# Patient Record
Sex: MALE | Race: WHITE | NOT HISPANIC OR LATINO | Employment: OTHER | URBAN - METROPOLITAN AREA
[De-identification: names, ages, dates, MRNs, and addresses within clinical notes are randomized per-mention and may not be internally consistent; named-entity substitution may affect disease eponyms.]

---

## 2017-10-23 RX ORDER — TRIAMTERENE AND HYDROCHLOROTHIAZIDE 37.5; 25 MG/1; MG/1
CAPSULE ORAL
Qty: 45 CAPSULE | Refills: 0 | OUTPATIENT
Start: 2017-10-23

## 2018-06-20 ENCOUNTER — TELEPHONE (OUTPATIENT)
Dept: DERMATOLOGY | Facility: CLINIC | Age: 68
End: 2018-06-20

## 2018-06-20 NOTE — TELEPHONE ENCOUNTER
6-20-18 Called patient with path report and he is scheduled for a consult with Dr. Goodman on 6-27-18

## 2018-06-27 ENCOUNTER — INITIAL CONSULT (OUTPATIENT)
Dept: DERMATOLOGY | Facility: CLINIC | Age: 68
End: 2018-06-27
Payer: COMMERCIAL

## 2018-06-27 VITALS
WEIGHT: 238 LBS | HEART RATE: 55 BPM | SYSTOLIC BLOOD PRESSURE: 168 MMHG | BODY MASS INDEX: 36.07 KG/M2 | DIASTOLIC BLOOD PRESSURE: 87 MMHG | HEIGHT: 68 IN

## 2018-06-27 DIAGNOSIS — C44.319 BASAL CELL CARCINOMA (BCC) OF RIGHT TEMPLE REGION: Primary | ICD-10-CM

## 2018-06-27 PROCEDURE — 99999 PR PBB SHADOW E&M-EST. PATIENT-LVL III: CPT | Mod: PBBFAC,,, | Performed by: DERMATOLOGY

## 2018-06-27 PROCEDURE — 99202 OFFICE O/P NEW SF 15 MIN: CPT | Mod: S$GLB,,, | Performed by: DERMATOLOGY

## 2018-06-27 RX ORDER — AMLODIPINE BESYLATE 5 MG/1
5 TABLET ORAL DAILY
COMMUNITY
End: 2019-01-30

## 2018-06-27 RX ORDER — NIACIN 500 MG
250 CAPSULE, EXTENDED RELEASE ORAL NIGHTLY
COMMUNITY
End: 2019-05-20

## 2018-06-27 RX ORDER — MONTELUKAST SODIUM 10 MG/1
10 TABLET ORAL NIGHTLY
COMMUNITY
End: 2020-10-25 | Stop reason: SDUPTHER

## 2018-06-27 RX ORDER — TAMSULOSIN HYDROCHLORIDE 0.4 MG/1
0.4 CAPSULE ORAL DAILY
COMMUNITY
End: 2019-01-30

## 2018-06-27 RX ORDER — POTASSIUM CHLORIDE 750 MG/1
10 TABLET, EXTENDED RELEASE ORAL 2 TIMES DAILY
COMMUNITY
End: 2019-08-12 | Stop reason: SDUPTHER

## 2018-06-27 RX ORDER — GLUCOSAMINE/CHONDRO SU A 500-400 MG
1 TABLET ORAL 3 TIMES DAILY
COMMUNITY
End: 2020-01-16

## 2018-06-27 RX ORDER — ROSUVASTATIN CALCIUM 5 MG/1
5 TABLET, COATED ORAL DAILY
COMMUNITY
End: 2019-10-28 | Stop reason: SDUPTHER

## 2018-06-27 RX ORDER — BUDESONIDE AND FORMOTEROL FUMARATE DIHYDRATE 160; 4.5 UG/1; UG/1
2 AEROSOL RESPIRATORY (INHALATION) EVERY 12 HOURS
COMMUNITY
End: 2021-01-07 | Stop reason: SDUPTHER

## 2018-06-27 RX ORDER — TRIAMTERENE AND HYDROCHLOROTHIAZIDE 37.5; 25 MG/1; MG/1
1 CAPSULE ORAL EVERY MORNING
COMMUNITY
End: 2019-11-21 | Stop reason: SDUPTHER

## 2018-06-27 RX ORDER — ALBUTEROL SULFATE 1.25 MG/3ML
1.25 SOLUTION RESPIRATORY (INHALATION) EVERY 6 HOURS PRN
COMMUNITY
End: 2019-01-30

## 2018-06-27 RX ORDER — IBUPROFEN 100 MG/5ML
1000 SUSPENSION, ORAL (FINAL DOSE FORM) ORAL DAILY
COMMUNITY

## 2018-06-27 RX ORDER — ASPIRIN 325 MG
50 TABLET, DELAYED RELEASE (ENTERIC COATED) ORAL DAILY
COMMUNITY

## 2018-06-27 RX ORDER — NAPROXEN SODIUM 220 MG/1
81 TABLET, FILM COATED ORAL
COMMUNITY

## 2018-06-27 RX ORDER — PNV NO.95/FERROUS FUM/FOLIC AC 28MG-0.8MG
1000 TABLET ORAL DAILY
COMMUNITY

## 2018-06-27 NOTE — LETTER
June 28, 2018      Omero Sullivan MD  [address] [address]           [facility]  [address] [facility] [address] [place] [address]  Phone: [phone]          Patient: Nico Teague   MR Number: [id number]   YOB: 1950   Date of Visit: 6/27/2018       Dear Dr. Omero Sullivan:    Thank you for referring Nico Teague to me for evaluation. Attached you will find relevant portions of my assessment and plan of care.    If you have questions, please do not hesitate to call me. I look forward to following Nico Teague along with you.    Sincerely,    Ruddy Goodman MD    Enclosure  CC:  No Recipients    If you would like to receive this communication electronically, please contact [email or web address] or [phone] to request more information on [employer] Link access.    For providers and/or their staff who would like to refer a patient to [facility], please contact us through our one-stop-shop provider referral line, [facility] , at [phone].    If you feel you have received this communication in error or would no longer like to receive these types of communications, please e-mail [email or web address]

## 2018-06-27 NOTE — PROGRESS NOTES
ALLERGIES:  Patient has no known allergies.    CHIEF COMPLAINT:  This 67 y.o. male comes for evaluation for Mohs' Micrographic Surgery, Fresh Tissue Technique, for treatment of a biopsy-proven basal cell carcinoma  on the right central temple. Consultation requested by Omero Sullivan M.D..    The patient is accompanied to this visit by his wife.    HISTORY OF PRESENT ILLNESS:   Location: right central temple  Duration: 2 months or more  Quality: persistent  Context: status post biopsy by Omero Sullivan M.D.; path = basal cell carcinoma; pathology accession #[id number], [facility]     Prior Treatment: none  See also the handwritten notes/diagrams scanned to chart for additional details.    Defibrillator: No  Pacemaker: No  Artificial heart valves: No  Artificial joints: No    REVIEW OF SYSTEMS:   General: general health good  Skin: has previous history of skin cancer(s); prior Mohs surgery right medial canthus at my private office  CV: has hypertension; no artificial valves, has no chest pain; has mitral valve prolapse   Resp: has asthma-related shortness of breath  Endo: has no diabetes  Hem/Lymph: taking prescribed anticoagulants-ASPIRIN, has easy bruising/bleeding  Allergy/Immuno: has no allergies as noted above  GI: has no history of hepatitis  MS: as noted above     PAST MEDICAL HISTORY:  Past Medical History:   Diagnosis Date    Allergy     Arthritis     Asthma     Basal cell carcinoma     Fever blister     Hypertension     Joint pain     Melanoma     Skin disease     Squamous cell carcinoma        PAST SURGICAL HISTORY:  Past Surgical History:   Procedure Laterality Date    SHOULDER ARTHROSCOPY          SOCIAL HISTORY:  Dependencies: smoking status as noted below  Social History   Substance Use Topics    Smoking status: Former Smoker     Packs/day: 2.00     Types: Cigarettes     Start date: 5/1/1972     Quit date: 1/1/1988    Smokeless tobacco: Not on file    Alcohol use Not on file        PERTINENT MEDICATIONS:  See medications list.    Current Outpatient Prescriptions:     albuterol (ACCUNEB) 1.25 mg/3 mL Nebu, Take 1.25 mg by nebulization every 6 (six) hours as needed. Rescue, Disp: , Rfl:     amLODIPine (NORVASC) 5 MG tablet, Take 5 mg by mouth once daily., Disp: , Rfl:     ascorbic acid, vitamin C, (VITAMIN C) 1000 MG tablet, Take 1,000 mg by mouth once daily., Disp: , Rfl:     aspirin 81 MG Chew, Take 81 mg by mouth once daily., Disp: , Rfl:     budesonide-formoterol 160-4.5 mcg (SYMBICORT) 160-4.5 mcg/actuation HFAA, Inhale 2 puffs into the lungs every 12 (twelve) hours. Controller, Disp: , Rfl:     co-enzyme Q-10 50 mg capsule, Take 50 mg by mouth once daily., Disp: , Rfl:     glucosamine-chondroitin 500-400 mg tablet, Take 1 tablet by mouth 3 (three) times daily., Disp: , Rfl:     montelukast (SINGULAIR) 10 mg tablet, Take 10 mg by mouth every evening., Disp: , Rfl:     niacin 500 MG CpSR, Take 250 mg by mouth every evening., Disp: , Rfl:     potassium chloride SA (K-DUR,KLOR-CON) 10 MEQ tablet, Take 10 mEq by mouth 2 (two) times daily., Disp: , Rfl:     rosuvastatin (CRESTOR) 5 MG tablet, Take 5 mg by mouth once daily., Disp: , Rfl:     tamsulosin (FLOMAX) 0.4 mg Cp24, Take 0.4 mg by mouth once daily., Disp: , Rfl:     triamterene-hydrochlorothiazide 37.5-25 mg (DYAZIDE) 37.5-25 mg per capsule, Take 1 capsule by mouth every morning., Disp: , Rfl:     vitamin E 1000 UNIT capsule, Take 1,000 Units by mouth once daily., Disp: , Rfl:     ALLERGIES:  Patient has no known allergies.    EXAM:  See also the handwritten notes/diagrams scanned to chart for additional details.  Constitutional  General appearance: well-developed, well-nourished, well-kempt older white male    Eyes  Inspection of conjunctivae and lids reveals no abnormalities; sclerae anicteric  Neurologic/Psychiatric  Alert,  normal orientation to time, place, person  Normal mood and affect with no evidence of  depression, anxiety, agitation  Skin: see photo(s)  Head: background marked solar damage to exposed areas of skin; in addition, inspection/palpation reveals an approximately 8 mm eschar on the right temple which feels freely movable over the underlying tissues on palpation;  he confirmed this as the site of the prior biopsy  Neck: examination reveals marked chronic solar damage  Right upper extremity: examination reveals marked chronic solar damage; some ecchymosis/ecchymoses   Left upper extremity: examination reveals marked chronic solar damage; some ecchymosis/ecchymoses     ASSESSMENT: biopsy-proven basal cell carcinoma of the right temple  chronic solar damage to areas as noted above  Personal history of skin cancer  Long term current use of aspirin    PLAN:  The diagnosis and management options, and risks and benefits of the alternatives, including observation/non-treatment, radiation treatment, excision with vertical frozen section or paraffin-embedded section margin evaluation, and Mohs' Micrographic Surgery, Fresh Tissue Technique, were discussed at length with the patient. In particular, the discussion included, but was not limited to, the following:    One alternative at this point would be to defer further treatment and observe the lesion. With small skin cancers of this kind, it is possible that a biopsy can be sufficient to definitively treat a small skin cancer of this kind. Alternatively, some skin cancers are slow growing and do not require immediate treatment. The potential advantage of this choice would be to avoid the need for possibly unnecessary additional surgery. Among the potential disadvantages of this would be the possibility of enlargement of the lesion, more extensive spread of the lesion or recurrence at a later date, which might necessitate a larger and more complex surgery.    Radiation treatment can be an effective treatment for this type of skin cancer. The usual course of treatment  is every weekday for several weeks. Local irritation will result from treatment, although no systemic side effects are expected. The potential advantage of radiation treatment is that it avoids the need for surgery. Among the disadvantages of radiation treatment are the length of treatment, the local inflammatory response, the absence of pathologic confirmation of the removal of the skin cancer, a possible increased risk of additional skin cancer in the treated area in later years, and a somewhat increased risk of recurrence at a later date.     Excisional surgery can be an effective treatment for this type of skin cancer. This would involve excision of the lesion with margin evaluation by submitting the specimen to a pathologist for either immediate marginal assessment via frozen section processing, or delayed marginal assessment by fixed-tissue processing. The potential advantage of this technique is that it offers a way of treating the lesion with some degree of histologic confirmation of tumor removal. Among the disadvantages of this treatment are the possible need for re-excision if marginal involvement is identified, a somewhat greater likelihood of recurrence as compared to Mohs' surgery because of the less comprehensive margin evaluation inherent in the technique, and the general potential risks of surgery, including allergic reactions to the anesthetic and other materials used, infection, injury to nerves in the area with consequent loss of sensation or muscle function, and scarring or distortion of surrounding structures.    Mohs' surgery is a very effective treatment for this type of skin cancer. The potential advantage of Mohs' surgery is that this technique offers the greatest possible certainty of knowing that the skin cancer has been completely removed, with the removal of the least amount of normal tissue. The potential disadvantages of Mohs' surgery include the duration of the surgery, the possible  need for a separate surgery for reconstruction following tumor removal, and scarring as a result. In addition, general potential risks of surgery as noted above also apply to treatment via Mohs' surgery.    In light of the nature of this tumor and the location on the temple in an area of increased risk of recurrence,  Mohs' micrographic surgery was thought to be the most appropriate management choice, and this diagnosis is appropriate for treatment by Mohs' micrographic surgery.     Sufficient time was available for questions, and all questions were answered to his satisfaction. He fully understands the aims, risks, alternatives, and possible complications, and has elected to proceed with the surgery, and verbally consented to do so. The procedure will be scheduled in the near future.    Routine pre-op instructions were given to him.    I instructed him to continue ASA prior to surgery.    A consultation report will be sent to Dr. Sullivan.  --------------------------------------  Note: Some or all of this note may have been generated using voice recognition software. There may be voice recognition errors including grammatical and/or spelling errors found in the text. Attempts were made to correct these errors prior to signature.

## 2018-07-20 ENCOUNTER — TELEPHONE (OUTPATIENT)
Dept: DERMATOLOGY | Facility: CLINIC | Age: 68
End: 2018-07-20

## 2018-07-20 NOTE — TELEPHONE ENCOUNTER
7-20-18 returned patient call and told him it was at the [place] office and that I mailed him a new appointment paper. Katty

## 2018-07-20 NOTE — TELEPHONE ENCOUNTER
----- Message from Bao Woodson sent at 7/20/2018  8:45 AM CDT -----  Contact: Patient   Patient Returning Call from [facility]    Who Left Message for Patient:Maria Elena  Communication Preference:[phone]  Additional Information:

## 2018-08-27 NOTE — PROGRESS NOTES
ALLERGIES:   Patient has no known allergies.      Current Outpatient Medications:     albuterol (ACCUNEB) 1.25 mg/3 mL Nebu, Take 1.25 mg by nebulization every 6 (six) hours as needed. Rescue, Disp: , Rfl:     amLODIPine (NORVASC) 5 MG tablet, Take 5 mg by mouth once daily., Disp: , Rfl:     ascorbic acid, vitamin C, (VITAMIN C) 1000 MG tablet, Take 1,000 mg by mouth once daily., Disp: , Rfl:     aspirin 81 MG Chew, Take 81 mg by mouth once daily., Disp: , Rfl:     budesonide-formoterol 160-4.5 mcg (SYMBICORT) 160-4.5 mcg/actuation HFAA, Inhale 2 puffs into the lungs every 12 (twelve) hours. Controller, Disp: , Rfl:     co-enzyme Q-10 50 mg capsule, Take 50 mg by mouth once daily., Disp: , Rfl:     glucosamine-chondroitin 500-400 mg tablet, Take 1 tablet by mouth 3 (three) times daily., Disp: , Rfl:     montelukast (SINGULAIR) 10 mg tablet, Take 10 mg by mouth every evening., Disp: , Rfl:     niacin 500 MG CpSR, Take 250 mg by mouth every evening., Disp: , Rfl:     potassium chloride SA (K-DUR,KLOR-CON) 10 MEQ tablet, Take 10 mEq by mouth 2 (two) times daily., Disp: , Rfl:     rosuvastatin (CRESTOR) 5 MG tablet, Take 5 mg by mouth once daily., Disp: , Rfl:     tamsulosin (FLOMAX) 0.4 mg Cp24, Take 0.4 mg by mouth once daily., Disp: , Rfl:     triamterene-hydrochlorothiazide 37.5-25 mg (DYAZIDE) 37.5-25 mg per capsule, Take 1 capsule by mouth every morning., Disp: , Rfl:     vitamin E 1000 UNIT capsule, Take 1,000 Units by mouth once daily., Disp: , Rfl:   -------------------------------------------------------------  PROCEDURE: Mohs' Micrographic Surgery    SITE: right central temple    INDICATION: basal cell carcinoma in an area at increased risk of recurrence    CASE NUMBER: [id number]      ANESTHETIC: 3 mL 1% Lidocaine with Epinephrine 1:100,000    SURGICAL PREP: Ethanol and Hibiclens    SURGEON: Ruddy Goodman MD    ASSISTANTS: Katty Fiore CST     PREOPERATIVE DIAGNOSIS: basal cell  carcinoma    POSTOPERATIVE DIAGNOSIS: basal cell carcinoma    PATHOLOGIC DIAGNOSIS: basal cell carcinoma    STAGES OF MOHS' SURGERY PERFORMED: one    TUMOR-FREE PLANE ACHIEVED: yes    HEMOSTASIS: Hyfrecation     SPECIMENS: one (one in stage A, )    INITIAL LESION SIZE: 1.5 x 1.5 cm    FINAL DEFECT SIZE: 1.5 x 1.6 cm    WOUND REPAIR/DISPOSITION: see below    NARRATIVE:    The patient is a 67 y.o.male referred by Omero Sullivan MD with a history of cancer on the right temple which was biopsied - pathology accession #[id number], [facility]. Findings revealed basal cell carcinoma. Examination revealed a pink, sclerotic plaque on the right temple at the site of prior biopsy, which was confirmed by reference to the photograph taken at the previous patient visit. In light of the nature of this tumor and the location on the temple, Mohs' micrographic surgery was thought to be the most appropriate management choice, and this diagnosis is appropriate for treatment by Mohs' micrographic surgery.  I discussed it with the patient and he fully understands the aims, risks, alternatives, and possible complications, and elects to proceed.  There are no medical or surgical contraindications to the procedure.     A signed informed consent was obtained.    PROCEDURE:  The patient was placed in the right lateral decubitus position on the operating table in the Mohs' Surgery Suite. The area in question was thoroughly prepped with ethanol and Hibiclens, with particular care to avoid application to the immediate periocular skin or introduction into the external auditory meatus. A sterile surgical marker was used to outline the clinically apparent margins of the involved area, and a narrow margin of normal-appearing skin. Reference marks were made at the periphery of the outlined area with the surgical marker. The proposed area of excision was measured and photographed. Local anesthesia as noted above was administered.  The total  "volume of anesthetic used throughout this portion of the procedure was as documented above. The area was prepared and draped in the standard manner. All of the grossly identifiable area of clinically abnormal tissue and an underlying/peripheral layer was taken and processed by the Mohs' technique.  Hemostasis was obtained with the hyfrecator. Tissue was taken from any areas of residual marginal involvement (if present) and processed by the Mohs' technique in as many stages as needed until a tumor-free plane was achieved.    Colors of inks used in the reference nicks at epidermal margins (if present) and/or inking of non-epithelial edges, if applicable, is represented on the Mohs map as follows: solid lines represent red ink, dots represent blue ink, jagged lines represent black ink, curlicues represent green ink, "xxx" represents yellow ink.    The first Mohs' layer consisted of one section(s) with 4 slide(s) evaluated. No residual tumor was noted at the margins of the first Mohs' layer. Histology of the specimen(s) showed changes consistent with chronic solar damage.     A total of one section(s) and 4 slide(s) were examined under the microscope via the Mohs technique.  A cancer free plane was reached after layer number one. Defect final size was as noted above.     The wound was covered with a nonadherent dressing between stages, and the patient allowed to wait in the waiting area during these periods. The final defect was photographed at the completion of the Mohs' procedure.    See the separate procedure note which follows regarding repair of the defect following Mohs' surgery.     -----------------------------------------------    REPAIR FOLLOWING MOHS' MICROGRAPHIC SURGERY    PREOPERATIVE DIAGNOSIS: defect following Mohs' surgery for a basal cell carcinoma    POSTOPERATIVE DIAGNOSIS: same    PROCEDURE PERFORMED: complex linear closure     ANESTHETIC: 6 mL 1% Lidocaine with Epinephrine 1:100,000     SURGICAL " PREP: Hibiclens    SURGEON: Ruddy Goodman MD     ASSISTANTS: as above    LOCATION: right temple      INDICATIONS:  Earlier in the day, the patient underwent Mohs' micrographic surgical excision of a basal cell carcinoma on the right temple. Tumor free margins were achieved after layer number one.  Later in the day, the management of the resulting wound was addressed with the patient. I discussed the various wound management options with the patient and he fully understands the aims, risks, alternatives, and possible complications of the alternatives, and he elects to proceed with closure of the defect in the manner noted below.  There are no medical or surgical contraindications to the procedure.    A signed informed consent was previously obtained.    PROCEDURE:  Repair via complex closure:  The patient was returned to the procedure room following completion of the Mohs' procedure and final slide review. Because of the size, shape and location of the defect, simple closure could not be achieved without possible distortion of surrounding structures, excessive tension on the wound margins and an unacceptable risk of wound dehiscence, and the creation of standing cone deformities. Consideration was given to the site of the wound, the surrounding structures, and the orientation of closure necessary to provide the optimal functional and cosmetic outcome. After devoting time to these considerations, and to the orientation of the vectors of maximal skin tension surrounding the defect, the area was prepped again and a fusiform closure was outlined on the skin surrounding the defect with a sterile surgical marker, to minimize tension across the wound. Additional anesthetic was infiltrated into the tissues surrounding the defect and the anticipated area of repair, to maintain anesthesia during the procedure. Preparation of the site for closure was then carried out by extending the defect through excision of triangles of  superfluous tissue on either side of the wound to square the shoulders of the defect and to allow closure without distortion by standing cone deformities, creating a semi-fusiform defect with an anterior M-plasty.  Wound margins were extensively undermined to allow advancement of the wound margins into the defect and to permit closure with minimal tension. After hemostasis was achieved with the hyfrecator, closure was accomplished with:      multiple #4-0 buried interrupted Vicryl suture(s) and    multiple #5-0 simple interrupted and vertical mattress Prolene suture(s) and    one #5-0 running locked Prolene suture(s) for final approximation of the wound margins.    Total length of the final closure, including the limbs of the M-plasty, was 5.0 cm.     The site was photographed following completion of the repair. Final dressing consisted of petrolatum, Telfa and tape.    Estimated blood loss for the total procedure was less than 5 mL.    Total operative time including tissue processing in the Mohs' laboratory and microscopic Mohs' frozen section slide review was 2 hour(s). Verbal and written wound care instructions were given to the patient, and he expressed understanding of these instructions. The patient tolerated the procedure well and left the operating room in good condition; he is to return in 7 days for suture removal.     Dr. Goodman's cell phone number was given to the patient with instructions to call prn with any problems.

## 2018-08-28 ENCOUNTER — PROCEDURE VISIT (OUTPATIENT)
Dept: DERMATOLOGY | Facility: CLINIC | Age: 68
End: 2018-08-28
Payer: COMMERCIAL

## 2018-08-28 VITALS
BODY MASS INDEX: 36.07 KG/M2 | HEIGHT: 68 IN | HEART RATE: 65 BPM | WEIGHT: 238 LBS | DIASTOLIC BLOOD PRESSURE: 74 MMHG | SYSTOLIC BLOOD PRESSURE: 140 MMHG

## 2018-08-28 DIAGNOSIS — C44.319 BASAL CELL CARCINOMA (BCC) OF RIGHT TEMPLE REGION: Primary | ICD-10-CM

## 2018-08-28 PROCEDURE — 99499 UNLISTED E&M SERVICE: CPT | Mod: S$GLB,,, | Performed by: DERMATOLOGY

## 2018-08-28 PROCEDURE — 17311 MOHS 1 STAGE H/N/HF/G: CPT | Mod: PBBFAC,PO | Performed by: DERMATOLOGY

## 2018-08-28 PROCEDURE — 17311 MOHS 1 STAGE H/N/HF/G: CPT | Mod: S$GLB,,, | Performed by: DERMATOLOGY

## 2018-08-28 PROCEDURE — 13132 CMPLX RPR F/C/C/M/N/AX/G/H/F: CPT | Mod: 51,S$GLB,, | Performed by: DERMATOLOGY

## 2018-08-28 PROCEDURE — 13132 CMPLX RPR F/C/C/M/N/AX/G/H/F: CPT | Mod: PBBFAC,PO | Performed by: DERMATOLOGY

## 2018-08-28 RX ORDER — LOSARTAN POTASSIUM 100 MG/1
100 TABLET ORAL DAILY
COMMUNITY
End: 2019-08-23 | Stop reason: SDUPTHER

## 2018-09-04 ENCOUNTER — OFFICE VISIT (OUTPATIENT)
Dept: DERMATOLOGY | Facility: CLINIC | Age: 68
End: 2018-09-04
Payer: COMMERCIAL

## 2018-09-04 DIAGNOSIS — Z48.02 VISIT FOR SUTURE REMOVAL: Primary | ICD-10-CM

## 2018-09-04 PROCEDURE — 99024 POSTOP FOLLOW-UP VISIT: CPT | Mod: S$GLB,,, | Performed by: DERMATOLOGY

## 2018-09-04 PROCEDURE — 99999 PR PBB SHADOW E&M-EST. PATIENT-LVL III: CPT | Mod: PBBFAC,,, | Performed by: DERMATOLOGY

## 2018-09-04 NOTE — PROGRESS NOTES
CC: 67 y.o.male patient is here for suture removal.     HPI: Patient is one week(s) s/p Mohs' micrographic surgery, fresh tissue technique of a Basal ell Carcinoma on the right temple, with subsequent repair   Patient reports no problems.    EXAM:  Sutures intact.  Wound healing well.  Good approximation of skin edges.  No undue erythema to surrounding skin or signs or symptoms of infection.    IMPRESSION:  Healing well post Mohs' micrographic surgery and repair    PLAN:  Site cleaned with peroxide, sutures removed  Dressed with petrolatum   Reviewed further care and expected course  Followup 3-4 months to Dr. Sullivan; PRN to me

## 2018-11-29 ENCOUNTER — TELEPHONE (OUTPATIENT)
Dept: PULMONOLOGY | Facility: CLINIC | Age: 68
End: 2018-11-29

## 2018-11-29 NOTE — TELEPHONE ENCOUNTER
----- Message from Arjun Montoya MA sent at 11/29/2018  2:11 PM CST -----      ----- Message -----  From: Catarina Song  Sent: 11/28/2018   2:39 PM  To: [facility] Staff    Would like to be seen tomorrow morning. Having uri issues, temp of 101 that started this afternoon. Sniffles, cough that is not productive, sinus issues. Uses [facility] on  [address]. Body aches. Had flu shot about week and half ago.

## 2018-11-29 NOTE — TELEPHONE ENCOUNTER
I spoke with the patient today, as I just got the message.  He is actually feeling better, no longer with fever.  He is hydrating. Told him to treat cold with OTC meds if he gets worse to call back

## 2019-01-29 RX ORDER — FLUTICASONE PROPIONATE 50 MCG
1 SPRAY, SUSPENSION (ML) NASAL
COMMUNITY
End: 2021-09-22 | Stop reason: SDUPTHER

## 2019-01-29 RX ORDER — GUAIFENESIN AND PHENYLEPHRINE HCL 400; 10 MG/1; MG/1
TABLET ORAL
COMMUNITY
End: 2019-05-20

## 2019-01-29 RX ORDER — MULTIVITAMIN
1 TABLET ORAL DAILY
COMMUNITY

## 2019-01-29 RX ORDER — GARLIC 100 MG
TABLET ORAL
COMMUNITY
End: 2020-01-16

## 2019-01-30 ENCOUNTER — OFFICE VISIT (OUTPATIENT)
Dept: PULMONOLOGY | Facility: CLINIC | Age: 69
End: 2019-01-30
Payer: COMMERCIAL

## 2019-01-30 VITALS
WEIGHT: 258 LBS | OXYGEN SATURATION: 98 % | SYSTOLIC BLOOD PRESSURE: 140 MMHG | DIASTOLIC BLOOD PRESSURE: 65 MMHG | HEART RATE: 68 BPM | HEIGHT: 68 IN | BODY MASS INDEX: 39.1 KG/M2

## 2019-01-30 DIAGNOSIS — J45.40 MODERATE PERSISTENT ASTHMA WITHOUT COMPLICATION: Primary | ICD-10-CM

## 2019-01-30 PROBLEM — J45.909 MODERATE ASTHMA: Status: ACTIVE | Noted: 2019-01-30

## 2019-01-30 PROCEDURE — 99214 OFFICE O/P EST MOD 30 MIN: CPT | Mod: ,,, | Performed by: INTERNAL MEDICINE

## 2019-01-30 PROCEDURE — 99214 PR OFFICE/OUTPT VISIT, EST, LEVL IV, 30-39 MIN: ICD-10-PCS | Mod: ,,, | Performed by: INTERNAL MEDICINE

## 2019-01-30 PROCEDURE — 1101F PR PT FALLS ASSESS DOC 0-1 FALLS W/OUT INJ PAST YR: ICD-10-PCS | Mod: ,,, | Performed by: INTERNAL MEDICINE

## 2019-01-30 PROCEDURE — 1101F PT FALLS ASSESS-DOCD LE1/YR: CPT | Mod: ,,, | Performed by: INTERNAL MEDICINE

## 2019-01-30 RX ORDER — VERAPAMIL HYDROCHLORIDE 120 MG/1
120 TABLET, FILM COATED ORAL DAILY
COMMUNITY
Start: 2019-01-24 | End: 2020-01-16

## 2019-01-30 RX ORDER — ASCORBATE CALCIUM 500 MG
500 TABLET ORAL
COMMUNITY
End: 2019-05-20

## 2019-01-30 RX ORDER — DICLOFENAC SODIUM 10 MG/G
GEL TOPICAL
COMMUNITY
Start: 2018-12-18 | End: 2019-05-20

## 2019-01-30 RX ORDER — ALBUTEROL SULFATE 90 UG/1
2 AEROSOL, METERED RESPIRATORY (INHALATION) EVERY 6 HOURS PRN
Qty: 3 INHALER | Refills: 4 | Status: SHIPPED | OUTPATIENT
Start: 2019-01-30 | End: 2021-04-01 | Stop reason: SDUPTHER

## 2019-01-30 NOTE — PROGRESS NOTES
SUBJECTIVE:    Patient ID: Nico Teague is a 68 y.o. male.    Chief Complaint: Asthma    HPI   Patient here today feeling alright.  He is using Symbicort twice a day.  He is using his rescue inhaler 3-4 times a week.  He states Dr. Rivera diagnosed him with PVC's and changed some of his meds.  He does not know if his shortness of breath is his asthma or his heart, he does not check his peak flow when he is short of breath.    Past Medical History:   Diagnosis Date    Allergy     Arthritis     Asthma     Basal cell carcinoma     Fever blister     Hypertension     Joint pain     Melanoma     PVC (premature ventricular contraction)     Skin disease     Squamous cell carcinoma      Past Surgical History:   Procedure Laterality Date    LIPOMA RESECTION      SHOULDER ARTHROSCOPY      SKIN GRAFT       Family History   Problem Relation Age of Onset    Cancer Mother         Social History:   Marital Status:   Occupation: retired captain   Alcohol History:  reports that he does not drink alcohol.  Tobacco History:  reports that he quit smoking about 31 years ago. His smoking use included cigarettes. He started smoking about 46 years ago. He has a 40.00 pack-year smoking history. He does not have any smokeless tobacco history on file.  Drug History:  reports that he does not use drugs.    Review of patient's allergies indicates:   Allergen Reactions    Bactrim [sulfamethoxazole-trimethoprim]      HIVES/ RASH       Current Outpatient Medications   Medication Sig Dispense Refill    albuterol (ACCUNEB) 1.25 mg/3 mL Nebu Take 1.25 mg by nebulization every 6 (six) hours as needed. Rescue      ascorbic acid, vitamin C, (VITAMIN C) 1000 MG tablet Take 1,000 mg by mouth once daily.      aspirin 81 MG Chew Take 81 mg by mouth once daily.      budesonide-formoterol 160-4.5 mcg (SYMBICORT) 160-4.5 mcg/actuation HFAA Inhale 2 puffs into the lungs every 12 (twelve) hours. Controller      co-enzyme Q-10 50  mg capsule Take 50 mg by mouth once daily.      fluticasone (FLONASE) 50 mcg/actuation nasal spray 1 spray by Nasal route.      garlic 100 mg Tab Take by mouth.      glucosamine-chondroitin 500-400 mg tablet Take 1 tablet by mouth 3 (three) times daily.      Lactobacillus rhamnosus GG (CULTURELLE) 10 billion cell capsule Take 1 capsule by mouth.      losartan (COZAAR) 100 MG tablet Take 100 mg by mouth once daily.      montelukast (SINGULAIR) 10 mg tablet Take 10 mg by mouth every evening.      multivitamin (THERAGRAN) per tablet Take 1 tablet by mouth.      niacin 500 MG CpSR Take 250 mg by mouth every evening.      potassium 99 mg Tab Take by mouth.      potassium chloride SA (K-DUR,KLOR-CON) 10 MEQ tablet Take 10 mEq by mouth 2 (two) times daily.      rosuvastatin (CRESTOR) 5 MG tablet Take 5 mg by mouth once daily.      triamterene-hydrochlorothiazide 37.5-25 mg (DYAZIDE) 37.5-25 mg per capsule Take 1 capsule by mouth every morning.      turmeric root extract 500 mg Cap Take by mouth.      verapamil (CALAN-SR) 180 MG CR tablet       vitamin E 1000 UNIT capsule Take 1,000 Units by mouth once daily.      ascorbate calcium 500 mg Tab Take 500 mg by mouth.      diclofenac sodium (VOLTAREN) 1 % Gel        No current facility-administered medications for this visit.        Last PFT: 4/2018      Review of Systems  General: Feeling Well.  Eyes: Vision is good.  ENT:  No sinusitis or pharyngitis.   Heart:: feels palpitations occasionally .  Lungs: dyspnea stable but still using rescue 4-5 times a week, cold spell makes him more short of breath   GI: No Nausea, vomiting, constipation, diarrhea, or reflux.  : No dysuria, hesitancy, or nocturia.  Musculoskeletal: knee pain .  Skin: No lesions or rashes.  Neuro: No headaches or neuropathy.  Lymph: swellings better since taking his fluid pill daily   Psych: No anxiety or depression.  Endo: weight gain     OBJECTIVE:      BP (!) 140/65 (BP Location: Left  "arm, Patient Position: Sitting, BP Method: Medium (Manual))   Pulse 68   Ht 5' 8" (1.727 m)   Wt 117 kg (258 lb)   SpO2 98%   BMI 39.23 kg/m²     Physical Exam  GENERAL: Older patient in no distress.  HEENT: Pupils equal and reactive. Extraocular movements intact. Nose intact.      Pharynx moist.  NECK: Supple.   HEART: Regular rate and rhythm. No murmur or gallop auscultated.  LUNGS: Clear to auscultation and percussion. Lung excursion symmetrical. No change in fremitus. No adventitial noises.  ABDOMEN: Bowel sounds present. Non-tender, no masses palpated.  EXTREMITIES: Normal muscle tone and joint movement, no cyanosis or clubbing.   LYMPHATICS: No adenopathy palpated, no edema.  SKIN: Dry, intact, no lesions.   NEURO: Cranial nerves II-XII intact. Motor strength 5/5 bilaterally, upper and lower extremities.  PSYCH: Appropriate affect.    Divya Alvarez NP served in the capacity as a "scribe" for this patient encounter.  A "face to face" encounter occurred with Dr. Brewer on this date  The treatment plan and medical decision making is outlined below:         Assessment:       1. Moderate persistent asthma without complication          Plan:       Moderate persistent asthma without complication    lose weight  Exercise  Use your rescue inhaler 2 puffs 15 minutes before exercise   Check you peak when you feel short of breath before reaching for your rescue inhaler  Continue the Symbicort      Follow-up in about 6 months (around 7/30/2019).        "

## 2019-01-30 NOTE — PATIENT INSTRUCTIONS
Understanding Asthma Triggers  Triggers are things that cause you to have asthma symptoms. Some triggers you can stay away from completely. Others you can plan for and adjust to. Use this sheet to help you know your triggers.    What are triggers?  Triggers irritate your lungs and lead to asthma flare-ups. Some examples are:  · Irritants, such as tobacco smoke or air pollution. These are a concern for all people with asthma.  · Allergens or substances that cause allergies, like pets, dust mites, or pollen  · Special conditions. These include being ill with a cold or the flu, or certain kinds of weather, including changes in weather. These differ from person to person.  · Exercise can trigger asthma in some people. If exercise is one of your triggers, you can learn how to exercise safely.  What triggers your asthma?  Which of these common triggers cause your asthma to flare up? Check all that apply to you.  Irritants:  ? Tobacco smoke (smoking or secondhand smoke)  ? Smoke from fireplaces  ? Vehicle exhaust  ? Smog or air pollution  ? Aerosol sprays  ? Strong odors, such as perfume, incense, or cooking odors  ? Household , such as ammonia or bleach  Allergens:  ? Cats  ? Dogs  ? Birds  ? Dust or dust mites  ? Pollen  ? Mold  ? Cockroaches  Other triggers:  ? Cold air  ? Hot air  ? Weather changes  ? Exercise  ? Certain foods or food ingredients (such as sulfites)  ? Medicines  ? Emotions such as laughing, crying, or feeling stressed  ? Illness such as colds, flu, and sinus infections  Allergies and allergy treatment  People with asthma often have allergies. If you have allergies, or think you have them, talk with your healthcare provider about testing and treatment. Allergy testing can find out exactly which allergens affect you. Types of tests include:  · Skin tests. A small amount of each allergen is put on the skin. Sites are then looked at for an allergic reaction. This could be redness, swelling, or  itching. In general, the greater the reaction, the stronger the allergy.  · Blood tests. A blood test can show sensitivity to the allergen.  Exposing a person to gradually larger amounts of an allergen can help the body build up a tolerance. This is the purpose of allergy shots (immunotherapy). For this therapy, injections are given over a period of years. At first, you get injections with a very small amount of allergen about once a week. As treatment goes on, the amount of allergen is gradually increased to a certain level. Eventually, you have the injections less often. This therapy can take up to a year to start working. But it can be very effective to manage certain allergies over time.   Date Last Reviewed: 10/1/2016  © 9905-2099 [employer] [address] [place] [address], [place] [address]. All rights reserved. This information is not intended as a substitute for professional medical care. Always follow your healthcare professional's instructions.      ose weight  Exercise  Use your rescue inhaler 2 puffs 15 minutes before exercise   Check you peak when you feel short of breath   Continue the Symbicort

## 2019-05-06 ENCOUNTER — TELEPHONE (OUTPATIENT)
Dept: PULMONOLOGY | Facility: CLINIC | Age: 69
End: 2019-05-06

## 2019-05-06 RX ORDER — PREDNISONE 10 MG/1
TABLET ORAL
Qty: 20 TABLET | Refills: 0 | Status: SHIPPED | OUTPATIENT
Start: 2019-05-06 | End: 2019-05-20

## 2019-05-06 NOTE — TELEPHONE ENCOUNTER
Started with allergies on Friday . Went to urgent care on Friday they gave him a shot an antibodic . He is having a lot of coughing can't lay down withough coughing wants to know if we can prescribe him something is doing worse .

## 2019-05-06 NOTE — TELEPHONE ENCOUNTER
I spoke with the patient sent Prednisone taper, he has tessalon the 100mg I told him to take 2 three times a day instead of 1.

## 2019-05-17 RX ORDER — PROMETHAZINE HYDROCHLORIDE 6.25 MG/5ML
SYRUP ORAL
COMMUNITY
Start: 2019-05-03 | End: 2019-05-20

## 2019-05-17 RX ORDER — TAMSULOSIN HYDROCHLORIDE 0.4 MG/1
CAPSULE ORAL
COMMUNITY
Start: 2019-02-08 | End: 2019-08-16 | Stop reason: SDUPTHER

## 2019-05-17 RX ORDER — ECONAZOLE NITRATE 10 MG/G
CREAM TOPICAL
COMMUNITY
Start: 2019-02-11 | End: 2021-12-14

## 2019-05-17 RX ORDER — AMLODIPINE BESYLATE 5 MG/1
TABLET ORAL
COMMUNITY
Start: 2018-12-18 | End: 2019-08-08

## 2019-05-17 RX ORDER — CEFDINIR 300 MG/1
CAPSULE ORAL
COMMUNITY
Start: 2019-05-03 | End: 2019-05-20

## 2019-05-20 ENCOUNTER — OFFICE VISIT (OUTPATIENT)
Dept: PULMONOLOGY | Facility: CLINIC | Age: 69
End: 2019-05-20
Payer: COMMERCIAL

## 2019-05-20 VITALS
SYSTOLIC BLOOD PRESSURE: 125 MMHG | OXYGEN SATURATION: 97 % | HEART RATE: 88 BPM | HEIGHT: 68 IN | DIASTOLIC BLOOD PRESSURE: 80 MMHG | BODY MASS INDEX: 36.53 KG/M2 | WEIGHT: 241 LBS

## 2019-05-20 DIAGNOSIS — J45.40 MODERATE PERSISTENT ASTHMA WITHOUT COMPLICATION: Primary | ICD-10-CM

## 2019-05-20 PROCEDURE — 1101F PR PT FALLS ASSESS DOC 0-1 FALLS W/OUT INJ PAST YR: ICD-10-PCS | Mod: ,,, | Performed by: INTERNAL MEDICINE

## 2019-05-20 PROCEDURE — 1101F PT FALLS ASSESS-DOCD LE1/YR: CPT | Mod: ,,, | Performed by: INTERNAL MEDICINE

## 2019-05-20 PROCEDURE — 99214 OFFICE O/P EST MOD 30 MIN: CPT | Mod: ,,, | Performed by: INTERNAL MEDICINE

## 2019-05-20 PROCEDURE — 99214 PR OFFICE/OUTPT VISIT, EST, LEVL IV, 30-39 MIN: ICD-10-PCS | Mod: ,,, | Performed by: INTERNAL MEDICINE

## 2019-05-20 NOTE — PATIENT INSTRUCTIONS
Controlling Asthma Triggers: Irritants  Irritants are things in the air that can trigger symptoms in some people with asthma or COPD. Below are some common irritants. Some are tiny particles and others are dissolved in the air. You will also find tips to help you stay away from them.     Wear a mask when working around fine particles, like dust or residue from sanding.   Smoke  This is from cigarettes, cigars, pipes, barbecues, outdoor campfires, and fireplaces.  · Dont smoke. And dont let people smoke in your home or car.  · When you travel, ask for rental cars and hotel rooms that are smoke-free.  · Stay away from fireplaces and wood stoves. If you cant, sit away from them. Make sure the smoke goes outside.  · Dont burn incense or use candles.  · Move away from smoky outdoor cooking grills.  Smog  This is from car exhaust and other pollution.  · Read or listen to local air quality reports. These let you know when air quality is poor.  · Stay indoors as much as you can on smoggy days. If possible, use air conditioning instead of opening the windows. Air conditioners filter the air. The filter needs to be cleaned regularly.  · In your car, set air conditioning to use air only inside the car. This will let in less pollution.  Strong odors  These are from air fresheners, deodorizers, and cleaning products. They are also from perfumes, deodorants, and other beauty products. Strong odors also come from incense and candles, and insect sprays and other sprays. The chlorine in swimming pools can affect some people.  · Use products with no scent. An example is scent-free deodorant or body lotion.  · Do not use products with bleach and ammonia for cleaning. Try making a cleaning solution with white vinegar, baking soda, or mild dish soap.  · Use exhaust fans while cooking. Or open a window if you can.  · Do not use perfumes, air fresheners, potpourri, and other scented products.  Other irritants  These are dust,  aerosol sprays, and fine powders.  · Wear a mask while doing tasks like sanding, dusting, sweeping, and yardwork. Make sure any indoor work area is well-ventilated. Open doors and windows when doing these tasks.  · Use pump spray bottles instead of aerosols.  · Pour liquid  instead of spraying them. For example, instead of spraying a window with , pour some on a rag or cloth.  If you have a quick-relief inhaler, carry it with you at all times. If you cant avoid an area with irritants, watch for symptoms. If you have symptoms, leave the area and use your quick-relief inhaler as directed.   Date Last Reviewed: 10/1/2016  © 1518-5998 [employer] [address] [place] [address], [place] [address]. All rights reserved. This information is not intended as a substitute for professional medical care. Always follow your healthcare professional's instructions.      Keep using your Symbicort 2 puffs twice a day   Continue the Singulair every day  Have Dr. Jiang send us office visit tomorrow  Keep losing weight

## 2019-05-20 NOTE — PROGRESS NOTES
SUBJECTIVE:    Patient ID: Nico Teague is a 68 y.o. male.    Chief Complaint: Asthma    HPI   Patient here today using his Symbicort twice a day.  He is getting over bronchitis again and a sinus infection. He is going to see Dr. Jiang tomorrow regarding his CT scan of his sinuses.  He has completed Ceftin and is currently on Levaquin per Dr. Jiang.   He is not coughing like he was several weeks ago.  His peak flow today is 750.  Past Medical History:   Diagnosis Date    Allergy     Arthritis     Asthma     Basal cell carcinoma     Fever blister     Hypertension     Joint pain     Melanoma     PVC (premature ventricular contraction)     Skin disease     Squamous cell carcinoma      Past Surgical History:   Procedure Laterality Date    LIPOMA RESECTION      SHOULDER ARTHROSCOPY      SKIN GRAFT       Family History   Problem Relation Age of Onset    Cancer Mother         Social History:   Marital Status:   Occupation: retired captain   Alcohol History:  reports that he does not drink alcohol.  Tobacco History:  reports that he quit smoking about 31 years ago. His smoking use included cigarettes. He started smoking about 47 years ago. He has a 40.00 pack-year smoking history. He has never used smokeless tobacco.  Drug History:  reports that he does not use drugs.    Review of patient's allergies indicates:   Allergen Reactions    Bactrim [sulfamethoxazole-trimethoprim]      HIVES/ RASH       Current Outpatient Medications   Medication Sig Dispense Refill    amLODIPine (NORVASC) 5 MG tablet Take 1/2 tablet by mouth twice a day      ascorbic acid, vitamin C, (VITAMIN C) 1000 MG tablet Take 1,000 mg by mouth once daily.      aspirin 81 MG Chew Take 81 mg by mouth once daily.      budesonide-formoterol 160-4.5 mcg (SYMBICORT) 160-4.5 mcg/actuation HFAA Inhale 2 puffs into the lungs every 12 (twelve) hours. Controller      co-enzyme Q-10 50 mg capsule Take 50 mg by mouth once daily.       econazole nitrate 1 % cream       garlic 100 mg Tab Take by mouth.      glucosamine-chondroitin 500-400 mg tablet Take 1 tablet by mouth 3 (three) times daily.      Lactobacillus rhamnosus GG (CULTURELLE) 10 billion cell capsule Take 1 capsule by mouth.      losartan (COZAAR) 100 MG tablet Take 100 mg by mouth once daily.      montelukast (SINGULAIR) 10 mg tablet Take 10 mg by mouth every evening.      multivitamin (THERAGRAN) per tablet Take 1 tablet by mouth.      potassium 99 mg Tab Take by mouth.      potassium chloride SA (K-DUR,KLOR-CON) 10 MEQ tablet Take 10 mEq by mouth 2 (two) times daily.      rosuvastatin (CRESTOR) 5 MG tablet Take 5 mg by mouth once daily.      tamsulosin (FLOMAX) 0.4 mg Cap       turmeric root extract 500 mg Cap Take by mouth.      verapamil (CALAN-SR) 180 MG CR tablet       vitamin E 1000 UNIT capsule Take 1,000 Units by mouth once daily.      albuterol (PROVENTIL/VENTOLIN HFA) 90 mcg/actuation inhaler Inhale 2 puffs into the lungs every 6 (six) hours as needed for Shortness of Breath. Rescue 3 Inhaler 4    diclofenac sodium (VOLTAREN) 1 % Gel       fluticasone (FLONASE) 50 mcg/actuation nasal spray 1 spray by Nasal route.      promethazine (PHENERGAN) 6.25 mg/5 mL syrup       triamterene-hydrochlorothiazide 37.5-25 mg (DYAZIDE) 37.5-25 mg per capsule Take 1 capsule by mouth every morning.       No current facility-administered medications for this visit.        Last PFT: 4/2018      Review of Systems  General: Feeling Well.  Eyes: Vision is good.  ENT:  He is blowing a lot out of his nose.   Heart:: feels palpitations occasionally .  Lungs: shortness of breath is better then it was 2 weeks ago, not coughing anymore   GI: No Nausea, vomiting, constipation, diarrhea, or reflux.  : No dysuria, hesitancy, or nocturia.  Musculoskeletal: knee pain .  Skin: No lesions or rashes.  Neuro: No headaches or neuropathy.  Lymph: swelling much better to his legs   Psych: No  "anxiety or depression.  Endo: weight loss     OBJECTIVE:      /80 (BP Location: Left arm, Patient Position: Sitting, BP Method: Medium (Manual))   Pulse 88   Ht 5' 8" (1.727 m)   Wt 109.3 kg (241 lb)   SpO2 97%    L/min   BMI 36.64 kg/m²     Physical Exam  GENERAL: Older patient in no distress.  HEENT: Pupils equal and reactive. Extraocular movements intact. Nose intact.      Pharynx moist.  NECK: Supple.   HEART: Regular rate and rhythm. No murmur or gallop auscultated.  LUNGS: Clear to auscultation and percussion. Lung excursion symmetrical. No change in fremitus. No adventitial noises.  ABDOMEN: Bowel sounds present. Non-tender, no masses palpated.  EXTREMITIES: Normal muscle tone and joint movement, no cyanosis or clubbing.   LYMPHATICS: No adenopathy palpated, +1 pitting edema to legs   SKIN: Dry, intact, no lesions.   NEURO: Cranial nerves II-XII intact. Motor strength 5/5 bilaterally, upper and lower extremities.  PSYCH: Appropriate affect.    Divya Alvarez NP served in the capacity as a "scribe" for this patient encounter.  A "face to face" encounter occurred with Dr. Brewer on this date  The treatment plan and medical decision making is outlined below:         Assessment:       1. Moderate persistent asthma without complication          Plan:       Moderate persistent asthma without complication      Keep using your Symbicort 2 puffs twice a day   Continue the Singulair every day  Have Dr. Jiang send us office visit tomorrow  Keep losing weight   Follow up in about 6 months (around 11/20/2019).        "

## 2019-08-08 ENCOUNTER — OFFICE VISIT (OUTPATIENT)
Dept: FAMILY MEDICINE | Facility: CLINIC | Age: 69
End: 2019-08-08
Payer: MEDICARE

## 2019-08-08 VITALS
BODY MASS INDEX: 35.92 KG/M2 | HEART RATE: 68 BPM | HEIGHT: 68 IN | WEIGHT: 237 LBS | OXYGEN SATURATION: 98 % | DIASTOLIC BLOOD PRESSURE: 70 MMHG | SYSTOLIC BLOOD PRESSURE: 118 MMHG

## 2019-08-08 DIAGNOSIS — Z11.59 NEED FOR HEPATITIS C SCREENING TEST: ICD-10-CM

## 2019-08-08 DIAGNOSIS — N40.0 BENIGN PROSTATIC HYPERPLASIA, UNSPECIFIED WHETHER LOWER URINARY TRACT SYMPTOMS PRESENT: ICD-10-CM

## 2019-08-08 DIAGNOSIS — E78.5 DYSLIPIDEMIA: ICD-10-CM

## 2019-08-08 DIAGNOSIS — I10 ESSENTIAL HYPERTENSION: Primary | ICD-10-CM

## 2019-08-08 DIAGNOSIS — Z13.6 SCREENING FOR AAA (ABDOMINAL AORTIC ANEURYSM): ICD-10-CM

## 2019-08-08 DIAGNOSIS — J45.20 MILD INTERMITTENT ASTHMA WITHOUT COMPLICATION: ICD-10-CM

## 2019-08-08 PROCEDURE — 1101F PT FALLS ASSESS-DOCD LE1/YR: CPT | Mod: S$GLB,,, | Performed by: NURSE PRACTITIONER

## 2019-08-08 PROCEDURE — 99203 PR OFFICE/OUTPT VISIT, NEW, LEVL III, 30-44 MIN: ICD-10-PCS | Mod: S$GLB,,, | Performed by: NURSE PRACTITIONER

## 2019-08-08 PROCEDURE — 99203 OFFICE O/P NEW LOW 30 MIN: CPT | Mod: S$GLB,,, | Performed by: NURSE PRACTITIONER

## 2019-08-08 PROCEDURE — 1101F PR PT FALLS ASSESS DOC 0-1 FALLS W/OUT INJ PAST YR: ICD-10-PCS | Mod: S$GLB,,, | Performed by: NURSE PRACTITIONER

## 2019-08-08 RX ORDER — ACETAMINOPHEN, DIPHENHYDRAMINE HCL, PHENYLEPHRINE HCL 325; 25; 5 MG/1; MG/1; MG/1
1 TABLET ORAL NIGHTLY PRN
COMMUNITY

## 2019-08-08 RX ORDER — LANOLIN ALCOHOL/MO/W.PET/CERES
400 CREAM (GRAM) TOPICAL DAILY
COMMUNITY
End: 2020-01-16

## 2019-08-08 RX ORDER — TERBINAFINE HYDROCHLORIDE 250 MG/1
250 TABLET ORAL
COMMUNITY
Start: 2019-06-04 | End: 2020-01-16

## 2019-08-08 RX ORDER — BENZONATATE 200 MG/1
200 CAPSULE ORAL 3 TIMES DAILY PRN
COMMUNITY
End: 2019-12-11

## 2019-08-08 NOTE — PROGRESS NOTES
SUBJECTIVE:      Patient ID: Nico Teague is a 68 y.o. male.    Chief Complaint: Establish Care    Mr Teague is a 68 y.o. male here today to establish care. He follows with Dr Rivera for PVCs, currently taking Verapamil  mg q am. Following with Dr Jiang for chronic sinusitis. Also follows with Dr Brewer for Asthma and Dr Rivas for BPH. He is UTD with his colonoscopy. Doing well today, does not need refills at this time. Had recent labs in June 2019.       Hyperlipidemia   This is a chronic problem. The current episode started more than 1 year ago. The problem is controlled. Recent lipid tests were reviewed and are normal. Pertinent negatives include no chest pain or shortness of breath. Current antihyperlipidemic treatment includes statins. The current treatment provides significant improvement of lipids.   Hypertension   This is a chronic problem. The current episode started more than 1 year ago. The problem is controlled. Pertinent negatives include no anxiety, blurred vision, chest pain, headaches, malaise/fatigue, palpitations, peripheral edema or shortness of breath. Risk factors for coronary artery disease include male gender, dyslipidemia and obesity. Past treatments include angiotensin blockers and diuretics. The current treatment provides significant improvement.       Past Surgical History:   Procedure Laterality Date    LIPOMA RESECTION      NOSE SURGERY      SHOULDER ARTHROSCOPY      SKIN GRAFT       Family History   Problem Relation Age of Onset    Cancer Mother         esophageal    Heart disease Father     Hypertension Father     Arthritis Paternal Grandmother       Social History     Socioeconomic History    Marital status:      Spouse name: Not on file    Number of children: Not on file    Years of education: Not on file    Highest education level: Not on file   Occupational History    Occupation: retired captain    Social Needs    Financial resource strain:  Not on file    Food insecurity:     Worry: Not on file     Inability: Not on file    Transportation needs:     Medical: Not on file     Non-medical: Not on file   Tobacco Use    Smoking status: Former Smoker     Packs/day: 2.00     Years: 20.00     Pack years: 40.00     Types: Cigarettes     Start date: 1972     Last attempt to quit: 1988     Years since quittin.9    Smokeless tobacco: Never Used   Substance and Sexual Activity    Alcohol use: No     Frequency: Never    Drug use: No    Sexual activity: Never   Lifestyle    Physical activity:     Days per week: Not on file     Minutes per session: Not on file    Stress: Not on file   Relationships    Social connections:     Talks on phone: Not on file     Gets together: Not on file     Attends Rastafarian service: Not on file     Active member of club or organization: Not on file     Attends meetings of clubs or organizations: Not on file     Relationship status: Not on file   Other Topics Concern    Not on file   Social History Narrative    Not on file     Current Outpatient Medications   Medication Sig Dispense Refill    albuterol (PROVENTIL/VENTOLIN HFA) 90 mcg/actuation inhaler Inhale 2 puffs into the lungs every 6 (six) hours as needed for Shortness of Breath. Rescue 3 Inhaler 4    ascorbic acid, vitamin C, (VITAMIN C) 1000 MG tablet Take 1,000 mg by mouth once daily.      aspirin 81 MG Chew Take 81 mg by mouth once daily.      benzonatate (TESSALON) 200 MG capsule Take 200 mg by mouth 3 (three) times daily as needed for Cough.      budesonide-formoterol 160-4.5 mcg (SYMBICORT) 160-4.5 mcg/actuation HFAA Inhale 2 puffs into the lungs every 12 (twelve) hours. Controller      co-enzyme Q-10 50 mg capsule Take 50 mg by mouth once daily.      econazole nitrate 1 % cream       fluticasone (FLONASE) 50 mcg/actuation nasal spray 1 spray by Nasal route.      garlic 100 mg Tab Take by mouth.      glucosamine-chondroitin 500-400 mg tablet  Take 1 tablet by mouth 3 (three) times daily.      Lactobacillus rhamnosus GG (CULTURELLE) 10 billion cell capsule Take 1 capsule by mouth.      losartan (COZAAR) 100 MG tablet Take 100 mg by mouth once daily.      magnesium oxide (MAG-OX) 400 mg (241.3 mg magnesium) tablet Take 400 mg by mouth once daily.      melatonin 10 mg Tab Take 1 tablet by mouth nightly as needed.      montelukast (SINGULAIR) 10 mg tablet Take 10 mg by mouth every evening.      multivitamin (THERAGRAN) per tablet Take 1 tablet by mouth.      omega 3-dha-epa-fish oil 1,600-500-800 mg/5 mL Liqd Take 2 capsules by mouth once daily.      potassium 99 mg Tab Take by mouth.      potassium chloride SA (K-DUR,KLOR-CON) 10 MEQ tablet Take 10 mEq by mouth 2 (two) times daily.      rosuvastatin (CRESTOR) 5 MG tablet Take 5 mg by mouth once daily.      tamsulosin (FLOMAX) 0.4 mg Cap       terbinafine HCl (LAMISIL) 250 mg tablet Take 250 mg by mouth.      triamterene-hydrochlorothiazide 37.5-25 mg (DYAZIDE) 37.5-25 mg per capsule Take 1 capsule by mouth every morning.      verapamil (CALAN-SR) 180 MG CR tablet       vitamin E 1000 UNIT capsule Take 1,000 Units by mouth once daily.       No current facility-administered medications for this visit.      Review of patient's allergies indicates:   Allergen Reactions    Bactrim [sulfamethoxazole-trimethoprim]      HIVES/ RASH      Past Medical History:   Diagnosis Date    Allergy     Arthritis     Asthma     Basal cell carcinoma     Fever blister     Hypertension     Joint pain     Melanoma     PVC (premature ventricular contraction)     Skin disease     Squamous cell carcinoma      Past Surgical History:   Procedure Laterality Date    LIPOMA RESECTION      NOSE SURGERY      SHOULDER ARTHROSCOPY      SKIN GRAFT         Review of Systems   Constitutional: Negative for appetite change, chills, diaphoresis, malaise/fatigue and unexpected weight change.   HENT: Negative for ear  "discharge, facial swelling, hearing loss, nosebleeds and trouble swallowing.    Eyes: Negative for blurred vision, photophobia, pain and visual disturbance.   Respiratory: Negative for apnea, choking, shortness of breath and wheezing.    Cardiovascular: Negative for chest pain and palpitations.   Gastrointestinal: Negative for abdominal pain, blood in stool and vomiting.   Endocrine: Negative for polyphagia.   Genitourinary: Negative for difficulty urinating and hematuria.   Musculoskeletal: Negative for gait problem and joint swelling.   Skin: Negative for pallor.   Neurological: Negative for dizziness, seizures, speech difficulty, weakness, light-headedness and headaches.   Hematological: Does not bruise/bleed easily.   Psychiatric/Behavioral: Negative for agitation, confusion and dysphoric mood.      OBJECTIVE:      Vitals:    08/08/19 0939   BP: 118/70   Pulse: 68   SpO2: 98%   Weight: 107.5 kg (237 lb)   Height: 5' 8" (1.727 m)     Physical Exam   Constitutional: He is oriented to person, place, and time. He appears well-developed and well-nourished.   HENT:   Head: Atraumatic.   Eyes: Conjunctivae are normal.   Neck: Neck supple. No JVD present. Carotid bruit is not present. No thyromegaly present.   Cardiovascular: Normal rate, regular rhythm, normal heart sounds and intact distal pulses.   Pulmonary/Chest: Effort normal and breath sounds normal.   Abdominal: Soft. Bowel sounds are normal. He exhibits no distension. There is no tenderness.   Musculoskeletal: Normal range of motion. He exhibits no edema.   Lymphadenopathy:     He has no cervical adenopathy.   Neurological: He is alert and oriented to person, place, and time.   Skin: Skin is warm and dry. No rash noted.   Psychiatric: He has a normal mood and affect. His speech is normal and behavior is normal.   Nursing note and vitals reviewed.     Assessment:       1. Essential hypertension    2. Dyslipidemia    3. Mild intermittent asthma without " complication    4. Benign prostatic hyperplasia, unspecified whether lower urinary tract symptoms present    5. Need for hepatitis C screening test    6. Screening for AAA (abdominal aortic aneurysm)        Plan:       Essential hypertension  Stable on current meds    Dyslipidemia  Lipids 6/19 reviewed and stable  Stable on current meds  Will repeat labs in 6mo    Mild intermittent asthma without complication  F/u with Dr Brewer as scheduled    Benign prostatic hyperplasia, unspecified whether lower urinary tract symptoms present  F/u with Dr Rivas as scheduled    Need for hepatitis C screening test  -     Hepatitis C antibody; Future; Expected date: 08/08/2019    Screening for AAA (abdominal aortic aneurysm)  -     US Abdominal Aorta; Future; Expected date: 08/08/2019        Follow up in about 6 months (around 2/8/2020) for htn .      8/8/2019 CONCHITA Bernal, FNP

## 2019-08-08 NOTE — PATIENT INSTRUCTIONS
4 Steps for Eating Healthier  Changing the way you eat can improve your health. It can lower your cholesterol and blood pressure, and help you stay at a healthy weight. Your diet doesnt have to be bland and boring to be healthy. Just watch your calories and follow these steps:    1. Eat fewer unhealthy fats  · Choose more fish and lean meats instead of fatty cuts of meat.  · Skip butter and lard, and use less margarine.  · Pass on foods that have palm, coconut, or hydrogenated oils.  · Eat fewer high-fat dairy foods like cheese, ice cream, and whole milk.  · Get a heart-healthy cookbook and try some low-fat recipes.  2. Go light on salt  · Keep the saltshaker off the table.  · Limit high-salt ingredients, such as soy sauce, bouillon, and garlic salt.  · Instead of adding salt when cooking, season your food with herbs and flavorings. Try lemon, garlic, and onion.  · Limit convenience foods, such as boxed or canned foods and restaurant food.  · Read food labels and choose lower-sodium options.  3. Limit sugar  · Pause before you add sugars to pancakes, cereal, coffee, or tea. This includes white and brown table sugar, syrup, honey, and molasses. Cut your usual amount by half.  · Use non-sugar sweeteners. Stevia, aspartame, and sucralose can satisfy a sweet tooth without adding calories.  · Swap out sugar-filled soda and other drinks. Buy sugar-free or low-calorie beverages. Remember water is always the best choice.  · Read labels and choose foods with less added sugar. Keep in mind that dairy foods and foods with fruit will have some natural sugar.  · Cut the sugar in recipes by 1/3 to 1/2. Boost the flavor with extracts like almond, vanilla, or orange. Or add spices such as cinnamon or nutmeg.  4. Eat more fiber  · Eat fresh fruits and vegetables every day.  · Boost your diet with whole grains. Go for oats, whole-grain rice, and bran.  · Add beans and lentils to your meals.  · Drink more water to match your fiber  increase. This is to help prevent constipation.  Date Last Reviewed: 5/11/2015  © 4213-8793 [employer]. [address] [place] [address], [place] [address]. All rights reserved. This information is not intended as a substitute for professional medical care. Always follow your healthcare professional's instructions.

## 2019-08-12 ENCOUNTER — OFFICE VISIT (OUTPATIENT)
Dept: ORTHOPEDICS | Facility: CLINIC | Age: 69
End: 2019-08-12
Payer: MEDICARE

## 2019-08-12 VITALS
HEIGHT: 68 IN | BODY MASS INDEX: 35.16 KG/M2 | SYSTOLIC BLOOD PRESSURE: 118 MMHG | WEIGHT: 232 LBS | DIASTOLIC BLOOD PRESSURE: 72 MMHG | HEART RATE: 70 BPM

## 2019-08-12 DIAGNOSIS — M25.551 RIGHT HIP PAIN: ICD-10-CM

## 2019-08-12 DIAGNOSIS — M25.552 LEFT HIP PAIN: ICD-10-CM

## 2019-08-12 DIAGNOSIS — M24.011: ICD-10-CM

## 2019-08-12 DIAGNOSIS — M25.511 ACUTE PAIN OF RIGHT SHOULDER: ICD-10-CM

## 2019-08-12 DIAGNOSIS — M87.051 AVASCULAR NECROSIS OF BONE OF HIP, RIGHT: Primary | ICD-10-CM

## 2019-08-12 DIAGNOSIS — M96.89 FAILURE OF ROTATOR CUFF REPAIR: ICD-10-CM

## 2019-08-12 PROCEDURE — 1101F PT FALLS ASSESS-DOCD LE1/YR: CPT | Mod: S$GLB,,, | Performed by: ORTHOPAEDIC SURGERY

## 2019-08-12 PROCEDURE — 99204 OFFICE O/P NEW MOD 45 MIN: CPT | Mod: S$GLB,,, | Performed by: ORTHOPAEDIC SURGERY

## 2019-08-12 PROCEDURE — 1101F PR PT FALLS ASSESS DOC 0-1 FALLS W/OUT INJ PAST YR: ICD-10-PCS | Mod: S$GLB,,, | Performed by: ORTHOPAEDIC SURGERY

## 2019-08-12 PROCEDURE — 99204 PR OFFICE/OUTPT VISIT, NEW, LEVL IV, 45-59 MIN: ICD-10-PCS | Mod: S$GLB,,, | Performed by: ORTHOPAEDIC SURGERY

## 2019-08-12 NOTE — PROGRESS NOTES
[facility]    Subjective:     Chief Complaint:   Chief Complaint   Patient presents with    Left Hip - Pain     Left hip pain x 3 months. States that his pain is not as bad as the right.     Right Hip - Pain     Right hip pain x 3 months. States that his pain is getting worse and that when he gets up from a sitting position the pain is worse.    Right Shoulder - Pain     Right shoulder pain x 1 month. States that he does have issues with raising his arm above his head and does feel a pop at times.        Past Medical History:   Diagnosis Date    Allergy     Arthritis     Asthma     Basal cell carcinoma     Fever blister     Hypertension     Joint pain     Melanoma     PVC (premature ventricular contraction)     Skin disease     Squamous cell carcinoma        Past Surgical History:   Procedure Laterality Date    LIPOMA RESECTION      NOSE SURGERY      SHOULDER ARTHROSCOPY      SKIN GRAFT         Current Outpatient Medications   Medication Sig    albuterol (PROVENTIL/VENTOLIN HFA) 90 mcg/actuation inhaler Inhale 2 puffs into the lungs every 6 (six) hours as needed for Shortness of Breath. Rescue    ascorbic acid, vitamin C, (VITAMIN C) 1000 MG tablet Take 1,000 mg by mouth once daily.    aspirin 81 MG Chew Take 81 mg by mouth once daily.    benzonatate (TESSALON) 200 MG capsule Take 200 mg by mouth 3 (three) times daily as needed for Cough.    budesonide-formoterol 160-4.5 mcg (SYMBICORT) 160-4.5 mcg/actuation HFAA Inhale 2 puffs into the lungs every 12 (twelve) hours. Controller    co-enzyme Q-10 50 mg capsule Take 50 mg by mouth once daily.    econazole nitrate 1 % cream     fluticasone (FLONASE) 50 mcg/actuation nasal spray 1 spray by Nasal route.    garlic 100 mg Tab Take by mouth.    glucosamine-chondroitin 500-400 mg tablet Take 1 tablet by mouth 3 (three) times daily.    Lactobacillus rhamnosus GG (CULTURELLE) 10 billion cell capsule Take 1 capsule by mouth.    losartan  (COZAAR) 100 MG tablet Take 100 mg by mouth once daily.    magnesium oxide (MAG-OX) 400 mg (241.3 mg magnesium) tablet Take 400 mg by mouth once daily.    melatonin 10 mg Tab Take 1 tablet by mouth nightly as needed.    montelukast (SINGULAIR) 10 mg tablet Take 10 mg by mouth every evening.    multivitamin (THERAGRAN) per tablet Take 1 tablet by mouth.    omega 3-dha-epa-fish oil 1,600-500-800 mg/5 mL Liqd Take 2 capsules by mouth once daily.    potassium 99 mg Tab Take by mouth.    rosuvastatin (CRESTOR) 5 MG tablet Take 5 mg by mouth once daily.    tamsulosin (FLOMAX) 0.4 mg Cap     terbinafine HCl (LAMISIL) 250 mg tablet Take 250 mg by mouth.    triamterene-hydrochlorothiazide 37.5-25 mg (DYAZIDE) 37.5-25 mg per capsule Take 1 capsule by mouth every morning.    verapamil (CALAN-SR) 180 MG CR tablet     vitamin E 1000 UNIT capsule Take 1,000 Units by mouth once daily.     No current facility-administered medications for this visit.        Review of patient's allergies indicates:   Allergen Reactions    Bactrim [sulfamethoxazole-trimethoprim]      HIVES/ RASH       Family History   Problem Relation Age of Onset    Cancer Mother         esophageal    Heart disease Father     Hypertension Father     Arthritis Paternal Grandmother        Social History     Socioeconomic History    Marital status:      Spouse name: Not on file    Number of children: Not on file    Years of education: Not on file    Highest education level: Not on file   Occupational History    Occupation: retired captain    Social Needs    Financial resource strain: Not on file    Food insecurity:     Worry: Not on file     Inability: Not on file    Transportation needs:     Medical: Not on file     Non-medical: Not on file   Tobacco Use    Smoking status: Former Smoker     Packs/day: 2.00     Years: 20.00     Pack years: 40.00     Types: Cigarettes     Start date: 5/1/1972     Last attempt to quit: 8/27/1988     Years  since quittin.9    Smokeless tobacco: Never Used   Substance and Sexual Activity    Alcohol use: No     Frequency: Never    Drug use: No    Sexual activity: Never   Lifestyle    Physical activity:     Days per week: Not on file     Minutes per session: Not on file    Stress: Not on file   Relationships    Social connections:     Talks on phone: Not on file     Gets together: Not on file     Attends Christianity service: Not on file     Active member of club or organization: Not on file     Attends meetings of clubs or organizations: Not on file     Relationship status: Not on file   Other Topics Concern    Not on file   Social History Narrative    Not on file       History of present illness: She is to issues she has some pain in his right hip is been there for couple months that's a catching painful sensation that runs in the hip to the knee. He takes Symbicort for lung issues and has had some prednisone for lung issues. He can walk a half a mile. The pain is little worse when he first gets up. Feels like the right hip does not move as well as the left hip. He also has some pain in the right shoulder. 2016 we did a rotator cuff repair seemed to do well until the last few weeks where he is having issues again with his right shoulder with pain and popping no new injury. He is retired      Review of Systems:    Constitution: Negative for chills, fever, and sweats.  Negative for unexplained weight loss.    HENT:  Negative for headaches and blurry vision.    Cardiovascular:Negative for chest pain or irregular heart beat. Negative for hypertension.    Respiratory:  Negative for cough and shortness of breath.    Gastrointestinal: Negative for abdominal pain, heartburn, melena, nausea, and vomitting.    Genitourinary:  Negative bladder incontinence and dysuria.    Musculoskeletal:  See HPI for details.     Neurological: Negative for numbness.    Psychiatric/Behavioral: Negative for depression.  The patient is  not nervous/anxious.      Endocrine: Negative for polyuria    Hematologic/Lymphatic: Negative for bleeding problem.  Does not bruise/bleed easily.    Skin: Negative for poor would healing and rash    Objective:      Physical Examination:    Vital Signs:    Vitals:    08/12/19 0832   BP: 118/72   Pulse: 70       Body mass index is 35.28 kg/m².    This a well-developed, well nourished patient in no acute distress.  They are alert and oriented and cooperative to examination.        So physical exam right hip shows the right leg overall is bigger than the left he says is true since he was a child. It is about three quarters of an inch more circumference right compared to left but the thigh on the right also greater than the left the left has excellent range of motion the right hip does have mild decreased rotation. Not tender over the trochanter some vague pain in the hip when you manipulate the right hip. Lumbar not tender straight leg raise not impressive his right shoulder he does have a painful click it at with arc. He has good forward flexion he has negative apprehension test. He has good strength and full passive range of motion  Pertinent New Results:    XRAY Report / Interpretation:   AP x-ray right hip is a unremarkable is relative of varus hips bilateral good coverage no acute findings. Maybe minimal degenerative change right hip compared to left but not impressive. No acute findings. That's AP and lateral right hip the right shoulder AP shows that the he has one metal anchor buried in the humeral head is a second metal anchor that appears to be anterior to the glenoid. Subdeltoid space is a still preserved. No acute findings.Electronically Signed By Gerson Guerra JR, MD  We have an AP pelvis and we have a lateral view of the left hip. He has minimal varus deformity of the left hip findings no arthritic changes.  Signature  Assessment/Plan:      My impression the right hip looking for arthritis or perhaps  avascular necrosis with his history of steroids. So we'll do MRI right hip. The right shoulder has had a prior rotator cuff repair 1 those anchors is displaced he's having some symptoms with a painful click with arc so we will an MRI to look at the rotator cuff as well as the hardware in the right shoulder. Work not an issue after MRIs      This note was created using Dragon voice recognition software that occasionally misinterpreted phrases or words.

## 2019-08-14 ENCOUNTER — NUTRITION (OUTPATIENT)
Dept: NUTRITION | Facility: HOSPITAL | Age: 69
End: 2019-08-14
Payer: MEDICARE

## 2019-08-14 ENCOUNTER — PATIENT MESSAGE (OUTPATIENT)
Dept: ORTHOPEDICS | Facility: CLINIC | Age: 69
End: 2019-08-14

## 2019-08-14 VITALS — WEIGHT: 236.81 LBS | BODY MASS INDEX: 36.01 KG/M2

## 2019-08-14 PROCEDURE — G0447 BEHAVIOR COUNSEL OBESITY 15M: HCPCS

## 2019-08-14 NOTE — PROGRESS NOTES
Diagnosis/Reason for Referral: Obesity   Medical Nutrition Prescription: Medical Nutrition Therapy      Weight today: 236.8 (2# weight loss in 3 weeks)    Comments:    Patient has increased his time in the ellipitical to 35 minutes twice a day. He also walks to his sons house a few times a week. Patient still watching his portion sizes and using a smoothie once a day as a meal replacement. Patient cannot ride his bicycle d/t hip pain. He is going for an MRI.       Goals: 1. Continue ellipitical 2x/day and walking on a regular basis       Michelle Wilkinson  08/14/2019  9:09 AM

## 2019-08-16 ENCOUNTER — PATIENT MESSAGE (OUTPATIENT)
Dept: PULMONOLOGY | Facility: CLINIC | Age: 69
End: 2019-08-16

## 2019-08-16 ENCOUNTER — PATIENT MESSAGE (OUTPATIENT)
Dept: FAMILY MEDICINE | Facility: CLINIC | Age: 69
End: 2019-08-16

## 2019-08-19 RX ORDER — TAMSULOSIN HYDROCHLORIDE 0.4 MG/1
0.4 CAPSULE ORAL DAILY
Qty: 90 CAPSULE | Refills: 1 | Status: SHIPPED | OUTPATIENT
Start: 2019-08-19 | End: 2020-02-27

## 2019-08-19 RX ORDER — MONTELUKAST SODIUM 10 MG/1
10 TABLET ORAL NIGHTLY
Qty: 90 TABLET | Refills: 3 | Status: SHIPPED | OUTPATIENT
Start: 2019-08-19 | End: 2020-01-16 | Stop reason: SDUPTHER

## 2019-08-22 ENCOUNTER — HOSPITAL ENCOUNTER (OUTPATIENT)
Dept: RADIOLOGY | Facility: HOSPITAL | Age: 69
Discharge: HOME | End: 2019-08-22
Attending: ORTHOPAEDIC SURGERY
Payer: MEDICARE

## 2019-08-22 ENCOUNTER — PATIENT MESSAGE (OUTPATIENT)
Dept: FAMILY MEDICINE | Facility: CLINIC | Age: 69
End: 2019-08-22

## 2019-08-22 DIAGNOSIS — M25.551 RIGHT HIP PAIN: ICD-10-CM

## 2019-08-22 DIAGNOSIS — M24.011: ICD-10-CM

## 2019-08-22 DIAGNOSIS — M96.89 FAILURE OF ROTATOR CUFF REPAIR: ICD-10-CM

## 2019-08-22 DIAGNOSIS — M87.051 AVASCULAR NECROSIS OF BONE OF HIP, RIGHT: ICD-10-CM

## 2019-08-22 DIAGNOSIS — M25.511 ACUTE PAIN OF RIGHT SHOULDER: ICD-10-CM

## 2019-08-22 PROCEDURE — 73721 MRI JNT OF LWR EXTRE W/O DYE: CPT | Mod: TC,RT

## 2019-08-22 PROCEDURE — 73221 MRI JOINT UPR EXTREM W/O DYE: CPT | Mod: TC,RT

## 2019-08-23 RX ORDER — LOSARTAN POTASSIUM 100 MG/1
100 TABLET ORAL DAILY
Qty: 90 TABLET | Refills: 1 | Status: SHIPPED | OUTPATIENT
Start: 2019-08-23 | End: 2020-03-13 | Stop reason: SDUPTHER

## 2019-09-03 ENCOUNTER — HOSPITAL ENCOUNTER (OUTPATIENT)
Dept: RADIOLOGY | Facility: HOSPITAL | Age: 69
Discharge: HOME | End: 2019-09-03
Attending: NURSE PRACTITIONER
Payer: MEDICARE

## 2019-09-03 DIAGNOSIS — Z13.6 SCREENING FOR AAA (ABDOMINAL AORTIC ANEURYSM): ICD-10-CM

## 2019-09-03 PROCEDURE — 76775 US EXAM ABDO BACK WALL LIM: CPT | Mod: TC,PO

## 2019-09-16 ENCOUNTER — OFFICE VISIT (OUTPATIENT)
Dept: ORTHOPEDICS | Facility: CLINIC | Age: 69
End: 2019-09-16
Payer: MEDICARE

## 2019-09-16 ENCOUNTER — TELEPHONE (OUTPATIENT)
Dept: RADIOLOGY | Facility: HOSPITAL | Age: 69
End: 2019-09-16

## 2019-09-16 VITALS
WEIGHT: 236 LBS | SYSTOLIC BLOOD PRESSURE: 120 MMHG | BODY MASS INDEX: 35.77 KG/M2 | HEIGHT: 68 IN | HEART RATE: 88 BPM | DIASTOLIC BLOOD PRESSURE: 70 MMHG

## 2019-09-16 DIAGNOSIS — M79.18 LUMBAR MUSCLE PAIN: ICD-10-CM

## 2019-09-16 DIAGNOSIS — M75.41 IMPINGEMENT SYNDROME, SHOULDER, RIGHT: ICD-10-CM

## 2019-09-16 DIAGNOSIS — M16.11 PRIMARY OSTEOARTHRITIS OF RIGHT HIP: Primary | ICD-10-CM

## 2019-09-16 DIAGNOSIS — M25.551 RIGHT HIP PAIN: ICD-10-CM

## 2019-09-16 PROCEDURE — 99214 OFFICE O/P EST MOD 30 MIN: CPT | Mod: 25,S$GLB,, | Performed by: ORTHOPAEDIC SURGERY

## 2019-09-16 PROCEDURE — 1101F PR PT FALLS ASSESS DOC 0-1 FALLS W/OUT INJ PAST YR: ICD-10-PCS | Mod: S$GLB,,, | Performed by: ORTHOPAEDIC SURGERY

## 2019-09-16 PROCEDURE — 20610 LARGE JOINT ASPIRATION/INJECTION: R SUBACROMIAL BURSA: ICD-10-PCS | Mod: RT,S$GLB,, | Performed by: ORTHOPAEDIC SURGERY

## 2019-09-16 PROCEDURE — 20610 DRAIN/INJ JOINT/BURSA W/O US: CPT | Mod: RT,S$GLB,, | Performed by: ORTHOPAEDIC SURGERY

## 2019-09-16 PROCEDURE — 99214 PR OFFICE/OUTPT VISIT, EST, LEVL IV, 30-39 MIN: ICD-10-PCS | Mod: 25,S$GLB,, | Performed by: ORTHOPAEDIC SURGERY

## 2019-09-16 PROCEDURE — 1101F PT FALLS ASSESS-DOCD LE1/YR: CPT | Mod: S$GLB,,, | Performed by: ORTHOPAEDIC SURGERY

## 2019-09-16 RX ORDER — METHYLPREDNISOLONE ACETATE 40 MG/ML
40 INJECTION, SUSPENSION INTRA-ARTICULAR; INTRALESIONAL; INTRAMUSCULAR; SOFT TISSUE
Status: DISCONTINUED | OUTPATIENT
Start: 2019-09-16 | End: 2019-09-16 | Stop reason: HOSPADM

## 2019-09-16 RX ADMIN — METHYLPREDNISOLONE ACETATE 40 MG: 40 INJECTION, SUSPENSION INTRA-ARTICULAR; INTRALESIONAL; INTRAMUSCULAR; SOFT TISSUE at 09:09

## 2019-09-16 NOTE — PROCEDURES
Large Joint Aspiration/Injection: R subacromial bursa  Date/Time: 9/16/2019 9:00 AM  Performed by: Gerson Guerra Jr., MD  Authorized by: Gerson Guerra Jr., MD     Consent Done?:  Yes (Verbal)  Indications:  Pain  Procedure site marked: Yes    Timeout: Prior to procedure the correct patient, procedure, and site was verified    Anesthesia  Local anesthesia used  Anesthetic: lidocaine 1% without epinephrine    Location:  Shoulder  Site:  R subacromial bursa  Prep: Patient was prepped and draped in usual sterile fashion    Needle size:  25 G  Medications:  40 mg methylPREDNISolone acetate 40 mg/mL  Patient tolerance:  Patient tolerated the procedure well with no immediate complications

## 2019-09-25 ENCOUNTER — OFFICE VISIT (OUTPATIENT)
Dept: FAMILY MEDICINE | Facility: CLINIC | Age: 69
End: 2019-09-25
Payer: MEDICARE

## 2019-09-25 VITALS
SYSTOLIC BLOOD PRESSURE: 138 MMHG | WEIGHT: 235 LBS | HEART RATE: 79 BPM | BODY MASS INDEX: 35.61 KG/M2 | DIASTOLIC BLOOD PRESSURE: 84 MMHG | TEMPERATURE: 100 F | OXYGEN SATURATION: 98 % | HEIGHT: 68 IN

## 2019-09-25 DIAGNOSIS — N30.01 ACUTE CYSTITIS WITH HEMATURIA: ICD-10-CM

## 2019-09-25 DIAGNOSIS — R50.9 FEVER, UNSPECIFIED FEVER CAUSE: Primary | ICD-10-CM

## 2019-09-25 DIAGNOSIS — K13.79 ORAL PAIN OF UNKNOWN ETIOLOGY: ICD-10-CM

## 2019-09-25 LAB
BILIRUB UR QL STRIP: NEGATIVE
CTP QC/QA: YES
FLUAV AG NPH QL: NEGATIVE
FLUBV AG NPH QL: NEGATIVE
GLUCOSE UR QL STRIP: NEGATIVE
KETONES UR QL STRIP: NEGATIVE
LEUKOCYTE ESTERASE UR QL STRIP: NEGATIVE
PH, POC UA: 6.5
POC BLOOD, URINE: POSITIVE
POC NITRATES, URINE: NEGATIVE
PROT UR QL STRIP: NEGATIVE
SP GR UR STRIP: 1.01 (ref 1–1.03)
UROBILINOGEN UR STRIP-ACNC: NORMAL (ref 0.3–2.2)

## 2019-09-25 PROCEDURE — 99214 PR OFFICE/OUTPT VISIT, EST, LEVL IV, 30-39 MIN: ICD-10-PCS | Mod: S$GLB,,, | Performed by: NURSE PRACTITIONER

## 2019-09-25 PROCEDURE — 99214 OFFICE O/P EST MOD 30 MIN: CPT | Mod: S$GLB,,, | Performed by: NURSE PRACTITIONER

## 2019-09-25 PROCEDURE — 1101F PR PT FALLS ASSESS DOC 0-1 FALLS W/OUT INJ PAST YR: ICD-10-PCS | Mod: S$GLB,,, | Performed by: NURSE PRACTITIONER

## 2019-09-25 PROCEDURE — 1101F PT FALLS ASSESS-DOCD LE1/YR: CPT | Mod: S$GLB,,, | Performed by: NURSE PRACTITIONER

## 2019-09-25 PROCEDURE — 81003 POCT URINALYSIS, DIPSTICK, AUTOMATED, W/O SCOPE: ICD-10-PCS | Mod: QW,S$GLB,, | Performed by: NURSE PRACTITIONER

## 2019-09-25 PROCEDURE — 87804 INFLUENZA ASSAY W/OPTIC: CPT | Mod: QW,,, | Performed by: NURSE PRACTITIONER

## 2019-09-25 PROCEDURE — 81003 URINALYSIS AUTO W/O SCOPE: CPT | Mod: QW,S$GLB,, | Performed by: NURSE PRACTITIONER

## 2019-09-25 PROCEDURE — 87804 POCT INFLUENZA A/B: ICD-10-PCS | Mod: QW,,, | Performed by: NURSE PRACTITIONER

## 2019-09-25 RX ORDER — AMOXICILLIN AND CLAVULANATE POTASSIUM 875; 125 MG/1; MG/1
1 TABLET, FILM COATED ORAL 2 TIMES DAILY
Qty: 20 TABLET | Refills: 0 | Status: CANCELLED | OUTPATIENT
Start: 2019-09-25

## 2019-09-25 RX ORDER — CIPROFLOXACIN 500 MG/1
500 TABLET ORAL 2 TIMES DAILY
Qty: 20 TABLET | Refills: 0 | Status: SHIPPED | OUTPATIENT
Start: 2019-09-25 | End: 2020-01-16

## 2019-09-25 NOTE — PATIENT INSTRUCTIONS
Fever Control (Adult)  A fever is a normal reaction of your body to an illness. The temperature itself usually isnt harmful.  It actually helps your body fight infections. You dont need to treat a fever unless you feel very uncomfortable.   Home care  Follow these tips to take care of yourself at home:  · If you feel warm, check your temperature.  · Dress in light clothing. This will help you lose extra body heat through your skin. The fever will go up if you wear extra layers or wrap in blankets.  · Fever causes your body to lose water through evaporation. Drink plenty of fluids. These include water, juice, clear sodas, ginger ale, or lemonade.  Fever medicines  You can take acetaminophen every 4 to 6 hours if:  · You feel very uncomfortable  · Your oral temperature is 100.4ºF (38ºC) or higher  If you cant take or keep down oral medicine, ask your pharmacist for acetaminophen suppositories. You dont need a prescription for these.  If the fever doesnt get better within 1 hour after you take acetaminophen, take ibuprofen. If this works, keep taking the ibuprofen every 6 to 8 hours.  Note: If you have chronic liver or kidney disease, talk with your healthcare provider before taking these medicines. Also talk with your provider if you ever had a stomach ulcer or GI (gastrointestinal) bleeding.  If either medicine alone doesnt keep the fever down, you may switch off between the 2 medicines every 3 to 4 hours. But do this only if your healthcare provider has told you to. For example, take ibuprofen. Wait 3 hours. Then take acetaminophen. Wait 3 hours. Take ibuprofen, and so on. Follow your providers instructions exactly.  Note: Do not give aspirin to anyone younger than age 19 who is ill with a fever. Aspirin can cause serious side effects such as liver damage and Reye syndrome. Although rare, Reye syndrome is a very serious illness usually found in children younger than age 15. The syndrome is closely linked to  the use of aspirin or aspirin-containing medicine during viral infection.  Follow-up care  Follow up with your healthcare provider if you don't get better after 48 hours.  When to seek medical advice  Call your healthcare provider right away if any of these occur:  · Fever, as directed by your healthcare provider, or:  ¨ Fever of 100.4°F (38°C) or above lasting for 24 to 48 hours  ¨ Fever lasting more than 3 days, even without other symptoms  ¨ Fever that happens after visiting a foreign country  ¨ Fever that happens within a month after visiting a country with malaria. Malaria is a serious illness. A fever can still be malaria even if you took medicine to prevent it. The medicine does not work in all cases.  · Confusion or trouble thinking  · Headache or stiff neck  · Flat, small, purplish red spots on your skin  · Low blood pressure  · Fast heart rate  · Fast (rapid) breathing  · You are pregnant  · You just had surgery, another medical procedure, or were just discharged from the hospital  · Use of medicines that suppress the immune system (immunosuppressants). These include Prednisone, cancer medicines, and organ transplant rejection medicines. If you are not sure about whether your medicines suppress your immune system, ask your healthcare provider.  Call 911  Someone should call 911 if you:  · Are having trouble breathing or shortness of breath  · Are unresponsive  Important reminder  Call your healthcare provider if you get a fever after visiting a place where infectious diseases are common. Many people  a cold or other virus while traveling. This usually goes away without a problem. But, some places have more serious diseases. Fever with certain other symptoms may mean you have a serious illness. Symptoms to watch for include diarrhea, skin rashes, insect bites, and skin boils, or infections. Your provider may ask you:  · What you did on your trip  · How long you were there  · Where you stayed (hotel,  native house, tent)  · What you ate and drank  · If you were bitten by insects or other bugs  · If you swam in freshwater  · If you had sex or got a tattoo or piercing while you were there  Check the Centers for Disease Control and Prevention to get more information about specific infectious diseases in the areas you have traveled.  Date Last Reviewed: 1/1/2017  © 4835-7095 [employer]. [address] [place] [address], [place] [id number]. All rights reserved. This information is not intended as a substitute for professional medical care. Always follow your healthcare professional's instructions.

## 2019-09-25 NOTE — PROGRESS NOTES
SUBJECTIVE:      Patient ID: Nico Teague is a 69 y.o. male.    Chief Complaint: Generalized Body Aches (fever, full body pain, weakness. started today)    Mr Teague is here today complaining of fever, weakness and body aches that started yesterday and worse today. He also started with urinary frequency last night with a little burning. Has a dental bridge that started hurting yesterday as well.     Fever    This is a new problem. The current episode started yesterday. The problem occurs daily. The problem has been gradually worsening. The maximum temperature noted was 100 to 100.9 F. The temperature was taken using an oral thermometer. Associated symptoms include congestion, muscle aches and urinary pain. Pertinent negatives include no abdominal pain, chest pain, headaches or vomiting.       Past Surgical History:   Procedure Laterality Date    LIPOMA RESECTION      NOSE SURGERY      SHOULDER ARTHROSCOPY      SKIN GRAFT       Family History   Problem Relation Age of Onset    Cancer Mother         esophageal    Heart disease Father     Hypertension Father     Arthritis Paternal Grandmother       Social History     Socioeconomic History    Marital status:      Spouse name: Not on file    Number of children: Not on file    Years of education: Not on file    Highest education level: Not on file   Occupational History    Occupation: retired captain    Social Needs    Financial resource strain: Not on file    Food insecurity:     Worry: Not on file     Inability: Not on file    Transportation needs:     Medical: Not on file     Non-medical: Not on file   Tobacco Use    Smoking status: Former Smoker     Packs/day: 2.00     Years: 20.00     Pack years: 40.00     Types: Cigarettes     Start date: 1972     Last attempt to quit: 1988     Years since quittin.0    Smokeless tobacco: Never Used   Substance and Sexual Activity    Alcohol use: No     Frequency: Never    Drug use: No     Sexual activity: Never   Lifestyle    Physical activity:     Days per week: Not on file     Minutes per session: Not on file    Stress: Not on file   Relationships    Social connections:     Talks on phone: Not on file     Gets together: Not on file     Attends Worship service: Not on file     Active member of club or organization: Not on file     Attends meetings of clubs or organizations: Not on file     Relationship status: Not on file   Other Topics Concern    Not on file   Social History Narrative    Not on file     Current Outpatient Medications   Medication Sig Dispense Refill    albuterol (PROVENTIL/VENTOLIN HFA) 90 mcg/actuation inhaler Inhale 2 puffs into the lungs every 6 (six) hours as needed for Shortness of Breath. Rescue 3 Inhaler 4    ascorbic acid, vitamin C, (VITAMIN C) 1000 MG tablet Take 1,000 mg by mouth once daily.      aspirin 81 MG Chew Take 81 mg by mouth once daily.      benzonatate (TESSALON) 200 MG capsule Take 200 mg by mouth 3 (three) times daily as needed for Cough.      budesonide-formoterol 160-4.5 mcg (SYMBICORT) 160-4.5 mcg/actuation HFAA Inhale 2 puffs into the lungs every 12 (twelve) hours. Controller      ciprofloxacin HCl (CIPRO) 500 MG tablet Take 1 tablet (500 mg total) by mouth 2 (two) times daily. 20 tablet 0    co-enzyme Q-10 50 mg capsule Take 50 mg by mouth once daily.      econazole nitrate 1 % cream       fluticasone (FLONASE) 50 mcg/actuation nasal spray 1 spray by Nasal route.      garlic 100 mg Tab Take by mouth.      glucosamine-chondroitin 500-400 mg tablet Take 1 tablet by mouth 3 (three) times daily.      Lactobacillus rhamnosus GG (CULTURELLE) 10 billion cell capsule Take 1 capsule by mouth.      losartan (COZAAR) 100 MG tablet Take 1 tablet (100 mg total) by mouth once daily. 90 tablet 1    magnesium oxide (MAG-OX) 400 mg (241.3 mg magnesium) tablet Take 400 mg by mouth once daily.      melatonin 10 mg Tab Take 1 tablet by mouth nightly  as needed.      montelukast (SINGULAIR) 10 mg tablet Take 10 mg by mouth every evening.      montelukast (SINGULAIR) 10 mg tablet Take 1 tablet (10 mg total) by mouth every evening. 90 tablet 3    multivitamin (THERAGRAN) per tablet Take 1 tablet by mouth.      omega 3-dha-epa-fish oil 1,600-500-800 mg/5 mL Liqd Take 2 capsules by mouth once daily.      potassium 99 mg Tab Take by mouth.      rosuvastatin (CRESTOR) 5 MG tablet Take 5 mg by mouth once daily.      tamsulosin (FLOMAX) 0.4 mg Cap Take 1 capsule (0.4 mg total) by mouth once daily. 90 capsule 1    terbinafine HCl (LAMISIL) 250 mg tablet Take 250 mg by mouth.      triamterene-hydrochlorothiazide 37.5-25 mg (DYAZIDE) 37.5-25 mg per capsule Take 1 capsule by mouth every morning.      verapamil (CALAN-SR) 180 MG CR tablet       vitamin E 1000 UNIT capsule Take 1,000 Units by mouth once daily.       No current facility-administered medications for this visit.      Review of patient's allergies indicates:   Allergen Reactions    Bactrim [sulfamethoxazole-trimethoprim]      HIVES/ RASH    Green tea (rashida sinensis) Hives     Pt is has allergies to black tea. Not an option under search.      Past Medical History:   Diagnosis Date    Allergy     Arthritis     Asthma     Basal cell carcinoma     Fever blister     Hypertension     Joint pain     Melanoma     PVC (premature ventricular contraction)     Skin disease     Squamous cell carcinoma      Past Surgical History:   Procedure Laterality Date    LIPOMA RESECTION      NOSE SURGERY      SHOULDER ARTHROSCOPY      SKIN GRAFT         Review of Systems   Constitutional: Positive for fever. Negative for appetite change, chills, diaphoresis and unexpected weight change.   HENT: Positive for congestion. Negative for ear discharge, facial swelling, hearing loss, nosebleeds and trouble swallowing.    Eyes: Negative for photophobia, pain and visual disturbance.   Respiratory: Negative for  "apnea, choking and shortness of breath.    Cardiovascular: Negative for chest pain and palpitations.   Gastrointestinal: Negative for abdominal pain, blood in stool and vomiting.   Endocrine: Negative for polyphagia.   Genitourinary: Positive for dysuria and frequency. Negative for difficulty urinating and hematuria.   Musculoskeletal: Positive for myalgias. Negative for gait problem and joint swelling.   Skin: Negative for pallor.   Neurological: Positive for weakness. Negative for dizziness, seizures, speech difficulty, light-headedness and headaches.   Hematological: Does not bruise/bleed easily.   Psychiatric/Behavioral: Negative for agitation, confusion and dysphoric mood. The patient is not nervous/anxious.       OBJECTIVE:      Vitals:    09/25/19 1256   BP: 138/84   Pulse: 79   Temp: 100.2 °F (37.9 °C)   TempSrc: Oral   SpO2: 98%   Weight: 106.6 kg (235 lb)   Height: 5' 8" (1.727 m)     Physical Exam   Constitutional: He is oriented to person, place, and time. He appears well-developed and well-nourished.   HENT:   Head: Atraumatic.   Right Ear: Tympanic membrane normal.   Left Ear: Tympanic membrane normal.   Nose: Nose normal.   Mouth/Throat: Uvula is midline and oropharynx is clear and moist.       Eyes: Conjunctivae are normal.   Neck: Neck supple. No thyromegaly present.   Cardiovascular: Normal rate, regular rhythm, normal heart sounds and intact distal pulses.   Pulmonary/Chest: Effort normal and breath sounds normal.   Abdominal: Soft. Bowel sounds are normal. He exhibits no distension. There is no hepatosplenomegaly. There is no tenderness. There is no rigidity, no rebound, no guarding and no CVA tenderness.   Musculoskeletal: Normal range of motion. He exhibits no edema.   Neurological: He is alert and oriented to person, place, and time.   Skin: Skin is warm and dry.   Psychiatric: He has a normal mood and affect.   Nursing note and vitals reviewed.     Assessment:       1. Fever, unspecified " fever cause    2. Oral pain of unknown etiology    3. Acute cystitis with hematuria        Plan:       Fever, unspecified fever cause  -     POCT Influenza A/B                         Neg  -     POCT Urinalysis, Dipstick, Automated, W/O Scope                   +blood  -     Urine culture; Future; Expected date: 09/25/2019  Patient advised if he starts with abdominal pain, vomiting or fever >101 report to an urgent care or ER    Oral pain of unknown etiology   Advised patient to f/u with his dentist regarding gingival pain    Acute cystitis with hematuria  - start  ciprofloxacin HCl (CIPRO) 500 MG tablet; Take 1 tablet (500 mg total) by mouth 2 (two) times daily.  Dispense: 20 tablet; Refill: 0        Follow up in about 2 days (around 9/27/2019) for fever.      9/25/2019 CONCHITA Bernal, JOCYP

## 2019-09-27 ENCOUNTER — PATIENT MESSAGE (OUTPATIENT)
Dept: FAMILY MEDICINE | Facility: CLINIC | Age: 69
End: 2019-09-27

## 2019-09-27 ENCOUNTER — OFFICE VISIT (OUTPATIENT)
Dept: FAMILY MEDICINE | Facility: CLINIC | Age: 69
End: 2019-09-27
Payer: MEDICARE

## 2019-09-27 VITALS
TEMPERATURE: 98 F | OXYGEN SATURATION: 97 % | BODY MASS INDEX: 35.61 KG/M2 | HEART RATE: 91 BPM | HEIGHT: 68 IN | DIASTOLIC BLOOD PRESSURE: 70 MMHG | SYSTOLIC BLOOD PRESSURE: 120 MMHG | WEIGHT: 235 LBS

## 2019-09-27 DIAGNOSIS — R50.9 FEVER, UNSPECIFIED FEVER CAUSE: ICD-10-CM

## 2019-09-27 DIAGNOSIS — N30.01 ACUTE CYSTITIS WITH HEMATURIA: Primary | ICD-10-CM

## 2019-09-27 PROCEDURE — 1101F PT FALLS ASSESS-DOCD LE1/YR: CPT | Mod: S$GLB,,, | Performed by: NURSE PRACTITIONER

## 2019-09-27 PROCEDURE — 99212 OFFICE O/P EST SF 10 MIN: CPT | Mod: S$GLB,,, | Performed by: NURSE PRACTITIONER

## 2019-09-27 PROCEDURE — 99212 PR OFFICE/OUTPT VISIT, EST, LEVL II, 10-19 MIN: ICD-10-PCS | Mod: S$GLB,,, | Performed by: NURSE PRACTITIONER

## 2019-09-27 PROCEDURE — 1101F PR PT FALLS ASSESS DOC 0-1 FALLS W/OUT INJ PAST YR: ICD-10-PCS | Mod: S$GLB,,, | Performed by: NURSE PRACTITIONER

## 2019-09-27 NOTE — PATIENT INSTRUCTIONS
Bladder Infection, Male (Adult)    You have a bladder infection.  Urine is normally free of bacteria. But bacteria can get into the urinary tract from the skin around the rectum or it may travel in the blood from elsewhere in the body.  This is called a urinary tract infection (UTI). An infection can occur anywhere in the urinary tract. It could be in a kidney (pyelonephritis)or in the bladder (cystitis) and urethra (urethritis). The urethra is the tube that drains the urine from the bladder through the tip of the penis.  The most common place for a UTI is in the bladder. This is called a bladder infection. Most bladder infections are easily treated. They are not serious unless the infection spreads up to the kidney.  The terms bladder infection, UTI, and cystitis are often used to describe the same thing, but they arent always the same. Cystitis is an inflammation of the bladder. The most common cause of cystitis is an infection.   Keep in mind:  · Infections in the urine are called UTIs.  · Cystitis is usually caused by a UTI.  · Not all UTIs and cases of cystitis are bladder infections.  · Bladder infections are the most common type of cystitis.  Symptoms of a bladder infection  The infection causes inflammation in the urethra and bladder. This inflammation causes many of the symptoms. The most common symptoms of a bladder infection are:  · Pain or burning when urinating  · Having to go more often than usual  · Feeling like you need to go right away  · Only a small amount comes out  · Blood in urine  · Discomfort in your belly (abdomen), usually in the lower abdomen, above the pubic bone  · Cloudy, strong, or bad smelling urine  · Unable to urinate (retention)  · Urinary incontinence  · Fever  · Loss of appetite  Older adults may also feel confused.  Causes of a bladder infection  Bladder infections are not contagious. You can't get one from someone else, from a toilet seat, or from sharing a bath.  The most  common cause of bladder infections is bacteria from the bowels. The bacteria get onto the skin around the opening of the urethra. From there they can get into the urine and travel up to the bladder. This causes inflammation and an infection. This usually happens because of:  · An enlarged prostate  · Poor cleaning of the genitals  · Procedures that put a tube in your bladder, like a Gastelum catheter  · Bowel incontinence  · Older age  · Not emptying your bladder (The urine stays there, giving the bacteria a chance to grow.)  · Dehydration (This allows urine to stay in the bladder longer.)  · Constipation (This can cause the bowels to push on the bladder or urethra and keep the bladder from emptying.)  Treatment  Bladder infections are treated with antibiotics. They usually clear up quickly without complications. Treatment helps prevent a more serious kidney infection.  Medicines  Medicines can help in the treatment of a bladder infection:  · You may have been given phenazopyridine to ease burning when you urinate. It will cause your urine to be bright orange. It can stain clothing.  · You may have been prescribed antibiotics. Take this medicine until you have finished it, even if you feel better. Taking all of the medicine will make sure the infection has cleared.  You can use acetaminophen or ibuprofen for pain, fever, or discomfort, unless another medicine was prescribed. You can also alternate them, or use both together. They work differently and are a different class of medicines, so taking them together is not an overdose. If you have chronic liver or kidney disease, talk with your healthcare provider before using these medicines. Also talk with your provider if youve had a stomach ulcer or GI bleeding or are taking blood thinner medicines.  Home care  Here are some guidelines to help you care for yourself at home:  · Drink plenty of fluids, unless your healthcare provider told you not to. Fluids will prevent  dehydration and flush out your bladder.  · Use good personal hygiene. Wipe from front to back after using the toilet, and clean your penis regularly. If you arent circumcised, retract the foreskin when cleaning.  · Urinate more frequently, and dont try to hold it in for long periods of time, if possible.  · Wear loose-fitting clothes and cotton underwear. Avoid tight-fitting pants. This helps keep you clean and dry.  · Change your diet to prevent constipation. This means eating more fresh foods and more fiber, and less junk and fatty foods.  · Avoid sex until your symptoms are gone.  · Avoid caffeine, alcohol, and spicy foods. These can irritate the bladder.  Follow-up care  Follow up with your healthcare provider, or as advised if all symptoms have not cleared up within 5 days. It is important to keep your follow-up appointment. You can talk with your provider to see if you need more tests of the urinary tract. This is especially important if you have infections that keep coming back.  If a culture was done, you will be told if your treatment needs to be changed. If directed, you can call to find out the results.  If X-rays were taken, you will be told of any findings that may affect your care.  Call 911  Call 911 if any of these occur:  · Trouble breathing  · Difficulty waking up  · Feeling confused  · Fainting or loss of consciousness  · Rapid heart rate  When to seek medical advice  Call your healthcare provider right away if any of these occur:  · Fever of 100.4ºF (38ºC) or higher, or as directed by your healthcare provider  · Your symptoms dont improve after 2 days of treatment  · Back or abdominal pain that gets worse  · Repeated vomiting, or you arent able to keep medicine down  · Weakness or dizziness  Date Last Reviewed: 10/1/2016  © 7737-0769 [employer]. [address] [place] [address], [place] [address]. All rights reserved. This information is not intended as a substitute for professional  medical care. Always follow your healthcare professional's instructions.

## 2019-09-27 NOTE — PROGRESS NOTES
SUBJECTIVE:      Patient ID: Nico Teague is a 69 y.o. male.    Chief Complaint: Fever (follow up pt said he is doing better)    Mr Teague is here today to f/u on fever, weakness and UTI. He is taking the medications as prescribed and is feeling better. He had a low grade fever 99.0 yesterday, no fever today. No urinary symptoms. No blood in urine. He did say 2 days ago after his visit he noticed blood in the urine but it improved at taking the antibiotic for 24 hours     Fever    This is a new problem. The current episode started in the past 7 days. The problem occurs daily. The problem has been gradually improving. The maximum temperature noted was 99 to 99.9 F. The temperature was taken using an oral thermometer. Pertinent negatives include no abdominal pain, chest pain, congestion, headaches, muscle aches, urinary pain, vomiting or wheezing.   Urinary Tract Infection    This is a new problem. The current episode started in the past 7 days. The problem has been gradually improving. There is no history of pyelonephritis. Pertinent negatives include no chills, hematuria or vomiting.       Past Surgical History:   Procedure Laterality Date    LIPOMA RESECTION      NOSE SURGERY      SHOULDER ARTHROSCOPY      SKIN GRAFT       Family History   Problem Relation Age of Onset    Cancer Mother         esophageal    Heart disease Father     Hypertension Father     Arthritis Paternal Grandmother       Social History     Socioeconomic History    Marital status:      Spouse name: Not on file    Number of children: Not on file    Years of education: Not on file    Highest education level: Not on file   Occupational History    Occupation: retired captain    Social Needs    Financial resource strain: Not on file    Food insecurity:     Worry: Not on file     Inability: Not on file    Transportation needs:     Medical: Not on file     Non-medical: Not on file   Tobacco Use    Smoking status: Former  Smoker     Packs/day: 2.00     Years: 20.00     Pack years: 40.00     Types: Cigarettes     Start date: 1972     Last attempt to quit: 1988     Years since quittin.1    Smokeless tobacco: Never Used   Substance and Sexual Activity    Alcohol use: No     Frequency: Never    Drug use: No    Sexual activity: Never   Lifestyle    Physical activity:     Days per week: Not on file     Minutes per session: Not on file    Stress: Not on file   Relationships    Social connections:     Talks on phone: Not on file     Gets together: Not on file     Attends Yazdanism service: Not on file     Active member of club or organization: Not on file     Attends meetings of clubs or organizations: Not on file     Relationship status: Not on file   Other Topics Concern    Not on file   Social History Narrative    Not on file     Current Outpatient Medications   Medication Sig Dispense Refill    albuterol (PROVENTIL/VENTOLIN HFA) 90 mcg/actuation inhaler Inhale 2 puffs into the lungs every 6 (six) hours as needed for Shortness of Breath. Rescue 3 Inhaler 4    ascorbic acid, vitamin C, (VITAMIN C) 1000 MG tablet Take 1,000 mg by mouth once daily.      budesonide-formoterol 160-4.5 mcg (SYMBICORT) 160-4.5 mcg/actuation HFAA Inhale 2 puffs into the lungs every 12 (twelve) hours. Controller      ciprofloxacin HCl (CIPRO) 500 MG tablet Take 1 tablet (500 mg total) by mouth 2 (two) times daily. 20 tablet 0    co-enzyme Q-10 50 mg capsule Take 50 mg by mouth once daily.      econazole nitrate 1 % cream       fluticasone (FLONASE) 50 mcg/actuation nasal spray 1 spray by Nasal route.      garlic 100 mg Tab Take by mouth.      glucosamine-chondroitin 500-400 mg tablet Take 1 tablet by mouth 3 (three) times daily.      Lactobacillus rhamnosus GG (CULTURELLE) 10 billion cell capsule Take 1 capsule by mouth.      losartan (COZAAR) 100 MG tablet Take 1 tablet (100 mg total) by mouth once daily. 90 tablet 1     magnesium oxide (MAG-OX) 400 mg (241.3 mg magnesium) tablet Take 400 mg by mouth once daily.      melatonin 10 mg Tab Take 1 tablet by mouth nightly as needed.      montelukast (SINGULAIR) 10 mg tablet Take 1 tablet (10 mg total) by mouth every evening. 90 tablet 3    multivitamin (THERAGRAN) per tablet Take 1 tablet by mouth.      omega 3-dha-epa-fish oil 1,600-500-800 mg/5 mL Liqd Take 2 capsules by mouth once daily.      potassium 99 mg Tab Take by mouth.      tamsulosin (FLOMAX) 0.4 mg Cap Take 1 capsule (0.4 mg total) by mouth once daily. 90 capsule 1    terbinafine HCl (LAMISIL) 250 mg tablet Take 250 mg by mouth.      triamterene-hydrochlorothiazide 37.5-25 mg (DYAZIDE) 37.5-25 mg per capsule Take 1 capsule by mouth every morning.      verapamil (CALAN-SR) 180 MG CR tablet       vitamin E 1000 UNIT capsule Take 1,000 Units by mouth once daily.      aspirin 81 MG Chew Take 81 mg by mouth once daily.      benzonatate (TESSALON) 200 MG capsule Take 200 mg by mouth 3 (three) times daily as needed for Cough.      montelukast (SINGULAIR) 10 mg tablet Take 10 mg by mouth every evening.      rosuvastatin (CRESTOR) 5 MG tablet Take 5 mg by mouth once daily.       No current facility-administered medications for this visit.      Review of patient's allergies indicates:   Allergen Reactions    Bactrim [sulfamethoxazole-trimethoprim]      HIVES/ RASH    Green tea (rashida sinensis) Hives     Pt is has allergies to black tea. Not an option under search.      Past Medical History:   Diagnosis Date    Allergy     Arthritis     Asthma     Basal cell carcinoma     Fever blister     Hypertension     Joint pain     Melanoma     PVC (premature ventricular contraction)     Skin disease     Squamous cell carcinoma      Past Surgical History:   Procedure Laterality Date    LIPOMA RESECTION      NOSE SURGERY      SHOULDER ARTHROSCOPY      SKIN GRAFT         Review of Systems   Constitutional: Negative  "for appetite change, chills, diaphoresis, fever and unexpected weight change.   HENT: Negative for congestion, ear discharge, facial swelling, hearing loss, nosebleeds and trouble swallowing.    Eyes: Negative for photophobia, pain and visual disturbance.   Respiratory: Negative for apnea, choking, shortness of breath and wheezing.    Cardiovascular: Negative for chest pain and palpitations.   Gastrointestinal: Negative for abdominal pain, blood in stool and vomiting.   Endocrine: Negative for polyphagia.   Genitourinary: Negative for difficulty urinating, dysuria and hematuria.   Musculoskeletal: Negative for gait problem and joint swelling.   Skin: Negative for pallor.   Neurological: Negative for dizziness, seizures, speech difficulty, weakness and headaches.   Hematological: Does not bruise/bleed easily.   Psychiatric/Behavioral: Negative for agitation, confusion and dysphoric mood. The patient is not nervous/anxious.       OBJECTIVE:      Vitals:    09/27/19 1055   BP: 120/70   Pulse: 91   Temp: 98.2 °F (36.8 °C)   TempSrc: Oral   SpO2: 97%   Weight: 106.6 kg (235 lb)   Height: 5' 8" (1.727 m)     Physical Exam   Constitutional: He is oriented to person, place, and time. He appears well-developed and well-nourished.   HENT:   Head: Atraumatic.   Eyes: Conjunctivae are normal.   Neck: Neck supple.   Cardiovascular: Normal rate, regular rhythm, normal heart sounds and intact distal pulses.   Pulmonary/Chest: Effort normal and breath sounds normal.   Abdominal: Soft. Bowel sounds are normal. He exhibits no distension. There is no tenderness. There is no CVA tenderness.   Musculoskeletal: Normal range of motion.   Lymphadenopathy:     He has no cervical adenopathy.   Neurological: He is alert and oriented to person, place, and time.   Skin: Skin is warm and dry.   Psychiatric: He has a normal mood and affect.   Nursing note and vitals reviewed.     Assessment:       1. Acute cystitis with hematuria    2. Fever, " unspecified fever cause        Plan:       Acute cystitis with hematuria  Improving  With antibiotics  Complete meds as prescribed    Fever, unspecified fever cause  Resolved       Follow up if symptoms worsen or fail to improve.      9/27/2019 CONCHITA Bernal, FNP

## 2019-09-30 LAB
BACTERIA UR CULT: NO GROWTH
BACTERIA UR CULT: NORMAL

## 2019-10-01 ENCOUNTER — TELEPHONE (OUTPATIENT)
Dept: FAMILY MEDICINE | Facility: CLINIC | Age: 69
End: 2019-10-01

## 2019-10-01 NOTE — TELEPHONE ENCOUNTER
----- Message from Tahir Sherman NP sent at 10/1/2019  7:40 AM CDT -----  Urine culture neg. Likely prostatitis. F/u with urology.

## 2019-10-02 ENCOUNTER — HOSPITAL ENCOUNTER (OUTPATIENT)
Dept: RADIOLOGY | Facility: HOSPITAL | Age: 69
Discharge: HOME | End: 2019-10-02
Attending: ORTHOPAEDIC SURGERY
Payer: MEDICARE

## 2019-10-02 DIAGNOSIS — M16.11 PRIMARY OSTEOARTHRITIS OF RIGHT HIP: ICD-10-CM

## 2019-10-02 PROCEDURE — 25500020 PHARM REV CODE 255: Performed by: ORTHOPAEDIC SURGERY

## 2019-10-02 PROCEDURE — 77002 NEEDLE LOCALIZATION BY XRAY: CPT

## 2019-10-02 PROCEDURE — 63600175 PHARM REV CODE 636 W HCPCS: Performed by: ORTHOPAEDIC SURGERY

## 2019-10-02 PROCEDURE — 25000003 PHARM REV CODE 250: Performed by: ORTHOPAEDIC SURGERY

## 2019-10-02 RX ORDER — BUPIVACAINE HYDROCHLORIDE 2.5 MG/ML
30 INJECTION, SOLUTION EPIDURAL; INFILTRATION; INTRACAUDAL ONCE
Status: COMPLETED | OUTPATIENT
Start: 2019-10-02 | End: 2019-10-02

## 2019-10-02 RX ORDER — LIDOCAINE HYDROCHLORIDE 10 MG/ML
30 INJECTION INFILTRATION; PERINEURAL ONCE
Status: COMPLETED | OUTPATIENT
Start: 2019-10-02 | End: 2019-10-02

## 2019-10-02 RX ORDER — METHYLPREDNISOLONE ACETATE 80 MG/ML
80 INJECTION, SUSPENSION INTRA-ARTICULAR; INTRALESIONAL; INTRAMUSCULAR; SOFT TISSUE ONCE
Status: COMPLETED | OUTPATIENT
Start: 2019-10-02 | End: 2019-10-02

## 2019-10-02 RX ADMIN — BUPIVACAINE HYDROCHLORIDE 1.5 ML: 2.5 INJECTION, SOLUTION EPIDURAL; INFILTRATION; INTRACAUDAL; PERINEURAL at 01:10

## 2019-10-02 RX ADMIN — IOHEXOL 3 ML: 300 INJECTION, SOLUTION INTRAVENOUS at 01:10

## 2019-10-02 RX ADMIN — LIDOCAINE HYDROCHLORIDE 7 ML: 10 INJECTION, SOLUTION INFILTRATION; PERINEURAL at 01:10

## 2019-10-02 RX ADMIN — METHYLPREDNISOLONE ACETATE 80 MG: 80 INJECTION, SUSPENSION INTRA-ARTICULAR; INTRALESIONAL; INTRAMUSCULAR; SOFT TISSUE at 01:10

## 2019-10-25 ENCOUNTER — PATIENT MESSAGE (OUTPATIENT)
Dept: FAMILY MEDICINE | Facility: CLINIC | Age: 69
End: 2019-10-25

## 2019-10-25 DIAGNOSIS — E78.5 DYSLIPIDEMIA: Primary | ICD-10-CM

## 2019-10-28 RX ORDER — ROSUVASTATIN CALCIUM 10 MG/1
10 TABLET, COATED ORAL DAILY
Qty: 90 TABLET | Refills: 1 | Status: SHIPPED | OUTPATIENT
Start: 2019-10-28 | End: 2020-02-27 | Stop reason: SINTOL

## 2019-10-31 ENCOUNTER — CLINICAL SUPPORT (OUTPATIENT)
Dept: FAMILY MEDICINE | Facility: CLINIC | Age: 69
End: 2019-10-31
Payer: MEDICARE

## 2019-10-31 DIAGNOSIS — Z23 NEED FOR INFLUENZA VACCINATION: Primary | ICD-10-CM

## 2019-10-31 PROCEDURE — G0008 FLU VACCINE - HIGH DOSE (65+) PRESERVATIVE FREE IM: ICD-10-PCS | Mod: S$GLB,,, | Performed by: NURSE PRACTITIONER

## 2019-10-31 PROCEDURE — G0008 ADMIN INFLUENZA VIRUS VAC: HCPCS | Mod: S$GLB,,, | Performed by: NURSE PRACTITIONER

## 2019-10-31 PROCEDURE — 90662 IIV NO PRSV INCREASED AG IM: CPT | Mod: S$GLB,,, | Performed by: NURSE PRACTITIONER

## 2019-10-31 PROCEDURE — 90662 FLU VACCINE - HIGH DOSE (65+) PRESERVATIVE FREE IM: ICD-10-PCS | Mod: S$GLB,,, | Performed by: NURSE PRACTITIONER

## 2019-11-20 ENCOUNTER — OFFICE VISIT (OUTPATIENT)
Dept: PULMONOLOGY | Facility: CLINIC | Age: 69
End: 2019-11-20
Payer: MEDICARE

## 2019-11-20 ENCOUNTER — PATIENT MESSAGE (OUTPATIENT)
Dept: FAMILY MEDICINE | Facility: CLINIC | Age: 69
End: 2019-11-20

## 2019-11-20 VITALS
WEIGHT: 242 LBS | DIASTOLIC BLOOD PRESSURE: 82 MMHG | HEART RATE: 62 BPM | SYSTOLIC BLOOD PRESSURE: 120 MMHG | OXYGEN SATURATION: 95 % | BODY MASS INDEX: 36.8 KG/M2

## 2019-11-20 DIAGNOSIS — I10 ESSENTIAL HYPERTENSION: Primary | ICD-10-CM

## 2019-11-20 DIAGNOSIS — J45.20 MILD INTERMITTENT ASTHMA WITHOUT COMPLICATION: ICD-10-CM

## 2019-11-20 PROBLEM — J45.909 MODERATE ASTHMA: Status: RESOLVED | Noted: 2019-01-30 | Resolved: 2019-11-20

## 2019-11-20 PROCEDURE — 99214 PR OFFICE/OUTPT VISIT, EST, LEVL IV, 30-39 MIN: ICD-10-PCS | Mod: S$GLB,,, | Performed by: INTERNAL MEDICINE

## 2019-11-20 PROCEDURE — 1101F PR PT FALLS ASSESS DOC 0-1 FALLS W/OUT INJ PAST YR: ICD-10-PCS | Mod: S$GLB,,, | Performed by: INTERNAL MEDICINE

## 2019-11-20 PROCEDURE — 1126F AMNT PAIN NOTED NONE PRSNT: CPT | Mod: S$GLB,,, | Performed by: INTERNAL MEDICINE

## 2019-11-20 PROCEDURE — 1126F PR PAIN SEVERITY QUANTIFIED, NO PAIN PRESENT: ICD-10-PCS | Mod: S$GLB,,, | Performed by: INTERNAL MEDICINE

## 2019-11-20 PROCEDURE — 99214 OFFICE O/P EST MOD 30 MIN: CPT | Mod: S$GLB,,, | Performed by: INTERNAL MEDICINE

## 2019-11-20 PROCEDURE — 1159F MED LIST DOCD IN RCRD: CPT | Mod: S$GLB,,, | Performed by: INTERNAL MEDICINE

## 2019-11-20 PROCEDURE — 1159F PR MEDICATION LIST DOCUMENTED IN MEDICAL RECORD: ICD-10-PCS | Mod: S$GLB,,, | Performed by: INTERNAL MEDICINE

## 2019-11-20 PROCEDURE — 1101F PT FALLS ASSESS-DOCD LE1/YR: CPT | Mod: S$GLB,,, | Performed by: INTERNAL MEDICINE

## 2019-11-20 RX ORDER — PREDNISOLONE ACETATE 10 MG/ML
SUSPENSION/ DROPS OPHTHALMIC
COMMUNITY
Start: 2019-11-18 | End: 2020-01-16

## 2019-11-20 RX ORDER — KETOROLAC TROMETHAMINE 4 MG/ML
SOLUTION/ DROPS OPHTHALMIC
COMMUNITY
Start: 2019-11-19 | End: 2020-01-16

## 2019-11-20 RX ORDER — MOXIFLOXACIN 5 MG/ML
SOLUTION/ DROPS OPHTHALMIC
COMMUNITY
Start: 2019-11-18 | End: 2020-01-16

## 2019-11-20 NOTE — PROGRESS NOTES
SUBJECTIVE:    Patient ID: Nico Teague is a 69 y.o. male.    Chief Complaint: Asthma (6 month follow up )    Patient here today feeling well.  His peak flow is 600 today.  He is not using Symbicort.  He is taking is Singulair every night.  He has his rescue inhaler that he is not using regularly.  He had a septoplasty in June and has had no issues with his Asthma.    Past Medical History:   Diagnosis Date    Allergy     Arthritis     Asthma     Basal cell carcinoma     Fever blister     Hypertension     Joint pain     Melanoma     PVC (premature ventricular contraction)     Skin disease     Squamous cell carcinoma      Past Surgical History:   Procedure Laterality Date    LIPOMA RESECTION      NOSE SURGERY      SHOULDER ARTHROSCOPY      SKIN GRAFT       Family History   Problem Relation Age of Onset    Cancer Mother         esophageal    Heart disease Father     Hypertension Father     Arthritis Paternal Grandmother         Social History:   Marital Status:   Occupation: retired captain   Alcohol History:  reports that he does not drink alcohol.  Tobacco History:  reports that he quit smoking about 31 years ago. His smoking use included cigarettes. He started smoking about 47 years ago. He has a 40.00 pack-year smoking history. He has never used smokeless tobacco.  Drug History:  reports that he does not use drugs.    Review of patient's allergies indicates:   Allergen Reactions    Bactrim [sulfamethoxazole-trimethoprim]      HIVES/ RASH       Current Outpatient Medications   Medication Sig Dispense Refill    albuterol (PROVENTIL/VENTOLIN HFA) 90 mcg/actuation inhaler Inhale 2 puffs into the lungs every 6 (six) hours as needed for Shortness of Breath. Rescue 3 Inhaler 4    ascorbic acid, vitamin C, (VITAMIN C) 1000 MG tablet Take 1,000 mg by mouth once daily.      benzonatate (TESSALON) 200 MG capsule Take 200 mg by mouth 3 (three) times daily as needed for Cough.       budesonide-formoterol 160-4.5 mcg (SYMBICORT) 160-4.5 mcg/actuation HFAA Inhale 2 puffs into the lungs every 12 (twelve) hours. Controller      co-enzyme Q-10 50 mg capsule Take 50 mg by mouth once daily.      fluticasone (FLONASE) 50 mcg/actuation nasal spray 1 spray by Nasal route.      garlic 100 mg Tab Take by mouth.      glucosamine-chondroitin 500-400 mg tablet Take 1 tablet by mouth 3 (three) times daily.      aspirin 81 MG Chew Take 81 mg by mouth once daily.      ciprofloxacin HCl (CIPRO) 500 MG tablet Take 1 tablet (500 mg total) by mouth 2 (two) times daily. (Patient not taking: Reported on 11/20/2019) 20 tablet 0    econazole nitrate 1 % cream       ketorolac 0.4% (ACULAR) 0.4 % Drop       Lactobacillus rhamnosus GG (CULTURELLE) 10 billion cell capsule Take 1 capsule by mouth.      losartan (COZAAR) 100 MG tablet Take 1 tablet (100 mg total) by mouth once daily. 90 tablet 1    magnesium oxide (MAG-OX) 400 mg (241.3 mg magnesium) tablet Take 400 mg by mouth once daily.      melatonin 10 mg Tab Take 1 tablet by mouth nightly as needed.      montelukast (SINGULAIR) 10 mg tablet Take 10 mg by mouth every evening.      montelukast (SINGULAIR) 10 mg tablet Take 1 tablet (10 mg total) by mouth every evening. 90 tablet 3    moxifloxacin (VIGAMOX) 0.5 % ophthalmic solution       multivitamin (THERAGRAN) per tablet Take 1 tablet by mouth.      omega 3-dha-epa-fish oil 1,600-500-800 mg/5 mL Liqd Take 2 capsules by mouth once daily.      potassium 99 mg Tab Take by mouth.      prednisoLONE acetate (PRED FORTE) 1 % DrpS       rosuvastatin (CRESTOR) 10 MG tablet Take 1 tablet (10 mg total) by mouth once daily. 90 tablet 1    tamsulosin (FLOMAX) 0.4 mg Cap Take 1 capsule (0.4 mg total) by mouth once daily. 90 capsule 1    terbinafine HCl (LAMISIL) 250 mg tablet Take 250 mg by mouth.      triamterene-hydrochlorothiazide 37.5-25 mg (DYAZIDE) 37.5-25 mg per capsule Take 1 capsule by mouth every  "morning.      verapamil (CALAN-SR) 180 MG CR tablet       vitamin E 1000 UNIT capsule Take 1,000 Units by mouth once daily.       No current facility-administered medications for this visit.        Last PFT: 4/2018      Review of Systems  General: Feeling Well.  Eyes: Vision is good.  ENT:  He is blowing a lot out of his nose.   Heart:: feels palpitations occasionally .  Lungs: shortness of breath is better then it was 2 weeks ago, not coughing anymore   GI: No Nausea, vomiting, constipation, diarrhea, or reflux.  : No dysuria, hesitancy, or nocturia.  Musculoskeletal: knee pain .  Skin: No lesions or rashes.  Neuro: No headaches or neuropathy.  Lymph: swelling much better to his legs   Psych: No anxiety or depression.  Endo: weight loss     OBJECTIVE:      /82 (BP Location: Left arm, Patient Position: Sitting)   Pulse 62   Wt 109.8 kg (242 lb)   SpO2 95%   BMI 36.80 kg/m²     Physical Exam  GENERAL: Older patient in no distress.  HEENT: Pupils equal and reactive. Extraocular movements intact. Nose intact.      Pharynx moist.  NECK: Supple.   HEART: Regular rate and rhythm. No murmur or gallop auscultated.  LUNGS: Clear to auscultation and percussion. Lung excursion symmetrical. No change in fremitus. No adventitial noises.  ABDOMEN: Bowel sounds present. Non-tender, no masses palpated.  EXTREMITIES: Normal muscle tone and joint movement, no cyanosis or clubbing.   LYMPHATICS: No adenopathy palpated, +1 pitting edema to legs   SKIN: Dry, intact, no lesions.   NEURO: Cranial nerves II-XII intact. Motor strength 5/5 bilaterally, upper and lower extremities.  PSYCH: Appropriate affect.    Divya Alvarez NP served in the capacity as a "scribe" for this patient encounter.  A "face to face" encounter occurred with Dr. Brewer on this date  The treatment plan and medical decision making is outlined below:         Assessment:       1. Mild intermittent asthma without complication          Plan:       Mild " intermittent asthma without complication        Check your peak flows if you start to use your rescue inhaler more or in yellow more then green call will start inhaled steroid at that time  Had flu shot already     Follow up in about 6 months (around 5/20/2020).

## 2019-11-20 NOTE — PATIENT INSTRUCTIONS
Understanding Asthma Triggers  Triggers are things that cause you to have asthma symptoms. Some triggers you can stay away from completely. Others you can plan for and adjust to. Use this sheet to help you know your triggers.    What are triggers?  Triggers irritate your lungs and lead to asthma flare-ups. Some examples are:  · Irritants, such as tobacco smoke or air pollution. These are a concern for all people with asthma.  · Allergens or substances that cause allergies, like pets, dust mites, or pollen  · Special conditions. These include being ill with a cold or the flu, or certain kinds of weather, including changes in weather. These differ from person to person.  · Exercise can trigger asthma in some people. If exercise is one of your triggers, you can learn how to exercise safely.  What triggers your asthma?  Which of these common triggers cause your asthma to flare up? Check all that apply to you.  Irritants:  ? Tobacco smoke (smoking or secondhand smoke)  ? Smoke from fireplaces  ? Vehicle exhaust  ? Smog or air pollution  ? Aerosol sprays  ? Strong odors, such as perfume, incense, or cooking odors  ? Household , such as ammonia or bleach  Allergens:  ? Cats  ? Dogs  ? Birds  ? Dust or dust mites  ? Pollen  ? Mold  ? Cockroaches  Other triggers:  ? Cold air  ? Hot air  ? Weather changes  ? Exercise  ? Certain foods or food ingredients (such as sulfites)  ? Medicines  ? Emotions such as laughing, crying, or feeling stressed  ? Illness such as colds, flu, and sinus infections  Allergies and allergy treatment  People with asthma often have allergies. If you have allergies, or think you have them, talk with your healthcare provider about testing and treatment. Allergy testing can find out exactly which allergens affect you. Types of tests include:  · Skin tests. A small amount of each allergen is put on the skin. Sites are then looked at for an allergic reaction. This could be redness, swelling, or  itching. In general, the greater the reaction, the stronger the allergy.  · Blood tests. A blood test can show sensitivity to the allergen.  Exposing a person to gradually larger amounts of an allergen can help the body build up a tolerance. This is the purpose of allergy shots (immunotherapy). For this therapy, injections are given over a period of years. At first, you get injections with a very small amount of allergen about once a week. As treatment goes on, the amount of allergen is gradually increased to a certain level. Eventually, you have the injections less often. This therapy can take up to a year to start working. But it can be very effective to manage certain allergies over time.   Date Last Reviewed: 10/1/2016  © 0239-6744 [employer]. [address] [place] [address], [place] [address]. All rights reserved. This information is not intended as a substitute for professional medical care. Always follow your healthcare professional's instructions.      Check your peak flows if you start to use your rescue inhaler more or in yellow more then green call will start inhaled steroid at that time  Had flu shot already

## 2019-11-21 RX ORDER — TRIAMTERENE AND HYDROCHLOROTHIAZIDE 37.5; 25 MG/1; MG/1
1 CAPSULE ORAL EVERY MORNING
Qty: 90 CAPSULE | Refills: 1 | Status: SHIPPED | OUTPATIENT
Start: 2019-11-21 | End: 2020-06-22

## 2019-12-11 ENCOUNTER — OFFICE VISIT (OUTPATIENT)
Dept: FAMILY MEDICINE | Facility: CLINIC | Age: 69
End: 2019-12-11
Payer: MEDICARE

## 2019-12-11 VITALS
TEMPERATURE: 97 F | SYSTOLIC BLOOD PRESSURE: 124 MMHG | DIASTOLIC BLOOD PRESSURE: 80 MMHG | OXYGEN SATURATION: 98 % | HEART RATE: 63 BPM | BODY MASS INDEX: 36.68 KG/M2 | HEIGHT: 68 IN | WEIGHT: 242 LBS

## 2019-12-11 DIAGNOSIS — M79.10 MYALGIA: ICD-10-CM

## 2019-12-11 DIAGNOSIS — R53.83 FATIGUE, UNSPECIFIED TYPE: ICD-10-CM

## 2019-12-11 DIAGNOSIS — I10 ESSENTIAL HYPERTENSION: ICD-10-CM

## 2019-12-11 DIAGNOSIS — M25.50 MULTIPLE JOINT PAIN: Primary | ICD-10-CM

## 2019-12-11 DIAGNOSIS — E78.5 HYPERLIPIDEMIA, UNSPECIFIED HYPERLIPIDEMIA TYPE: ICD-10-CM

## 2019-12-11 PROBLEM — R42 VERTIGO: Status: ACTIVE | Noted: 2019-12-11

## 2019-12-11 PROBLEM — N40.0 BPH (BENIGN PROSTATIC HYPERPLASIA): Status: ACTIVE | Noted: 2019-12-11

## 2019-12-11 PROCEDURE — 1101F PT FALLS ASSESS-DOCD LE1/YR: CPT | Mod: S$GLB,,, | Performed by: INTERNAL MEDICINE

## 2019-12-11 PROCEDURE — 1101F PR PT FALLS ASSESS DOC 0-1 FALLS W/OUT INJ PAST YR: ICD-10-PCS | Mod: S$GLB,,, | Performed by: INTERNAL MEDICINE

## 2019-12-11 PROCEDURE — 1125F PR PAIN SEVERITY QUANTIFIED, PAIN PRESENT: ICD-10-PCS | Mod: S$GLB,,, | Performed by: INTERNAL MEDICINE

## 2019-12-11 PROCEDURE — 1159F MED LIST DOCD IN RCRD: CPT | Mod: S$GLB,,, | Performed by: INTERNAL MEDICINE

## 2019-12-11 PROCEDURE — 99214 OFFICE O/P EST MOD 30 MIN: CPT | Mod: S$GLB,,, | Performed by: INTERNAL MEDICINE

## 2019-12-11 PROCEDURE — 1159F PR MEDICATION LIST DOCUMENTED IN MEDICAL RECORD: ICD-10-PCS | Mod: S$GLB,,, | Performed by: INTERNAL MEDICINE

## 2019-12-11 PROCEDURE — 99214 PR OFFICE/OUTPT VISIT, EST, LEVL IV, 30-39 MIN: ICD-10-PCS | Mod: S$GLB,,, | Performed by: INTERNAL MEDICINE

## 2019-12-11 PROCEDURE — 1170F PR FUNCTIONAL STATUS ASSESSED: ICD-10-PCS | Mod: S$GLB,,, | Performed by: INTERNAL MEDICINE

## 2019-12-11 PROCEDURE — 1125F AMNT PAIN NOTED PAIN PRSNT: CPT | Mod: S$GLB,,, | Performed by: INTERNAL MEDICINE

## 2019-12-11 PROCEDURE — 1170F FXNL STATUS ASSESSED: CPT | Mod: S$GLB,,, | Performed by: INTERNAL MEDICINE

## 2019-12-11 RX ORDER — METHYLPREDNISOLONE 4 MG/1
TABLET ORAL
Qty: 21 TABLET | Refills: 0 | Status: SHIPPED | OUTPATIENT
Start: 2019-12-11 | End: 2020-01-16

## 2019-12-11 NOTE — PROGRESS NOTES
Subjective:       Patient ID: Nico Teague is a 69 y.o. male.    Chief Complaint: Generalized Body Aches (multiple joint pain and muscles) and Fatigue    Here for evaluation of generalized body aches in muscles and joints (mostly joints) which seems to be gradually worsening over several months.  Initially was waxing and waning but now occurs pretty much daily.  Worse in the afternoons; rates it 6/10 intensity.   Stiff after periods of rest; improves after about 30 minutes of activity but never really goes completely away.   Feels weak; having a hard time getting out of chair.  Hips, shoulders, hands bother him the most.  Tired all the time.  No rashes.      Hypertension   This is a chronic problem. The problem is controlled. Associated symptoms include malaise/fatigue, palpitations, peripheral edema and shortness of breath (d/t asthma; resolves with bronchodilator). Pertinent negatives include no chest pain, headaches or neck pain.     Review of Systems   Constitutional: Positive for chills, fatigue and malaise/fatigue. Negative for fever and unexpected weight change.   HENT: Positive for hearing loss. Negative for congestion, postnasal drip, rhinorrhea, trouble swallowing and voice change.    Eyes: Negative for photophobia and visual disturbance.   Respiratory: Positive for shortness of breath (d/t asthma; resolves with bronchodilator). Negative for apnea, cough, choking, chest tightness and wheezing.    Cardiovascular: Positive for palpitations and leg swelling. Negative for chest pain.   Gastrointestinal: Negative for abdominal pain, blood in stool, constipation, diarrhea, nausea, rectal pain and vomiting.   Endocrine: Positive for cold intolerance. Negative for heat intolerance, polydipsia and polyuria.   Genitourinary: Positive for difficulty urinating and frequency. Negative for decreased urine volume, discharge, dysuria, flank pain, genital sores, hematuria, testicular pain and urgency.   Musculoskeletal:  Positive for arthralgias, back pain, gait problem, joint swelling and myalgias. Negative for neck pain.   Skin: Negative for color change, rash and wound.   Allergic/Immunologic: Negative for environmental allergies and food allergies.   Neurological: Positive for weakness and light-headedness (when first stands up). Negative for dizziness, tremors, seizures, syncope, facial asymmetry, speech difficulty, numbness and headaches.   Hematological: Negative for adenopathy. Bruises/bleeds easily.   Psychiatric/Behavioral: Positive for confusion and sleep disturbance. Negative for hallucinations and suicidal ideas. The patient is nervous/anxious.        Past Medical History:   Diagnosis Date    Allergy     Arthritis     Asthma     Basal cell carcinoma     Fever blister     Hypertension     Joint pain     Melanoma     PVC (premature ventricular contraction)     Skin disease     Squamous cell carcinoma       Past Surgical History:   Procedure Laterality Date    EYE SURGERY Left 12/03/2019    LIPOMA RESECTION      NOSE SURGERY      SHOULDER ARTHROSCOPY      SKIN GRAFT         Family History   Problem Relation Age of Onset    Cancer Mother         esophageal    Heart disease Father     Hypertension Father     Arthritis Paternal Grandmother        Social History     Socioeconomic History    Marital status:      Spouse name: Not on file    Number of children: Not on file    Years of education: Not on file    Highest education level: Not on file   Occupational History    Occupation: retired captain    Social Needs    Financial resource strain: Not on file    Food insecurity:     Worry: Not on file     Inability: Not on file    Transportation needs:     Medical: Not on file     Non-medical: Not on file   Tobacco Use    Smoking status: Former Smoker     Packs/day: 2.00     Years: 20.00     Pack years: 40.00     Types: Cigarettes     Start date: 5/1/1972     Last attempt to quit: 8/27/1988      Years since quittin.3    Smokeless tobacco: Never Used   Substance and Sexual Activity    Alcohol use: No     Frequency: Never    Drug use: No    Sexual activity: Never   Lifestyle    Physical activity:     Days per week: Not on file     Minutes per session: Not on file    Stress: To some extent   Relationships    Social connections:     Talks on phone: Not on file     Gets together: Not on file     Attends Alevism service: Not on file     Active member of club or organization: Not on file     Attends meetings of clubs or organizations: Not on file     Relationship status: Not on file   Other Topics Concern    Not on file   Social History Narrative    Live with wife       Current Outpatient Medications   Medication Sig Dispense Refill    albuterol (PROVENTIL/VENTOLIN HFA) 90 mcg/actuation inhaler Inhale 2 puffs into the lungs every 6 (six) hours as needed for Shortness of Breath. Rescue 3 Inhaler 4    ascorbic acid, vitamin C, (VITAMIN C) 1000 MG tablet Take 1,000 mg by mouth once daily.      aspirin 81 MG Chew Take 81 mg by mouth once daily.      budesonide-formoterol 160-4.5 mcg (SYMBICORT) 160-4.5 mcg/actuation HFAA Inhale 2 puffs into the lungs every 12 (twelve) hours. Controller      co-enzyme Q-10 50 mg capsule Take 50 mg by mouth once daily.      econazole nitrate 1 % cream       fluticasone (FLONASE) 50 mcg/actuation nasal spray 1 spray by Nasal route.      garlic 100 mg Tab Take by mouth.      glucosamine-chondroitin 500-400 mg tablet Take 1 tablet by mouth 3 (three) times daily.      Lactobacillus rhamnosus GG (CULTURELLE) 10 billion cell capsule Take 1 capsule by mouth.      losartan (COZAAR) 100 MG tablet Take 1 tablet (100 mg total) by mouth once daily. 90 tablet 1    magnesium oxide (MAG-OX) 400 mg (241.3 mg magnesium) tablet Take 400 mg by mouth once daily.      melatonin 10 mg Tab Take 1 tablet by mouth nightly as needed.      montelukast (SINGULAIR) 10 mg tablet Take 10  "mg by mouth every evening.      multivitamin (THERAGRAN) per tablet Take 1 tablet by mouth.      potassium 99 mg Tab Take by mouth.      prednisoLONE acetate (PRED FORTE) 1 % DrpS       rosuvastatin (CRESTOR) 10 MG tablet Take 1 tablet (10 mg total) by mouth once daily. 90 tablet 1    tamsulosin (FLOMAX) 0.4 mg Cap Take 1 capsule (0.4 mg total) by mouth once daily. 90 capsule 1    triamterene-hydrochlorothiazide 37.5-25 mg (DYAZIDE) 37.5-25 mg per capsule Take 1 capsule by mouth every morning. 90 capsule 1    verapamil (CALAN-SR) 180 MG CR tablet       vitamin E 1000 UNIT capsule Take 1,000 Units by mouth once daily.      ciprofloxacin HCl (CIPRO) 500 MG tablet Take 1 tablet (500 mg total) by mouth 2 (two) times daily. (Patient not taking: Reported on 11/20/2019) 20 tablet 0    ketorolac 0.4% (ACULAR) 0.4 % Drop       methylPREDNISolone (MEDROL DOSEPACK) 4 mg tablet Take 6 tablets on day 1.  Decrease by 1 tablet each day until complete. 21 tablet 0    montelukast (SINGULAIR) 10 mg tablet Take 1 tablet (10 mg total) by mouth every evening. (Patient not taking: Reported on 12/11/2019) 90 tablet 3    moxifloxacin (VIGAMOX) 0.5 % ophthalmic solution       omega 3-dha-epa-fish oil 1,600-500-800 mg/5 mL Liqd Take 2 capsules by mouth once daily.      terbinafine HCl (LAMISIL) 250 mg tablet Take 250 mg by mouth.       No current facility-administered medications for this visit.        Review of patient's allergies indicates:   Allergen Reactions    Bactrim [sulfamethoxazole-trimethoprim]      HIVES/ RASH    Green tea (rashida sinensis) Hives     Pt is has allergies to black tea. Not an option under search.     Objective:    HPI     Generalized Body Aches      Additional comments: multiple joint pain and muscles          Last edited by Radha Ruffin MA on 12/11/2019  9:52 AM. (History)      Blood pressure 124/80, pulse 63, temperature 96.8 °F (36 °C), temperature source Temporal, height 5' 8" (1.727 m), " weight 109.8 kg (242 lb), SpO2 98 %. Body mass index is 36.8 kg/m².   Physical Exam   Constitutional: He appears well-developed. No distress.   HENT:   Nose: Nose normal.   Mouth/Throat: Oropharynx is clear and moist.   Eyes: Conjunctivae are normal. Right eye exhibits no discharge. Left eye exhibits no discharge. No scleral icterus.   Neck: Carotid bruit is not present.   Cardiovascular: Normal rate, regular rhythm and normal heart sounds.   No murmur heard.  Pulmonary/Chest: Effort normal and breath sounds normal. No respiratory distress. He has no decreased breath sounds. He has no wheezes. He has no rhonchi. He has no rales.   Abdominal: Soft. He exhibits no distension. There is no tenderness. There is no rebound and no guarding.   Musculoskeletal: He exhibits no edema.   Has some mild tenderness in shoulder and hip girdles     Neurological: He is alert. He has normal strength. He displays no tremor.   Able to stand unassisted.  No significant weakness noted   Skin: Skin is warm and dry.   Psychiatric: He has a normal mood and affect. His speech is normal.   Nursing note and vitals reviewed.          Assessment:       1. Multiple joint pain    2. Myalgia    3. Fatigue, unspecified type    4. Essential hypertension    5. Hyperlipidemia, unspecified hyperlipidemia type        Plan:       Nico was seen today for generalized body aches and fatigue.    Diagnoses and all orders for this visit:    Multiple joint pain  Comments:  Has OA but will check for RA given worsening symptoms and prolonged stiffness  Orders:  -     Rheumatoid factor; Future  -     Sedimentation rate, automated; Future  -     methylPREDNISolone (MEDROL DOSEPACK) 4 mg tablet; Take 6 tablets on day 1.  Decrease by 1 tablet each day until complete.  -     Rheumatoid factor  -     Sedimentation rate, automated    Myalgia  Comments:  Statin myopathy, PMR are primary considerations.  Trial steroids after labs.  May need to consider holding statin.    Orders:  -     Sedimentation rate, automated; Future  -     CK isoenzymes; Future  -     JOSE Screen w/Reflex; Future  -     methylPREDNISolone (MEDROL DOSEPACK) 4 mg tablet; Take 6 tablets on day 1.  Decrease by 1 tablet each day until complete.  -     Sedimentation rate, automated  -     CK isoenzymes  -     JOSE Screen w/Reflex    Fatigue, unspecified type  -     TSH; Future  -     CBC auto differential; Future  -     TSH  -     CBC auto differential    Essential hypertension  -     Lipid panel; Future  -     Comprehensive metabolic panel; Future  -     Lipid panel  -     Comprehensive metabolic panel    Hyperlipidemia, unspecified hyperlipidemia type  -     Lipid panel; Future  -     Comprehensive metabolic panel; Future  -     Lipid panel  -     Comprehensive metabolic panel

## 2019-12-16 LAB
ALBUMIN SERPL-MCNC: 4 G/DL (ref 3.6–5.1)
ALBUMIN/GLOB SERPL: 1.4 (CALC) (ref 1–2.5)
ALP SERPL-CCNC: 70 U/L (ref 40–115)
ALT SERPL-CCNC: 10 U/L (ref 9–46)
ANA SER QL IF: NEGATIVE
AST SERPL-CCNC: 12 U/L (ref 10–35)
BASOPHILS # BLD AUTO: 70 CELLS/UL (ref 0–200)
BASOPHILS NFR BLD AUTO: 0.8 %
BILIRUB SERPL-MCNC: 0.4 MG/DL (ref 0.2–1.2)
BUN SERPL-MCNC: 23 MG/DL (ref 7–25)
BUN/CREAT SERPL: NORMAL (CALC) (ref 6–22)
CALCIUM SERPL-MCNC: 9.3 MG/DL (ref 8.6–10.3)
CHLORIDE SERPL-SCNC: 107 MMOL/L (ref 98–110)
CHOLEST SERPL-MCNC: 113 MG/DL
CHOLEST/HDLC SERPL: 3.3 (CALC)
CK BB CFR SERPL ELPH: NORMAL % OF TOTAL
CK MB CFR SERPL ELPH: 0 % OF TOTAL
CK MM CFR SERPL ELPH: 100 % OF TOTAL (ref 95–100)
CO2 SERPL-SCNC: 26 MMOL/L (ref 20–32)
CREAT SERPL-MCNC: 1.11 MG/DL (ref 0.7–1.25)
EOSINOPHIL # BLD AUTO: 322 CELLS/UL (ref 15–500)
EOSINOPHIL NFR BLD AUTO: 3.7 %
ERYTHROCYTE [DISTWIDTH] IN BLOOD BY AUTOMATED COUNT: 13.1 % (ref 11–15)
ERYTHROCYTE [SEDIMENTATION RATE] IN BLOOD BY WESTERGREN METHOD: 50 MM/H
GFRSERPLBLD MDRD-ARVRAT: 68 ML/MIN/1.73M2
GLOBULIN SER CALC-MCNC: 2.8 G/DL (CALC) (ref 1.9–3.7)
GLUCOSE SERPL-MCNC: 83 MG/DL (ref 65–99)
HCT VFR BLD AUTO: 38.2 % (ref 38.5–50)
HDLC SERPL-MCNC: 34 MG/DL
HGB BLD-MCNC: 12.1 G/DL (ref 13.2–17.1)
LDLC SERPL CALC-MCNC: 58 MG/DL (CALC)
LYMPHOCYTES # BLD AUTO: 2401 CELLS/UL (ref 850–3900)
LYMPHOCYTES NFR BLD AUTO: 27.6 %
MCH RBC QN AUTO: 27.3 PG (ref 27–33)
MCHC RBC AUTO-ENTMCNC: 31.7 G/DL (ref 32–36)
MCV RBC AUTO: 86 FL (ref 80–100)
MONOCYTES # BLD AUTO: 835 CELLS/UL (ref 200–950)
MONOCYTES NFR BLD AUTO: 9.6 %
NEUTROPHILS # BLD AUTO: 5072 CELLS/UL (ref 1500–7800)
NEUTROPHILS NFR BLD AUTO: 58.3 %
NONHDLC SERPL-MCNC: 79 MG/DL (CALC)
PLATELET # BLD AUTO: 255 THOUSAND/UL (ref 140–400)
PMV BLD REES-ECKER: 11.1 FL (ref 7.5–12.5)
POTASSIUM SERPL-SCNC: 4.2 MMOL/L (ref 3.5–5.3)
PROT SERPL-MCNC: 6.8 G/DL (ref 6.1–8.1)
RBC # BLD AUTO: 4.44 MILLION/UL (ref 4.2–5.8)
RHEUMATOID FACT SERPL-ACNC: <14 IU/ML
SODIUM SERPL-SCNC: 142 MMOL/L (ref 135–146)
TRIGL SERPL-MCNC: 129 MG/DL
TSH SERPL-ACNC: 4.12 MIU/L (ref 0.4–4.5)
WBC # BLD AUTO: 8.7 THOUSAND/UL (ref 3.8–10.8)

## 2020-01-02 ENCOUNTER — OFFICE VISIT (OUTPATIENT)
Dept: PULMONOLOGY | Facility: CLINIC | Age: 70
End: 2020-01-02
Payer: MEDICARE

## 2020-01-02 VITALS
WEIGHT: 243 LBS | DIASTOLIC BLOOD PRESSURE: 80 MMHG | HEART RATE: 61 BPM | BODY MASS INDEX: 36.95 KG/M2 | SYSTOLIC BLOOD PRESSURE: 130 MMHG | OXYGEN SATURATION: 97 %

## 2020-01-02 DIAGNOSIS — J45.20 MILD INTERMITTENT ASTHMA WITHOUT COMPLICATION: Primary | ICD-10-CM

## 2020-01-02 DIAGNOSIS — R06.00 DYSPNEA, UNSPECIFIED TYPE: ICD-10-CM

## 2020-01-02 DIAGNOSIS — R09.82 POST-NASAL DRIP: ICD-10-CM

## 2020-01-02 PROCEDURE — 3075F SYST BP GE 130 - 139MM HG: CPT | Mod: S$GLB,,, | Performed by: NURSE PRACTITIONER

## 2020-01-02 PROCEDURE — 3079F DIAST BP 80-89 MM HG: CPT | Mod: S$GLB,,, | Performed by: NURSE PRACTITIONER

## 2020-01-02 PROCEDURE — 99214 OFFICE O/P EST MOD 30 MIN: CPT | Mod: S$GLB,,, | Performed by: NURSE PRACTITIONER

## 2020-01-02 PROCEDURE — 99214 PR OFFICE/OUTPT VISIT, EST, LEVL IV, 30-39 MIN: ICD-10-PCS | Mod: S$GLB,,, | Performed by: NURSE PRACTITIONER

## 2020-01-02 PROCEDURE — 1101F PT FALLS ASSESS-DOCD LE1/YR: CPT | Mod: S$GLB,,, | Performed by: NURSE PRACTITIONER

## 2020-01-02 PROCEDURE — 3079F PR MOST RECENT DIASTOLIC BLOOD PRESSURE 80-89 MM HG: ICD-10-PCS | Mod: S$GLB,,, | Performed by: NURSE PRACTITIONER

## 2020-01-02 PROCEDURE — 3075F PR MOST RECENT SYSTOLIC BLOOD PRESS GE 130-139MM HG: ICD-10-PCS | Mod: S$GLB,,, | Performed by: NURSE PRACTITIONER

## 2020-01-02 PROCEDURE — 1159F MED LIST DOCD IN RCRD: CPT | Mod: S$GLB,,, | Performed by: NURSE PRACTITIONER

## 2020-01-02 PROCEDURE — 1101F PR PT FALLS ASSESS DOC 0-1 FALLS W/OUT INJ PAST YR: ICD-10-PCS | Mod: S$GLB,,, | Performed by: NURSE PRACTITIONER

## 2020-01-02 PROCEDURE — 1126F PR PAIN SEVERITY QUANTIFIED, NO PAIN PRESENT: ICD-10-PCS | Mod: S$GLB,,, | Performed by: NURSE PRACTITIONER

## 2020-01-02 PROCEDURE — 1126F AMNT PAIN NOTED NONE PRSNT: CPT | Mod: S$GLB,,, | Performed by: NURSE PRACTITIONER

## 2020-01-02 PROCEDURE — 1159F PR MEDICATION LIST DOCUMENTED IN MEDICAL RECORD: ICD-10-PCS | Mod: S$GLB,,, | Performed by: NURSE PRACTITIONER

## 2020-01-02 RX ORDER — METHYLPREDNISOLONE 4 MG/1
TABLET ORAL
Qty: 1 PACKAGE | Refills: 0 | Status: SHIPPED | OUTPATIENT
Start: 2020-01-02 | End: 2020-01-16

## 2020-01-02 NOTE — PATIENT INSTRUCTIONS
lay off the salt, more water, elevate your feet   Keep using your symbicort 2 puffs twice, rinse after you use it  Start Taking Allegra daily  Continue the Flonase and saline rinses   Medrol dose pack   Call if do not improve and will send a prednisone taper

## 2020-01-02 NOTE — PROGRESS NOTES
SUBJECTIVE:    Patient ID: Nico Teague is a 69 y.o. male.    Chief Complaint: Cough (started on Monday with cough, chest congestion )    Patient here today feeling more short winded since Monday.  It started with a sore throat.  He started using his Symbicort again, and has used his rescue inhaler daily this week.  He has been eating more salt since the holidays, with weight gain and swelling.   He has been using saline spray and the netti pot since his sinus surgery.   He states he is having cataract surgery at the end of this month and can not have steroids for 15 days prior.  His peak flow is 500 with poor effort today.     Past Medical History:   Diagnosis Date    Allergy     Arthritis     Asthma     Basal cell carcinoma     Fever blister     Hypertension     Joint pain     Melanoma     PVC (premature ventricular contraction)     Skin disease     Squamous cell carcinoma      Past Surgical History:   Procedure Laterality Date    EYE SURGERY Left 12/03/2019    LIPOMA RESECTION      NOSE SURGERY      SHOULDER ARTHROSCOPY      SKIN GRAFT       Family History   Problem Relation Age of Onset    Cancer Mother         esophageal    Heart disease Father     Hypertension Father     Arthritis Paternal Grandmother         Social History:   Marital Status:   Occupation: retired captain   Alcohol History:  reports that he does not drink alcohol.  Tobacco History:  reports that he quit smoking about 31 years ago. His smoking use included cigarettes. He started smoking about 47 years ago. He has a 40.00 pack-year smoking history. He has never used smokeless tobacco.  Drug History:  reports that he does not use drugs.    Review of patient's allergies indicates:   Allergen Reactions    Bactrim [sulfamethoxazole-trimethoprim]      HIVES/ RASH       Current Outpatient Medications   Medication Sig Dispense Refill    albuterol (PROVENTIL/VENTOLIN HFA) 90 mcg/actuation inhaler Inhale 2 puffs into the  lungs every 6 (six) hours as needed for Shortness of Breath. Rescue 3 Inhaler 4    aspirin 81 MG Chew Take 81 mg by mouth once daily.      budesonide-formoterol 160-4.5 mcg (SYMBICORT) 160-4.5 mcg/actuation HFAA Inhale 2 puffs into the lungs every 12 (twelve) hours. Controller      co-enzyme Q-10 50 mg capsule Take 50 mg by mouth once daily.      fluticasone (FLONASE) 50 mcg/actuation nasal spray 1 spray by Nasal route.      Lactobacillus rhamnosus GG (CULTURELLE) 10 billion cell capsule Take 1 capsule by mouth.      losartan (COZAAR) 100 MG tablet Take 1 tablet (100 mg total) by mouth once daily. 90 tablet 1    melatonin 10 mg Tab Take 1 tablet by mouth nightly as needed.      montelukast (SINGULAIR) 10 mg tablet Take 10 mg by mouth every evening.      multivitamin (THERAGRAN) per tablet Take 1 tablet by mouth.      prednisoLONE acetate (PRED FORTE) 1 % DrpS       rosuvastatin (CRESTOR) 10 MG tablet Take 1 tablet (10 mg total) by mouth once daily. 90 tablet 1    tamsulosin (FLOMAX) 0.4 mg Cap Take 1 capsule (0.4 mg total) by mouth once daily. 90 capsule 1    verapamil (CALAN-SR) 180 MG CR tablet       ascorbic acid, vitamin C, (VITAMIN C) 1000 MG tablet Take 1,000 mg by mouth once daily.      ciprofloxacin HCl (CIPRO) 500 MG tablet Take 1 tablet (500 mg total) by mouth 2 (two) times daily. (Patient not taking: Reported on 11/20/2019) 20 tablet 0    econazole nitrate 1 % cream       garlic 100 mg Tab Take by mouth.      glucosamine-chondroitin 500-400 mg tablet Take 1 tablet by mouth 3 (three) times daily.      ketorolac 0.4% (ACULAR) 0.4 % Drop       magnesium oxide (MAG-OX) 400 mg (241.3 mg magnesium) tablet Take 400 mg by mouth once daily.      methylPREDNISolone (MEDROL DOSEPACK) 4 mg tablet Take 6 tablets on day 1.  Decrease by 1 tablet each day until complete. (Patient not taking: Reported on 1/2/2020) 21 tablet 0    methylPREDNISolone (MEDROL DOSEPACK) 4 mg tablet use as directed 1  Package 0    montelukast (SINGULAIR) 10 mg tablet Take 1 tablet (10 mg total) by mouth every evening. (Patient not taking: Reported on 12/11/2019) 90 tablet 3    moxifloxacin (VIGAMOX) 0.5 % ophthalmic solution       omega 3-dha-epa-fish oil 1,600-500-800 mg/5 mL Liqd Take 2 capsules by mouth once daily.      potassium 99 mg Tab Take by mouth.      terbinafine HCl (LAMISIL) 250 mg tablet Take 250 mg by mouth.      triamterene-hydrochlorothiazide 37.5-25 mg (DYAZIDE) 37.5-25 mg per capsule Take 1 capsule by mouth every morning. (Patient not taking: Reported on 1/2/2020) 90 capsule 1    vitamin E 1000 UNIT capsule Take 1,000 Units by mouth once daily.       No current facility-administered medications for this visit.        Last PFT: 4/2018      Review of Systems  General: not feeling as good as he was  Eyes: scheduled to have cataract surgery at end of month  ENT:  Sore throat since Monday  Heart:: feels palpitations occasionally .  Lungs: wheezing at time, shortness of breath, cough since Monday   GI: No Nausea, vomiting, constipation, diarrhea, or reflux.  : No dysuria, hesitancy, or nocturia.  Musculoskeletal: knee pain .  Skin: No lesions or rashes.  Neuro: No headaches or neuropathy.  Lymph: swelling much better to his legs   Psych: No anxiety or depression.  Endo: weight loss     OBJECTIVE:      /80 (BP Location: Left arm, Patient Position: Sitting)   Pulse 61   Wt 110.2 kg (243 lb)   SpO2 97%   BMI 36.95 kg/m²     Physical Exam  GENERAL: Older patient in no distress.  HEENT: Pupils equal and reactive. Extraocular movements intact. Nose intact.      Post nasal drip, erythematous  Pharynx moist.  NECK: Supple.   HEART: Regular rate and rhythm. No murmur or gallop auscultated.  LUNGS: Clear to auscultation and percussion. Lung excursion symmetrical. No change in fremitus. No adventitial noises.  ABDOMEN: Bowel sounds present. Non-tender, no masses palpated.  EXTREMITIES: Normal muscle tone and  joint movement, no cyanosis or clubbing.   LYMPHATICS: No adenopathy palpated, +2 pitting edema to legs   SKIN: Dry, intact, no lesions.   NEURO: Cranial nerves II-XII intact. Motor strength 5/5 bilaterally, upper and lower extremities.  PSYCH: Appropriate affect.           Assessment:       1. Mild intermittent asthma without complication    2. Dyspnea, unspecified type    3. Post-nasal drip          Plan:       Mild intermittent asthma without complication    Dyspnea, unspecified type    Post-nasal drip    Other orders  -     methylPREDNISolone (MEDROL DOSEPACK) 4 mg tablet; use as directed  Dispense: 1 Package; Refill: 0         lay off the salt, more water, elevate your feet, take your diuretic as ordered   Keep using your symbicort 2 puffs twice, rinse after you use it  Start Taking Allegra daily  Continue the Flonase and saline rinses   Medrol dose pack   Call if do not improve and will send a prednisone taper     Follow up if symptoms worsen or fail to improve.

## 2020-01-07 ENCOUNTER — PATIENT MESSAGE (OUTPATIENT)
Dept: PULMONOLOGY | Facility: CLINIC | Age: 70
End: 2020-01-07

## 2020-01-09 ENCOUNTER — PATIENT MESSAGE (OUTPATIENT)
Dept: PULMONOLOGY | Facility: CLINIC | Age: 70
End: 2020-01-09

## 2020-01-09 RX ORDER — DOXYCYCLINE HYCLATE 100 MG
100 TABLET ORAL 2 TIMES DAILY
Qty: 14 TABLET | Refills: 0 | Status: SHIPPED | OUTPATIENT
Start: 2020-01-09 | End: 2020-01-16

## 2020-01-16 ENCOUNTER — OFFICE VISIT (OUTPATIENT)
Dept: FAMILY MEDICINE | Facility: CLINIC | Age: 70
End: 2020-01-16
Payer: MEDICARE

## 2020-01-16 ENCOUNTER — PATIENT MESSAGE (OUTPATIENT)
Dept: PULMONOLOGY | Facility: CLINIC | Age: 70
End: 2020-01-16

## 2020-01-16 VITALS
HEIGHT: 68 IN | HEART RATE: 60 BPM | BODY MASS INDEX: 36.83 KG/M2 | TEMPERATURE: 98 F | SYSTOLIC BLOOD PRESSURE: 130 MMHG | DIASTOLIC BLOOD PRESSURE: 82 MMHG | OXYGEN SATURATION: 98 % | WEIGHT: 243 LBS

## 2020-01-16 DIAGNOSIS — M79.10 MYALGIA: Primary | ICD-10-CM

## 2020-01-16 PROCEDURE — 1125F PR PAIN SEVERITY QUANTIFIED, PAIN PRESENT: ICD-10-PCS | Mod: S$GLB,,, | Performed by: INTERNAL MEDICINE

## 2020-01-16 PROCEDURE — 1101F PT FALLS ASSESS-DOCD LE1/YR: CPT | Mod: S$GLB,,, | Performed by: INTERNAL MEDICINE

## 2020-01-16 PROCEDURE — 3075F PR MOST RECENT SYSTOLIC BLOOD PRESS GE 130-139MM HG: ICD-10-PCS | Mod: S$GLB,,, | Performed by: INTERNAL MEDICINE

## 2020-01-16 PROCEDURE — 3079F DIAST BP 80-89 MM HG: CPT | Mod: S$GLB,,, | Performed by: INTERNAL MEDICINE

## 2020-01-16 PROCEDURE — 99213 OFFICE O/P EST LOW 20 MIN: CPT | Mod: S$GLB,,, | Performed by: INTERNAL MEDICINE

## 2020-01-16 PROCEDURE — 99213 PR OFFICE/OUTPT VISIT, EST, LEVL III, 20-29 MIN: ICD-10-PCS | Mod: S$GLB,,, | Performed by: INTERNAL MEDICINE

## 2020-01-16 PROCEDURE — 1159F MED LIST DOCD IN RCRD: CPT | Mod: S$GLB,,, | Performed by: INTERNAL MEDICINE

## 2020-01-16 PROCEDURE — 1170F PR FUNCTIONAL STATUS ASSESSED: ICD-10-PCS | Mod: S$GLB,,, | Performed by: INTERNAL MEDICINE

## 2020-01-16 PROCEDURE — 3079F PR MOST RECENT DIASTOLIC BLOOD PRESSURE 80-89 MM HG: ICD-10-PCS | Mod: S$GLB,,, | Performed by: INTERNAL MEDICINE

## 2020-01-16 PROCEDURE — 1159F PR MEDICATION LIST DOCUMENTED IN MEDICAL RECORD: ICD-10-PCS | Mod: S$GLB,,, | Performed by: INTERNAL MEDICINE

## 2020-01-16 PROCEDURE — 1170F FXNL STATUS ASSESSED: CPT | Mod: S$GLB,,, | Performed by: INTERNAL MEDICINE

## 2020-01-16 PROCEDURE — 3075F SYST BP GE 130 - 139MM HG: CPT | Mod: S$GLB,,, | Performed by: INTERNAL MEDICINE

## 2020-01-16 PROCEDURE — 1101F PR PT FALLS ASSESS DOC 0-1 FALLS W/OUT INJ PAST YR: ICD-10-PCS | Mod: S$GLB,,, | Performed by: INTERNAL MEDICINE

## 2020-01-16 PROCEDURE — 1125F AMNT PAIN NOTED PAIN PRSNT: CPT | Mod: S$GLB,,, | Performed by: INTERNAL MEDICINE

## 2020-01-16 RX ORDER — VERAPAMIL HYDROCHLORIDE 120 MG/1
1 TABLET, FILM COATED, EXTENDED RELEASE ORAL DAILY
COMMUNITY
Start: 2020-01-15 | End: 2020-02-27

## 2020-01-16 NOTE — PROGRESS NOTES
Subjective:       Patient ID: Nico Teague is a 69 y.o. male.    Chief Complaint: Follow-up (follow up labs)    Here for follow up.  He reports improvement in pain complaints for the first 3 days he was on the medrol but noticed pain start to return as he started to taper the dose.   Pain hasn't returned to previous levels.  He is scheduled for eye surgery at the end of the month and they would like him to refrain from further steroids prior to surgery.  He has been having some issues with dizziness and lightheadedness last several days.  When he checked his blood pressure, it has been as low as 89/60.  He spoke with Dr. Rivera' office yesterday and they lowered the dose on verapamil.      Review of Systems   Constitutional: Negative for chills, fatigue, fever and unexpected weight change.   HENT: Positive for hearing loss (right ear). Negative for congestion, postnasal drip, rhinorrhea, trouble swallowing and voice change.    Eyes: Negative for photophobia and visual disturbance.   Respiratory: Positive for shortness of breath. Negative for apnea, cough, choking, chest tightness and wheezing.    Cardiovascular: Positive for palpitations and leg swelling. Negative for chest pain.   Gastrointestinal: Negative for abdominal pain, blood in stool, constipation, diarrhea, nausea, rectal pain and vomiting.   Endocrine: Negative for cold intolerance, heat intolerance, polydipsia and polyuria.   Genitourinary: Positive for difficulty urinating, frequency and urgency. Negative for decreased urine volume, discharge, dysuria, flank pain, genital sores, hematuria and testicular pain.   Musculoskeletal: Positive for arthralgias, back pain and myalgias. Negative for gait problem, joint swelling and neck pain.   Skin: Negative for color change, rash and wound.   Allergic/Immunologic: Negative for environmental allergies and food allergies.   Neurological: Positive for dizziness and light-headedness. Negative for tremors,  seizures, syncope, facial asymmetry, speech difficulty, weakness, numbness and headaches.   Hematological: Negative for adenopathy. Bruises/bleeds easily.   Psychiatric/Behavioral: Positive for confusion and sleep disturbance. Negative for hallucinations and suicidal ideas. The patient is nervous/anxious.        Past Medical History:   Diagnosis Date    Allergy     Arthritis     Asthma     Basal cell carcinoma     Fever blister     Hypertension     Joint pain     Melanoma     PVC (premature ventricular contraction)     Skin disease     Squamous cell carcinoma       Past Surgical History:   Procedure Laterality Date    EYE SURGERY Left 2019    LIPOMA RESECTION      NOSE SURGERY      SHOULDER ARTHROSCOPY      SKIN GRAFT         Family History   Problem Relation Age of Onset    Cancer Mother         esophageal    Heart disease Father     Hypertension Father     Arthritis Paternal Grandmother        Social History     Socioeconomic History    Marital status:      Spouse name: Not on file    Number of children: Not on file    Years of education: Not on file    Highest education level: Not on file   Occupational History    Occupation: retired captain    Social Needs    Financial resource strain: Not on file    Food insecurity:     Worry: Not on file     Inability: Not on file    Transportation needs:     Medical: Not on file     Non-medical: Not on file   Tobacco Use    Smoking status: Former Smoker     Packs/day: 2.00     Years: 20.00     Pack years: 40.00     Types: Cigarettes     Start date: 1972     Last attempt to quit: 1988     Years since quittin.4    Smokeless tobacco: Never Used   Substance and Sexual Activity    Alcohol use: No     Frequency: Never    Drug use: No    Sexual activity: Never   Lifestyle    Physical activity:     Days per week: Not on file     Minutes per session: Not on file    Stress: To some extent   Relationships    Social  connections:     Talks on phone: Not on file     Gets together: Not on file     Attends Moravian service: Not on file     Active member of club or organization: Not on file     Attends meetings of clubs or organizations: Not on file     Relationship status: Not on file   Other Topics Concern    Not on file   Social History Narrative    Live with wife       Current Outpatient Medications   Medication Sig Dispense Refill    albuterol (PROVENTIL/VENTOLIN HFA) 90 mcg/actuation inhaler Inhale 2 puffs into the lungs every 6 (six) hours as needed for Shortness of Breath. Rescue 3 Inhaler 4    ascorbic acid, vitamin C, (VITAMIN C) 1000 MG tablet Take 1,000 mg by mouth once daily.      aspirin 81 MG Chew Take 81 mg by mouth once daily.      budesonide-formoterol 160-4.5 mcg (SYMBICORT) 160-4.5 mcg/actuation HFAA Inhale 2 puffs into the lungs every 12 (twelve) hours. Controller      co-enzyme Q-10 50 mg capsule Take 50 mg by mouth once daily.      econazole nitrate 1 % cream       fluticasone (FLONASE) 50 mcg/actuation nasal spray 1 spray by Nasal route.      Lactobacillus rhamnosus GG (CULTURELLE) 10 billion cell capsule Take 1 capsule by mouth.      losartan (COZAAR) 100 MG tablet Take 1 tablet (100 mg total) by mouth once daily. 90 tablet 1    melatonin 10 mg Tab Take 1 tablet by mouth nightly as needed.      montelukast (SINGULAIR) 10 mg tablet Take 10 mg by mouth every evening.      multivitamin (THERAGRAN) per tablet Take 1 tablet by mouth.      rosuvastatin (CRESTOR) 10 MG tablet Take 1 tablet (10 mg total) by mouth once daily. 90 tablet 1    tamsulosin (FLOMAX) 0.4 mg Cap Take 1 capsule (0.4 mg total) by mouth once daily. 90 capsule 1    triamterene-hydrochlorothiazide 37.5-25 mg (DYAZIDE) 37.5-25 mg per capsule Take 1 capsule by mouth every morning. 90 capsule 1    verapamil (CALAN-SR) 120 MG CR tablet Take 1 tablet by mouth once daily.      vitamin E 1000 UNIT capsule Take 1,000 Units by mouth  "once daily.       No current facility-administered medications for this visit.        Review of patient's allergies indicates:   Allergen Reactions    Bactrim [sulfamethoxazole-trimethoprim]      HIVES/ RASH    Green tea (rashida sinensis) Hives     Pt is has allergies to black tea. Not an option under search.     Objective:    HPI     Follow-up      Additional comments: follow up labs          Last edited by Radha Ruffin MA on 1/16/2020  9:54 AM. (History)      Blood pressure 130/82, pulse 60, temperature 97.5 °F (36.4 °C), temperature source Temporal, height 5' 8" (1.727 m), weight 110.2 kg (243 lb), SpO2 98 %. Body mass index is 36.95 kg/m².   Physical Exam   Constitutional: He appears well-developed. No distress.   HENT:   Nose: Nose normal.   Mouth/Throat: Oropharynx is clear and moist.   Eyes: Conjunctivae are normal. Right eye exhibits no discharge. Left eye exhibits no discharge. No scleral icterus.   Neck: Carotid bruit is not present.   Cardiovascular: Normal rate, regular rhythm and normal heart sounds.   No murmur heard.  Pulmonary/Chest: Effort normal and breath sounds normal. No respiratory distress. He has no decreased breath sounds. He has no wheezes. He has no rhonchi. He has no rales.   Abdominal: Soft. He exhibits no distension. There is no tenderness. There is no rebound and no guarding.   Musculoskeletal: He exhibits no edema.   Doesn't seem to be tender today       Neurological: He is alert. He has normal strength. He displays no tremor.   Skin: Skin is warm and dry.   Psychiatric: He has a normal mood and affect. His speech is normal.   Nursing note and vitals reviewed.        No visits with results within 1 Month(s) from this visit.   Latest known visit with results is:   Office Visit on 12/11/2019   Component Date Value Ref Range Status    Rheumatoid Factor 12/12/2019 <14  <14 IU/mL Final    Sed Rate 12/12/2019 50* < OR = 20 mm/h Final    CK-BB 12/12/2019 NONE DETECTED  NONE " DETECTED % of total Final    CK-MB 12/12/2019 0  <5 % of total Final    CK-MM 12/12/2019 100  95 - 100 % of total Final    Comment:    Total CK was not performed per client request.         Glucose 12/12/2019 83  65 - 99 mg/dL Final    Comment:               Fasting reference interval         BUN, Bld 12/12/2019 23  7 - 25 mg/dL Final    Creatinine 12/12/2019 1.11  0.70 - 1.25 mg/dL Final    Comment: For patients >49 years of age, the reference limit  for Creatinine is approximately 13% higher for people  identified as -American.         eGFR if non African American 12/12/2019 68  > OR = 60 mL/min/1.73m2 Final    eGFR if African American 12/12/2019 79  > OR = 60 mL/min/1.73m2 Final    BUN/Creatinine Ratio 12/12/2019 NOT APPLICABLE  6 - 22 (calc) Final    Sodium 12/12/2019 142  135 - 146 mmol/L Final    Potassium 12/12/2019 4.2  3.5 - 5.3 mmol/L Final    Chloride 12/12/2019 107  98 - 110 mmol/L Final    CO2 12/12/2019 26  20 - 32 mmol/L Final    Calcium 12/12/2019 9.3  8.6 - 10.3 mg/dL Final    Total Protein 12/12/2019 6.8  6.1 - 8.1 g/dL Final    Albumin 12/12/2019 4.0  3.6 - 5.1 g/dL Final    Globulin, Total 12/12/2019 2.8  1.9 - 3.7 g/dL (calc) Final    Albumin/Globulin Ratio 12/12/2019 1.4  1.0 - 2.5 (calc) Final    Total Bilirubin 12/12/2019 0.4  0.2 - 1.2 mg/dL Final    Alkaline Phosphatase 12/12/2019 70  40 - 115 U/L Final    AST 12/12/2019 12  10 - 35 U/L Final    ALT 12/12/2019 10  9 - 46 U/L Final    TSH 12/12/2019 4.12  0.40 - 4.50 mIU/L Final    JOSE 12/12/2019 NEGATIVE  NEGATIVE Final    Comment: JOSE IFA is a first line screen for detecting the  presence of up to approximately 150 autoantibodies in  various autoimmune diseases. A negative JOSE IFA result  suggests an JOSE-associated autoimmune disease is not  present at this time, but is not definitive. If there  is high clinical suspicion for Sjogren's syndrome,  testing for anti-SS-A/Ro antibody should be  considered.  Anti-Becki-1 antibody should be considered for clinically  suspected inflammatory myopathies.     AC-0: Negative     International Consensus on JOSE Patterns  (https://doi.org/10.1515/pukg-6686-9585)     For additional information, please refer to  http://[employer].Drive.SG/faq/RIV505  (This link is being provided for informational/  educational purposes only.)          WBC 12/12/2019 8.7  3.8 - 10.8 Thousand/uL Final    RBC 12/12/2019 4.44  4.20 - 5.80 Million/uL Final    Hemoglobin 12/12/2019 12.1* 13.2 - 17.1 g/dL Final    Hematocrit 12/12/2019 38.2* 38.5 - 50.0 % Final    Mean Corpuscular Volume 12/12/2019 86.0  80.0 - 100.0 fL Final    Mean Corpuscular Hemoglobin 12/12/2019 27.3  27.0 - 33.0 pg Final    Mean Corpuscular Hemoglobin Conc 12/12/2019 31.7* 32.0 - 36.0 g/dL Final    RDW 12/12/2019 13.1  11.0 - 15.0 % Final    Platelets 12/12/2019 255  140 - 400 Thousand/uL Final    MPV 12/12/2019 11.1  7.5 - 12.5 fL Final    Neutrophils Absolute 12/12/2019 5,072  1,500 - 7,800 cells/uL Final    Lymph # 12/12/2019 2,401  850 - 3,900 cells/uL Final    Mono # 12/12/2019 835  200 - 950 cells/uL Final    Eos # 12/12/2019 322  15 - 500 cells/uL Final    Baso # 12/12/2019 70  0 - 200 cells/uL Final    Neutrophils Relative 12/12/2019 58.3  % Final    Lymph% 12/12/2019 27.6  % Final    Mono% 12/12/2019 9.6  % Final    Eosinophil% 12/12/2019 3.7  % Final    Basophil% 12/12/2019 0.8  % Final    Cholesterol 12/12/2019 113  <200 mg/dL Final    HDL 12/12/2019 34* >40 mg/dL Final    Triglycerides 12/12/2019 129  <150 mg/dL Final    LDL Cholesterol 12/12/2019 58  mg/dL (calc) Final    Comment: Reference range: <100     Desirable range <100 mg/dL for primary prevention;    <70 mg/dL for patients with CHD or diabetic patients   with > or = 2 CHD risk factors.     LDL-C is now calculated using the Kevin-Shannon   calculation, which is a validated novel method providing   better accuracy  than the Friedewald equation in the   estimation of LDL-C.   Kevin SS et al. NICOLE. 2013;310(19): 0212-0004   (http://education.[employer]eraRx/faq/MDB337)      Hdl/Cholesterol Ratio 12/12/2019 3.3  <5.0 (calc) Final    Non HDL Chol. (LDL+VLDL) 12/12/2019 79  <130 mg/dL (calc) Final    Comment: For patients with diabetes plus 1 major ASCVD risk   factor, treating to a non-HDL-C goal of <100 mg/dL   (LDL-C of <70 mg/dL) is considered a therapeutic   option.     ]  Assessment:       1. Myalgia        Plan:       Nico was seen today for follow-up.    Diagnoses and all orders for this visit:    Myalgia  Comments:  ESR elevated but other labs normal.  Partially steroid responsive.  PMR still a consideration but will try holding statin and see if that makes a difference

## 2020-01-20 ENCOUNTER — PATIENT MESSAGE (OUTPATIENT)
Dept: FAMILY MEDICINE | Facility: CLINIC | Age: 70
End: 2020-01-20

## 2020-01-20 ENCOUNTER — TELEPHONE (OUTPATIENT)
Dept: FAMILY MEDICINE | Facility: CLINIC | Age: 70
End: 2020-01-20

## 2020-01-20 LAB
HCV AB S/CO SERPL IA: 0.02
HCV AB SERPL QL IA: NORMAL

## 2020-02-27 ENCOUNTER — OFFICE VISIT (OUTPATIENT)
Dept: FAMILY MEDICINE | Facility: CLINIC | Age: 70
End: 2020-02-27
Payer: MEDICARE

## 2020-02-27 VITALS
HEIGHT: 68 IN | HEART RATE: 59 BPM | OXYGEN SATURATION: 98 % | WEIGHT: 247 LBS | TEMPERATURE: 98 F | BODY MASS INDEX: 37.44 KG/M2 | DIASTOLIC BLOOD PRESSURE: 80 MMHG | SYSTOLIC BLOOD PRESSURE: 126 MMHG

## 2020-02-27 DIAGNOSIS — I10 ESSENTIAL HYPERTENSION: Primary | ICD-10-CM

## 2020-02-27 DIAGNOSIS — E78.00 PURE HYPERCHOLESTEROLEMIA: ICD-10-CM

## 2020-02-27 DIAGNOSIS — M19.90 ARTHRITIS: ICD-10-CM

## 2020-02-27 PROCEDURE — 3074F PR MOST RECENT SYSTOLIC BLOOD PRESSURE < 130 MM HG: ICD-10-PCS | Mod: S$GLB,,, | Performed by: INTERNAL MEDICINE

## 2020-02-27 PROCEDURE — 1101F PT FALLS ASSESS-DOCD LE1/YR: CPT | Mod: S$GLB,,, | Performed by: INTERNAL MEDICINE

## 2020-02-27 PROCEDURE — 3079F DIAST BP 80-89 MM HG: CPT | Mod: S$GLB,,, | Performed by: INTERNAL MEDICINE

## 2020-02-27 PROCEDURE — 1159F PR MEDICATION LIST DOCUMENTED IN MEDICAL RECORD: ICD-10-PCS | Mod: S$GLB,,, | Performed by: INTERNAL MEDICINE

## 2020-02-27 PROCEDURE — 1101F PR PT FALLS ASSESS DOC 0-1 FALLS W/OUT INJ PAST YR: ICD-10-PCS | Mod: S$GLB,,, | Performed by: INTERNAL MEDICINE

## 2020-02-27 PROCEDURE — 3079F PR MOST RECENT DIASTOLIC BLOOD PRESSURE 80-89 MM HG: ICD-10-PCS | Mod: S$GLB,,, | Performed by: INTERNAL MEDICINE

## 2020-02-27 PROCEDURE — 1170F PR FUNCTIONAL STATUS ASSESSED: ICD-10-PCS | Mod: S$GLB,,, | Performed by: INTERNAL MEDICINE

## 2020-02-27 PROCEDURE — 1125F AMNT PAIN NOTED PAIN PRSNT: CPT | Mod: S$GLB,,, | Performed by: INTERNAL MEDICINE

## 2020-02-27 PROCEDURE — 1125F PR PAIN SEVERITY QUANTIFIED, PAIN PRESENT: ICD-10-PCS | Mod: S$GLB,,, | Performed by: INTERNAL MEDICINE

## 2020-02-27 PROCEDURE — 3074F SYST BP LT 130 MM HG: CPT | Mod: S$GLB,,, | Performed by: INTERNAL MEDICINE

## 2020-02-27 PROCEDURE — 1159F MED LIST DOCD IN RCRD: CPT | Mod: S$GLB,,, | Performed by: INTERNAL MEDICINE

## 2020-02-27 PROCEDURE — 1170F FXNL STATUS ASSESSED: CPT | Mod: S$GLB,,, | Performed by: INTERNAL MEDICINE

## 2020-02-27 PROCEDURE — 99213 OFFICE O/P EST LOW 20 MIN: CPT | Mod: S$GLB,,, | Performed by: INTERNAL MEDICINE

## 2020-02-27 PROCEDURE — 99213 PR OFFICE/OUTPT VISIT, EST, LEVL III, 20-29 MIN: ICD-10-PCS | Mod: S$GLB,,, | Performed by: INTERNAL MEDICINE

## 2020-02-27 RX ORDER — DOXAZOSIN 2 MG/1
1 TABLET ORAL DAILY
COMMUNITY
Start: 2020-01-28 | End: 2021-01-28 | Stop reason: ALTCHOICE

## 2020-02-27 RX ORDER — MELOXICAM 15 MG/1
15 TABLET ORAL DAILY
Qty: 30 TABLET | Refills: 6 | Status: SHIPPED | OUTPATIENT
Start: 2020-02-27 | End: 2020-05-18

## 2020-02-27 NOTE — PROGRESS NOTES
Subjective:       Patient ID: Nico Teague is a 69 y.o. male.    Chief Complaint: Hypertension (continuityof care) and Hyperlipidemia    Hypertension   This is a chronic problem. The problem is controlled. Associated symptoms include malaise/fatigue, palpitations (rare), peripheral edema and shortness of breath. Pertinent negatives include no chest pain, headaches or neck pain. Past treatments include angiotensin blockers, calcium channel blockers, alpha 1 blockers and diuretics. Compliance problems: taken off verapamil by Cardiology d/t bradycardia and low BP.    Hyperlipidemia   This is a chronic problem. The problem is controlled. Recent lipid tests were reviewed and are normal. Associated symptoms include myalgias and shortness of breath. Pertinent negatives include no chest pain. Current antihyperlipidemic treatment includes statins. Compliance problems include medication side effects (stopped crestor d/t myalgias).      Review of Systems   Constitutional: Positive for fatigue and malaise/fatigue. Negative for chills, fever and unexpected weight change.   HENT: Negative for congestion, hearing loss, postnasal drip, rhinorrhea, trouble swallowing and voice change.    Eyes: Negative for photophobia and visual disturbance.   Respiratory: Positive for shortness of breath. Negative for apnea, cough, choking, chest tightness and wheezing.    Cardiovascular: Positive for palpitations (rare) and leg swelling. Negative for chest pain.   Gastrointestinal: Negative for abdominal pain, blood in stool, constipation, diarrhea, nausea, rectal pain and vomiting.   Endocrine: Positive for cold intolerance. Negative for heat intolerance, polydipsia and polyuria.   Genitourinary: Negative for decreased urine volume, difficulty urinating, discharge, dysuria, flank pain, frequency, genital sores, hematuria, testicular pain and urgency.   Musculoskeletal: Positive for arthralgias (hips), back pain, joint swelling (hands) and  myalgias. Negative for gait problem and neck pain.   Skin: Negative for color change, rash and wound.   Allergic/Immunologic: Negative for environmental allergies and food allergies.   Neurological: Positive for weakness and light-headedness. Negative for dizziness, tremors, seizures, syncope, facial asymmetry, speech difficulty, numbness and headaches.   Hematological: Negative for adenopathy. Does not bruise/bleed easily.   Psychiatric/Behavioral: Positive for sleep disturbance (due to pain). Negative for confusion, hallucinations and suicidal ideas. The patient is nervous/anxious.        Past Medical History:   Diagnosis Date    Allergy     Arthritis     Asthma     Basal cell carcinoma     Fever blister     Hypertension     Joint pain     Melanoma     PVC (premature ventricular contraction)     Skin disease     Squamous cell carcinoma       Past Surgical History:   Procedure Laterality Date    EYE SURGERY Left 2019    EYE SURGERY Right 2020    LIPOMA RESECTION      NOSE SURGERY      SHOULDER ARTHROSCOPY      SKIN GRAFT         Family History   Problem Relation Age of Onset    Cancer Mother         esophageal    Heart disease Father     Hypertension Father     Arthritis Paternal Grandmother        Social History     Socioeconomic History    Marital status:      Spouse name: Not on file    Number of children: Not on file    Years of education: Not on file    Highest education level: Not on file   Occupational History    Occupation: retired captain    Social Needs    Financial resource strain: Not on file    Food insecurity:     Worry: Not on file     Inability: Not on file    Transportation needs:     Medical: Not on file     Non-medical: Not on file   Tobacco Use    Smoking status: Former Smoker     Packs/day: 2.00     Years: 20.00     Pack years: 40.00     Types: Cigarettes     Start date: 1972     Last attempt to quit: 1988     Years since quittin.5     Smokeless tobacco: Never Used   Substance and Sexual Activity    Alcohol use: No     Frequency: Never    Drug use: No    Sexual activity: Never   Lifestyle    Physical activity:     Days per week: Not on file     Minutes per session: Not on file    Stress: To some extent   Relationships    Social connections:     Talks on phone: Not on file     Gets together: Not on file     Attends Rastafari service: Not on file     Active member of club or organization: Not on file     Attends meetings of clubs or organizations: Not on file     Relationship status: Not on file   Other Topics Concern    Not on file   Social History Narrative    Live with wife       Current Outpatient Medications   Medication Sig Dispense Refill    albuterol (PROVENTIL/VENTOLIN HFA) 90 mcg/actuation inhaler Inhale 2 puffs into the lungs every 6 (six) hours as needed for Shortness of Breath. Rescue 3 Inhaler 4    ascorbic acid, vitamin C, (VITAMIN C) 1000 MG tablet Take 1,000 mg by mouth once daily.      aspirin 81 MG Chew Take 81 mg by mouth once daily.      budesonide-formoterol 160-4.5 mcg (SYMBICORT) 160-4.5 mcg/actuation HFAA Inhale 2 puffs into the lungs every 12 (twelve) hours. Controller      co-enzyme Q-10 50 mg capsule Take 50 mg by mouth once daily.      doxazosin (CARDURA) 2 MG tablet Take 1 tablet by mouth once daily.      econazole nitrate 1 % cream       fluticasone (FLONASE) 50 mcg/actuation nasal spray 1 spray by Nasal route.      Lactobacillus rhamnosus GG (CULTURELLE) 10 billion cell capsule Take 1 capsule by mouth.      losartan (COZAAR) 100 MG tablet Take 1 tablet (100 mg total) by mouth once daily. 90 tablet 1    melatonin 10 mg Tab Take 1 tablet by mouth nightly as needed.      montelukast (SINGULAIR) 10 mg tablet Take 10 mg by mouth every evening.      multivitamin (THERAGRAN) per tablet Take 1 tablet by mouth.      triamterene-hydrochlorothiazide 37.5-25 mg (DYAZIDE) 37.5-25 mg per capsule Take 1  "capsule by mouth every morning. 90 capsule 1    meloxicam (MOBIC) 15 MG tablet Take 1 tablet (15 mg total) by mouth once daily. 30 tablet 6    vitamin E 1000 UNIT capsule Take 1,000 Units by mouth once daily.       No current facility-administered medications for this visit.        Review of patient's allergies indicates:   Allergen Reactions    Bactrim [sulfamethoxazole-trimethoprim]      HIVES/ RASH    Green tea (rashida sinensis) Hives     Pt is has allergies to black tea. Not an option under search.     Objective:    HPI     Hypertension      Additional comments: continuityof care          Last edited by Radha Ruffin MA on 2/27/2020 10:22 AM. (History)      Blood pressure 126/80, pulse (!) 59, temperature 97.9 °F (36.6 °C), temperature source Temporal, height 5' 8" (1.727 m), weight 112 kg (247 lb), SpO2 98 %. Body mass index is 37.56 kg/m².   Physical Exam   Constitutional: He appears well-developed. No distress.   HENT:   Nose: Nose normal.   Mouth/Throat: Oropharynx is clear and moist.   Eyes: Conjunctivae are normal. Right eye exhibits no discharge. Left eye exhibits no discharge. No scleral icterus.   Neck: Carotid bruit is not present.   Cardiovascular: Normal rate, regular rhythm and normal heart sounds.   No murmur heard.  Pulmonary/Chest: Effort normal and breath sounds normal. No respiratory distress. He has no decreased breath sounds. He has no wheezes. He has no rhonchi. He has no rales.   Abdominal: Soft. He exhibits no distension. There is no tenderness. There is no rebound and no guarding.   Musculoskeletal: He exhibits edema (1+).   Neurological: He is alert. He displays no tremor.   Skin: Skin is warm and dry.   Psychiatric: He has a normal mood and affect. His speech is normal.   Nursing note and vitals reviewed.          Assessment:       1. Essential hypertension    2. Pure hypercholesterolemia    3. Arthritis        Plan:       Nico was seen today for hypertension and " hyperlipidemia.    Diagnoses and all orders for this visit:    Essential hypertension  Comments:  Looks good today.  Has f/u with Dr. Rivera next week    Pure hypercholesterolemia  Comments:  Might want to try taking meds TIW    Arthritis  -     meloxicam (MOBIC) 15 MG tablet; Take 1 tablet (15 mg total) by mouth once daily.

## 2020-03-13 ENCOUNTER — PATIENT MESSAGE (OUTPATIENT)
Dept: FAMILY MEDICINE | Facility: CLINIC | Age: 70
End: 2020-03-13

## 2020-03-13 RX ORDER — LOSARTAN POTASSIUM 100 MG/1
100 TABLET ORAL DAILY
Qty: 90 TABLET | Refills: 1 | Status: SHIPPED | OUTPATIENT
Start: 2020-03-13 | End: 2020-03-19 | Stop reason: SDUPTHER

## 2020-03-19 RX ORDER — LOSARTAN POTASSIUM 100 MG/1
100 TABLET ORAL DAILY
Qty: 90 TABLET | Refills: 1 | Status: SHIPPED | OUTPATIENT
Start: 2020-03-19 | End: 2020-10-26

## 2020-05-18 ENCOUNTER — OFFICE VISIT (OUTPATIENT)
Dept: FAMILY MEDICINE | Facility: CLINIC | Age: 70
End: 2020-05-18
Payer: MEDICARE

## 2020-05-18 VITALS
DIASTOLIC BLOOD PRESSURE: 74 MMHG | HEART RATE: 81 BPM | OXYGEN SATURATION: 98 % | BODY MASS INDEX: 37.28 KG/M2 | TEMPERATURE: 98 F | WEIGHT: 246 LBS | SYSTOLIC BLOOD PRESSURE: 110 MMHG | HEIGHT: 68 IN

## 2020-05-18 DIAGNOSIS — M25.50 MULTIPLE JOINT PAIN: Primary | ICD-10-CM

## 2020-05-18 PROCEDURE — 1170F FXNL STATUS ASSESSED: CPT | Mod: S$GLB,,, | Performed by: INTERNAL MEDICINE

## 2020-05-18 PROCEDURE — 1159F MED LIST DOCD IN RCRD: CPT | Mod: S$GLB,,, | Performed by: INTERNAL MEDICINE

## 2020-05-18 PROCEDURE — 3078F DIAST BP <80 MM HG: CPT | Mod: S$GLB,,, | Performed by: INTERNAL MEDICINE

## 2020-05-18 PROCEDURE — 3074F SYST BP LT 130 MM HG: CPT | Mod: S$GLB,,, | Performed by: INTERNAL MEDICINE

## 2020-05-18 PROCEDURE — 1159F PR MEDICATION LIST DOCUMENTED IN MEDICAL RECORD: ICD-10-PCS | Mod: S$GLB,,, | Performed by: INTERNAL MEDICINE

## 2020-05-18 PROCEDURE — 1125F PR PAIN SEVERITY QUANTIFIED, PAIN PRESENT: ICD-10-PCS | Mod: S$GLB,,, | Performed by: INTERNAL MEDICINE

## 2020-05-18 PROCEDURE — 1101F PR PT FALLS ASSESS DOC 0-1 FALLS W/OUT INJ PAST YR: ICD-10-PCS | Mod: S$GLB,,, | Performed by: INTERNAL MEDICINE

## 2020-05-18 PROCEDURE — 1125F AMNT PAIN NOTED PAIN PRSNT: CPT | Mod: S$GLB,,, | Performed by: INTERNAL MEDICINE

## 2020-05-18 PROCEDURE — 1101F PT FALLS ASSESS-DOCD LE1/YR: CPT | Mod: S$GLB,,, | Performed by: INTERNAL MEDICINE

## 2020-05-18 PROCEDURE — 99213 OFFICE O/P EST LOW 20 MIN: CPT | Mod: S$GLB,,, | Performed by: INTERNAL MEDICINE

## 2020-05-18 PROCEDURE — 3078F PR MOST RECENT DIASTOLIC BLOOD PRESSURE < 80 MM HG: ICD-10-PCS | Mod: S$GLB,,, | Performed by: INTERNAL MEDICINE

## 2020-05-18 PROCEDURE — 99213 PR OFFICE/OUTPT VISIT, EST, LEVL III, 20-29 MIN: ICD-10-PCS | Mod: S$GLB,,, | Performed by: INTERNAL MEDICINE

## 2020-05-18 PROCEDURE — 1170F PR FUNCTIONAL STATUS ASSESSED: ICD-10-PCS | Mod: S$GLB,,, | Performed by: INTERNAL MEDICINE

## 2020-05-18 PROCEDURE — 3074F PR MOST RECENT SYSTOLIC BLOOD PRESSURE < 130 MM HG: ICD-10-PCS | Mod: S$GLB,,, | Performed by: INTERNAL MEDICINE

## 2020-05-18 RX ORDER — NAPROXEN SODIUM 220 MG
2 TABLET ORAL DAILY
COMMUNITY
End: 2021-01-07 | Stop reason: SINTOL

## 2020-05-18 NOTE — PROGRESS NOTES
Subjective:       Patient ID: Nico Teague is a 69 y.o. male.    Chief Complaint: OTHER (multiple joint and muscle pain)    Here for follow up.  Continues to have issues with generalized muscle and joint pain.  Feels like he is weak in his shoulders and hip girdles.   He has a hard time walking when he first stands up because he feels so stiff.  Pain ranges 3 to 7 in intensity.  Has had injections with Ortho.  Meloxicam didn't help much.   Takes aleve PM at night to sleep; seems to work better than meloxicam.  Also takes tylenol which helps.     Review of Systems   Constitutional: Negative for chills, fatigue, fever and unexpected weight change.   HENT: Negative for congestion, hearing loss, postnasal drip, rhinorrhea, trouble swallowing and voice change.    Eyes: Negative for photophobia and visual disturbance.   Respiratory: Negative for apnea, cough, choking, chest tightness, shortness of breath and wheezing.    Cardiovascular: Negative for chest pain, palpitations and leg swelling.   Gastrointestinal: Negative for abdominal pain, blood in stool, constipation, diarrhea, nausea, rectal pain and vomiting.   Endocrine: Negative for cold intolerance, heat intolerance, polydipsia and polyuria.   Genitourinary: Negative for decreased urine volume, difficulty urinating, discharge, dysuria, flank pain, frequency, genital sores, hematuria, testicular pain and urgency.   Musculoskeletal: Positive for arthralgias, back pain and myalgias. Negative for gait problem, joint swelling and neck pain.   Skin: Negative for color change, rash and wound.   Allergic/Immunologic: Negative for environmental allergies and food allergies.   Neurological: Negative for dizziness, tremors, seizures, syncope, facial asymmetry, speech difficulty, weakness, light-headedness, numbness and headaches.   Hematological: Negative for adenopathy. Does not bruise/bleed easily.   Psychiatric/Behavioral: Positive for sleep disturbance. Negative for  confusion, hallucinations and suicidal ideas. The patient is not nervous/anxious.        Past Medical History:   Diagnosis Date    Allergy     Arthritis     Asthma     Basal cell carcinoma     Fever blister     Hypertension     Joint pain     Melanoma     PVC (premature ventricular contraction)     Skin disease     Squamous cell carcinoma       Past Surgical History:   Procedure Laterality Date    EYE SURGERY Left 2019    EYE SURGERY Right 2020    LIPOMA RESECTION      NOSE SURGERY      SHOULDER ARTHROSCOPY      SKIN GRAFT         Family History   Problem Relation Age of Onset    Cancer Mother         esophageal    Heart disease Father     Hypertension Father     Arthritis Paternal Grandmother        Social History     Socioeconomic History    Marital status:      Spouse name: Not on file    Number of children: Not on file    Years of education: Not on file    Highest education level: Not on file   Occupational History    Occupation: retired captain    Social Needs    Financial resource strain: Not on file    Food insecurity:     Worry: Not on file     Inability: Not on file    Transportation needs:     Medical: Not on file     Non-medical: Not on file   Tobacco Use    Smoking status: Former Smoker     Packs/day: 2.00     Years: 20.00     Pack years: 40.00     Types: Cigarettes     Start date: 1972     Last attempt to quit: 1988     Years since quittin.7    Smokeless tobacco: Never Used   Substance and Sexual Activity    Alcohol use: No     Frequency: Never    Drug use: No    Sexual activity: Never   Lifestyle    Physical activity:     Days per week: Not on file     Minutes per session: Not on file    Stress: To some extent   Relationships    Social connections:     Talks on phone: Not on file     Gets together: Not on file     Attends Christian service: Not on file     Active member of club or organization: Not on file     Attends meetings of  clubs or organizations: Not on file     Relationship status: Not on file   Other Topics Concern    Not on file   Social History Narrative    Live with wife       Current Outpatient Medications   Medication Sig Dispense Refill    albuterol (PROVENTIL/VENTOLIN HFA) 90 mcg/actuation inhaler Inhale 2 puffs into the lungs every 6 (six) hours as needed for Shortness of Breath. Rescue 3 Inhaler 4    ascorbic acid, vitamin C, (VITAMIN C) 1000 MG tablet Take 1,000 mg by mouth once daily.      aspirin 81 MG Chew Take 81 mg by mouth once daily.      budesonide-formoterol 160-4.5 mcg (SYMBICORT) 160-4.5 mcg/actuation HFAA Inhale 2 puffs into the lungs every 12 (twelve) hours. Controller      co-enzyme Q-10 50 mg capsule Take 50 mg by mouth once daily.      doxazosin (CARDURA) 2 MG tablet Take 1 tablet by mouth once daily.      econazole nitrate 1 % cream       fluticasone (FLONASE) 50 mcg/actuation nasal spray 1 spray by Nasal route.      Lactobacillus rhamnosus GG (CULTURELLE) 10 billion cell capsule Take 1 capsule by mouth.      losartan (COZAAR) 100 MG tablet Take 1 tablet (100 mg total) by mouth once daily. 90 tablet 1    melatonin 10 mg Tab Take 1 tablet by mouth nightly as needed.      montelukast (SINGULAIR) 10 mg tablet Take 10 mg by mouth every evening.      multivitamin (THERAGRAN) per tablet Take 1 tablet by mouth.      naproxen sodium (ANAPROX) 220 MG tablet Take 2 tablets by mouth once daily.      naproxen-diphenhydramine 220-25 mg Tab Take 2 tablets by mouth every evening.      triamterene-hydrochlorothiazide 37.5-25 mg (DYAZIDE) 37.5-25 mg per capsule Take 1 capsule by mouth every morning. 90 capsule 1    vitamin E 1000 UNIT capsule Take 1,000 Units by mouth once daily.       No current facility-administered medications for this visit.        Review of patient's allergies indicates:   Allergen Reactions    Bactrim [sulfamethoxazole-trimethoprim]      HIVES/ RASH    Green tea (rashida  "sinensis) Hives     Pt is has allergies to black tea. Not an option under search.     Objective:    HPI     OTHER      Additional comments: multiple joint and muscle pain          Last edited by Radha Ruffin MA on 5/18/2020 10:52 AM. (History)      Blood pressure 110/74, pulse 81, temperature 97.6 °F (36.4 °C), temperature source Oral, height 5' 8" (1.727 m), weight 111.6 kg (246 lb), SpO2 98 %. Body mass index is 37.4 kg/m².   Physical Exam   Constitutional: He appears well-developed. No distress.   Eyes: Conjunctivae are normal. Right eye exhibits no discharge. Left eye exhibits no discharge. No scleral icterus.   Neck: Carotid bruit is not present.   Cardiovascular: Normal rate, regular rhythm and normal heart sounds.   No murmur heard.  Pulmonary/Chest: Effort normal and breath sounds normal. No respiratory distress. He has no decreased breath sounds. He has no wheezes. He has no rhonchi. He has no rales.   Abdominal: Soft. He exhibits no distension. There is no tenderness. There is no rebound and no guarding.   Musculoskeletal: He exhibits no edema.   Able to stand from seated position without assistance so hip girdle strength seems fine.  Has some weakness in shoulder girdle, especially on right, which seems to be more r/t pain   Neurological: He is alert. He displays no tremor.   Skin: Skin is warm and dry.   Psychiatric: He has a normal mood and affect. His speech is normal.   Nursing note and vitals reviewed.          Assessment:       1. Multiple joint pain        Plan:       Nico was seen today for other.    Diagnoses and all orders for this visit:    Multiple joint pain  Comments:  Continue naproxen and tylenol as needed; okay to take together.  Labs normal in December other than elevated ESR.  Most likely OA but can get Rheum eval  Orders:  -     Ambulatory referral/consult to Rheumatology; Future         "

## 2020-05-19 ENCOUNTER — OFFICE VISIT (OUTPATIENT)
Dept: PULMONOLOGY | Facility: CLINIC | Age: 70
End: 2020-05-19
Payer: MEDICARE

## 2020-05-19 VITALS
SYSTOLIC BLOOD PRESSURE: 131 MMHG | DIASTOLIC BLOOD PRESSURE: 64 MMHG | OXYGEN SATURATION: 100 % | BODY MASS INDEX: 37.59 KG/M2 | WEIGHT: 248 LBS | HEIGHT: 68 IN | HEART RATE: 60 BPM | TEMPERATURE: 98 F

## 2020-05-19 DIAGNOSIS — J45.909 MODERATE ASTHMA WITHOUT COMPLICATION, UNSPECIFIED WHETHER PERSISTENT: Primary | ICD-10-CM

## 2020-05-19 PROCEDURE — 3075F PR MOST RECENT SYSTOLIC BLOOD PRESS GE 130-139MM HG: ICD-10-PCS | Mod: S$GLB,,, | Performed by: NURSE PRACTITIONER

## 2020-05-19 PROCEDURE — 1101F PR PT FALLS ASSESS DOC 0-1 FALLS W/OUT INJ PAST YR: ICD-10-PCS | Mod: S$GLB,,, | Performed by: NURSE PRACTITIONER

## 2020-05-19 PROCEDURE — 99214 PR OFFICE/OUTPT VISIT, EST, LEVL IV, 30-39 MIN: ICD-10-PCS | Mod: 25,S$GLB,, | Performed by: NURSE PRACTITIONER

## 2020-05-19 PROCEDURE — 93000 ELECTROCARDIOGRAM COMPLETE: CPT | Mod: S$GLB,,, | Performed by: NURSE PRACTITIONER

## 2020-05-19 PROCEDURE — 99214 OFFICE O/P EST MOD 30 MIN: CPT | Mod: 25,S$GLB,, | Performed by: NURSE PRACTITIONER

## 2020-05-19 PROCEDURE — 1159F PR MEDICATION LIST DOCUMENTED IN MEDICAL RECORD: ICD-10-PCS | Mod: S$GLB,,, | Performed by: NURSE PRACTITIONER

## 2020-05-19 PROCEDURE — 1126F AMNT PAIN NOTED NONE PRSNT: CPT | Mod: S$GLB,,, | Performed by: NURSE PRACTITIONER

## 2020-05-19 PROCEDURE — 1159F MED LIST DOCD IN RCRD: CPT | Mod: S$GLB,,, | Performed by: NURSE PRACTITIONER

## 2020-05-19 PROCEDURE — 1126F PR PAIN SEVERITY QUANTIFIED, NO PAIN PRESENT: ICD-10-PCS | Mod: S$GLB,,, | Performed by: NURSE PRACTITIONER

## 2020-05-19 PROCEDURE — 1101F PT FALLS ASSESS-DOCD LE1/YR: CPT | Mod: S$GLB,,, | Performed by: NURSE PRACTITIONER

## 2020-05-19 PROCEDURE — 3078F PR MOST RECENT DIASTOLIC BLOOD PRESSURE < 80 MM HG: ICD-10-PCS | Mod: S$GLB,,, | Performed by: NURSE PRACTITIONER

## 2020-05-19 PROCEDURE — 3078F DIAST BP <80 MM HG: CPT | Mod: S$GLB,,, | Performed by: NURSE PRACTITIONER

## 2020-05-19 PROCEDURE — 3075F SYST BP GE 130 - 139MM HG: CPT | Mod: S$GLB,,, | Performed by: NURSE PRACTITIONER

## 2020-05-19 PROCEDURE — 93000 PR ELECTROCARDIOGRAM, COMPLETE: ICD-10-PCS | Mod: S$GLB,,, | Performed by: NURSE PRACTITIONER

## 2020-05-19 RX ORDER — VIT C/E/ZN/COPPR/LUTEIN/ZEAXAN 250MG-90MG
1000 CAPSULE ORAL DAILY
COMMUNITY

## 2020-05-19 NOTE — PATIENT INSTRUCTIONS
Understanding Asthma Triggers  Triggers are things that cause you to have asthma symptoms. Some triggers you can stay away from completely. Others you can plan for and adjust to. Use this sheet to help you know your triggers.    What are triggers?  Triggers irritate your lungs and lead to asthma flare-ups. Some examples are:  · Irritants, such as tobacco smoke or air pollution. These are a concern for all people with asthma.  · Allergens or substances that cause allergies, like pets, dust mites, or pollen  · Special conditions. These include being ill with a cold or the flu, or certain kinds of weather, including changes in weather. These differ from person to person.  · Exercise can trigger asthma in some people. If exercise is one of your triggers, you can learn how to exercise safely.  What triggers your asthma?  Which of these common triggers cause your asthma to flare up? Check all that apply to you.  Irritants:  ? Tobacco smoke (smoking or secondhand smoke)  ? Smoke from fireplaces  ? Vehicle exhaust  ? Smog or air pollution  ? Aerosol sprays  ? Strong odors, such as perfume, incense, or cooking odors  ? Household , such as ammonia or bleach  Allergens:  ? Cats  ? Dogs  ? Birds  ? Dust or dust mites  ? Pollen  ? Mold  ? Cockroaches  Other triggers:  ? Cold air  ? Hot air  ? Weather changes  ? Exercise  ? Certain foods or food ingredients (such as sulfites)  ? Medicines  ? Emotions such as laughing, crying, or feeling stressed  ? Illness such as colds, flu, and sinus infections  Allergies and allergy treatment  People with asthma often have allergies. If you have allergies, or think you have them, talk with your healthcare provider about testing and treatment. Allergy testing can find out exactly which allergens affect you. Types of tests include:  · Skin tests. A small amount of each allergen is put on the skin. Sites are then looked at for an allergic reaction. This could be redness, swelling, or  itching. In general, the greater the reaction, the stronger the allergy.  · Blood tests. A blood test can show sensitivity to the allergen.  Exposing a person to gradually larger amounts of an allergen can help the body build up a tolerance. This is the purpose of allergy shots (immunotherapy). For this therapy, injections are given over a period of years. At first, you get injections with a very small amount of allergen about once a week. As treatment goes on, the amount of allergen is gradually increased to a certain level. Eventually, you have the injections less often. This therapy can take up to a year to start working. But it can be very effective to manage certain allergies over time.   Date Last Reviewed: 10/1/2016  © 3052-9201 [employer]. [address] [place] [address], [place] [address]. All rights reserved. This information is not intended as a substitute for professional medical care. Always follow your healthcare professional's instructions.        Understanding Asthma Triggers  Triggers are things that cause you to have asthma symptoms. Some triggers you can stay away from completely. Others you can plan for and adjust to. Use this sheet to help you know your triggers.    What are triggers?  Triggers irritate your lungs and lead to asthma flare-ups. Some examples are:  · Irritants, such as tobacco smoke or air pollution. These are a concern for all people with asthma.  · Allergens or substances that cause allergies, like pets, dust mites, or pollen  · Special conditions. These include being ill with a cold or the flu, or certain kinds of weather, including changes in weather. These differ from person to person.  · Exercise can trigger asthma in some people. If exercise is one of your triggers, you can learn how to exercise safely.  What triggers your asthma?  Which of these common triggers cause your asthma to flare up? Check all that apply to you.  Irritants:  ? Tobacco smoke (smoking or  secondhand smoke)  ? Smoke from fireplaces  ? Vehicle exhaust  ? Smog or air pollution  ? Aerosol sprays  ? Strong odors, such as perfume, incense, or cooking odors  ? Household , such as ammonia or bleach  Allergens:  ? Cats  ? Dogs  ? Birds  ? Dust or dust mites  ? Pollen  ? Mold  ? Cockroaches  Other triggers:  ? Cold air  ? Hot air  ? Weather changes  ? Exercise  ? Certain foods or food ingredients (such as sulfites)  ? Medicines  ? Emotions such as laughing, crying, or feeling stressed  ? Illness such as colds, flu, and sinus infections  Allergies and allergy treatment  People with asthma often have allergies. If you have allergies, or think you have them, talk with your healthcare provider about testing and treatment. Allergy testing can find out exactly which allergens affect you. Types of tests include:  · Skin tests. A small amount of each allergen is put on the skin. Sites are then looked at for an allergic reaction. This could be redness, swelling, or itching. In general, the greater the reaction, the stronger the allergy.  · Blood tests. A blood test can show sensitivity to the allergen.  Exposing a person to gradually larger amounts of an allergen can help the body build up a tolerance. This is the purpose of allergy shots (immunotherapy). For this therapy, injections are given over a period of years. At first, you get injections with a very small amount of allergen about once a week. As treatment goes on, the amount of allergen is gradually increased to a certain level. Eventually, you have the injections less often. This therapy can take up to a year to start working. But it can be very effective to manage certain allergies over time.   Date Last Reviewed: 10/1/2016  © 6202-2717 [employer]. [address] [place] [address]. All rights reserved. This information is not intended as a substitute for professional medical care. Always follow your healthcare professional's  instructions.

## 2020-05-19 NOTE — PROGRESS NOTES
SUBJECTIVE:    Patient ID: Nico Teague is a 69 y.o. male.    Chief Complaint: Follow-up and Asthma    He started using his Symbicort again, .  His peak flow is 550 with poor effort today. He states his breathing has been fine, no more sinus issues since he had surgery.      Past Medical History:   Diagnosis Date    Allergy     Arthritis     Asthma     Basal cell carcinoma     Fever blister     Hypertension     Joint pain     Melanoma     PVC (premature ventricular contraction)     Skin disease     Squamous cell carcinoma      Past Surgical History:   Procedure Laterality Date    EYE SURGERY Left 12/03/2019    EYE SURGERY Right 01/28/2020    LIPOMA RESECTION      NOSE SURGERY      SHOULDER ARTHROSCOPY      SKIN GRAFT       Family History   Problem Relation Age of Onset    Cancer Mother         esophageal    Heart disease Father     Hypertension Father     Arthritis Paternal Grandmother         Social History:   Marital Status:   Occupation: retired captain   Alcohol History:  reports that he does not drink alcohol.  Tobacco History:  reports that he quit smoking about 31 years ago. His smoking use included cigarettes. He started smoking about 48 years ago. He has a 40.00 pack-year smoking history. He has never used smokeless tobacco.  Drug History:  reports that he does not use drugs.    Review of patient's allergies indicates:   Allergen Reactions    Bactrim [sulfamethoxazole-trimethoprim]      HIVES/ RASH       Current Outpatient Medications   Medication Sig Dispense Refill    albuterol (PROVENTIL/VENTOLIN HFA) 90 mcg/actuation inhaler Inhale 2 puffs into the lungs every 6 (six) hours as needed for Shortness of Breath. Rescue 3 Inhaler 4    ascorbic acid, vitamin C, (VITAMIN C) 1000 MG tablet Take 1,000 mg by mouth once daily.      aspirin 81 MG Chew Take 81 mg by mouth once daily.      budesonide-formoterol 160-4.5 mcg (SYMBICORT) 160-4.5 mcg/actuation HFAA Inhale 2 puffs into  the lungs every 12 (twelve) hours. Controller      cholecalciferol, vitamin D3, (VITAMIN D3) 25 mcg (1,000 unit) capsule Take 1,000 Units by mouth once daily.      co-enzyme Q-10 50 mg capsule Take 50 mg by mouth once daily.      econazole nitrate 1 % cream       fluticasone (FLONASE) 50 mcg/actuation nasal spray 1 spray by Nasal route.      Lactobacillus rhamnosus GG (CULTURELLE) 10 billion cell capsule Take 1 capsule by mouth.      losartan (COZAAR) 100 MG tablet Take 1 tablet (100 mg total) by mouth once daily. 90 tablet 1    melatonin 10 mg Tab Take 1 tablet by mouth nightly as needed.      montelukast (SINGULAIR) 10 mg tablet Take 10 mg by mouth every evening.      multivitamin (THERAGRAN) per tablet Take 1 tablet by mouth.      naproxen sodium (ANAPROX) 220 MG tablet Take 2 tablets by mouth once daily.      naproxen-diphenhydramine 220-25 mg Tab Take 2 tablets by mouth every evening.      triamterene-hydrochlorothiazide 37.5-25 mg (DYAZIDE) 37.5-25 mg per capsule Take 1 capsule by mouth every morning. 90 capsule 1    doxazosin (CARDURA) 2 MG tablet Take 1 tablet by mouth once daily.      vitamin E 1000 UNIT capsule Take 1,000 Units by mouth once daily.       No current facility-administered medications for this visit.        Last PFT: 4/2018      Review of Systems  General: feels well  Eyes: no issues  ENT:  No more sinus issues since his sinus surgery  Heart:: feels palpitations occasionally, states he has PVC's follows with Dr. Rivera   Lungs: breathing is well   GI: No Nausea, vomiting, constipation, diarrhea, or reflux.  : No dysuria, hesitancy, or nocturia.  Musculoskeletal: arthritic pain in hips  .  Skin: No lesions or rashes.  Neuro: No headaches or neuropathy.  Lymph: swelling to legs   Psych: No anxiety or depression.  Endo: weight gain      OBJECTIVE:      /64 (BP Location: Left arm, Patient Position: Sitting, BP Method: Large (Automatic))   Pulse (!) 33   Temp 97.5 °F (36.4  "°C)   Ht 5' 8" (1.727 m)   Wt 112.5 kg (248 lb)   SpO2 100%   BMI 37.71 kg/m²     Physical Exam  GENERAL: Older patient in no distress.  HEENT: Pupils equal and reactive. Extraocular movements intact. Nose intact.      Pharynx moist.  NECK: Supple.   HEART: regular with frequent ectopic beat  LUNGS: Clear to auscultation and percussion. Lung excursion symmetrical. No change in fremitus. No adventitial noises.  ABDOMEN: Bowel sounds present. Non-tender, no masses palpated.  EXTREMITIES: Normal muscle tone and joint movement, no cyanosis or clubbing.   LYMPHATICS: No adenopathy palpated, +2 pitting edema to legs   SKIN: Dry, intact, no lesions.   NEURO: Cranial nerves II-XII intact. Motor strength 5/5 bilaterally, upper and lower extremities.  PSYCH: Appropriate affect.           Assessment:       1. Moderate asthma without complication, unspecified whether persistent          Plan:       Moderate asthma without complication, unspecified whether persistent           Keep using your symbicort 2 puffs twice, rinse after you use it  Continue the Flonase and saline rinses   EKG, fax to Dr. Rivera office     Follow up in about 6 months (around 11/19/2020).        "

## 2020-05-26 ENCOUNTER — HOSPITAL ENCOUNTER (OUTPATIENT)
Dept: RADIOLOGY | Facility: HOSPITAL | Age: 70
Discharge: HOME | End: 2020-05-26
Attending: INTERNAL MEDICINE
Payer: MEDICARE

## 2020-05-26 ENCOUNTER — OFFICE VISIT (OUTPATIENT)
Dept: RHEUMATOLOGY | Facility: CLINIC | Age: 70
End: 2020-05-26
Payer: MEDICARE

## 2020-05-26 VITALS
WEIGHT: 247.19 LBS | TEMPERATURE: 98 F | SYSTOLIC BLOOD PRESSURE: 137 MMHG | DIASTOLIC BLOOD PRESSURE: 75 MMHG | BODY MASS INDEX: 37.59 KG/M2

## 2020-05-26 DIAGNOSIS — M19.90 INFLAMMATORY ARTHRITIS: ICD-10-CM

## 2020-05-26 DIAGNOSIS — M19.90 INFLAMMATORY ARTHRITIS: Primary | ICD-10-CM

## 2020-05-26 DIAGNOSIS — Z79.899 LONG-TERM USE OF HIGH-RISK MEDICATION: ICD-10-CM

## 2020-05-26 PROCEDURE — 1101F PT FALLS ASSESS-DOCD LE1/YR: CPT | Mod: S$GLB,,, | Performed by: INTERNAL MEDICINE

## 2020-05-26 PROCEDURE — 1125F AMNT PAIN NOTED PAIN PRSNT: CPT | Mod: S$GLB,,, | Performed by: INTERNAL MEDICINE

## 2020-05-26 PROCEDURE — 71046 X-RAY EXAM CHEST 2 VIEWS: CPT | Mod: TC

## 2020-05-26 PROCEDURE — 1159F PR MEDICATION LIST DOCUMENTED IN MEDICAL RECORD: ICD-10-PCS | Mod: S$GLB,,, | Performed by: INTERNAL MEDICINE

## 2020-05-26 PROCEDURE — 3078F PR MOST RECENT DIASTOLIC BLOOD PRESSURE < 80 MM HG: ICD-10-PCS | Mod: S$GLB,,, | Performed by: INTERNAL MEDICINE

## 2020-05-26 PROCEDURE — 1101F PR PT FALLS ASSESS DOC 0-1 FALLS W/OUT INJ PAST YR: ICD-10-PCS | Mod: S$GLB,,, | Performed by: INTERNAL MEDICINE

## 2020-05-26 PROCEDURE — 3078F DIAST BP <80 MM HG: CPT | Mod: S$GLB,,, | Performed by: INTERNAL MEDICINE

## 2020-05-26 PROCEDURE — 1125F PR PAIN SEVERITY QUANTIFIED, PAIN PRESENT: ICD-10-PCS | Mod: S$GLB,,, | Performed by: INTERNAL MEDICINE

## 2020-05-26 PROCEDURE — 99204 OFFICE O/P NEW MOD 45 MIN: CPT | Mod: S$GLB,,, | Performed by: INTERNAL MEDICINE

## 2020-05-26 PROCEDURE — 3075F SYST BP GE 130 - 139MM HG: CPT | Mod: S$GLB,,, | Performed by: INTERNAL MEDICINE

## 2020-05-26 PROCEDURE — 73630 X-RAY EXAM OF FOOT: CPT | Mod: TC,50

## 2020-05-26 PROCEDURE — 3075F PR MOST RECENT SYSTOLIC BLOOD PRESS GE 130-139MM HG: ICD-10-PCS | Mod: S$GLB,,, | Performed by: INTERNAL MEDICINE

## 2020-05-26 PROCEDURE — 73130 X-RAY EXAM OF HAND: CPT | Mod: TC,50

## 2020-05-26 PROCEDURE — 99204 PR OFFICE/OUTPT VISIT, NEW, LEVL IV, 45-59 MIN: ICD-10-PCS | Mod: S$GLB,,, | Performed by: INTERNAL MEDICINE

## 2020-05-26 PROCEDURE — 1159F MED LIST DOCD IN RCRD: CPT | Mod: S$GLB,,, | Performed by: INTERNAL MEDICINE

## 2020-05-26 NOTE — PROGRESS NOTES
[facility] [place] [facility]        NEW PATIENT      Subjective:       Patient ID:   NAME: Nico Teague : 1950     69 y.o. male    Referring Doc: No ref. provider found  Other Physicians:    Chief Complaint:  Joint Pain      History of Present Illness:     New patient with hx of pain in MCP's,PIP's,shoulders,knees for last few months.Low back pain with stiffness lasting all day.Back pain relieved with activity  Fatigue  AM stiffness lasting 1 hr  Swelling in MCP's and PIP's  Had labs done in December.  This showed negative rheumatoid factor negative JOSE but elevation of sedimentation rate.      ROS:   GEN: no fevers night sweats or significant weight changes  +  fatigue  HEENT: no HA's, changes in vision , no mouth ulcers, no sicca symptoms, no scalp tenderness, jaw claudication  CV: no CP, SOB, PND, ESQUIVEL or orthopnea,+ palpitations on and off for 1 yr ( dxd as PVC's by cardio)  PULM:no SOB, cough, hemoptysis, sputum or pleuritic pain  GI: no abdominal pain, nausea, vomiting,+ occ constipation, diarrhea, melanotic stools, BRBPR, or hematemesis, no dysphagia  : no hematuria, dysuria  NEURO:+  Short lived in hands paresthesias,No  headaches, visual disturbances, muscle weakness  SKIN:  no rashes , erythema, bruising, or swelling, no Raynauds, no photosensitivity  MUSCULOSKELETAL:+ joint swelling MCP's,PIP's, + prolonged AM stiffness, + back pain   PSYCH:  +  Insomnia,  - depression, + anxiety    Medications:    Current Outpatient Medications:     albuterol (PROVENTIL/VENTOLIN HFA) 90 mcg/actuation inhaler, Inhale 2 puffs into the lungs every 6 (six) hours as needed for Shortness of Breath. Rescue, Disp: 3 Inhaler, Rfl: 4    ascorbic acid, vitamin C, (VITAMIN C) 1000 MG tablet, Take 1,000 mg by mouth once daily., Disp: , Rfl:     aspirin 81 MG Chew, Take 81 mg by mouth once daily., Disp: , Rfl:     budesonide-formoterol 160-4.5 mcg (SYMBICORT) 160-4.5 mcg/actuation HFAA, Inhale 2 puffs into the lungs every  12 (twelve) hours. Controller, Disp: , Rfl:     cholecalciferol, vitamin D3, (VITAMIN D3) 25 mcg (1,000 unit) capsule, Take 1,000 Units by mouth once daily., Disp: , Rfl:     co-enzyme Q-10 50 mg capsule, Take 50 mg by mouth once daily., Disp: , Rfl:     doxazosin (CARDURA) 2 MG tablet, Take 1 tablet by mouth once daily., Disp: , Rfl:     econazole nitrate 1 % cream, , Disp: , Rfl:     fluticasone (FLONASE) 50 mcg/actuation nasal spray, 1 spray by Nasal route., Disp: , Rfl:     Lactobacillus rhamnosus GG (CULTURELLE) 10 billion cell capsule, Take 1 capsule by mouth., Disp: , Rfl:     losartan (COZAAR) 100 MG tablet, Take 1 tablet (100 mg total) by mouth once daily., Disp: 90 tablet, Rfl: 1    melatonin 10 mg Tab, Take 1 tablet by mouth nightly as needed., Disp: , Rfl:     montelukast (SINGULAIR) 10 mg tablet, Take 10 mg by mouth every evening., Disp: , Rfl:     multivitamin (THERAGRAN) per tablet, Take 1 tablet by mouth., Disp: , Rfl:     naproxen sodium (ANAPROX) 220 MG tablet, Take 2 tablets by mouth once daily., Disp: , Rfl:     naproxen-diphenhydramine 220-25 mg Tab, Take 2 tablets by mouth every evening., Disp: , Rfl:     triamterene-hydrochlorothiazide 37.5-25 mg (DYAZIDE) 37.5-25 mg per capsule, Take 1 capsule by mouth every morning., Disp: 90 capsule, Rfl: 1    vitamin E 1000 UNIT capsule, Take 1,000 Units by mouth once daily., Disp: , Rfl:     FAMILY HISTORY: negative for Connective Tissue Disease  Grandmother: Rheumatoid Arthritis    PAST MEDICAL HISTORY:  HTN  PVC's  Asthma  Gout : one attack of podagra  Nephrolithiasis at age 20  PAST SURGICAL HISTORY:  Arthroscopic surgery both shoulders  SOCIAL HISTORY:  Quit smoking in 1988  ETOH rarely  Work: retired [employer]  ALLERGIES:  Sulfa  Green tea      Objective:     Vitals:  Blood pressure 137/75, temperature 97.6 °F (36.4 °C), weight 112.1 kg (247 lb 3.2 oz).    Physical Examination:   GEN: no apparent distress, comfortable;  AAOx3  SKIN: no rashes, no lesions, no sclerodactyly or induration, no Raynaud's, no periungual erythema  HEAD: normal  EYES: no pallor, no icterus,  NECK: no masses, thyroid normal, trachea midline, no LAD/LN's, supple  CV:   S1 and S2  Irreg,irreg regular, no murmurs, gallop or rubs  CHEST: Normal respiratory effort;  normal breath sounds; no rubs, no wheezes, no crackles.   ABDOM: nontender and nondistended; soft; ; no rebound/guarding,no masses  MUSC/Skeletal: ROM decreased in shoulders; no crepitus 2-5 MCP's with swelling and tenderness.Tenderness PIP's joints  no deformities   EXTREM: no clubbing, cyanosis, edema, normal pulses.  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  Arrhythmia . Hx PVC's.Seems more irreg,irreg Patient states he had EKG done recently by primary care physician.  He has an appointment to see see his cardiologist in a few months.  Asked  patient if at all possible to see Cardiology earlier, I do not see copy of EKG in his chart      PLAN:  X-rays  Labs  Did not start him on low-dose prednisone due to his arrhythmia.  Would like him to see Cardiology first   FU in 1 wk      Discussion:     I have explained all of the above in detail and the patient understands all of the current recommendation(s). I have answered all of their questions to the best of my ability and to their complete satisfaction.      I have reviewed the risks and benefits of the medication in detail with patient, who understands and wishes to proceed. Printed information regarding  the disease and/or medication was also provided.        RTC        Electronically signed by Zoran Correia MD

## 2020-05-29 ENCOUNTER — PATIENT MESSAGE (OUTPATIENT)
Dept: RHEUMATOLOGY | Facility: CLINIC | Age: 70
End: 2020-05-29

## 2020-06-01 ENCOUNTER — OFFICE VISIT (OUTPATIENT)
Dept: RHEUMATOLOGY | Facility: CLINIC | Age: 70
End: 2020-06-01
Payer: MEDICARE

## 2020-06-01 VITALS
BODY MASS INDEX: 37.42 KG/M2 | TEMPERATURE: 98 F | WEIGHT: 246.13 LBS | SYSTOLIC BLOOD PRESSURE: 165 MMHG | DIASTOLIC BLOOD PRESSURE: 70 MMHG

## 2020-06-01 DIAGNOSIS — Z79.899 LONG-TERM USE OF HIGH-RISK MEDICATION: ICD-10-CM

## 2020-06-01 DIAGNOSIS — M19.90 INFLAMMATORY ARTHRITIS: Primary | ICD-10-CM

## 2020-06-01 PROCEDURE — 99214 PR OFFICE/OUTPT VISIT, EST, LEVL IV, 30-39 MIN: ICD-10-PCS | Mod: S$GLB,,, | Performed by: INTERNAL MEDICINE

## 2020-06-01 PROCEDURE — 1125F PR PAIN SEVERITY QUANTIFIED, PAIN PRESENT: ICD-10-PCS | Mod: S$GLB,,, | Performed by: INTERNAL MEDICINE

## 2020-06-01 PROCEDURE — 99214 OFFICE O/P EST MOD 30 MIN: CPT | Mod: S$GLB,,, | Performed by: INTERNAL MEDICINE

## 2020-06-01 PROCEDURE — 1101F PR PT FALLS ASSESS DOC 0-1 FALLS W/OUT INJ PAST YR: ICD-10-PCS | Mod: S$GLB,,, | Performed by: INTERNAL MEDICINE

## 2020-06-01 PROCEDURE — 3077F PR MOST RECENT SYSTOLIC BLOOD PRESSURE >= 140 MM HG: ICD-10-PCS | Mod: S$GLB,,, | Performed by: INTERNAL MEDICINE

## 2020-06-01 PROCEDURE — 3078F PR MOST RECENT DIASTOLIC BLOOD PRESSURE < 80 MM HG: ICD-10-PCS | Mod: S$GLB,,, | Performed by: INTERNAL MEDICINE

## 2020-06-01 PROCEDURE — 1159F PR MEDICATION LIST DOCUMENTED IN MEDICAL RECORD: ICD-10-PCS | Mod: S$GLB,,, | Performed by: INTERNAL MEDICINE

## 2020-06-01 PROCEDURE — 3078F DIAST BP <80 MM HG: CPT | Mod: S$GLB,,, | Performed by: INTERNAL MEDICINE

## 2020-06-01 PROCEDURE — 1159F MED LIST DOCD IN RCRD: CPT | Mod: S$GLB,,, | Performed by: INTERNAL MEDICINE

## 2020-06-01 PROCEDURE — 3077F SYST BP >= 140 MM HG: CPT | Mod: S$GLB,,, | Performed by: INTERNAL MEDICINE

## 2020-06-01 PROCEDURE — 1125F AMNT PAIN NOTED PAIN PRSNT: CPT | Mod: S$GLB,,, | Performed by: INTERNAL MEDICINE

## 2020-06-01 PROCEDURE — 1101F PT FALLS ASSESS-DOCD LE1/YR: CPT | Mod: S$GLB,,, | Performed by: INTERNAL MEDICINE

## 2020-06-01 RX ORDER — PREDNISONE 2.5 MG/1
TABLET ORAL
Qty: 100 TABLET | Refills: 1 | Status: SHIPPED | OUTPATIENT
Start: 2020-06-01 | End: 2020-07-27

## 2020-06-01 RX ORDER — HYDROXYCHLOROQUINE SULFATE 200 MG/1
200 TABLET, FILM COATED ORAL 2 TIMES DAILY
Qty: 180 TABLET | Refills: 1 | Status: SHIPPED | OUTPATIENT
Start: 2020-06-01 | End: 2020-06-03 | Stop reason: SDUPTHER

## 2020-06-01 NOTE — PROGRESS NOTES
[facility] RHEUMATOLOGY            PROGRESS NOTE      Subjective:       Patient ID:   NAME: Nico Teague : 1950     69 y.o. male    Referring Doc: No ref. provider found  Other Physicians:    Chief Complaint:  Inflammatory arthritis      History of Present Illness:     Patient returns today for a regularly scheduled follow-up visit for inflammatory polyarthritis      The patient continues with same symptoms MCP pain and slight swelling with prolonged morning stiffness.  No rashes.  No fevers.  No GI complaints.  No chest pains cough or shortness of breath.  Slight fatigue            ROS:   GEN:  No  fever, night sweats . weight is stable   slightl fatigue  No orthopnea, + occ  palpitations  PULM: normal with no SOB, cough, hemoptysis, sputum or pleuritic pain  GI:  no abdominal pain, nausea, vomiting, constipation, diarrhea, melanotic stools, BRBPR, hematemesis, no dysphagia  :   no dysuria  NEURO: no paresthesias, headaches, visual disturbances, muscle weakness  MUSCULOSKELETAL:+ joint swelling,+ prolonged AM stiffness, no back pain, no muscle pain  Allergies:  Review of patient's allergies indicates:   Allergen Reactions    Bactrim [sulfamethoxazole-trimethoprim]      HIVES/ RASH    Green tea (rashida sinensis) Hives     Pt is has allergies to black tea. Not an option under search.       Medications:    Current Outpatient Medications:     albuterol (PROVENTIL/VENTOLIN HFA) 90 mcg/actuation inhaler, Inhale 2 puffs into the lungs every 6 (six) hours as needed for Shortness of Breath. Rescue, Disp: 3 Inhaler, Rfl: 4    ascorbic acid, vitamin C, (VITAMIN C) 1000 MG tablet, Take 1,000 mg by mouth once daily., Disp: , Rfl:     aspirin 81 MG Chew, Take 81 mg by mouth once daily., Disp: , Rfl:     budesonide-formoterol 160-4.5 mcg (SYMBICORT) 160-4.5 mcg/actuation HFAA, Inhale 2 puffs into the lungs every 12 (twelve) hours. Controller, Disp: , Rfl:     cholecalciferol, vitamin D3, (VITAMIN D3) 25 mcg  (1,000 unit) capsule, Take 1,000 Units by mouth once daily., Disp: , Rfl:     co-enzyme Q-10 50 mg capsule, Take 50 mg by mouth once daily., Disp: , Rfl:     doxazosin (CARDURA) 2 MG tablet, Take 1 tablet by mouth once daily., Disp: , Rfl:     econazole nitrate 1 % cream, , Disp: , Rfl:     fluticasone (FLONASE) 50 mcg/actuation nasal spray, 1 spray by Nasal route., Disp: , Rfl:     Lactobacillus rhamnosus GG (CULTURELLE) 10 billion cell capsule, Take 1 capsule by mouth., Disp: , Rfl:     losartan (COZAAR) 100 MG tablet, Take 1 tablet (100 mg total) by mouth once daily., Disp: 90 tablet, Rfl: 1    melatonin 10 mg Tab, Take 1 tablet by mouth nightly as needed., Disp: , Rfl:     montelukast (SINGULAIR) 10 mg tablet, Take 10 mg by mouth every evening., Disp: , Rfl:     multivitamin (THERAGRAN) per tablet, Take 1 tablet by mouth., Disp: , Rfl:     naproxen sodium (ANAPROX) 220 MG tablet, Take 2 tablets by mouth once daily., Disp: , Rfl:     naproxen-diphenhydramine 220-25 mg Tab, Take 2 tablets by mouth every evening., Disp: , Rfl:     triamterene-hydrochlorothiazide 37.5-25 mg (DYAZIDE) 37.5-25 mg per capsule, Take 1 capsule by mouth every morning., Disp: 90 capsule, Rfl: 1    vitamin E 1000 UNIT capsule, Take 1,000 Units by mouth once daily., Disp: , Rfl:     hydroxychloroquine (PLAQUENIL) 200 mg tablet, Take 1 tablet (200 mg total) by mouth 2 (two) times daily., Disp: 180 tablet, Rfl: 1    predniSONE (DELTASONE) 2.5 MG tablet, One po bid-tid pc, Disp: 100 tablet, Rfl: 1    PMHx/PSHx Updates:          Objective:     Vitals:  Blood pressure (!) 165/70, temperature 97.8 °F (36.6 °C), weight 111.6 kg (246 lb 1.6 oz).    Physical Examination:   GEN: no apparent distress, comfortable; AAOx3  SKIN: no rashes,no ulceration, no Raynaud's, no petechiae, no SQ nodules,  HEAD: normal  EYES: no pallor, no icterus  NECK: no masses, thyroid normal, trachea midline, no LAD/LN's, supple  CV: RRR with no murmur; l S1  "and S2 reg. ,no gallop no rubs,   CHEST: Normal respiratory effort; CTAB; normal breath sounds; no wheeze or crackles  ABDOM: nontender and nondistended; soft; no masses; no rebound/guarding  MUSC/Skeletal: ROM normal; no crepitus;  MCP , PIPjoints with sl synovitis, Very tender MCP's no deformities  No joint swelling or tenderness of , elbow, shoulder, or knee joints  EXTREM: no clubbing, cyanosis, no edema,normal  pulses   NEURO: grossly intact; motor WNL; AAOx3; ,   PSYCH: normal mood, affect and behavior  LYMPH: normal cervical, supraclavicular          Labs:   Lab Results   Component Value Date    WBC 11.72 05/26/2020    HGB 13.0 (L) 05/26/2020    HCT 41.2 05/26/2020    MCV 88 05/26/2020     05/26/2020    CMP  @LASTLAB(NA,K,CL,CO2,GLU,BUN,Creatinine,Calcium,PROT,Albumin,Bilitot,Alkphos,AST,ALT,CRP,ESR,RF,CCP,JOSE,SSA,CPK,uric acid) )@  I have reviewed all available lab results and radiology reports.    Radiology/Diagnostic Studies:        Assessment/Plan:   (1) 69 y.o. male with diagnosis of inflammatory polyarthritis.  Has negative JOSE, negative SSA , negative rheumatoid factor and CCP antibody.  Very slight elevation of uric acid.  Slight elevation of inflammatory markers.  Chest x-ray OK; x-rays of hands and feet without erosive changes.  CMP with slight decrease in GFR.  Patient has been on Naprosyn.  He was instructed to stop nonsteroidal anti-inflammatories including over-the-counter once.  Long explanation of results and diagnosis.  Will treat him with low-dose prednisone and start Plaquenil after.  He understands Plaquenil can take from 2-4 months to be effective  Eye exam.  Patient states his cardiologist Dr. Rivera  gave him an " okay" to start prednisone.  Potential for side effects of prednisone discussed  Advised patient on potential for side effects for Plaquenil including skin rashes, ophthalmologic complications and need for eye exam at  baseline and then every year.  Also talked about " potential for heart adverse effect although very rare at the dose we are  using.  Patient advised to talk to his cardiologist and see if okay with him to start Plaquenil.  Reviewed report of EKG in his chart QT interval was not elevated    Labs in 3 weeks    Discussion:     I have explained all of the above in detail and the patient understands all of the current recommendation(s). I have answered all questions to the best of my ability and to their complete satisfaction.       The patient is to continue with the current management plan         RTC in   5 weeks      Electronically signed by Zoran Correia MD

## 2020-06-03 DIAGNOSIS — M19.90 INFLAMMATORY ARTHRITIS: Primary | ICD-10-CM

## 2020-06-03 RX ORDER — HYDROXYCHLOROQUINE SULFATE 200 MG/1
200 TABLET, FILM COATED ORAL 2 TIMES DAILY
Qty: 180 TABLET | Refills: 1 | Status: SHIPPED | OUTPATIENT
Start: 2020-06-03 | End: 2020-07-06

## 2020-06-16 NOTE — TELEPHONE ENCOUNTER
----- Message from Ana Esteban sent at 7/20/2018  8:35 AM CDT -----  Contact: pt at [phone] or [phone]  [facility] pt-Murphy-Needs to cancel his surgery on July 27 and reschedule for the next week Friday August 3.  ThanksNeeds Advice    Reason for call:   Changing appt from July 27 to august 3   Communication Preference:call  Additional Information:   ANTICOAGULATION FOLLOW-UP CLINIC VISIT    Patient Name:  Layne Guadarrama  Date:  2020  Contact Type:  Telephone    SUBJECTIVE:         OBJECTIVE    Recent labs: (last 7 days)     20  1354   INR 1.92*       ASSESSMENT / PLAN  No question data found.  Anticoagulation Summary  As of 2020    INR goal:   2.0-2.5   TTR:   71.5 % (11.5 mo)   INR used for dosin.92! (2020)   Warfarin maintenance plan:   2 mg (1 mg x 2) every day   Full warfarin instructions:   : 3 mg; Otherwise 2 mg every day   Weekly warfarin total:   14 mg   Plan last modified:   Hannah Quiroz RN (2020)   Next INR check:   2020   Priority:   Critical   Target end date:   Indefinite    Indications    Long-term (current) use of anticoagulants [Z79.01] [Z79.01]  Pulmonary embolism with infarction (HCC) [I26.99] [I26.99]  LVAD (left ventricular assist device) present (H) [Z95.811]  Chronic systolic congestive heart failure (H) [I50.22]             Anticoagulation Episode Summary     INR check location:       Preferred lab:       Send INR reminders to:   [facility]    Comments:   HIPPA Form Mailed 17    No Bridging Age>75  HM3 LVAD Implanted 17   ASA 81 mg daily  Patient has 1mg and 3mg tablets.        Anticoagulation Care Providers     Provider Role Specialty Phone number    Rita Muhammad MD Referring Cardiology [phone]    Henrique Ma MD Referring Student in organized health care education/training program [phone]            See the Encounter Report to view Anticoagulation Flowsheet and Dosing Calendar (Go to Encounters tab in chart review, and find the Anticoagulation Therapy Visit)    Left message for patient with results and dosing recommendations. Asked patient to call back to report any missed doses, falls, signs and symptoms of bleeding or clotting, any changes in health, medication, or diet. Asked patient to call back with any questions or  concerns.      Anali Tate RN

## 2020-07-06 ENCOUNTER — OFFICE VISIT (OUTPATIENT)
Dept: RHEUMATOLOGY | Facility: CLINIC | Age: 70
End: 2020-07-06
Payer: MEDICARE

## 2020-07-06 VITALS
TEMPERATURE: 98 F | SYSTOLIC BLOOD PRESSURE: 140 MMHG | BODY MASS INDEX: 36.96 KG/M2 | WEIGHT: 243.13 LBS | DIASTOLIC BLOOD PRESSURE: 80 MMHG

## 2020-07-06 DIAGNOSIS — M19.90 INFLAMMATORY ARTHRITIS: Primary | ICD-10-CM

## 2020-07-06 DIAGNOSIS — Z79.899 HIGH RISK MEDICATIONS (NOT ANTICOAGULANTS) LONG-TERM USE: ICD-10-CM

## 2020-07-06 PROCEDURE — 1101F PT FALLS ASSESS-DOCD LE1/YR: CPT | Mod: S$GLB,,, | Performed by: INTERNAL MEDICINE

## 2020-07-06 PROCEDURE — 3008F BODY MASS INDEX DOCD: CPT | Mod: S$GLB,,, | Performed by: INTERNAL MEDICINE

## 2020-07-06 PROCEDURE — 1159F PR MEDICATION LIST DOCUMENTED IN MEDICAL RECORD: ICD-10-PCS | Mod: S$GLB,,, | Performed by: INTERNAL MEDICINE

## 2020-07-06 PROCEDURE — 1125F AMNT PAIN NOTED PAIN PRSNT: CPT | Mod: S$GLB,,, | Performed by: INTERNAL MEDICINE

## 2020-07-06 PROCEDURE — 1125F PR PAIN SEVERITY QUANTIFIED, PAIN PRESENT: ICD-10-PCS | Mod: S$GLB,,, | Performed by: INTERNAL MEDICINE

## 2020-07-06 PROCEDURE — 99213 OFFICE O/P EST LOW 20 MIN: CPT | Mod: S$GLB,,, | Performed by: INTERNAL MEDICINE

## 2020-07-06 PROCEDURE — 3077F SYST BP >= 140 MM HG: CPT | Mod: S$GLB,,, | Performed by: INTERNAL MEDICINE

## 2020-07-06 PROCEDURE — 3079F PR MOST RECENT DIASTOLIC BLOOD PRESSURE 80-89 MM HG: ICD-10-PCS | Mod: S$GLB,,, | Performed by: INTERNAL MEDICINE

## 2020-07-06 PROCEDURE — 3077F PR MOST RECENT SYSTOLIC BLOOD PRESSURE >= 140 MM HG: ICD-10-PCS | Mod: S$GLB,,, | Performed by: INTERNAL MEDICINE

## 2020-07-06 PROCEDURE — 3079F DIAST BP 80-89 MM HG: CPT | Mod: S$GLB,,, | Performed by: INTERNAL MEDICINE

## 2020-07-06 PROCEDURE — 99213 PR OFFICE/OUTPT VISIT, EST, LEVL III, 20-29 MIN: ICD-10-PCS | Mod: S$GLB,,, | Performed by: INTERNAL MEDICINE

## 2020-07-06 PROCEDURE — 1101F PR PT FALLS ASSESS DOC 0-1 FALLS W/OUT INJ PAST YR: ICD-10-PCS | Mod: S$GLB,,, | Performed by: INTERNAL MEDICINE

## 2020-07-06 PROCEDURE — 1159F MED LIST DOCD IN RCRD: CPT | Mod: S$GLB,,, | Performed by: INTERNAL MEDICINE

## 2020-07-06 PROCEDURE — 3008F PR BODY MASS INDEX (BMI) DOCUMENTED: ICD-10-PCS | Mod: S$GLB,,, | Performed by: INTERNAL MEDICINE

## 2020-07-06 RX ORDER — HYDROXYCHLOROQUINE SULFATE 200 MG/1
200 TABLET, FILM COATED ORAL 2 TIMES DAILY
Qty: 180 TABLET | Refills: 1 | Status: SHIPPED | OUTPATIENT
Start: 2020-07-06 | End: 2021-02-01

## 2020-07-06 NOTE — PROGRESS NOTES
[facility] RHEUMATOLOGY            PROGRESS NOTE      Subjective:       Patient ID:   NAME: Nico Teague : 1950     69 y.o. male    Referring Doc: No ref. provider found  Other Physicians:    Chief Complaint:  Inflammatory arthritis (Needs refill on Plaquenil)      History of Present Illness:     Patient returns today for a regularly scheduled follow-up visit for inflammatory arthritis      The patient is doing well.  He finished prednisone he was on 2.5 mg a day a week or 2 ago.  No prolonged morning stiffness significant arthralgias or joint swelling.  No chest pains cough shortness of breath.  No fevers no GI complaints            ROS:   GEN:  No  fever, night sweats . weight is stable   + sl fatigue  SKIN: no rashes, no bruising, no ulcerations, no Raynaud's  HEENT: no HA's, No visual changes, no mucosal ulcers, no sicca symptoms,  CV:   no CP, SOB, PND, ESQUIVEL, no orthopnea, no palpitations  PULM: normal with no SOB, cough, hemoptysis, sputum or pleuritic pain  GI:  no abdominal pain, nausea, vomiting, constipation, diarrhea, melanotic stools, BRBPR, hematemesis, no dysphagia  :   no dysuria  NEURO: no paresthesias, headaches, visual disturbances, muscle weakness  MUSCULOSKELETAL:no joint swelling, prolonged AM stiffness, no back pain, no muscle pain  Allergies:  Review of patient's allergies indicates:   Allergen Reactions    Bactrim [sulfamethoxazole-trimethoprim]      HIVES/ RASH    Green tea (rashida sinensis) Hives     Pt is has allergies to black tea. Not an option under search.       Medications:    Current Outpatient Medications:     albuterol (PROVENTIL/VENTOLIN HFA) 90 mcg/actuation inhaler, Inhale 2 puffs into the lungs every 6 (six) hours as needed for Shortness of Breath. Rescue, Disp: 3 Inhaler, Rfl: 4    ascorbic acid, vitamin C, (VITAMIN C) 1000 MG tablet, Take 1,000 mg by mouth once daily., Disp: , Rfl:     aspirin 81 MG Chew, Take 81 mg by mouth once daily., Disp: , Rfl:      budesonide-formoterol 160-4.5 mcg (SYMBICORT) 160-4.5 mcg/actuation HFAA, Inhale 2 puffs into the lungs every 12 (twelve) hours. Controller, Disp: , Rfl:     cholecalciferol, vitamin D3, (VITAMIN D3) 25 mcg (1,000 unit) capsule, Take 1,000 Units by mouth once daily., Disp: , Rfl:     co-enzyme Q-10 50 mg capsule, Take 50 mg by mouth once daily., Disp: , Rfl:     doxazosin (CARDURA) 2 MG tablet, Take 1 tablet by mouth once daily., Disp: , Rfl:     econazole nitrate 1 % cream, , Disp: , Rfl:     fluticasone (FLONASE) 50 mcg/actuation nasal spray, 1 spray by Nasal route., Disp: , Rfl:     hydroxychloroquine (PLAQUENIL) 200 mg tablet, Take 1 tablet (200 mg total) by mouth 2 (two) times daily., Disp: 180 tablet, Rfl: 1    Lactobacillus rhamnosus GG (CULTURELLE) 10 billion cell capsule, Take 1 capsule by mouth., Disp: , Rfl:     losartan (COZAAR) 100 MG tablet, Take 1 tablet (100 mg total) by mouth once daily., Disp: 90 tablet, Rfl: 1    melatonin 10 mg Tab, Take 1 tablet by mouth nightly as needed., Disp: , Rfl:     montelukast (SINGULAIR) 10 mg tablet, Take 10 mg by mouth every evening., Disp: , Rfl:     multivitamin (THERAGRAN) per tablet, Take 1 tablet by mouth., Disp: , Rfl:     naproxen sodium (ANAPROX) 220 MG tablet, Take 2 tablets by mouth once daily., Disp: , Rfl:     triamterene-hydrochlorothiazide 37.5-25 mg (DYAZIDE) 37.5-25 mg per capsule, TAKE 1 CAPSULE EVERY MORNING, Disp: 90 capsule, Rfl: 1    vitamin E 1000 UNIT capsule, Take 1,000 Units by mouth once daily., Disp: , Rfl:     predniSONE (DELTASONE) 2.5 MG tablet, One po bid-tid pc (Patient not taking: Reported on 7/6/2020), Disp: 100 tablet, Rfl: 1    PMHx/PSHx Updates:          Objective:     Vitals:  Blood pressure (!) 140/80, temperature 97.7 °F (36.5 °C), weight 110.3 kg (243 lb 1.6 oz).    Physical Examination:   GEN: no apparent distress, comfortable; AAOx3  SKIN: no rashes,no ulceration, no Raynaud's, no petechiae, no SQ  nodules,  HEAD: normal  EYES: no pallor, no icterus,  NECK: no masses, thyroid normal, trachea midline, no LAD/LN's, supple  CV: RRR with no murmur; l S1 and S2 reg. ,no gallop no rubs,   CHEST: Normal respiratory effort; CTAB; normal breath sounds; no wheeze or crackles  MUSC/Skeletal: ROM normal; no crepitus; joints without synovitis,  no deformities  No joint swelling or tenderness of PIP, MCP, wrist, elbow, shoulder, or knee joints  EXTREM: no clubbing, cyanosis, no edema,normal  pulses   NEURO: grossly intact; motor WNL; AAOx3; ,PSYCH: normal mood, affect and behavior  LYMPH: normal cervical, supraclavicular          Labs:   Lab Results   Component Value Date    WBC 11.72 05/26/2020    HGB 13.0 (L) 05/26/2020    HCT 41.2 05/26/2020    MCV 88 05/26/2020     05/26/2020    CMP  @LASTLAB(NA,K,CL,CO2,GLU,BUN,Creatinine,Calcium,PROT,Albumin,Bilitot,Alkphos,AST,ALT,CRP,ESR,RF,CCP,JOSE,SSA,CPK,uric acid) )@  I have reviewed all available lab results and radiology reports.    Radiology/Diagnostic Studies:        Assessment/Plan:   (1) 69 y.o. male with diagnosis of seronegative polyarticular inflammatory arthritis.  Doing well on Plaquenil.        CBC CMP CRP        Discussion:     I have explained all of the above in detail and the patient understands all of the current recommendation(s). I have answered all questions to the best of my ability and to their complete satisfaction.       The patient is to continue with the current management plan         RTC in   4 months or before if needed      Electronically signed by Zoran Correia MD        Answers for HPI/ROS submitted by the patient on 7/5/2020   fever: No  eye redness: No  headaches: No  shortness of breath: No  chest pain: No  trouble swallowing: No  diarrhea: No  constipation: No  unexpected weight change: No  genital sore: No  During the last 3 days, have you had a skin rash?: No  Bruises or bleeds easily: Yes  cough: No

## 2020-07-14 ENCOUNTER — PATIENT MESSAGE (OUTPATIENT)
Dept: FAMILY MEDICINE | Facility: CLINIC | Age: 70
End: 2020-07-14

## 2020-07-14 RX ORDER — DICLOFENAC SODIUM 10 MG/G
2 GEL TOPICAL DAILY
Qty: 100 G | Refills: 3 | Status: SHIPPED | OUTPATIENT
Start: 2020-07-14 | End: 2021-04-01 | Stop reason: SDUPTHER

## 2020-07-27 ENCOUNTER — OFFICE VISIT (OUTPATIENT)
Dept: FAMILY MEDICINE | Facility: CLINIC | Age: 70
End: 2020-07-27
Payer: MEDICARE

## 2020-07-27 VITALS
WEIGHT: 249 LBS | HEART RATE: 63 BPM | OXYGEN SATURATION: 97 % | SYSTOLIC BLOOD PRESSURE: 130 MMHG | HEIGHT: 68 IN | TEMPERATURE: 98 F | BODY MASS INDEX: 37.74 KG/M2 | DIASTOLIC BLOOD PRESSURE: 72 MMHG

## 2020-07-27 DIAGNOSIS — E78.00 PURE HYPERCHOLESTEROLEMIA: ICD-10-CM

## 2020-07-27 DIAGNOSIS — Z23 NEED FOR PROPHYLACTIC VACCINATION AGAINST STREPTOCOCCUS PNEUMONIAE (PNEUMOCOCCUS): ICD-10-CM

## 2020-07-27 DIAGNOSIS — I10 ESSENTIAL HYPERTENSION: Primary | ICD-10-CM

## 2020-07-27 PROCEDURE — 1170F FXNL STATUS ASSESSED: CPT | Mod: S$GLB,,, | Performed by: INTERNAL MEDICINE

## 2020-07-27 PROCEDURE — 1159F PR MEDICATION LIST DOCUMENTED IN MEDICAL RECORD: ICD-10-PCS | Mod: S$GLB,,, | Performed by: INTERNAL MEDICINE

## 2020-07-27 PROCEDURE — 99213 PR OFFICE/OUTPT VISIT, EST, LEVL III, 20-29 MIN: ICD-10-PCS | Mod: S$GLB,,, | Performed by: INTERNAL MEDICINE

## 2020-07-27 PROCEDURE — 3008F BODY MASS INDEX DOCD: CPT | Mod: S$GLB,,, | Performed by: INTERNAL MEDICINE

## 2020-07-27 PROCEDURE — 1101F PT FALLS ASSESS-DOCD LE1/YR: CPT | Mod: S$GLB,,, | Performed by: INTERNAL MEDICINE

## 2020-07-27 PROCEDURE — 1101F PR PT FALLS ASSESS DOC 0-1 FALLS W/OUT INJ PAST YR: ICD-10-PCS | Mod: S$GLB,,, | Performed by: INTERNAL MEDICINE

## 2020-07-27 PROCEDURE — 3075F SYST BP GE 130 - 139MM HG: CPT | Mod: S$GLB,,, | Performed by: INTERNAL MEDICINE

## 2020-07-27 PROCEDURE — 3008F PR BODY MASS INDEX (BMI) DOCUMENTED: ICD-10-PCS | Mod: S$GLB,,, | Performed by: INTERNAL MEDICINE

## 2020-07-27 PROCEDURE — 99213 OFFICE O/P EST LOW 20 MIN: CPT | Mod: S$GLB,,, | Performed by: INTERNAL MEDICINE

## 2020-07-27 PROCEDURE — 90732 PNEUMOCOCCAL POLYSACCHARIDE VACCINE 23-VALENT =>2YO SQ IM: ICD-10-PCS | Mod: S$GLB,,, | Performed by: INTERNAL MEDICINE

## 2020-07-27 PROCEDURE — G0009 PNEUMOCOCCAL POLYSACCHARIDE VACCINE 23-VALENT =>2YO SQ IM: ICD-10-PCS | Mod: S$GLB,,, | Performed by: INTERNAL MEDICINE

## 2020-07-27 PROCEDURE — 3075F PR MOST RECENT SYSTOLIC BLOOD PRESS GE 130-139MM HG: ICD-10-PCS | Mod: S$GLB,,, | Performed by: INTERNAL MEDICINE

## 2020-07-27 PROCEDURE — 90732 PPSV23 VACC 2 YRS+ SUBQ/IM: CPT | Mod: S$GLB,,, | Performed by: INTERNAL MEDICINE

## 2020-07-27 PROCEDURE — 1125F AMNT PAIN NOTED PAIN PRSNT: CPT | Mod: S$GLB,,, | Performed by: INTERNAL MEDICINE

## 2020-07-27 PROCEDURE — 1170F PR FUNCTIONAL STATUS ASSESSED: ICD-10-PCS | Mod: S$GLB,,, | Performed by: INTERNAL MEDICINE

## 2020-07-27 PROCEDURE — 3078F DIAST BP <80 MM HG: CPT | Mod: S$GLB,,, | Performed by: INTERNAL MEDICINE

## 2020-07-27 PROCEDURE — 3078F PR MOST RECENT DIASTOLIC BLOOD PRESSURE < 80 MM HG: ICD-10-PCS | Mod: S$GLB,,, | Performed by: INTERNAL MEDICINE

## 2020-07-27 PROCEDURE — 1125F PR PAIN SEVERITY QUANTIFIED, PAIN PRESENT: ICD-10-PCS | Mod: S$GLB,,, | Performed by: INTERNAL MEDICINE

## 2020-07-27 PROCEDURE — G0009 ADMIN PNEUMOCOCCAL VACCINE: HCPCS | Mod: S$GLB,,, | Performed by: INTERNAL MEDICINE

## 2020-07-27 PROCEDURE — 1159F MED LIST DOCD IN RCRD: CPT | Mod: S$GLB,,, | Performed by: INTERNAL MEDICINE

## 2020-07-27 NOTE — PROGRESS NOTES
Subjective:       Patient ID: Nico Teague is a 69 y.o. male.    Chief Complaint: Hypertension (5 months followup ) and Hyperlipidemia    Here for follow up.  Started on plaquenil by Rheum and has seen significant improvement in his joint pain.  Has noted some vertigo at times when rolling over in the bed since he started it; not that big an issue.    Hypertension  This is a chronic problem. The problem is controlled. Associated symptoms include anxiety, malaise/fatigue and palpitations. Pertinent negatives include no chest pain, headaches, neck pain, peripheral edema or shortness of breath. Past treatments include angiotensin blockers, calcium channel blockers, alpha 1 blockers and diuretics. Compliance problems: taken off verapamil by Cardiology d/t bradycardia and low BP.    Hyperlipidemia  This is a chronic problem. The problem is controlled. Recent lipid tests were reviewed and are normal. Pertinent negatives include no chest pain, myalgias or shortness of breath. Current antihyperlipidemic treatment includes statins. Compliance problems include medication side effects (stopped crestor d/t myalgias).      Review of Systems   Constitutional: Positive for malaise/fatigue. Negative for chills, fatigue, fever and unexpected weight change.   HENT: Negative for congestion, hearing loss, postnasal drip, rhinorrhea, trouble swallowing and voice change.    Eyes: Negative for photophobia and visual disturbance.   Respiratory: Negative for apnea, cough, choking, chest tightness, shortness of breath and wheezing.    Cardiovascular: Positive for palpitations. Negative for chest pain and leg swelling.   Gastrointestinal: Negative for abdominal pain, blood in stool, constipation, diarrhea, nausea, rectal pain and vomiting.   Endocrine: Negative for cold intolerance, heat intolerance, polydipsia and polyuria.   Genitourinary: Positive for difficulty urinating. Negative for decreased urine volume, discharge, dysuria, flank  pain, frequency, genital sores, hematuria, testicular pain and urgency.   Musculoskeletal: Positive for joint swelling (right ankle). Negative for arthralgias, gait problem, myalgias and neck pain. Back pain: lower back pain.   Skin: Negative for color change, rash and wound.   Allergic/Immunologic: Negative for environmental allergies and food allergies.   Neurological: Negative for dizziness, tremors, seizures, syncope, facial asymmetry, speech difficulty, weakness, light-headedness, numbness and headaches.   Hematological: Negative for adenopathy. Bruises/bleeds easily.   Psychiatric/Behavioral: Positive for sleep disturbance. Negative for confusion, hallucinations and suicidal ideas. The patient is nervous/anxious.        Past Medical History:   Diagnosis Date    Allergy     Arthritis     Asthma     Basal cell carcinoma     Fever blister     Hypertension     Joint pain     Melanoma     PVC (premature ventricular contraction)     Skin disease     Squamous cell carcinoma       Past Surgical History:   Procedure Laterality Date    EYE SURGERY Left 12/03/2019    EYE SURGERY Right 01/28/2020    LIPOMA RESECTION      NOSE SURGERY      SHOULDER ARTHROSCOPY      SKIN GRAFT         Family History   Problem Relation Age of Onset    Cancer Mother         esophageal    Heart disease Father     Hypertension Father     Arthritis Paternal Grandmother        Social History     Socioeconomic History    Marital status:      Spouse name: Not on file    Number of children: Not on file    Years of education: Not on file    Highest education level: Not on file   Occupational History    Occupation: retired captain    Social Needs    Financial resource strain: Not on file    Food insecurity     Worry: Not on file     Inability: Not on file    Transportation needs     Medical: Not on file     Non-medical: Not on file   Tobacco Use    Smoking status: Former Smoker     Packs/day: 2.00     Years: 20.00      Pack years: 40.00     Types: Cigarettes     Start date: 1972     Quit date: 1988     Years since quittin.9    Smokeless tobacco: Never Used   Substance and Sexual Activity    Alcohol use: No     Frequency: Never    Drug use: No    Sexual activity: Never   Lifestyle    Physical activity     Days per week: Not on file     Minutes per session: Not on file    Stress: To some extent   Relationships    Social connections     Talks on phone: Not on file     Gets together: Not on file     Attends [employer] service: Not on file     Active member of club or organization: Not on file     Attends meetings of clubs or organizations: Not on file     Relationship status: Not on file   Other Topics Concern    Not on file   Social History Narrative    Live with wife       Current Outpatient Medications   Medication Sig Dispense Refill    albuterol (PROVENTIL/VENTOLIN HFA) 90 mcg/actuation inhaler Inhale 2 puffs into the lungs every 6 (six) hours as needed for Shortness of Breath. Rescue 3 Inhaler 4    ascorbic acid, vitamin C, (VITAMIN C) 1000 MG tablet Take 1,000 mg by mouth once daily.      aspirin 81 MG Chew Take 81 mg by mouth once daily.      budesonide-formoterol 160-4.5 mcg (SYMBICORT) 160-4.5 mcg/actuation HFAA Inhale 2 puffs into the lungs every 12 (twelve) hours. Controller      cholecalciferol, vitamin D3, (VITAMIN D3) 25 mcg (1,000 unit) capsule Take 1,000 Units by mouth once daily.      co-enzyme Q-10 50 mg capsule Take 50 mg by mouth once daily.      diclofenac sodium (VOLTAREN) 1 % Gel Apply 2 g topically once daily. 100 g 3    doxazosin (CARDURA) 2 MG tablet Take 1 tablet by mouth once daily.      econazole nitrate 1 % cream       fluticasone (FLONASE) 50 mcg/actuation nasal spray 1 spray by Nasal route.      hydroxychloroquine (PLAQUENIL) 200 mg tablet Take 1 tablet (200 mg total) by mouth 2 (two) times daily. 180 tablet 1    Lactobacillus rhamnosus GG (CULTURELLE) 10 billion  "cell capsule Take 1 capsule by mouth.      losartan (COZAAR) 100 MG tablet Take 1 tablet (100 mg total) by mouth once daily. 90 tablet 1    melatonin 10 mg Tab Take 1 tablet by mouth nightly as needed.      montelukast (SINGULAIR) 10 mg tablet Take 10 mg by mouth every evening.      multivitamin (THERAGRAN) per tablet Take 1 tablet by mouth.      naproxen sodium (ANAPROX) 220 MG tablet Take 2 tablets by mouth once daily.      triamterene-hydrochlorothiazide 37.5-25 mg (DYAZIDE) 37.5-25 mg per capsule TAKE 1 CAPSULE EVERY MORNING 90 capsule 1    vitamin E 1000 UNIT capsule Take 1,000 Units by mouth once daily.       No current facility-administered medications for this visit.        Review of patient's allergies indicates:   Allergen Reactions    Bactrim [sulfamethoxazole-trimethoprim]      HIVES/ RASH    Green tea (rashida sinensis) Hives     Pt is has allergies to black tea. Not an option under search.     Objective:    HPI     Hypertension      Additional comments: 5 months followup           Last edited by Radha Ruffin MA on 7/27/2020  9:16 AM. (History)      Blood pressure 130/72, pulse 63, temperature 97.9 °F (36.6 °C), temperature source Temporal, height 5' 8" (1.727 m), weight 112.9 kg (249 lb), SpO2 97 %. Body mass index is 37.86 kg/m².   Physical Exam  Vitals signs and nursing note reviewed.   Constitutional:       General: He is not in acute distress.     Appearance: He is well-developed. He is obese. He is not ill-appearing, toxic-appearing or diaphoretic.   HENT:      Head: Normocephalic and atraumatic.   Eyes:      General: No scleral icterus.        Right eye: No discharge.         Left eye: No discharge.      Conjunctiva/sclera: Conjunctivae normal.   Neck:      Vascular: No carotid bruit.   Cardiovascular:      Rate and Rhythm: Normal rate and regular rhythm.      Heart sounds: Normal heart sounds. No murmur.   Pulmonary:      Effort: Pulmonary effort is normal. No respiratory distress. "      Breath sounds: Normal breath sounds. No decreased breath sounds, wheezing, rhonchi or rales.   Abdominal:      General: There is no distension.      Palpations: Abdomen is soft.      Tenderness: There is no abdominal tenderness. There is no guarding or rebound.   Musculoskeletal:      Right lower leg: Edema (trace) present.      Left lower leg: No edema.   Skin:     General: Skin is warm and dry.   Neurological:      Mental Status: He is alert.      Motor: No tremor.   Psychiatric:         Mood and Affect: Mood normal.         Speech: Speech normal.         Behavior: Behavior normal.             Assessment:       1. Essential hypertension    2. Pure hypercholesterolemia    3. Need for prophylactic vaccination against Streptococcus pneumoniae (pneumococcus)        Plan:       Nico was seen today for hypertension and hyperlipidemia.    Diagnoses and all orders for this visit:    Essential hypertension  Comments:  Same meds    Pure hypercholesterolemia  -     Lipid Panel; Future  -     Lipid Panel    Need for prophylactic vaccination against Streptococcus pneumoniae (pneumococcus)  -     Pneumococcal Polysaccharide Vaccine (23 Valent) (SQ/IM)

## 2020-10-07 LAB
ALBUMIN SERPL-MCNC: 4 G/DL (ref 3.6–5.1)
ALBUMIN/GLOB SERPL: 1.6 (CALC) (ref 1–2.5)
ALP SERPL-CCNC: 56 U/L (ref 35–144)
ALT SERPL-CCNC: 25 U/L (ref 9–46)
AST SERPL-CCNC: 17 U/L (ref 10–35)
BASOPHILS # BLD AUTO: 70 CELLS/UL (ref 0–200)
BASOPHILS NFR BLD AUTO: 1 %
BILIRUB SERPL-MCNC: 0.4 MG/DL (ref 0.2–1.2)
BUN SERPL-MCNC: 21 MG/DL (ref 7–25)
BUN/CREAT SERPL: 16 (CALC) (ref 6–22)
CALCIUM SERPL-MCNC: 9.1 MG/DL (ref 8.6–10.3)
CHLORIDE SERPL-SCNC: 106 MMOL/L (ref 98–110)
CHOLEST SERPL-MCNC: 167 MG/DL
CHOLEST/HDLC SERPL: 5.1 (CALC)
CO2 SERPL-SCNC: 28 MMOL/L (ref 20–32)
CREAT SERPL-MCNC: 1.35 MG/DL (ref 0.7–1.25)
EOSINOPHIL # BLD AUTO: 469 CELLS/UL (ref 15–500)
EOSINOPHIL NFR BLD AUTO: 6.7 %
ERYTHROCYTE [DISTWIDTH] IN BLOOD BY AUTOMATED COUNT: 12.6 % (ref 11–15)
GFRSERPLBLD MDRD-ARVRAT: 53 ML/MIN/1.73M2
GLOBULIN SER CALC-MCNC: 2.5 G/DL (CALC) (ref 1.9–3.7)
GLUCOSE SERPL-MCNC: 93 MG/DL (ref 65–99)
HCT VFR BLD AUTO: 39.7 % (ref 38.5–50)
HDLC SERPL-MCNC: 33 MG/DL
HGB BLD-MCNC: 13.1 G/DL (ref 13.2–17.1)
LDLC SERPL CALC-MCNC: 108 MG/DL (CALC)
LYMPHOCYTES # BLD AUTO: 2233 CELLS/UL (ref 850–3900)
LYMPHOCYTES NFR BLD AUTO: 31.9 %
MCH RBC QN AUTO: 29.1 PG (ref 27–33)
MCHC RBC AUTO-ENTMCNC: 33 G/DL (ref 32–36)
MCV RBC AUTO: 88.2 FL (ref 80–100)
MONOCYTES # BLD AUTO: 609 CELLS/UL (ref 200–950)
MONOCYTES NFR BLD AUTO: 8.7 %
NEUTROPHILS # BLD AUTO: 3619 CELLS/UL (ref 1500–7800)
NEUTROPHILS NFR BLD AUTO: 51.7 %
NONHDLC SERPL-MCNC: 134 MG/DL (CALC)
PLATELET # BLD AUTO: 227 THOUSAND/UL (ref 140–400)
PMV BLD REES-ECKER: 11.1 FL (ref 7.5–12.5)
POTASSIUM SERPL-SCNC: 4.8 MMOL/L (ref 3.5–5.3)
PROT SERPL-MCNC: 6.5 G/DL (ref 6.1–8.1)
RBC # BLD AUTO: 4.5 MILLION/UL (ref 4.2–5.8)
SODIUM SERPL-SCNC: 142 MMOL/L (ref 135–146)
TRIGL SERPL-MCNC: 151 MG/DL
WBC # BLD AUTO: 7 THOUSAND/UL (ref 3.8–10.8)

## 2020-10-13 ENCOUNTER — PATIENT MESSAGE (OUTPATIENT)
Dept: RHEUMATOLOGY | Facility: CLINIC | Age: 70
End: 2020-10-13

## 2020-10-13 ENCOUNTER — PATIENT MESSAGE (OUTPATIENT)
Dept: FAMILY MEDICINE | Facility: CLINIC | Age: 70
End: 2020-10-13

## 2020-10-15 ENCOUNTER — PATIENT MESSAGE (OUTPATIENT)
Dept: RHEUMATOLOGY | Facility: CLINIC | Age: 70
End: 2020-10-15

## 2020-11-06 ENCOUNTER — PATIENT MESSAGE (OUTPATIENT)
Dept: FAMILY MEDICINE | Facility: CLINIC | Age: 70
End: 2020-11-06

## 2020-11-09 ENCOUNTER — OFFICE VISIT (OUTPATIENT)
Dept: RHEUMATOLOGY | Facility: CLINIC | Age: 70
End: 2020-11-09
Payer: MEDICARE

## 2020-11-09 VITALS
BODY MASS INDEX: 38.61 KG/M2 | DIASTOLIC BLOOD PRESSURE: 84 MMHG | SYSTOLIC BLOOD PRESSURE: 134 MMHG | WEIGHT: 253.88 LBS | TEMPERATURE: 97 F

## 2020-11-09 DIAGNOSIS — M19.90 INFLAMMATORY ARTHRITIS: Primary | ICD-10-CM

## 2020-11-09 DIAGNOSIS — Z79.899 LONG-TERM USE OF HIGH-RISK MEDICATION: ICD-10-CM

## 2020-11-09 PROCEDURE — 3075F SYST BP GE 130 - 139MM HG: CPT | Mod: S$GLB,,, | Performed by: INTERNAL MEDICINE

## 2020-11-09 PROCEDURE — 1101F PR PT FALLS ASSESS DOC 0-1 FALLS W/OUT INJ PAST YR: ICD-10-PCS | Mod: S$GLB,,, | Performed by: INTERNAL MEDICINE

## 2020-11-09 PROCEDURE — 3008F BODY MASS INDEX DOCD: CPT | Mod: S$GLB,,, | Performed by: INTERNAL MEDICINE

## 2020-11-09 PROCEDURE — 3079F DIAST BP 80-89 MM HG: CPT | Mod: S$GLB,,, | Performed by: INTERNAL MEDICINE

## 2020-11-09 PROCEDURE — 1159F MED LIST DOCD IN RCRD: CPT | Mod: S$GLB,,, | Performed by: INTERNAL MEDICINE

## 2020-11-09 PROCEDURE — 3079F PR MOST RECENT DIASTOLIC BLOOD PRESSURE 80-89 MM HG: ICD-10-PCS | Mod: S$GLB,,, | Performed by: INTERNAL MEDICINE

## 2020-11-09 PROCEDURE — 1159F PR MEDICATION LIST DOCUMENTED IN MEDICAL RECORD: ICD-10-PCS | Mod: S$GLB,,, | Performed by: INTERNAL MEDICINE

## 2020-11-09 PROCEDURE — 99213 PR OFFICE/OUTPT VISIT, EST, LEVL III, 20-29 MIN: ICD-10-PCS | Mod: S$GLB,,, | Performed by: INTERNAL MEDICINE

## 2020-11-09 PROCEDURE — 99213 OFFICE O/P EST LOW 20 MIN: CPT | Mod: S$GLB,,, | Performed by: INTERNAL MEDICINE

## 2020-11-09 PROCEDURE — 3075F PR MOST RECENT SYSTOLIC BLOOD PRESS GE 130-139MM HG: ICD-10-PCS | Mod: S$GLB,,, | Performed by: INTERNAL MEDICINE

## 2020-11-09 PROCEDURE — 1101F PT FALLS ASSESS-DOCD LE1/YR: CPT | Mod: S$GLB,,, | Performed by: INTERNAL MEDICINE

## 2020-11-09 PROCEDURE — 3008F PR BODY MASS INDEX (BMI) DOCUMENTED: ICD-10-PCS | Mod: S$GLB,,, | Performed by: INTERNAL MEDICINE

## 2020-11-09 NOTE — PROGRESS NOTES
[facility] RHEUMATOLOGY            PROGRESS NOTE      Subjective:       Patient ID:   NAME: Nico Teague : 1950     70 y.o. male    Referring Doc: No ref. provider found  Other Physicians:    Chief Complaint:  Follow-up      History of Present Illness:      Patient returns today for a regularly scheduled follow-up visit for seronegative inflammatory polyarthritis    The patient is doing well except for dry skin.  No fevers cough or change in shortness of breath from his baseline due to COPD.  No GI complaints.  No prolonged morning stiffness or joint swelling.  No chest pains or cough.  No known exposure to COVID        ROS:   GEN:  No  fever, night sweats . weight is stable   + sl fatigue  SKIN: no rashes, no bruising, no ulcerations, no Raynaud's  HEENT: no HA's, No visual changes, no mucosal ulcers, no sicca symptoms,  CV:   no CP, SOB, PND, ESQUIVEL, no orthopnea, no palpitations  PULM: normal with no SOB, cough, hemoptysis, sputum or pleuritic pain  GI:  no abdominal pain, nausea, vomiting, constipation, diarrhea, melanotic stools, BRBPR, hematemesis, no dysphagia  :   no dysuria  NEURO: no paresthesias, headaches, visual disturbances, muscle weakness  MUSCULOSKELETAL:no joint swelling, prolonged AM stiffness, no back pain, no muscle pain  Allergies:  Review of patient's allergies indicates:   Allergen Reactions    Bactrim [sulfamethoxazole-trimethoprim]      HIVES/ RASH    Green tea (rashida sinensis) Hives     Pt is has allergies to black tea. Not an option under search.       Medications:    Current Outpatient Medications:     albuterol (PROVENTIL/VENTOLIN HFA) 90 mcg/actuation inhaler, Inhale 2 puffs into the lungs every 6 (six) hours as needed for Shortness of Breath. Rescue, Disp: 3 Inhaler, Rfl: 4    ascorbic acid, vitamin C, (VITAMIN C) 1000 MG tablet, Take 1,000 mg by mouth once daily., Disp: , Rfl:     aspirin 81 MG Chew, Take 81 mg by mouth once daily., Disp: , Rfl:      budesonide-formoterol 160-4.5 mcg (SYMBICORT) 160-4.5 mcg/actuation HFAA, Inhale 2 puffs into the lungs every 12 (twelve) hours. Controller, Disp: , Rfl:     cholecalciferol, vitamin D3, (VITAMIN D3) 25 mcg (1,000 unit) capsule, Take 1,000 Units by mouth once daily., Disp: , Rfl:     co-enzyme Q-10 50 mg capsule, Take 50 mg by mouth once daily., Disp: , Rfl:     diclofenac sodium (VOLTAREN) 1 % Gel, Apply 2 g topically once daily., Disp: 100 g, Rfl: 3    doxazosin (CARDURA) 2 MG tablet, Take 1 tablet by mouth once daily., Disp: , Rfl:     econazole nitrate 1 % cream, , Disp: , Rfl:     fluticasone (FLONASE) 50 mcg/actuation nasal spray, 1 spray by Nasal route., Disp: , Rfl:     hydroxychloroquine (PLAQUENIL) 200 mg tablet, Take 1 tablet (200 mg total) by mouth 2 (two) times daily., Disp: 180 tablet, Rfl: 1    Lactobacillus rhamnosus GG (CULTURELLE) 10 billion cell capsule, Take 1 capsule by mouth., Disp: , Rfl:     losartan (COZAAR) 100 MG tablet, TAKE 1 TABLET (100 MG TOTAL) BY MOUTH ONCE DAILY., Disp: 90 tablet, Rfl: 1    melatonin 10 mg Tab, Take 1 tablet by mouth nightly as needed., Disp: , Rfl:     montelukast (SINGULAIR) 10 mg tablet, TAKE 1 TABLET (10 MG TOTAL) BY MOUTH EVERY EVENING., Disp: 90 tablet, Rfl: 4    multivitamin (THERAGRAN) per tablet, Take 1 tablet by mouth., Disp: , Rfl:     naproxen sodium (ANAPROX) 220 MG tablet, Take 2 tablets by mouth once daily., Disp: , Rfl:     triamterene-hydrochlorothiazide 37.5-25 mg (DYAZIDE) 37.5-25 mg per capsule, TAKE 1 CAPSULE EVERY MORNING, Disp: 90 capsule, Rfl: 1    vitamin E 1000 UNIT capsule, Take 1,000 Units by mouth once daily., Disp: , Rfl:     PMHx/PSHx Updates:        Last Eye Exam up-to-date      Objective:     Vitals:  Blood pressure 134/84, temperature 97.3 °F (36.3 °C), weight 115.2 kg (253 lb 14.4 oz).    Physical Examination:   GEN: no apparent distress, comfortable; AAOx3  SKIN: no rashes,no ulceration, no Raynaud's, no petechiae,  no SQ nodules,  HEAD: normal  EYES: no pallor, no icterus,  NECK: no masses, thyroid normal, trachea midline, no LAD/LN's, supple  CV: RRR with no murmur; l S1 and S2 reg. ,no gallop no rubs,   CHEST: Normal respiratory effort; CTAB; normal breath sounds; no wheeze or crackles  MUSC/Skeletal: ROM normal; no crepitus; joints without synovitis,  no deformities  No joint swelling or tenderness of PIP, MCP, wrist, elbow, shoulder, or knee joints  EXTREM: no clubbing, cyanosis, no edema,normal  pulses   NEURO: grossly intact; motor WNL; AAOx3  PSYCH: normal mood, affect and behavior  LYMPH: normal cervical, supraclavicular          Labs:   Lab Results   Component Value Date    WBC 7.0 10/06/2020    HGB 13.1 (L) 10/06/2020    HCT 39.7 10/06/2020    MCV 88.2 10/06/2020     10/06/2020    CMP  @LASTLAB(NA,K,CL,CO2,GLU,BUN,Creatinine,Calcium,PROT,Albumin,Bilitot,Alkphos,AST,ALT,CRP,ESR,RF,CCP,JOSE,SSA,CPK,uric acid) )@  I have reviewed all available lab results and radiology reports.    Radiology/Diagnostic Studies:    Eye exam is up-to-date    Assessment/Plan:   (1) 70 y.o. male with diagnosis of inflammatory polyarthritis.  He is stable on Plaquenil.  Creatinine was slightly elevated.  He takes Aleve 2-3 a day.  Advised him to stop all over-the-counter nonsteroidals and repeat CMP in a week or so    Follow-up in 4 months          Discussion:     I have explained all of the above in detail and the patient understands all of the current recommendation(s). I have answered all questions to the best of my ability and to their complete satisfaction.       The patient is to continue with the current management plan         RTC in         Electronically signed by Zoran Correia MD

## 2020-11-12 ENCOUNTER — CLINICAL SUPPORT (OUTPATIENT)
Dept: FAMILY MEDICINE | Facility: CLINIC | Age: 70
End: 2020-11-12
Payer: MEDICARE

## 2020-11-12 DIAGNOSIS — Z23 NEED FOR INFLUENZA VACCINATION: Primary | ICD-10-CM

## 2020-11-12 PROCEDURE — G0008 ADMIN INFLUENZA VIRUS VAC: HCPCS | Mod: S$GLB,,, | Performed by: NURSE PRACTITIONER

## 2020-11-12 PROCEDURE — 90662 FLU VACCINE - QUADRIVALENT - HIGH DOSE (65+) PRESERVATIVE FREE IM: ICD-10-PCS | Mod: S$GLB,,, | Performed by: NURSE PRACTITIONER

## 2020-11-12 PROCEDURE — G0008 FLU VACCINE - QUADRIVALENT - HIGH DOSE (65+) PRESERVATIVE FREE IM: ICD-10-PCS | Mod: S$GLB,,, | Performed by: NURSE PRACTITIONER

## 2020-11-12 PROCEDURE — 90662 IIV NO PRSV INCREASED AG IM: CPT | Mod: S$GLB,,, | Performed by: NURSE PRACTITIONER

## 2020-11-19 ENCOUNTER — OFFICE VISIT (OUTPATIENT)
Dept: PULMONOLOGY | Facility: CLINIC | Age: 70
End: 2020-11-19
Payer: MEDICARE

## 2020-11-19 VITALS
BODY MASS INDEX: 38.49 KG/M2 | SYSTOLIC BLOOD PRESSURE: 158 MMHG | HEIGHT: 68 IN | DIASTOLIC BLOOD PRESSURE: 90 MMHG | WEIGHT: 254 LBS | OXYGEN SATURATION: 99 %

## 2020-11-19 DIAGNOSIS — J45.909 MODERATE ASTHMA WITHOUT COMPLICATION, UNSPECIFIED WHETHER PERSISTENT: Primary | ICD-10-CM

## 2020-11-19 PROCEDURE — 3077F PR MOST RECENT SYSTOLIC BLOOD PRESSURE >= 140 MM HG: ICD-10-PCS | Mod: S$GLB,,, | Performed by: INTERNAL MEDICINE

## 2020-11-19 PROCEDURE — 99213 PR OFFICE/OUTPT VISIT, EST, LEVL III, 20-29 MIN: ICD-10-PCS | Mod: S$GLB,,, | Performed by: INTERNAL MEDICINE

## 2020-11-19 PROCEDURE — 3008F BODY MASS INDEX DOCD: CPT | Mod: S$GLB,,, | Performed by: INTERNAL MEDICINE

## 2020-11-19 PROCEDURE — 3080F DIAST BP >= 90 MM HG: CPT | Mod: S$GLB,,, | Performed by: INTERNAL MEDICINE

## 2020-11-19 PROCEDURE — 1159F MED LIST DOCD IN RCRD: CPT | Mod: S$GLB,,, | Performed by: INTERNAL MEDICINE

## 2020-11-19 PROCEDURE — 3080F PR MOST RECENT DIASTOLIC BLOOD PRESSURE >= 90 MM HG: ICD-10-PCS | Mod: S$GLB,,, | Performed by: INTERNAL MEDICINE

## 2020-11-19 PROCEDURE — 3008F PR BODY MASS INDEX (BMI) DOCUMENTED: ICD-10-PCS | Mod: S$GLB,,, | Performed by: INTERNAL MEDICINE

## 2020-11-19 PROCEDURE — 1159F PR MEDICATION LIST DOCUMENTED IN MEDICAL RECORD: ICD-10-PCS | Mod: S$GLB,,, | Performed by: INTERNAL MEDICINE

## 2020-11-19 PROCEDURE — 3077F SYST BP >= 140 MM HG: CPT | Mod: S$GLB,,, | Performed by: INTERNAL MEDICINE

## 2020-11-19 PROCEDURE — 99213 OFFICE O/P EST LOW 20 MIN: CPT | Mod: S$GLB,,, | Performed by: INTERNAL MEDICINE

## 2020-11-19 NOTE — PROGRESS NOTES
.    SUBJECTIVE:    Patient ID: Nico Teague is a 70 y.o. male.    Chief Complaint: Asthma (doing okay. States that he started hydrochloroquine and has has some SOB since starting.)    The patient is here doing ok with his breathing.  He feels the Plaquenil he was started on may be making him feel more dyspneic.  His peak flow is 510, bottom of green is 450.  He is on Symbicort bid.    Past Medical History:   Diagnosis Date    Allergy     Arthritis     Asthma     Basal cell carcinoma     Fever blister     Hypertension     Joint pain     Melanoma     PVC (premature ventricular contraction)     Skin disease     Squamous cell carcinoma      Past Surgical History:   Procedure Laterality Date    EYE SURGERY Left 12/03/2019    EYE SURGERY Right 01/28/2020    LIPOMA RESECTION      NOSE SURGERY      SHOULDER ARTHROSCOPY      SKIN GRAFT       Family History   Problem Relation Age of Onset    Cancer Mother         esophageal    Heart disease Father     Hypertension Father     Arthritis Paternal Grandmother         Social History:   Marital Status:   Occupation: retired captain   Alcohol History:  reports no history of alcohol use.  Tobacco History:  reports that he quit smoking about 32 years ago. His smoking use included cigarettes. He started smoking about 48 years ago. He has a 40.00 pack-year smoking history. He has never used smokeless tobacco.  Drug History:  reports no history of drug use.    Review of patient's allergies indicates:   Allergen Reactions    Bactrim [sulfamethoxazole-trimethoprim]      HIVES/ RASH       Current Outpatient Medications   Medication Sig Dispense Refill    albuterol (PROVENTIL/VENTOLIN HFA) 90 mcg/actuation inhaler Inhale 2 puffs into the lungs every 6 (six) hours as needed for Shortness of Breath. Rescue 3 Inhaler 4    ascorbic acid, vitamin C, (VITAMIN C) 1000 MG tablet Take 1,000 mg by mouth once daily.      aspirin 81 MG Chew Take 81 mg by mouth once  daily.      budesonide-formoterol 160-4.5 mcg (SYMBICORT) 160-4.5 mcg/actuation HFAA Inhale 2 puffs into the lungs every 12 (twelve) hours. Controller      cholecalciferol, vitamin D3, (VITAMIN D3) 25 mcg (1,000 unit) capsule Take 1,000 Units by mouth once daily.      co-enzyme Q-10 50 mg capsule Take 50 mg by mouth once daily.      diclofenac sodium (VOLTAREN) 1 % Gel Apply 2 g topically once daily. 100 g 3    doxazosin (CARDURA) 2 MG tablet Take 1 tablet by mouth once daily.      econazole nitrate 1 % cream       fluticasone (FLONASE) 50 mcg/actuation nasal spray 1 spray by Nasal route.      hydroxychloroquine (PLAQUENIL) 200 mg tablet Take 1 tablet (200 mg total) by mouth 2 (two) times daily. 180 tablet 1    Lactobacillus rhamnosus GG (CULTURELLE) 10 billion cell capsule Take 1 capsule by mouth.      losartan (COZAAR) 100 MG tablet TAKE 1 TABLET (100 MG TOTAL) BY MOUTH ONCE DAILY. 90 tablet 1    melatonin 10 mg Tab Take 1 tablet by mouth nightly as needed.      montelukast (SINGULAIR) 10 mg tablet TAKE 1 TABLET (10 MG TOTAL) BY MOUTH EVERY EVENING. 90 tablet 4    multivitamin (THERAGRAN) per tablet Take 1 tablet by mouth.      triamterene-hydrochlorothiazide 37.5-25 mg (DYAZIDE) 37.5-25 mg per capsule TAKE 1 CAPSULE EVERY MORNING 90 capsule 1    vitamin E 1000 UNIT capsule Take 1,000 Units by mouth once daily.      naproxen sodium (ANAPROX) 220 MG tablet Take 2 tablets by mouth once daily.       No current facility-administered medications for this visit.        Last PFT: 4/2018      Review of Systems  General: feels pretty good  Eyes: vision is good  ENT: has some nasal stuffiness, using a netipot and less Flonase  Heart:: feels palpitations occasionally when he lies down   Lungs: breathing is well   GI: No Nausea, vomiting, constipation, diarrhea, or reflux.  : Nocturia 3-4 times a night  Musculoskeletal: arthritic pain in hips  .  Skin: No lesions or rashes.  Neuro: No headaches or  "neuropathy.  Lymph: no adneopathy or edema  Psych: No anxiety or depression.  Endo: weight gain      OBJECTIVE:      BP (!) 158/90   Ht 5' 8" (1.727 m)   Wt 115.2 kg (254 lb)   SpO2 99%   PF 53 L/min   BMI 38.62 kg/m²     Physical Exam  GENERAL: Older patient in no distress.  HEENT: Pupils equal and reactive. Extraocular movements intact. Nose intact.      Pharynx moist.  NECK: Supple.   HEART: regular rate and rhythym  LUNGS: Clear to auscultation and percussion. Lung excursion symmetrical. No change in fremitus. No adventitial noises.  ABDOMEN: Bowel sounds present. Non-tender, no masses palpated.  EXTREMITIES: Normal muscle tone and joint movement, no cyanosis or clubbing.   LYMPHATICS: No adenopathy palpated, +2 on R and 1+ on the L pitting edema to legs   SKIN: Dry, intact, no lesions.   NEURO: Cranial nerves II-XII intact. Motor strength 5/5 bilaterally, upper and lower extremities.  PSYCH: Appropriate affect.           Assessment:       1. Moderate asthma without complication, unspecified whether persistent          Plan:       Moderate asthma without complication, unspecified whether persistent  -     Complete PFT with bronchodilator; Future       Keep using your symbicort 2 puffs twice, rinse after you use it  Continue the Flonase and saline rinses as needed  Has had flu shot      Follow up in about 6 months (around 5/19/2021).          "

## 2020-11-21 LAB
ALBUMIN SERPL-MCNC: 4.4 G/DL (ref 3.6–5.1)
ALBUMIN/GLOB SERPL: 1.7 (CALC) (ref 1–2.5)
ALP SERPL-CCNC: 62 U/L (ref 35–144)
ALT SERPL-CCNC: 18 U/L (ref 9–46)
AST SERPL-CCNC: 15 U/L (ref 10–35)
BILIRUB SERPL-MCNC: 0.4 MG/DL (ref 0.2–1.2)
BUN SERPL-MCNC: 20 MG/DL (ref 7–25)
BUN/CREAT SERPL: NORMAL (CALC) (ref 6–22)
CALCIUM SERPL-MCNC: 9.7 MG/DL (ref 8.6–10.3)
CHLORIDE SERPL-SCNC: 105 MMOL/L (ref 98–110)
CO2 SERPL-SCNC: 28 MMOL/L (ref 20–32)
CREAT SERPL-MCNC: 1.1 MG/DL (ref 0.7–1.25)
GFRSERPLBLD MDRD-ARVRAT: 68 ML/MIN/1.73M2
GLOBULIN SER CALC-MCNC: 2.6 G/DL (CALC) (ref 1.9–3.7)
GLUCOSE SERPL-MCNC: 86 MG/DL (ref 65–139)
POTASSIUM SERPL-SCNC: 4.3 MMOL/L (ref 3.5–5.3)
PROT SERPL-MCNC: 7 G/DL (ref 6.1–8.1)
SODIUM SERPL-SCNC: 141 MMOL/L (ref 135–146)

## 2020-12-06 ENCOUNTER — PATIENT MESSAGE (OUTPATIENT)
Dept: PULMONOLOGY | Facility: CLINIC | Age: 70
End: 2020-12-06

## 2020-12-07 RX ORDER — BUDESONIDE AND FORMOTEROL FUMARATE DIHYDRATE 160; 4.5 UG/1; UG/1
2 AEROSOL RESPIRATORY (INHALATION) EVERY 12 HOURS
Qty: 3 INHALER | Refills: 6 | Status: SHIPPED | OUTPATIENT
Start: 2020-12-07 | End: 2021-05-17

## 2020-12-10 ENCOUNTER — HOSPITAL ENCOUNTER (OUTPATIENT)
Dept: PULMONOLOGY | Facility: HOSPITAL | Age: 70
Discharge: HOME | End: 2020-12-10
Attending: INTERNAL MEDICINE
Payer: MEDICARE

## 2020-12-10 DIAGNOSIS — J45.909 MODERATE ASTHMA WITHOUT COMPLICATION, UNSPECIFIED WHETHER PERSISTENT: ICD-10-CM

## 2020-12-10 PROCEDURE — 94729 DIFFUSING CAPACITY: CPT

## 2020-12-10 PROCEDURE — 94727 GAS DIL/WSHOT DETER LNG VOL: CPT

## 2020-12-10 PROCEDURE — 94010 BREATHING CAPACITY TEST: CPT

## 2020-12-11 ENCOUNTER — TELEPHONE (OUTPATIENT)
Dept: PULMONOLOGY | Facility: CLINIC | Age: 70
End: 2020-12-11

## 2020-12-15 ENCOUNTER — PATIENT MESSAGE (OUTPATIENT)
Dept: PULMONOLOGY | Facility: CLINIC | Age: 70
End: 2020-12-15

## 2021-01-07 ENCOUNTER — OFFICE VISIT (OUTPATIENT)
Dept: FAMILY MEDICINE | Facility: CLINIC | Age: 71
End: 2021-01-07
Payer: MEDICARE

## 2021-01-07 VITALS
BODY MASS INDEX: 38.52 KG/M2 | DIASTOLIC BLOOD PRESSURE: 78 MMHG | SYSTOLIC BLOOD PRESSURE: 128 MMHG | HEART RATE: 56 BPM | OXYGEN SATURATION: 99 % | TEMPERATURE: 98 F | HEIGHT: 68 IN | WEIGHT: 254.19 LBS

## 2021-01-07 DIAGNOSIS — I10 ESSENTIAL HYPERTENSION: Primary | ICD-10-CM

## 2021-01-07 DIAGNOSIS — E78.00 PURE HYPERCHOLESTEROLEMIA: ICD-10-CM

## 2021-01-07 DIAGNOSIS — J45.909 MODERATE ASTHMA WITHOUT COMPLICATION, UNSPECIFIED WHETHER PERSISTENT: ICD-10-CM

## 2021-01-07 DIAGNOSIS — N40.0 BENIGN PROSTATIC HYPERPLASIA, UNSPECIFIED WHETHER LOWER URINARY TRACT SYMPTOMS PRESENT: ICD-10-CM

## 2021-01-07 PROCEDURE — 3008F BODY MASS INDEX DOCD: CPT | Mod: S$GLB,,, | Performed by: INTERNAL MEDICINE

## 2021-01-07 PROCEDURE — 1101F PR PT FALLS ASSESS DOC 0-1 FALLS W/OUT INJ PAST YR: ICD-10-PCS | Mod: S$GLB,,, | Performed by: INTERNAL MEDICINE

## 2021-01-07 PROCEDURE — 3078F DIAST BP <80 MM HG: CPT | Mod: S$GLB,,, | Performed by: INTERNAL MEDICINE

## 2021-01-07 PROCEDURE — 1101F PT FALLS ASSESS-DOCD LE1/YR: CPT | Mod: S$GLB,,, | Performed by: INTERNAL MEDICINE

## 2021-01-07 PROCEDURE — 1170F FXNL STATUS ASSESSED: CPT | Mod: S$GLB,,, | Performed by: INTERNAL MEDICINE

## 2021-01-07 PROCEDURE — 99214 PR OFFICE/OUTPT VISIT, EST, LEVL IV, 30-39 MIN: ICD-10-PCS | Mod: S$GLB,,, | Performed by: INTERNAL MEDICINE

## 2021-01-07 PROCEDURE — 1125F AMNT PAIN NOTED PAIN PRSNT: CPT | Mod: S$GLB,,, | Performed by: INTERNAL MEDICINE

## 2021-01-07 PROCEDURE — 3074F SYST BP LT 130 MM HG: CPT | Mod: S$GLB,,, | Performed by: INTERNAL MEDICINE

## 2021-01-07 PROCEDURE — 1159F MED LIST DOCD IN RCRD: CPT | Mod: S$GLB,,, | Performed by: INTERNAL MEDICINE

## 2021-01-07 PROCEDURE — 99214 OFFICE O/P EST MOD 30 MIN: CPT | Mod: S$GLB,,, | Performed by: INTERNAL MEDICINE

## 2021-01-07 PROCEDURE — 3288F FALL RISK ASSESSMENT DOCD: CPT | Mod: S$GLB,,, | Performed by: INTERNAL MEDICINE

## 2021-01-07 PROCEDURE — 1125F PR PAIN SEVERITY QUANTIFIED, PAIN PRESENT: ICD-10-PCS | Mod: S$GLB,,, | Performed by: INTERNAL MEDICINE

## 2021-01-07 PROCEDURE — 3008F PR BODY MASS INDEX (BMI) DOCUMENTED: ICD-10-PCS | Mod: S$GLB,,, | Performed by: INTERNAL MEDICINE

## 2021-01-07 PROCEDURE — 3288F PR FALLS RISK ASSESSMENT DOCUMENTED: ICD-10-PCS | Mod: S$GLB,,, | Performed by: INTERNAL MEDICINE

## 2021-01-07 PROCEDURE — 1170F PR FUNCTIONAL STATUS ASSESSED: ICD-10-PCS | Mod: S$GLB,,, | Performed by: INTERNAL MEDICINE

## 2021-01-07 PROCEDURE — 3078F PR MOST RECENT DIASTOLIC BLOOD PRESSURE < 80 MM HG: ICD-10-PCS | Mod: S$GLB,,, | Performed by: INTERNAL MEDICINE

## 2021-01-07 PROCEDURE — 1159F PR MEDICATION LIST DOCUMENTED IN MEDICAL RECORD: ICD-10-PCS | Mod: S$GLB,,, | Performed by: INTERNAL MEDICINE

## 2021-01-07 PROCEDURE — 3074F PR MOST RECENT SYSTOLIC BLOOD PRESSURE < 130 MM HG: ICD-10-PCS | Mod: S$GLB,,, | Performed by: INTERNAL MEDICINE

## 2021-01-08 ENCOUNTER — PATIENT MESSAGE (OUTPATIENT)
Dept: PULMONOLOGY | Facility: CLINIC | Age: 71
End: 2021-01-08

## 2021-01-12 ENCOUNTER — OFFICE VISIT (OUTPATIENT)
Dept: ORTHOPEDICS | Facility: CLINIC | Age: 71
End: 2021-01-12
Payer: MEDICARE

## 2021-01-12 VITALS
DIASTOLIC BLOOD PRESSURE: 86 MMHG | HEIGHT: 68 IN | HEART RATE: 56 BPM | BODY MASS INDEX: 38.49 KG/M2 | SYSTOLIC BLOOD PRESSURE: 118 MMHG | WEIGHT: 254 LBS

## 2021-01-12 DIAGNOSIS — M54.42 ACUTE LEFT-SIDED LOW BACK PAIN WITH LEFT-SIDED SCIATICA: ICD-10-CM

## 2021-01-12 DIAGNOSIS — M51.36 DISC DEGENERATION, LUMBAR: ICD-10-CM

## 2021-01-12 DIAGNOSIS — M54.50 LUMBAR PAIN: Primary | ICD-10-CM

## 2021-01-12 PROCEDURE — 1101F PR PT FALLS ASSESS DOC 0-1 FALLS W/OUT INJ PAST YR: ICD-10-PCS | Mod: S$GLB,,, | Performed by: ORTHOPAEDIC SURGERY

## 2021-01-12 PROCEDURE — 3288F FALL RISK ASSESSMENT DOCD: CPT | Mod: S$GLB,,, | Performed by: ORTHOPAEDIC SURGERY

## 2021-01-12 PROCEDURE — 1159F MED LIST DOCD IN RCRD: CPT | Mod: S$GLB,,, | Performed by: ORTHOPAEDIC SURGERY

## 2021-01-12 PROCEDURE — 1125F PR PAIN SEVERITY QUANTIFIED, PAIN PRESENT: ICD-10-PCS | Mod: S$GLB,,, | Performed by: ORTHOPAEDIC SURGERY

## 2021-01-12 PROCEDURE — 3074F SYST BP LT 130 MM HG: CPT | Mod: S$GLB,,, | Performed by: ORTHOPAEDIC SURGERY

## 2021-01-12 PROCEDURE — 99213 OFFICE O/P EST LOW 20 MIN: CPT | Mod: 25,S$GLB,, | Performed by: ORTHOPAEDIC SURGERY

## 2021-01-12 PROCEDURE — 1101F PT FALLS ASSESS-DOCD LE1/YR: CPT | Mod: S$GLB,,, | Performed by: ORTHOPAEDIC SURGERY

## 2021-01-12 PROCEDURE — 99213 PR OFFICE/OUTPT VISIT, EST, LEVL III, 20-29 MIN: ICD-10-PCS | Mod: 25,S$GLB,, | Performed by: ORTHOPAEDIC SURGERY

## 2021-01-12 PROCEDURE — 3008F PR BODY MASS INDEX (BMI) DOCUMENTED: ICD-10-PCS | Mod: S$GLB,,, | Performed by: ORTHOPAEDIC SURGERY

## 2021-01-12 PROCEDURE — 3074F PR MOST RECENT SYSTOLIC BLOOD PRESSURE < 130 MM HG: ICD-10-PCS | Mod: S$GLB,,, | Performed by: ORTHOPAEDIC SURGERY

## 2021-01-12 PROCEDURE — 3008F BODY MASS INDEX DOCD: CPT | Mod: S$GLB,,, | Performed by: ORTHOPAEDIC SURGERY

## 2021-01-12 PROCEDURE — 1125F AMNT PAIN NOTED PAIN PRSNT: CPT | Mod: S$GLB,,, | Performed by: ORTHOPAEDIC SURGERY

## 2021-01-12 PROCEDURE — 3288F PR FALLS RISK ASSESSMENT DOCUMENTED: ICD-10-PCS | Mod: S$GLB,,, | Performed by: ORTHOPAEDIC SURGERY

## 2021-01-12 PROCEDURE — 20552 TRIGGER POINT: ICD-10-PCS | Mod: S$GLB,,, | Performed by: ORTHOPAEDIC SURGERY

## 2021-01-12 PROCEDURE — 3079F DIAST BP 80-89 MM HG: CPT | Mod: S$GLB,,, | Performed by: ORTHOPAEDIC SURGERY

## 2021-01-12 PROCEDURE — 1159F PR MEDICATION LIST DOCUMENTED IN MEDICAL RECORD: ICD-10-PCS | Mod: S$GLB,,, | Performed by: ORTHOPAEDIC SURGERY

## 2021-01-12 PROCEDURE — 3079F PR MOST RECENT DIASTOLIC BLOOD PRESSURE 80-89 MM HG: ICD-10-PCS | Mod: S$GLB,,, | Performed by: ORTHOPAEDIC SURGERY

## 2021-01-12 PROCEDURE — 20552 NJX 1/MLT TRIGGER POINT 1/2: CPT | Mod: S$GLB,,, | Performed by: ORTHOPAEDIC SURGERY

## 2021-01-12 RX ORDER — METHYLPREDNISOLONE ACETATE 40 MG/ML
40 INJECTION, SUSPENSION INTRA-ARTICULAR; INTRALESIONAL; INTRAMUSCULAR; SOFT TISSUE
Status: DISCONTINUED | OUTPATIENT
Start: 2021-01-12 | End: 2021-01-12 | Stop reason: HOSPADM

## 2021-01-12 RX ADMIN — METHYLPREDNISOLONE ACETATE 40 MG: 40 INJECTION, SUSPENSION INTRA-ARTICULAR; INTRALESIONAL; INTRAMUSCULAR; SOFT TISSUE at 07:01

## 2021-01-28 RX ORDER — TAMSULOSIN HYDROCHLORIDE 0.4 MG/1
0.4 CAPSULE ORAL DAILY
Qty: 30 CAPSULE | Refills: 3
Start: 2021-01-28 | End: 2021-02-02

## 2021-02-18 ENCOUNTER — PATIENT MESSAGE (OUTPATIENT)
Dept: FAMILY MEDICINE | Facility: CLINIC | Age: 71
End: 2021-02-18

## 2021-02-18 ENCOUNTER — PATIENT MESSAGE (OUTPATIENT)
Dept: RHEUMATOLOGY | Facility: CLINIC | Age: 71
End: 2021-02-18

## 2021-04-01 ENCOUNTER — HOSPITAL ENCOUNTER (OUTPATIENT)
Dept: RADIOLOGY | Facility: HOSPITAL | Age: 71
Discharge: HOME | End: 2021-04-01
Attending: INTERNAL MEDICINE
Payer: MEDICARE

## 2021-04-01 ENCOUNTER — OFFICE VISIT (OUTPATIENT)
Dept: RHEUMATOLOGY | Facility: CLINIC | Age: 71
End: 2021-04-01
Payer: MEDICARE

## 2021-04-01 VITALS — SYSTOLIC BLOOD PRESSURE: 138 MMHG | DIASTOLIC BLOOD PRESSURE: 86 MMHG | WEIGHT: 261 LBS | BODY MASS INDEX: 39.68 KG/M2

## 2021-04-01 DIAGNOSIS — R60.0 PERIPHERAL EDEMA: ICD-10-CM

## 2021-04-01 DIAGNOSIS — Z79.899 LONG-TERM USE OF HIGH-RISK MEDICATION: ICD-10-CM

## 2021-04-01 DIAGNOSIS — M19.90 INFLAMMATORY ARTHRITIS: Primary | ICD-10-CM

## 2021-04-01 DIAGNOSIS — R06.02 SHORTNESS OF BREATH: ICD-10-CM

## 2021-04-01 DIAGNOSIS — J45.40 MODERATE PERSISTENT ASTHMA WITHOUT COMPLICATION: ICD-10-CM

## 2021-04-01 PROCEDURE — 1159F MED LIST DOCD IN RCRD: CPT | Mod: S$GLB,,, | Performed by: INTERNAL MEDICINE

## 2021-04-01 PROCEDURE — 3008F BODY MASS INDEX DOCD: CPT | Mod: S$GLB,,, | Performed by: INTERNAL MEDICINE

## 2021-04-01 PROCEDURE — 3008F PR BODY MASS INDEX (BMI) DOCUMENTED: ICD-10-PCS | Mod: S$GLB,,, | Performed by: INTERNAL MEDICINE

## 2021-04-01 PROCEDURE — 1159F PR MEDICATION LIST DOCUMENTED IN MEDICAL RECORD: ICD-10-PCS | Mod: S$GLB,,, | Performed by: INTERNAL MEDICINE

## 2021-04-01 PROCEDURE — 3075F SYST BP GE 130 - 139MM HG: CPT | Mod: S$GLB,,, | Performed by: INTERNAL MEDICINE

## 2021-04-01 PROCEDURE — 71046 X-RAY EXAM CHEST 2 VIEWS: CPT | Mod: TC

## 2021-04-01 PROCEDURE — 3079F DIAST BP 80-89 MM HG: CPT | Mod: S$GLB,,, | Performed by: INTERNAL MEDICINE

## 2021-04-01 PROCEDURE — 3075F PR MOST RECENT SYSTOLIC BLOOD PRESS GE 130-139MM HG: ICD-10-PCS | Mod: S$GLB,,, | Performed by: INTERNAL MEDICINE

## 2021-04-01 PROCEDURE — 99213 OFFICE O/P EST LOW 20 MIN: CPT | Mod: S$GLB,,, | Performed by: INTERNAL MEDICINE

## 2021-04-01 PROCEDURE — 99213 PR OFFICE/OUTPT VISIT, EST, LEVL III, 20-29 MIN: ICD-10-PCS | Mod: S$GLB,,, | Performed by: INTERNAL MEDICINE

## 2021-04-01 PROCEDURE — 3079F PR MOST RECENT DIASTOLIC BLOOD PRESSURE 80-89 MM HG: ICD-10-PCS | Mod: S$GLB,,, | Performed by: INTERNAL MEDICINE

## 2021-04-01 RX ORDER — FUROSEMIDE 20 MG/1
10 TABLET ORAL 2 TIMES DAILY PRN
COMMUNITY
Start: 2021-03-22 | End: 2021-06-08

## 2021-04-01 RX ORDER — POTASSIUM CHLORIDE 750 MG/1
10 CAPSULE, EXTENDED RELEASE ORAL DAILY
COMMUNITY
Start: 2021-03-22

## 2021-04-02 LAB
ALBUMIN SERPL-MCNC: 4.1 G/DL (ref 3.6–5.1)
ALBUMIN/GLOB SERPL: 1.4 (CALC) (ref 1–2.5)
ALP SERPL-CCNC: 69 U/L (ref 35–144)
ALT SERPL-CCNC: 19 U/L (ref 9–46)
APPEARANCE UR: CLEAR
AST SERPL-CCNC: 16 U/L (ref 10–35)
BACTERIA #/AREA URNS HPF: NORMAL /HPF
BASOPHILS # BLD AUTO: 61 CELLS/UL (ref 0–200)
BASOPHILS NFR BLD AUTO: 0.6 %
BILIRUB SERPL-MCNC: 0.5 MG/DL (ref 0.2–1.2)
BILIRUB UR QL STRIP: NEGATIVE
BUN SERPL-MCNC: 20 MG/DL (ref 7–25)
BUN/CREAT SERPL: NORMAL (CALC) (ref 6–22)
CALCIUM SERPL-MCNC: 9.1 MG/DL (ref 8.6–10.3)
CHLORIDE SERPL-SCNC: 104 MMOL/L (ref 98–110)
CO2 SERPL-SCNC: 29 MMOL/L (ref 20–32)
COLOR UR: YELLOW
CREAT SERPL-MCNC: 1.13 MG/DL (ref 0.7–1.25)
CRP SERPL-MCNC: 32.8 MG/L
EOSINOPHIL # BLD AUTO: 374 CELLS/UL (ref 15–500)
EOSINOPHIL NFR BLD AUTO: 3.7 %
ERYTHROCYTE [DISTWIDTH] IN BLOOD BY AUTOMATED COUNT: 12 % (ref 11–15)
ERYTHROCYTE [SEDIMENTATION RATE] IN BLOOD BY WESTERGREN METHOD: 45 MM/H
GFRSERPLBLD MDRD-ARVRAT: 66 ML/MIN/1.73M2
GLOBULIN SER CALC-MCNC: 2.9 G/DL (CALC) (ref 1.9–3.7)
GLUCOSE SERPL-MCNC: 74 MG/DL (ref 65–99)
GLUCOSE UR QL STRIP: NEGATIVE
HCT VFR BLD AUTO: 38.6 % (ref 38.5–50)
HGB BLD-MCNC: 12.8 G/DL (ref 13.2–17.1)
HGB UR QL STRIP: NEGATIVE
HYALINE CASTS #/AREA URNS LPF: NORMAL /LPF
KETONES UR QL STRIP: NEGATIVE
LEUKOCYTE ESTERASE UR QL STRIP: NEGATIVE
LYMPHOCYTES # BLD AUTO: 2182 CELLS/UL (ref 850–3900)
LYMPHOCYTES NFR BLD AUTO: 21.6 %
MCH RBC QN AUTO: 29.1 PG (ref 27–33)
MCHC RBC AUTO-ENTMCNC: 33.2 G/DL (ref 32–36)
MCV RBC AUTO: 87.7 FL (ref 80–100)
MONOCYTES # BLD AUTO: 939 CELLS/UL (ref 200–950)
MONOCYTES NFR BLD AUTO: 9.3 %
NEUTROPHILS # BLD AUTO: 6545 CELLS/UL (ref 1500–7800)
NEUTROPHILS NFR BLD AUTO: 64.8 %
NITRITE UR QL STRIP: NEGATIVE
PH UR STRIP: NORMAL [PH] (ref 5–8)
PLATELET # BLD AUTO: 215 THOUSAND/UL (ref 140–400)
PMV BLD REES-ECKER: 11 FL (ref 7.5–12.5)
POTASSIUM SERPL-SCNC: 4 MMOL/L (ref 3.5–5.3)
PROT SERPL-MCNC: 7 G/DL (ref 6.1–8.1)
PROT UR QL STRIP: NEGATIVE
RBC # BLD AUTO: 4.4 MILLION/UL (ref 4.2–5.8)
RBC #/AREA URNS HPF: NORMAL /HPF
SODIUM SERPL-SCNC: 142 MMOL/L (ref 135–146)
SP GR UR STRIP: 1.01 (ref 1–1.03)
SQUAMOUS #/AREA URNS HPF: NORMAL /HPF
URATE SERPL-MCNC: 7.9 MG/DL (ref 4–8)
WBC # BLD AUTO: 10.1 THOUSAND/UL (ref 3.8–10.8)
WBC #/AREA URNS HPF: NORMAL /HPF

## 2021-04-03 DIAGNOSIS — J45.40 MODERATE PERSISTENT ASTHMA WITHOUT COMPLICATION: ICD-10-CM

## 2021-04-03 RX ORDER — ALBUTEROL SULFATE 90 UG/1
2 AEROSOL, METERED RESPIRATORY (INHALATION) EVERY 6 HOURS PRN
Status: CANCELLED | OUTPATIENT
Start: 2021-04-03

## 2021-04-05 ENCOUNTER — PATIENT MESSAGE (OUTPATIENT)
Dept: PULMONOLOGY | Facility: CLINIC | Age: 71
End: 2021-04-05

## 2021-04-05 RX ORDER — ALBUTEROL SULFATE 90 UG/1
2 AEROSOL, METERED RESPIRATORY (INHALATION) EVERY 6 HOURS PRN
Qty: 3 G | Refills: 3 | Status: SHIPPED | OUTPATIENT
Start: 2021-04-05 | End: 2022-06-06

## 2021-05-17 ENCOUNTER — OFFICE VISIT (OUTPATIENT)
Dept: PULMONOLOGY | Facility: CLINIC | Age: 71
End: 2021-05-17
Payer: MEDICARE

## 2021-05-17 VITALS
WEIGHT: 255 LBS | HEIGHT: 68 IN | HEART RATE: 82 BPM | OXYGEN SATURATION: 98 % | BODY MASS INDEX: 38.65 KG/M2 | SYSTOLIC BLOOD PRESSURE: 134 MMHG | DIASTOLIC BLOOD PRESSURE: 67 MMHG

## 2021-05-17 DIAGNOSIS — J45.909 MODERATE ASTHMA WITHOUT COMPLICATION, UNSPECIFIED WHETHER PERSISTENT: Primary | ICD-10-CM

## 2021-05-17 PROCEDURE — 1126F AMNT PAIN NOTED NONE PRSNT: CPT | Mod: S$GLB,,, | Performed by: INTERNAL MEDICINE

## 2021-05-17 PROCEDURE — 1126F PR PAIN SEVERITY QUANTIFIED, NO PAIN PRESENT: ICD-10-PCS | Mod: S$GLB,,, | Performed by: INTERNAL MEDICINE

## 2021-05-17 PROCEDURE — 99214 PR OFFICE/OUTPT VISIT, EST, LEVL IV, 30-39 MIN: ICD-10-PCS | Mod: S$GLB,,, | Performed by: INTERNAL MEDICINE

## 2021-05-17 PROCEDURE — 99214 OFFICE O/P EST MOD 30 MIN: CPT | Mod: S$GLB,,, | Performed by: INTERNAL MEDICINE

## 2021-05-17 RX ORDER — FLUTICASONE FUROATE AND VILANTEROL TRIFENATATE 100; 25 UG/1; UG/1
1 POWDER RESPIRATORY (INHALATION) DAILY
Qty: 3 EACH | Refills: 4 | Status: SHIPPED | OUTPATIENT
Start: 2021-05-17 | End: 2023-04-27

## 2021-05-17 RX ORDER — FUROSEMIDE 40 MG/1
20 TABLET ORAL 2 TIMES DAILY
COMMUNITY
Start: 2021-05-05 | End: 2021-11-08 | Stop reason: DRUGHIGH

## 2021-05-19 ENCOUNTER — PATIENT MESSAGE (OUTPATIENT)
Dept: PULMONOLOGY | Facility: CLINIC | Age: 71
End: 2021-05-19

## 2021-05-20 ENCOUNTER — PATIENT MESSAGE (OUTPATIENT)
Dept: PULMONOLOGY | Facility: CLINIC | Age: 71
End: 2021-05-20

## 2021-05-28 ENCOUNTER — PATIENT MESSAGE (OUTPATIENT)
Dept: PULMONOLOGY | Facility: CLINIC | Age: 71
End: 2021-05-28

## 2021-06-08 ENCOUNTER — OFFICE VISIT (OUTPATIENT)
Dept: FAMILY MEDICINE | Facility: CLINIC | Age: 71
End: 2021-06-08
Payer: MEDICARE

## 2021-06-08 VITALS
HEIGHT: 68 IN | OXYGEN SATURATION: 98 % | SYSTOLIC BLOOD PRESSURE: 134 MMHG | WEIGHT: 249 LBS | HEART RATE: 71 BPM | TEMPERATURE: 98 F | BODY MASS INDEX: 37.74 KG/M2 | DIASTOLIC BLOOD PRESSURE: 56 MMHG

## 2021-06-08 DIAGNOSIS — I10 ESSENTIAL HYPERTENSION: Primary | ICD-10-CM

## 2021-06-08 DIAGNOSIS — I48.91 ATRIAL FIBRILLATION, UNSPECIFIED TYPE: ICD-10-CM

## 2021-06-08 DIAGNOSIS — E78.00 PURE HYPERCHOLESTEROLEMIA: ICD-10-CM

## 2021-06-08 PROCEDURE — 3288F FALL RISK ASSESSMENT DOCD: CPT | Mod: S$GLB,,, | Performed by: INTERNAL MEDICINE

## 2021-06-08 PROCEDURE — 1170F PR FUNCTIONAL STATUS ASSESSED: ICD-10-PCS | Mod: S$GLB,,, | Performed by: INTERNAL MEDICINE

## 2021-06-08 PROCEDURE — 1170F FXNL STATUS ASSESSED: CPT | Mod: S$GLB,,, | Performed by: INTERNAL MEDICINE

## 2021-06-08 PROCEDURE — 3008F PR BODY MASS INDEX (BMI) DOCUMENTED: ICD-10-PCS | Mod: S$GLB,,, | Performed by: INTERNAL MEDICINE

## 2021-06-08 PROCEDURE — 1101F PR PT FALLS ASSESS DOC 0-1 FALLS W/OUT INJ PAST YR: ICD-10-PCS | Mod: S$GLB,,, | Performed by: INTERNAL MEDICINE

## 2021-06-08 PROCEDURE — 99214 OFFICE O/P EST MOD 30 MIN: CPT | Mod: S$GLB,,, | Performed by: INTERNAL MEDICINE

## 2021-06-08 PROCEDURE — 1159F MED LIST DOCD IN RCRD: CPT | Mod: S$GLB,,, | Performed by: INTERNAL MEDICINE

## 2021-06-08 PROCEDURE — 3008F BODY MASS INDEX DOCD: CPT | Mod: S$GLB,,, | Performed by: INTERNAL MEDICINE

## 2021-06-08 PROCEDURE — 1159F PR MEDICATION LIST DOCUMENTED IN MEDICAL RECORD: ICD-10-PCS | Mod: S$GLB,,, | Performed by: INTERNAL MEDICINE

## 2021-06-08 PROCEDURE — 1101F PT FALLS ASSESS-DOCD LE1/YR: CPT | Mod: S$GLB,,, | Performed by: INTERNAL MEDICINE

## 2021-06-08 PROCEDURE — 3075F SYST BP GE 130 - 139MM HG: CPT | Mod: S$GLB,,, | Performed by: INTERNAL MEDICINE

## 2021-06-08 PROCEDURE — 3075F PR MOST RECENT SYSTOLIC BLOOD PRESS GE 130-139MM HG: ICD-10-PCS | Mod: S$GLB,,, | Performed by: INTERNAL MEDICINE

## 2021-06-08 PROCEDURE — 1126F PR PAIN SEVERITY QUANTIFIED, NO PAIN PRESENT: ICD-10-PCS | Mod: S$GLB,,, | Performed by: INTERNAL MEDICINE

## 2021-06-08 PROCEDURE — 1126F AMNT PAIN NOTED NONE PRSNT: CPT | Mod: S$GLB,,, | Performed by: INTERNAL MEDICINE

## 2021-06-08 PROCEDURE — 99214 PR OFFICE/OUTPT VISIT, EST, LEVL IV, 30-39 MIN: ICD-10-PCS | Mod: S$GLB,,, | Performed by: INTERNAL MEDICINE

## 2021-06-08 PROCEDURE — 3078F DIAST BP <80 MM HG: CPT | Mod: S$GLB,,, | Performed by: INTERNAL MEDICINE

## 2021-06-08 PROCEDURE — 3288F PR FALLS RISK ASSESSMENT DOCUMENTED: ICD-10-PCS | Mod: S$GLB,,, | Performed by: INTERNAL MEDICINE

## 2021-06-08 PROCEDURE — 3078F PR MOST RECENT DIASTOLIC BLOOD PRESSURE < 80 MM HG: ICD-10-PCS | Mod: S$GLB,,, | Performed by: INTERNAL MEDICINE

## 2021-06-08 RX ORDER — LOSARTAN POTASSIUM 100 MG/1
50 TABLET ORAL DAILY
COMMUNITY
End: 2021-08-17 | Stop reason: ALTCHOICE

## 2021-06-08 RX ORDER — MONTELUKAST SODIUM 10 MG/1
1 TABLET ORAL DAILY
COMMUNITY
Start: 2021-06-01 | End: 2021-12-20

## 2021-06-08 RX ORDER — AMIODARONE HYDROCHLORIDE 200 MG/1
200 TABLET ORAL 2 TIMES DAILY
COMMUNITY
Start: 2021-06-02

## 2021-06-08 RX ORDER — METOPROLOL SUCCINATE 50 MG/1
0.5 TABLET, EXTENDED RELEASE ORAL 2 TIMES DAILY
COMMUNITY
Start: 2021-05-25 | End: 2021-12-14

## 2021-07-14 ENCOUNTER — PATIENT MESSAGE (OUTPATIENT)
Dept: FAMILY MEDICINE | Facility: CLINIC | Age: 71
End: 2021-07-14

## 2021-07-14 DIAGNOSIS — E78.00 PURE HYPERCHOLESTEROLEMIA: Primary | ICD-10-CM

## 2021-07-20 ENCOUNTER — PATIENT MESSAGE (OUTPATIENT)
Dept: FAMILY MEDICINE | Facility: CLINIC | Age: 71
End: 2021-07-20

## 2021-08-02 ENCOUNTER — OFFICE VISIT (OUTPATIENT)
Dept: RHEUMATOLOGY | Facility: CLINIC | Age: 71
End: 2021-08-02
Payer: MEDICARE

## 2021-08-02 VITALS
SYSTOLIC BLOOD PRESSURE: 131 MMHG | DIASTOLIC BLOOD PRESSURE: 81 MMHG | WEIGHT: 249.81 LBS | BODY MASS INDEX: 37.98 KG/M2

## 2021-08-02 DIAGNOSIS — Z79.899 LONG-TERM USE OF HIGH-RISK MEDICATION: ICD-10-CM

## 2021-08-02 DIAGNOSIS — M19.90 INFLAMMATORY ARTHRITIS: Primary | ICD-10-CM

## 2021-08-02 PROCEDURE — 3079F DIAST BP 80-89 MM HG: CPT | Mod: S$GLB,,, | Performed by: INTERNAL MEDICINE

## 2021-08-02 PROCEDURE — 1159F MED LIST DOCD IN RCRD: CPT | Mod: S$GLB,,, | Performed by: INTERNAL MEDICINE

## 2021-08-02 PROCEDURE — 3008F BODY MASS INDEX DOCD: CPT | Mod: S$GLB,,, | Performed by: INTERNAL MEDICINE

## 2021-08-02 PROCEDURE — 3079F PR MOST RECENT DIASTOLIC BLOOD PRESSURE 80-89 MM HG: ICD-10-PCS | Mod: S$GLB,,, | Performed by: INTERNAL MEDICINE

## 2021-08-02 PROCEDURE — 3075F SYST BP GE 130 - 139MM HG: CPT | Mod: S$GLB,,, | Performed by: INTERNAL MEDICINE

## 2021-08-02 PROCEDURE — 99213 PR OFFICE/OUTPT VISIT, EST, LEVL III, 20-29 MIN: ICD-10-PCS | Mod: S$GLB,,, | Performed by: INTERNAL MEDICINE

## 2021-08-02 PROCEDURE — 3008F PR BODY MASS INDEX (BMI) DOCUMENTED: ICD-10-PCS | Mod: S$GLB,,, | Performed by: INTERNAL MEDICINE

## 2021-08-02 PROCEDURE — 1159F PR MEDICATION LIST DOCUMENTED IN MEDICAL RECORD: ICD-10-PCS | Mod: S$GLB,,, | Performed by: INTERNAL MEDICINE

## 2021-08-02 PROCEDURE — 99213 OFFICE O/P EST LOW 20 MIN: CPT | Mod: S$GLB,,, | Performed by: INTERNAL MEDICINE

## 2021-08-02 PROCEDURE — 3075F PR MOST RECENT SYSTOLIC BLOOD PRESS GE 130-139MM HG: ICD-10-PCS | Mod: S$GLB,,, | Performed by: INTERNAL MEDICINE

## 2021-08-02 RX ORDER — HYDROXYCHLOROQUINE SULFATE 200 MG/1
200 TABLET, FILM COATED ORAL 2 TIMES DAILY
Qty: 180 TABLET | Refills: 1 | Status: SHIPPED | OUTPATIENT
Start: 2021-08-02 | End: 2021-10-06

## 2021-08-02 RX ORDER — SACUBITRIL AND VALSARTAN 24; 26 MG/1; MG/1
TABLET, FILM COATED ORAL
COMMUNITY
Start: 2021-06-15 | End: 2022-06-06 | Stop reason: DRUGHIGH

## 2021-08-05 ENCOUNTER — PATIENT MESSAGE (OUTPATIENT)
Dept: FAMILY MEDICINE | Facility: CLINIC | Age: 71
End: 2021-08-05

## 2021-08-05 LAB
CHOLEST SERPL-MCNC: 208 MG/DL
CHOLEST/HDLC SERPL: 5.9 (CALC)
CRP SERPL-MCNC: 3.6 MG/L
HDLC SERPL-MCNC: 35 MG/DL
LDLC SERPL CALC-MCNC: 135 MG/DL (CALC)
NONHDLC SERPL-MCNC: 173 MG/DL (CALC)
TRIGL SERPL-MCNC: 236 MG/DL

## 2021-08-06 RX ORDER — ROSUVASTATIN CALCIUM 10 MG/1
10 TABLET, COATED ORAL EVERY OTHER DAY
Qty: 90 TABLET | Refills: 0 | Status: SHIPPED | OUTPATIENT
Start: 2021-08-06 | End: 2021-11-08 | Stop reason: SDUPTHER

## 2021-08-17 ENCOUNTER — OFFICE VISIT (OUTPATIENT)
Dept: FAMILY MEDICINE | Facility: CLINIC | Age: 71
End: 2021-08-17
Payer: MEDICARE

## 2021-08-17 VITALS
WEIGHT: 249 LBS | SYSTOLIC BLOOD PRESSURE: 120 MMHG | HEIGHT: 68 IN | OXYGEN SATURATION: 97 % | BODY MASS INDEX: 37.74 KG/M2 | HEART RATE: 59 BPM | DIASTOLIC BLOOD PRESSURE: 66 MMHG | TEMPERATURE: 97 F

## 2021-08-17 DIAGNOSIS — M71.121 INFECTION OF RIGHT OLECRANON BURSA: Primary | ICD-10-CM

## 2021-08-17 PROCEDURE — 99213 PR OFFICE/OUTPT VISIT, EST, LEVL III, 20-29 MIN: ICD-10-PCS | Mod: S$GLB,,, | Performed by: INTERNAL MEDICINE

## 2021-08-17 PROCEDURE — 3078F PR MOST RECENT DIASTOLIC BLOOD PRESSURE < 80 MM HG: ICD-10-PCS | Mod: S$GLB,,, | Performed by: INTERNAL MEDICINE

## 2021-08-17 PROCEDURE — 3288F FALL RISK ASSESSMENT DOCD: CPT | Mod: S$GLB,,, | Performed by: INTERNAL MEDICINE

## 2021-08-17 PROCEDURE — 1159F MED LIST DOCD IN RCRD: CPT | Mod: S$GLB,,, | Performed by: INTERNAL MEDICINE

## 2021-08-17 PROCEDURE — 1170F FXNL STATUS ASSESSED: CPT | Mod: S$GLB,,, | Performed by: INTERNAL MEDICINE

## 2021-08-17 PROCEDURE — 3008F PR BODY MASS INDEX (BMI) DOCUMENTED: ICD-10-PCS | Mod: S$GLB,,, | Performed by: INTERNAL MEDICINE

## 2021-08-17 PROCEDURE — 3288F PR FALLS RISK ASSESSMENT DOCUMENTED: ICD-10-PCS | Mod: S$GLB,,, | Performed by: INTERNAL MEDICINE

## 2021-08-17 PROCEDURE — 1125F PR PAIN SEVERITY QUANTIFIED, PAIN PRESENT: ICD-10-PCS | Mod: S$GLB,,, | Performed by: INTERNAL MEDICINE

## 2021-08-17 PROCEDURE — 1170F PR FUNCTIONAL STATUS ASSESSED: ICD-10-PCS | Mod: S$GLB,,, | Performed by: INTERNAL MEDICINE

## 2021-08-17 PROCEDURE — 99213 OFFICE O/P EST LOW 20 MIN: CPT | Mod: S$GLB,,, | Performed by: INTERNAL MEDICINE

## 2021-08-17 PROCEDURE — 3008F BODY MASS INDEX DOCD: CPT | Mod: S$GLB,,, | Performed by: INTERNAL MEDICINE

## 2021-08-17 PROCEDURE — 1101F PR PT FALLS ASSESS DOC 0-1 FALLS W/OUT INJ PAST YR: ICD-10-PCS | Mod: S$GLB,,, | Performed by: INTERNAL MEDICINE

## 2021-08-17 PROCEDURE — 1101F PT FALLS ASSESS-DOCD LE1/YR: CPT | Mod: S$GLB,,, | Performed by: INTERNAL MEDICINE

## 2021-08-17 PROCEDURE — 3074F SYST BP LT 130 MM HG: CPT | Mod: S$GLB,,, | Performed by: INTERNAL MEDICINE

## 2021-08-17 PROCEDURE — 3078F DIAST BP <80 MM HG: CPT | Mod: S$GLB,,, | Performed by: INTERNAL MEDICINE

## 2021-08-17 PROCEDURE — 1125F AMNT PAIN NOTED PAIN PRSNT: CPT | Mod: S$GLB,,, | Performed by: INTERNAL MEDICINE

## 2021-08-17 PROCEDURE — 3074F PR MOST RECENT SYSTOLIC BLOOD PRESSURE < 130 MM HG: ICD-10-PCS | Mod: S$GLB,,, | Performed by: INTERNAL MEDICINE

## 2021-08-17 PROCEDURE — 1159F PR MEDICATION LIST DOCUMENTED IN MEDICAL RECORD: ICD-10-PCS | Mod: S$GLB,,, | Performed by: INTERNAL MEDICINE

## 2021-08-17 RX ORDER — CEFDINIR 300 MG/1
300 CAPSULE ORAL 2 TIMES DAILY
Qty: 20 CAPSULE | Refills: 0 | Status: SHIPPED | OUTPATIENT
Start: 2021-08-17 | End: 2021-08-27

## 2021-08-23 ENCOUNTER — OFFICE VISIT (OUTPATIENT)
Dept: ORTHOPEDICS | Facility: CLINIC | Age: 71
End: 2021-08-23
Payer: MEDICARE

## 2021-08-23 VITALS — BODY MASS INDEX: 37.89 KG/M2 | HEIGHT: 68 IN | WEIGHT: 250 LBS

## 2021-08-23 DIAGNOSIS — M70.21 OLECRANON BURSITIS OF RIGHT ELBOW: Primary | ICD-10-CM

## 2021-08-23 DIAGNOSIS — M10.9 ACUTE GOUT OF RIGHT ELBOW, UNSPECIFIED CAUSE: ICD-10-CM

## 2021-08-23 PROCEDURE — 3288F PR FALLS RISK ASSESSMENT DOCUMENTED: ICD-10-PCS | Mod: S$GLB,,, | Performed by: PHYSICIAN ASSISTANT

## 2021-08-23 PROCEDURE — 99213 OFFICE O/P EST LOW 20 MIN: CPT | Mod: 25,S$GLB,, | Performed by: PHYSICIAN ASSISTANT

## 2021-08-23 PROCEDURE — 1125F PR PAIN SEVERITY QUANTIFIED, PAIN PRESENT: ICD-10-PCS | Mod: S$GLB,,, | Performed by: PHYSICIAN ASSISTANT

## 2021-08-23 PROCEDURE — 96372 IM INJECTION: ICD-10-PCS | Mod: S$GLB,,, | Performed by: PHYSICIAN ASSISTANT

## 2021-08-23 PROCEDURE — 1101F PT FALLS ASSESS-DOCD LE1/YR: CPT | Mod: S$GLB,,, | Performed by: PHYSICIAN ASSISTANT

## 2021-08-23 PROCEDURE — 99213 PR OFFICE/OUTPT VISIT, EST, LEVL III, 20-29 MIN: ICD-10-PCS | Mod: 25,S$GLB,, | Performed by: PHYSICIAN ASSISTANT

## 2021-08-23 PROCEDURE — 3008F BODY MASS INDEX DOCD: CPT | Mod: S$GLB,,, | Performed by: PHYSICIAN ASSISTANT

## 2021-08-23 PROCEDURE — 96372 THER/PROPH/DIAG INJ SC/IM: CPT | Mod: S$GLB,,, | Performed by: PHYSICIAN ASSISTANT

## 2021-08-23 PROCEDURE — 3008F PR BODY MASS INDEX (BMI) DOCUMENTED: ICD-10-PCS | Mod: S$GLB,,, | Performed by: PHYSICIAN ASSISTANT

## 2021-08-23 PROCEDURE — 1159F PR MEDICATION LIST DOCUMENTED IN MEDICAL RECORD: ICD-10-PCS | Mod: S$GLB,,, | Performed by: PHYSICIAN ASSISTANT

## 2021-08-23 PROCEDURE — 3288F FALL RISK ASSESSMENT DOCD: CPT | Mod: S$GLB,,, | Performed by: PHYSICIAN ASSISTANT

## 2021-08-23 PROCEDURE — 1101F PR PT FALLS ASSESS DOC 0-1 FALLS W/OUT INJ PAST YR: ICD-10-PCS | Mod: S$GLB,,, | Performed by: PHYSICIAN ASSISTANT

## 2021-08-23 PROCEDURE — 1159F MED LIST DOCD IN RCRD: CPT | Mod: S$GLB,,, | Performed by: PHYSICIAN ASSISTANT

## 2021-08-23 PROCEDURE — 1160F RVW MEDS BY RX/DR IN RCRD: CPT | Mod: S$GLB,,, | Performed by: PHYSICIAN ASSISTANT

## 2021-08-23 PROCEDURE — 1125F AMNT PAIN NOTED PAIN PRSNT: CPT | Mod: S$GLB,,, | Performed by: PHYSICIAN ASSISTANT

## 2021-08-23 PROCEDURE — 1160F PR REVIEW ALL MEDS BY PRESCRIBER/CLIN PHARMACIST DOCUMENTED: ICD-10-PCS | Mod: S$GLB,,, | Performed by: PHYSICIAN ASSISTANT

## 2021-08-23 RX ORDER — METHYLPREDNISOLONE ACETATE 40 MG/ML
40 INJECTION, SUSPENSION INTRA-ARTICULAR; INTRALESIONAL; INTRAMUSCULAR; SOFT TISSUE
Status: DISCONTINUED | OUTPATIENT
Start: 2021-08-23 | End: 2021-08-23 | Stop reason: HOSPADM

## 2021-08-23 RX ADMIN — METHYLPREDNISOLONE ACETATE 40 MG: 40 INJECTION, SUSPENSION INTRA-ARTICULAR; INTRALESIONAL; INTRAMUSCULAR; SOFT TISSUE at 01:08

## 2021-08-24 LAB
BASOPHILS # BLD AUTO: 73 CELLS/UL (ref 0–200)
BASOPHILS NFR BLD AUTO: 0.7 %
BLASTS # BLD: NORMAL CELLS/UL
BLASTS NFR BLD MANUAL: NORMAL %
CRP SERPL-MCNC: 4.5 MG/L
EOSINOPHIL # BLD AUTO: 260 CELLS/UL (ref 15–500)
EOSINOPHIL NFR BLD AUTO: 2.5 %
ERYTHROCYTE [DISTWIDTH] IN BLOOD BY AUTOMATED COUNT: 12.8 % (ref 11–15)
ERYTHROCYTE [SEDIMENTATION RATE] IN BLOOD BY WESTERGREN METHOD: 19 MM/H
HCT VFR BLD AUTO: 44.6 % (ref 38.5–50)
HGB BLD-MCNC: 14.9 G/DL (ref 13.2–17.1)
LYMPHOCYTES # BLD AUTO: 2569 CELLS/UL (ref 850–3900)
LYMPHOCYTES NFR BLD AUTO: 24.7 %
MCH RBC QN AUTO: 30.3 PG (ref 27–33)
MCHC RBC AUTO-ENTMCNC: 33.4 G/DL (ref 32–36)
MCV RBC AUTO: 90.8 FL (ref 80–100)
METAMYELOCYTES # BLD: NORMAL CELLS/UL
METAMYELOCYTES NFR BLD MANUAL: NORMAL %
MONOCYTES # BLD AUTO: 915 CELLS/UL (ref 200–950)
MONOCYTES NFR BLD AUTO: 8.8 %
MYELOCYTES # BLD: NORMAL CELLS/UL
MYELOCYTES NFR BLD MANUAL: NORMAL %
NEUTROPHILS # BLD AUTO: 6583 CELLS/UL (ref 1500–7800)
NEUTROPHILS NFR BLD AUTO: 63.3 %
NEUTS BAND # BLD: NORMAL CELLS/UL (ref 0–750)
NEUTS BAND NFR BLD MANUAL: NORMAL %
NRBC # BLD: NORMAL CELLS/UL
NRBC BLD-RTO: NORMAL /100 WBC
PLATELET # BLD AUTO: 276 THOUSAND/UL (ref 140–400)
PMV BLD REES-ECKER: 11 FL (ref 7.5–12.5)
PROMYELOCYTES # BLD: NORMAL CELLS/UL
PROMYELOCYTES NFR BLD MANUAL: NORMAL %
RBC # BLD AUTO: 4.91 MILLION/UL (ref 4.2–5.8)
SERVICE CMNT-IMP: NORMAL
URATE SERPL-MCNC: 10.8 MG/DL (ref 4–8)
VARIANT LYMPHS NFR BLD: NORMAL % (ref 0–10)
WBC # BLD AUTO: 10.4 THOUSAND/UL (ref 3.8–10.8)

## 2021-09-21 ENCOUNTER — OFFICE VISIT (OUTPATIENT)
Dept: ORTHOPEDICS | Facility: CLINIC | Age: 71
End: 2021-09-21
Payer: MEDICARE

## 2021-09-21 ENCOUNTER — PATIENT MESSAGE (OUTPATIENT)
Dept: FAMILY MEDICINE | Facility: CLINIC | Age: 71
End: 2021-09-21

## 2021-09-21 VITALS — WEIGHT: 245 LBS | BODY MASS INDEX: 37.13 KG/M2 | HEIGHT: 68 IN

## 2021-09-21 DIAGNOSIS — M70.21 OLECRANON BURSITIS OF RIGHT ELBOW: ICD-10-CM

## 2021-09-21 DIAGNOSIS — M10.9 ACUTE GOUT OF RIGHT ELBOW, UNSPECIFIED CAUSE: Primary | ICD-10-CM

## 2021-09-21 PROCEDURE — 1101F PT FALLS ASSESS-DOCD LE1/YR: CPT | Mod: S$GLB,,, | Performed by: ORTHOPAEDIC SURGERY

## 2021-09-21 PROCEDURE — 1159F PR MEDICATION LIST DOCUMENTED IN MEDICAL RECORD: ICD-10-PCS | Mod: S$GLB,,, | Performed by: ORTHOPAEDIC SURGERY

## 2021-09-21 PROCEDURE — 3288F PR FALLS RISK ASSESSMENT DOCUMENTED: ICD-10-PCS | Mod: S$GLB,,, | Performed by: ORTHOPAEDIC SURGERY

## 2021-09-21 PROCEDURE — 99213 PR OFFICE/OUTPT VISIT, EST, LEVL III, 20-29 MIN: ICD-10-PCS | Mod: S$GLB,,, | Performed by: ORTHOPAEDIC SURGERY

## 2021-09-21 PROCEDURE — 1125F AMNT PAIN NOTED PAIN PRSNT: CPT | Mod: S$GLB,,, | Performed by: ORTHOPAEDIC SURGERY

## 2021-09-21 PROCEDURE — 1159F MED LIST DOCD IN RCRD: CPT | Mod: S$GLB,,, | Performed by: ORTHOPAEDIC SURGERY

## 2021-09-21 PROCEDURE — 1125F PR PAIN SEVERITY QUANTIFIED, PAIN PRESENT: ICD-10-PCS | Mod: S$GLB,,, | Performed by: ORTHOPAEDIC SURGERY

## 2021-09-21 PROCEDURE — 4010F PR ACE/ARB THEARPY RXD/TAKEN: ICD-10-PCS | Mod: S$GLB,,, | Performed by: ORTHOPAEDIC SURGERY

## 2021-09-21 PROCEDURE — 4010F ACE/ARB THERAPY RXD/TAKEN: CPT | Mod: S$GLB,,, | Performed by: ORTHOPAEDIC SURGERY

## 2021-09-21 PROCEDURE — 1160F PR REVIEW ALL MEDS BY PRESCRIBER/CLIN PHARMACIST DOCUMENTED: ICD-10-PCS | Mod: S$GLB,,, | Performed by: ORTHOPAEDIC SURGERY

## 2021-09-21 PROCEDURE — 3288F FALL RISK ASSESSMENT DOCD: CPT | Mod: S$GLB,,, | Performed by: ORTHOPAEDIC SURGERY

## 2021-09-21 PROCEDURE — 99213 OFFICE O/P EST LOW 20 MIN: CPT | Mod: S$GLB,,, | Performed by: ORTHOPAEDIC SURGERY

## 2021-09-21 PROCEDURE — 1160F RVW MEDS BY RX/DR IN RCRD: CPT | Mod: S$GLB,,, | Performed by: ORTHOPAEDIC SURGERY

## 2021-09-21 PROCEDURE — 3008F PR BODY MASS INDEX (BMI) DOCUMENTED: ICD-10-PCS | Mod: S$GLB,,, | Performed by: ORTHOPAEDIC SURGERY

## 2021-09-21 PROCEDURE — 1101F PR PT FALLS ASSESS DOC 0-1 FALLS W/OUT INJ PAST YR: ICD-10-PCS | Mod: S$GLB,,, | Performed by: ORTHOPAEDIC SURGERY

## 2021-09-21 PROCEDURE — 3008F BODY MASS INDEX DOCD: CPT | Mod: S$GLB,,, | Performed by: ORTHOPAEDIC SURGERY

## 2021-09-21 RX ORDER — COLCHICINE 0.6 MG/1
TABLET ORAL
Qty: 10 TABLET | Refills: 0 | Status: SHIPPED | OUTPATIENT
Start: 2021-09-21 | End: 2021-11-08 | Stop reason: ALTCHOICE

## 2021-09-22 ENCOUNTER — PATIENT MESSAGE (OUTPATIENT)
Dept: FAMILY MEDICINE | Facility: CLINIC | Age: 71
End: 2021-09-22

## 2021-09-22 RX ORDER — FLUTICASONE PROPIONATE 50 MCG
1 SPRAY, SUSPENSION (ML) NASAL DAILY
Qty: 48 G | Refills: 3 | Status: SHIPPED | OUTPATIENT
Start: 2021-09-22 | End: 2022-10-12

## 2021-10-06 ENCOUNTER — OFFICE VISIT (OUTPATIENT)
Dept: FAMILY MEDICINE | Facility: CLINIC | Age: 71
End: 2021-10-06
Payer: MEDICARE

## 2021-10-06 VITALS
HEIGHT: 68 IN | DIASTOLIC BLOOD PRESSURE: 72 MMHG | TEMPERATURE: 97 F | HEART RATE: 92 BPM | WEIGHT: 250 LBS | SYSTOLIC BLOOD PRESSURE: 136 MMHG | BODY MASS INDEX: 37.89 KG/M2 | OXYGEN SATURATION: 98 %

## 2021-10-06 DIAGNOSIS — Z23 NEED FOR PROPHYLACTIC VACCINATION AND INOCULATION AGAINST INFLUENZA: ICD-10-CM

## 2021-10-06 DIAGNOSIS — M10.9 ACUTE GOUT OF RIGHT ELBOW, UNSPECIFIED CAUSE: Primary | ICD-10-CM

## 2021-10-06 PROCEDURE — 4010F ACE/ARB THERAPY RXD/TAKEN: CPT | Mod: S$GLB,,, | Performed by: INTERNAL MEDICINE

## 2021-10-06 PROCEDURE — 3008F PR BODY MASS INDEX (BMI) DOCUMENTED: ICD-10-PCS | Mod: S$GLB,,, | Performed by: INTERNAL MEDICINE

## 2021-10-06 PROCEDURE — G0008 FLU VACCINE - QUADRIVALENT - HIGH DOSE (65+) PRESERVATIVE FREE IM: ICD-10-PCS | Mod: S$GLB,,, | Performed by: INTERNAL MEDICINE

## 2021-10-06 PROCEDURE — 4010F PR ACE/ARB THEARPY RXD/TAKEN: ICD-10-PCS | Mod: S$GLB,,, | Performed by: INTERNAL MEDICINE

## 2021-10-06 PROCEDURE — 3008F BODY MASS INDEX DOCD: CPT | Mod: S$GLB,,, | Performed by: INTERNAL MEDICINE

## 2021-10-06 PROCEDURE — 3288F PR FALLS RISK ASSESSMENT DOCUMENTED: ICD-10-PCS | Mod: S$GLB,,, | Performed by: INTERNAL MEDICINE

## 2021-10-06 PROCEDURE — 90662 FLU VACCINE - QUADRIVALENT - HIGH DOSE (65+) PRESERVATIVE FREE IM: ICD-10-PCS | Mod: S$GLB,,, | Performed by: INTERNAL MEDICINE

## 2021-10-06 PROCEDURE — 1126F PR PAIN SEVERITY QUANTIFIED, NO PAIN PRESENT: ICD-10-PCS | Mod: S$GLB,,, | Performed by: INTERNAL MEDICINE

## 2021-10-06 PROCEDURE — 90662 IIV NO PRSV INCREASED AG IM: CPT | Mod: S$GLB,,, | Performed by: INTERNAL MEDICINE

## 2021-10-06 PROCEDURE — 99213 PR OFFICE/OUTPT VISIT, EST, LEVL III, 20-29 MIN: ICD-10-PCS | Mod: 25,S$GLB,, | Performed by: INTERNAL MEDICINE

## 2021-10-06 PROCEDURE — 3288F FALL RISK ASSESSMENT DOCD: CPT | Mod: S$GLB,,, | Performed by: INTERNAL MEDICINE

## 2021-10-06 PROCEDURE — 1159F MED LIST DOCD IN RCRD: CPT | Mod: S$GLB,,, | Performed by: INTERNAL MEDICINE

## 2021-10-06 PROCEDURE — 1101F PR PT FALLS ASSESS DOC 0-1 FALLS W/OUT INJ PAST YR: ICD-10-PCS | Mod: S$GLB,,, | Performed by: INTERNAL MEDICINE

## 2021-10-06 PROCEDURE — 1126F AMNT PAIN NOTED NONE PRSNT: CPT | Mod: S$GLB,,, | Performed by: INTERNAL MEDICINE

## 2021-10-06 PROCEDURE — 1101F PT FALLS ASSESS-DOCD LE1/YR: CPT | Mod: S$GLB,,, | Performed by: INTERNAL MEDICINE

## 2021-10-06 PROCEDURE — 1159F PR MEDICATION LIST DOCUMENTED IN MEDICAL RECORD: ICD-10-PCS | Mod: S$GLB,,, | Performed by: INTERNAL MEDICINE

## 2021-10-06 PROCEDURE — 99213 OFFICE O/P EST LOW 20 MIN: CPT | Mod: 25,S$GLB,, | Performed by: INTERNAL MEDICINE

## 2021-10-06 PROCEDURE — G0008 ADMIN INFLUENZA VIRUS VAC: HCPCS | Mod: S$GLB,,, | Performed by: INTERNAL MEDICINE

## 2021-10-06 RX ORDER — ALLOPURINOL 300 MG/1
300 TABLET ORAL DAILY
Qty: 90 TABLET | Refills: 1 | Status: SHIPPED | OUTPATIENT
Start: 2021-10-06 | End: 2022-06-06 | Stop reason: SDUPTHER

## 2021-11-02 ENCOUNTER — OFFICE VISIT (OUTPATIENT)
Dept: ORTHOPEDICS | Facility: CLINIC | Age: 71
End: 2021-11-02
Payer: MEDICARE

## 2021-11-02 VITALS — WEIGHT: 250 LBS | BODY MASS INDEX: 37.89 KG/M2 | HEIGHT: 68 IN

## 2021-11-02 DIAGNOSIS — M51.36 DISC DEGENERATION, LUMBAR: Primary | ICD-10-CM

## 2021-11-02 DIAGNOSIS — M70.62 TROCHANTERIC BURSITIS OF LEFT HIP: ICD-10-CM

## 2021-11-02 PROCEDURE — 1159F MED LIST DOCD IN RCRD: CPT | Mod: S$GLB,,, | Performed by: ORTHOPAEDIC SURGERY

## 2021-11-02 PROCEDURE — 1101F PT FALLS ASSESS-DOCD LE1/YR: CPT | Mod: S$GLB,,, | Performed by: ORTHOPAEDIC SURGERY

## 2021-11-02 PROCEDURE — 3008F BODY MASS INDEX DOCD: CPT | Mod: S$GLB,,, | Performed by: ORTHOPAEDIC SURGERY

## 2021-11-02 PROCEDURE — 20610 DRAIN/INJ JOINT/BURSA W/O US: CPT | Mod: LT,S$GLB,, | Performed by: ORTHOPAEDIC SURGERY

## 2021-11-02 PROCEDURE — 4010F PR ACE/ARB THEARPY RXD/TAKEN: ICD-10-PCS | Mod: S$GLB,,, | Performed by: ORTHOPAEDIC SURGERY

## 2021-11-02 PROCEDURE — 4010F ACE/ARB THERAPY RXD/TAKEN: CPT | Mod: S$GLB,,, | Performed by: ORTHOPAEDIC SURGERY

## 2021-11-02 PROCEDURE — 99213 OFFICE O/P EST LOW 20 MIN: CPT | Mod: 25,S$GLB,, | Performed by: ORTHOPAEDIC SURGERY

## 2021-11-02 PROCEDURE — 20610 LARGE JOINT ASPIRATION/INJECTION: L GREATER TROCHANTERIC BURSA: ICD-10-PCS | Mod: LT,S$GLB,, | Performed by: ORTHOPAEDIC SURGERY

## 2021-11-02 PROCEDURE — 99213 PR OFFICE/OUTPT VISIT, EST, LEVL III, 20-29 MIN: ICD-10-PCS | Mod: 25,S$GLB,, | Performed by: ORTHOPAEDIC SURGERY

## 2021-11-02 PROCEDURE — 1125F PR PAIN SEVERITY QUANTIFIED, PAIN PRESENT: ICD-10-PCS | Mod: S$GLB,,, | Performed by: ORTHOPAEDIC SURGERY

## 2021-11-02 PROCEDURE — 1101F PR PT FALLS ASSESS DOC 0-1 FALLS W/OUT INJ PAST YR: ICD-10-PCS | Mod: S$GLB,,, | Performed by: ORTHOPAEDIC SURGERY

## 2021-11-02 PROCEDURE — 1160F PR REVIEW ALL MEDS BY PRESCRIBER/CLIN PHARMACIST DOCUMENTED: ICD-10-PCS | Mod: S$GLB,,, | Performed by: ORTHOPAEDIC SURGERY

## 2021-11-02 PROCEDURE — 3008F PR BODY MASS INDEX (BMI) DOCUMENTED: ICD-10-PCS | Mod: S$GLB,,, | Performed by: ORTHOPAEDIC SURGERY

## 2021-11-02 PROCEDURE — 3288F PR FALLS RISK ASSESSMENT DOCUMENTED: ICD-10-PCS | Mod: S$GLB,,, | Performed by: ORTHOPAEDIC SURGERY

## 2021-11-02 PROCEDURE — 1159F PR MEDICATION LIST DOCUMENTED IN MEDICAL RECORD: ICD-10-PCS | Mod: S$GLB,,, | Performed by: ORTHOPAEDIC SURGERY

## 2021-11-02 PROCEDURE — 1125F AMNT PAIN NOTED PAIN PRSNT: CPT | Mod: S$GLB,,, | Performed by: ORTHOPAEDIC SURGERY

## 2021-11-02 PROCEDURE — 3288F FALL RISK ASSESSMENT DOCD: CPT | Mod: S$GLB,,, | Performed by: ORTHOPAEDIC SURGERY

## 2021-11-02 PROCEDURE — 1160F RVW MEDS BY RX/DR IN RCRD: CPT | Mod: S$GLB,,, | Performed by: ORTHOPAEDIC SURGERY

## 2021-11-02 RX ORDER — TRIAMCINOLONE ACETONIDE 40 MG/ML
40 INJECTION, SUSPENSION INTRA-ARTICULAR; INTRAMUSCULAR
Status: DISCONTINUED | OUTPATIENT
Start: 2021-11-02 | End: 2021-11-02 | Stop reason: HOSPADM

## 2021-11-02 RX ADMIN — TRIAMCINOLONE ACETONIDE 40 MG: 40 INJECTION, SUSPENSION INTRA-ARTICULAR; INTRAMUSCULAR at 02:11

## 2021-11-04 ENCOUNTER — IMMUNIZATION (OUTPATIENT)
Dept: PRIMARY CARE CLINIC | Facility: CLINIC | Age: 71
End: 2021-11-04
Payer: MEDICARE

## 2021-11-04 DIAGNOSIS — Z23 NEED FOR VACCINATION: Primary | ICD-10-CM

## 2021-11-04 PROCEDURE — 0004A COVID-19, MRNA, LNP-S, PF, 30 MCG/0.3 ML DOSE VACCINE: ICD-10-PCS | Mod: CV19,S$GLB,, | Performed by: FAMILY MEDICINE

## 2021-11-04 PROCEDURE — 91300 COVID-19, MRNA, LNP-S, PF, 30 MCG/0.3 ML DOSE VACCINE: ICD-10-PCS | Mod: S$GLB,,, | Performed by: FAMILY MEDICINE

## 2021-11-04 PROCEDURE — 0004A COVID-19, MRNA, LNP-S, PF, 30 MCG/0.3 ML DOSE VACCINE: CPT | Mod: CV19,S$GLB,, | Performed by: FAMILY MEDICINE

## 2021-11-04 PROCEDURE — 91300 COVID-19, MRNA, LNP-S, PF, 30 MCG/0.3 ML DOSE VACCINE: CPT | Mod: S$GLB,,, | Performed by: FAMILY MEDICINE

## 2021-11-08 ENCOUNTER — OFFICE VISIT (OUTPATIENT)
Dept: FAMILY MEDICINE | Facility: CLINIC | Age: 71
End: 2021-11-08
Payer: MEDICARE

## 2021-11-08 VITALS
WEIGHT: 254 LBS | SYSTOLIC BLOOD PRESSURE: 132 MMHG | HEIGHT: 68 IN | HEART RATE: 50 BPM | DIASTOLIC BLOOD PRESSURE: 84 MMHG | BODY MASS INDEX: 38.49 KG/M2 | TEMPERATURE: 97 F | OXYGEN SATURATION: 98 %

## 2021-11-08 DIAGNOSIS — E78.00 PURE HYPERCHOLESTEROLEMIA: ICD-10-CM

## 2021-11-08 DIAGNOSIS — I10 ESSENTIAL HYPERTENSION: Primary | ICD-10-CM

## 2021-11-08 PROCEDURE — 3075F PR MOST RECENT SYSTOLIC BLOOD PRESS GE 130-139MM HG: ICD-10-PCS | Mod: S$GLB,,, | Performed by: INTERNAL MEDICINE

## 2021-11-08 PROCEDURE — 3079F DIAST BP 80-89 MM HG: CPT | Mod: S$GLB,,, | Performed by: INTERNAL MEDICINE

## 2021-11-08 PROCEDURE — 3008F PR BODY MASS INDEX (BMI) DOCUMENTED: ICD-10-PCS | Mod: S$GLB,,, | Performed by: INTERNAL MEDICINE

## 2021-11-08 PROCEDURE — 99213 OFFICE O/P EST LOW 20 MIN: CPT | Mod: S$GLB,,, | Performed by: INTERNAL MEDICINE

## 2021-11-08 PROCEDURE — 3079F PR MOST RECENT DIASTOLIC BLOOD PRESSURE 80-89 MM HG: ICD-10-PCS | Mod: S$GLB,,, | Performed by: INTERNAL MEDICINE

## 2021-11-08 PROCEDURE — 3075F SYST BP GE 130 - 139MM HG: CPT | Mod: S$GLB,,, | Performed by: INTERNAL MEDICINE

## 2021-11-08 PROCEDURE — 3008F BODY MASS INDEX DOCD: CPT | Mod: S$GLB,,, | Performed by: INTERNAL MEDICINE

## 2021-11-08 PROCEDURE — 1159F PR MEDICATION LIST DOCUMENTED IN MEDICAL RECORD: ICD-10-PCS | Mod: S$GLB,,, | Performed by: INTERNAL MEDICINE

## 2021-11-08 PROCEDURE — 99213 PR OFFICE/OUTPT VISIT, EST, LEVL III, 20-29 MIN: ICD-10-PCS | Mod: S$GLB,,, | Performed by: INTERNAL MEDICINE

## 2021-11-08 PROCEDURE — 4010F ACE/ARB THERAPY RXD/TAKEN: CPT | Mod: S$GLB,,, | Performed by: INTERNAL MEDICINE

## 2021-11-08 PROCEDURE — 1159F MED LIST DOCD IN RCRD: CPT | Mod: S$GLB,,, | Performed by: INTERNAL MEDICINE

## 2021-11-08 PROCEDURE — 4010F PR ACE/ARB THEARPY RXD/TAKEN: ICD-10-PCS | Mod: S$GLB,,, | Performed by: INTERNAL MEDICINE

## 2021-11-08 RX ORDER — ROSUVASTATIN CALCIUM 10 MG/1
10 TABLET, COATED ORAL EVERY OTHER DAY
Qty: 90 TABLET | Refills: 1 | Status: SHIPPED | OUTPATIENT
Start: 2021-11-08 | End: 2022-01-04 | Stop reason: SDUPTHER

## 2021-11-08 RX ORDER — TRIAMTERENE AND HYDROCHLOROTHIAZIDE 37.5; 25 MG/1; MG/1
1 CAPSULE ORAL EVERY MORNING
Qty: 90 CAPSULE | Refills: 1 | Status: SHIPPED | OUTPATIENT
Start: 2021-11-08 | End: 2022-04-05

## 2021-11-08 RX ORDER — FUROSEMIDE 40 MG/1
20 TABLET ORAL DAILY
COMMUNITY

## 2021-11-16 ENCOUNTER — OFFICE VISIT (OUTPATIENT)
Dept: PULMONOLOGY | Facility: CLINIC | Age: 71
End: 2021-11-16
Payer: MEDICARE

## 2021-11-16 VITALS
HEIGHT: 68 IN | OXYGEN SATURATION: 98 % | DIASTOLIC BLOOD PRESSURE: 70 MMHG | HEART RATE: 65 BPM | BODY MASS INDEX: 38.16 KG/M2 | SYSTOLIC BLOOD PRESSURE: 140 MMHG | WEIGHT: 251.81 LBS

## 2021-11-16 DIAGNOSIS — J45.909 MODERATE ASTHMA WITHOUT COMPLICATION, UNSPECIFIED WHETHER PERSISTENT: Primary | ICD-10-CM

## 2021-11-16 DIAGNOSIS — R09.82 POST-NASAL DRIP: ICD-10-CM

## 2021-11-16 PROCEDURE — 1159F MED LIST DOCD IN RCRD: CPT | Mod: S$GLB,,, | Performed by: INTERNAL MEDICINE

## 2021-11-16 PROCEDURE — 1159F PR MEDICATION LIST DOCUMENTED IN MEDICAL RECORD: ICD-10-PCS | Mod: S$GLB,,, | Performed by: INTERNAL MEDICINE

## 2021-11-16 PROCEDURE — 3078F DIAST BP <80 MM HG: CPT | Mod: S$GLB,,, | Performed by: INTERNAL MEDICINE

## 2021-11-16 PROCEDURE — 99214 OFFICE O/P EST MOD 30 MIN: CPT | Mod: S$GLB,,, | Performed by: INTERNAL MEDICINE

## 2021-11-16 PROCEDURE — 1160F RVW MEDS BY RX/DR IN RCRD: CPT | Mod: S$GLB,,, | Performed by: INTERNAL MEDICINE

## 2021-11-16 PROCEDURE — 3077F SYST BP >= 140 MM HG: CPT | Mod: S$GLB,,, | Performed by: INTERNAL MEDICINE

## 2021-11-16 PROCEDURE — 4010F PR ACE/ARB THEARPY RXD/TAKEN: ICD-10-PCS | Mod: S$GLB,,, | Performed by: INTERNAL MEDICINE

## 2021-11-16 PROCEDURE — 3008F BODY MASS INDEX DOCD: CPT | Mod: S$GLB,,, | Performed by: INTERNAL MEDICINE

## 2021-11-16 PROCEDURE — 3008F PR BODY MASS INDEX (BMI) DOCUMENTED: ICD-10-PCS | Mod: S$GLB,,, | Performed by: INTERNAL MEDICINE

## 2021-11-16 PROCEDURE — 4010F ACE/ARB THERAPY RXD/TAKEN: CPT | Mod: S$GLB,,, | Performed by: INTERNAL MEDICINE

## 2021-11-16 PROCEDURE — 1126F AMNT PAIN NOTED NONE PRSNT: CPT | Mod: S$GLB,,, | Performed by: INTERNAL MEDICINE

## 2021-11-16 PROCEDURE — 3078F PR MOST RECENT DIASTOLIC BLOOD PRESSURE < 80 MM HG: ICD-10-PCS | Mod: S$GLB,,, | Performed by: INTERNAL MEDICINE

## 2021-11-16 PROCEDURE — 99214 PR OFFICE/OUTPT VISIT, EST, LEVL IV, 30-39 MIN: ICD-10-PCS | Mod: S$GLB,,, | Performed by: INTERNAL MEDICINE

## 2021-11-16 PROCEDURE — 3077F PR MOST RECENT SYSTOLIC BLOOD PRESSURE >= 140 MM HG: ICD-10-PCS | Mod: S$GLB,,, | Performed by: INTERNAL MEDICINE

## 2021-11-16 PROCEDURE — 1126F PR PAIN SEVERITY QUANTIFIED, NO PAIN PRESENT: ICD-10-PCS | Mod: S$GLB,,, | Performed by: INTERNAL MEDICINE

## 2021-11-16 PROCEDURE — 1160F PR REVIEW ALL MEDS BY PRESCRIBER/CLIN PHARMACIST DOCUMENTED: ICD-10-PCS | Mod: S$GLB,,, | Performed by: INTERNAL MEDICINE

## 2021-11-29 ENCOUNTER — PATIENT MESSAGE (OUTPATIENT)
Dept: FAMILY MEDICINE | Facility: CLINIC | Age: 71
End: 2021-11-29
Payer: MEDICARE

## 2021-11-29 DIAGNOSIS — M10.9 GOUT, UNSPECIFIED CAUSE, UNSPECIFIED CHRONICITY, UNSPECIFIED SITE: ICD-10-CM

## 2021-11-29 DIAGNOSIS — I10 ESSENTIAL HYPERTENSION: Primary | ICD-10-CM

## 2021-12-11 ENCOUNTER — PATIENT MESSAGE (OUTPATIENT)
Dept: FAMILY MEDICINE | Facility: CLINIC | Age: 71
End: 2021-12-11
Payer: MEDICARE

## 2021-12-13 ENCOUNTER — TELEPHONE (OUTPATIENT)
Dept: FAMILY MEDICINE | Facility: CLINIC | Age: 71
End: 2021-12-13
Payer: MEDICARE

## 2021-12-13 DIAGNOSIS — E07.9 DISORDER OF THYROID, UNSPECIFIED: ICD-10-CM

## 2021-12-13 DIAGNOSIS — R79.89 ELEVATED TSH: Primary | ICD-10-CM

## 2021-12-13 RX ORDER — LEVOTHYROXINE SODIUM 50 UG/1
50 TABLET ORAL
Qty: 30 TABLET | Refills: 5 | Status: SHIPPED | OUTPATIENT
Start: 2021-12-13 | End: 2022-03-03 | Stop reason: SDUPTHER

## 2021-12-14 ENCOUNTER — OFFICE VISIT (OUTPATIENT)
Dept: FAMILY MEDICINE | Facility: CLINIC | Age: 71
End: 2021-12-14
Payer: MEDICARE

## 2021-12-14 VITALS
DIASTOLIC BLOOD PRESSURE: 74 MMHG | BODY MASS INDEX: 38.04 KG/M2 | HEIGHT: 68 IN | TEMPERATURE: 97 F | OXYGEN SATURATION: 98 % | HEART RATE: 55 BPM | SYSTOLIC BLOOD PRESSURE: 144 MMHG | WEIGHT: 251 LBS

## 2021-12-14 DIAGNOSIS — R42 VERTIGO: Primary | ICD-10-CM

## 2021-12-14 DIAGNOSIS — I10 PRIMARY HYPERTENSION: ICD-10-CM

## 2021-12-14 LAB
ALBUMIN SERPL-MCNC: 4.3 G/DL (ref 3.6–5.1)
ALBUMIN/GLOB SERPL: 2.2 (CALC) (ref 1–2.5)
ALP SERPL-CCNC: 66 U/L (ref 35–144)
ALT SERPL-CCNC: 18 U/L (ref 9–46)
AST SERPL-CCNC: 12 U/L (ref 10–35)
BILIRUB SERPL-MCNC: 0.5 MG/DL (ref 0.2–1.2)
BUN SERPL-MCNC: 20 MG/DL (ref 7–25)
BUN/CREAT SERPL: 16 (CALC) (ref 6–22)
CALCIUM SERPL-MCNC: 9.1 MG/DL (ref 8.6–10.3)
CHLORIDE SERPL-SCNC: 107 MMOL/L (ref 98–110)
CLIENT CONTACT:: NORMAL
CO2 SERPL-SCNC: 30 MMOL/L (ref 20–32)
COMMENT: NORMAL
CREAT SERPL-MCNC: 1.25 MG/DL (ref 0.7–1.18)
GLOBULIN SER CALC-MCNC: 2 G/DL (CALC) (ref 1.9–3.7)
GLUCOSE SERPL-MCNC: 89 MG/DL (ref 65–99)
Lab: NORMAL
POTASSIUM SERPL-SCNC: 3.8 MMOL/L (ref 3.5–5.3)
PROT SERPL-MCNC: 6.3 G/DL (ref 6.1–8.1)
REF LAB TEST NAME: NORMAL
REF LAB TEST: NORMAL
SODIUM SERPL-SCNC: 143 MMOL/L (ref 135–146)
T3FREE SERPL-MCNC: 2.7 PG/ML (ref 2.3–4.2)
T4 FREE SERPL-MCNC: 1.1 NG/DL (ref 0.8–1.8)
URATE SERPL-MCNC: 7 MG/DL (ref 4–8)

## 2021-12-14 PROCEDURE — 99213 OFFICE O/P EST LOW 20 MIN: CPT | Mod: S$GLB,,, | Performed by: INTERNAL MEDICINE

## 2021-12-14 PROCEDURE — 99213 PR OFFICE/OUTPT VISIT, EST, LEVL III, 20-29 MIN: ICD-10-PCS | Mod: S$GLB,,, | Performed by: INTERNAL MEDICINE

## 2021-12-14 PROCEDURE — 4010F PR ACE/ARB THEARPY RXD/TAKEN: ICD-10-PCS | Mod: S$GLB,,, | Performed by: INTERNAL MEDICINE

## 2021-12-14 PROCEDURE — 4010F ACE/ARB THERAPY RXD/TAKEN: CPT | Mod: S$GLB,,, | Performed by: INTERNAL MEDICINE

## 2021-12-14 RX ORDER — MECLIZINE HCL 12.5 MG 12.5 MG/1
12.5 TABLET ORAL 3 TIMES DAILY PRN
Qty: 30 TABLET | Refills: 1 | Status: SHIPPED | OUTPATIENT
Start: 2021-12-14 | End: 2023-09-27

## 2022-01-04 ENCOUNTER — PATIENT MESSAGE (OUTPATIENT)
Dept: FAMILY MEDICINE | Facility: CLINIC | Age: 72
End: 2022-01-04
Payer: MEDICARE

## 2022-01-04 DIAGNOSIS — E78.00 PURE HYPERCHOLESTEROLEMIA: ICD-10-CM

## 2022-01-04 RX ORDER — ROSUVASTATIN CALCIUM 10 MG/1
10 TABLET, COATED ORAL EVERY OTHER DAY
Qty: 90 TABLET | Refills: 1 | Status: SHIPPED | OUTPATIENT
Start: 2022-01-04 | End: 2022-06-06 | Stop reason: SDUPTHER

## 2022-01-05 ENCOUNTER — PATIENT MESSAGE (OUTPATIENT)
Dept: PULMONOLOGY | Facility: CLINIC | Age: 72
End: 2022-01-05
Payer: MEDICARE

## 2022-01-11 ENCOUNTER — PATIENT MESSAGE (OUTPATIENT)
Dept: FAMILY MEDICINE | Facility: CLINIC | Age: 72
End: 2022-01-11
Payer: MEDICARE

## 2022-01-11 DIAGNOSIS — E78.00 PURE HYPERCHOLESTEROLEMIA: Primary | ICD-10-CM

## 2022-01-11 DIAGNOSIS — E07.9 DISORDER OF THYROID, UNSPECIFIED: ICD-10-CM

## 2022-01-15 LAB
CHOLEST SERPL-MCNC: 173 MG/DL
CHOLEST/HDLC SERPL: 3.6 (CALC)
CLIENT CONTACT:: NORMAL
COMMENT: NORMAL
HDLC SERPL-MCNC: 48 MG/DL
LDLC SERPL CALC-MCNC: 102 MG/DL (CALC)
Lab: NORMAL
NONHDLC SERPL-MCNC: 125 MG/DL (CALC)
PSA SERPL-MCNC: 1.07 NG/ML
REF LAB TEST NAME: NORMAL
REF LAB TEST: NORMAL
T3FREE SERPL-MCNC: 2.8 PG/ML (ref 2.3–4.2)
T4 FREE SERPL-MCNC: 1.2 NG/DL (ref 0.8–1.8)
TRIGL SERPL-MCNC: 131 MG/DL
TSH SERPL-ACNC: 5.6 MIU/L (ref 0.4–4.5)

## 2022-01-26 DIAGNOSIS — R31.1 BENIGN MICROSCOPIC HEMATURIA: Primary | ICD-10-CM

## 2022-01-31 ENCOUNTER — HOSPITAL ENCOUNTER (OUTPATIENT)
Dept: RADIOLOGY | Facility: HOSPITAL | Age: 72
Discharge: HOME | End: 2022-01-31
Attending: SPECIALIST
Payer: MEDICARE

## 2022-01-31 DIAGNOSIS — R31.1 BENIGN MICROSCOPIC HEMATURIA: ICD-10-CM

## 2022-01-31 PROCEDURE — 76770 US EXAM ABDO BACK WALL COMP: CPT | Mod: TC,PO

## 2022-02-28 ENCOUNTER — PATIENT MESSAGE (OUTPATIENT)
Dept: FAMILY MEDICINE | Facility: CLINIC | Age: 72
End: 2022-02-28
Payer: MEDICARE

## 2022-02-28 DIAGNOSIS — E07.9 DISORDER OF THYROID, UNSPECIFIED: Primary | ICD-10-CM

## 2022-03-03 ENCOUNTER — PATIENT MESSAGE (OUTPATIENT)
Dept: FAMILY MEDICINE | Facility: CLINIC | Age: 72
End: 2022-03-03
Payer: MEDICARE

## 2022-03-03 DIAGNOSIS — R79.89 ELEVATED TSH: ICD-10-CM

## 2022-03-03 LAB
T4 FREE SERPL-MCNC: 1.1 NG/DL (ref 0.8–1.8)
TSH SERPL-ACNC: 6.38 MIU/L (ref 0.4–4.5)

## 2022-03-03 RX ORDER — LEVOTHYROXINE SODIUM 75 UG/1
75 TABLET ORAL
Qty: 30 TABLET | Refills: 5 | Status: SHIPPED | OUTPATIENT
Start: 2022-03-03 | End: 2022-06-06 | Stop reason: SDUPTHER

## 2022-03-07 ENCOUNTER — OFFICE VISIT (OUTPATIENT)
Dept: FAMILY MEDICINE | Facility: CLINIC | Age: 72
End: 2022-03-07
Payer: MEDICARE

## 2022-03-07 VITALS
DIASTOLIC BLOOD PRESSURE: 64 MMHG | HEART RATE: 64 BPM | BODY MASS INDEX: 37.89 KG/M2 | TEMPERATURE: 97 F | HEIGHT: 68 IN | OXYGEN SATURATION: 96 % | WEIGHT: 250 LBS | SYSTOLIC BLOOD PRESSURE: 114 MMHG

## 2022-03-07 DIAGNOSIS — E03.2 HYPOTHYROIDISM DUE TO MEDICATION: ICD-10-CM

## 2022-03-07 DIAGNOSIS — I10 PRIMARY HYPERTENSION: Primary | ICD-10-CM

## 2022-03-07 PROCEDURE — 1101F PR PT FALLS ASSESS DOC 0-1 FALLS W/OUT INJ PAST YR: ICD-10-PCS | Mod: S$GLB,,, | Performed by: INTERNAL MEDICINE

## 2022-03-07 PROCEDURE — 3078F PR MOST RECENT DIASTOLIC BLOOD PRESSURE < 80 MM HG: ICD-10-PCS | Mod: S$GLB,,, | Performed by: INTERNAL MEDICINE

## 2022-03-07 PROCEDURE — 1126F AMNT PAIN NOTED NONE PRSNT: CPT | Mod: S$GLB,,, | Performed by: INTERNAL MEDICINE

## 2022-03-07 PROCEDURE — 99213 OFFICE O/P EST LOW 20 MIN: CPT | Mod: S$GLB,,, | Performed by: INTERNAL MEDICINE

## 2022-03-07 PROCEDURE — 3008F BODY MASS INDEX DOCD: CPT | Mod: S$GLB,,, | Performed by: INTERNAL MEDICINE

## 2022-03-07 PROCEDURE — 3074F SYST BP LT 130 MM HG: CPT | Mod: S$GLB,,, | Performed by: INTERNAL MEDICINE

## 2022-03-07 PROCEDURE — 1126F PR PAIN SEVERITY QUANTIFIED, NO PAIN PRESENT: ICD-10-PCS | Mod: S$GLB,,, | Performed by: INTERNAL MEDICINE

## 2022-03-07 PROCEDURE — 3074F PR MOST RECENT SYSTOLIC BLOOD PRESSURE < 130 MM HG: ICD-10-PCS | Mod: S$GLB,,, | Performed by: INTERNAL MEDICINE

## 2022-03-07 PROCEDURE — 1160F PR REVIEW ALL MEDS BY PRESCRIBER/CLIN PHARMACIST DOCUMENTED: ICD-10-PCS | Mod: S$GLB,,, | Performed by: INTERNAL MEDICINE

## 2022-03-07 PROCEDURE — 3288F FALL RISK ASSESSMENT DOCD: CPT | Mod: S$GLB,,, | Performed by: INTERNAL MEDICINE

## 2022-03-07 PROCEDURE — 1159F MED LIST DOCD IN RCRD: CPT | Mod: S$GLB,,, | Performed by: INTERNAL MEDICINE

## 2022-03-07 PROCEDURE — 99213 PR OFFICE/OUTPT VISIT, EST, LEVL III, 20-29 MIN: ICD-10-PCS | Mod: S$GLB,,, | Performed by: INTERNAL MEDICINE

## 2022-03-07 PROCEDURE — 3078F DIAST BP <80 MM HG: CPT | Mod: S$GLB,,, | Performed by: INTERNAL MEDICINE

## 2022-03-07 PROCEDURE — 3288F PR FALLS RISK ASSESSMENT DOCUMENTED: ICD-10-PCS | Mod: S$GLB,,, | Performed by: INTERNAL MEDICINE

## 2022-03-07 PROCEDURE — 3008F PR BODY MASS INDEX (BMI) DOCUMENTED: ICD-10-PCS | Mod: S$GLB,,, | Performed by: INTERNAL MEDICINE

## 2022-03-07 PROCEDURE — 1160F RVW MEDS BY RX/DR IN RCRD: CPT | Mod: S$GLB,,, | Performed by: INTERNAL MEDICINE

## 2022-03-07 PROCEDURE — 1101F PT FALLS ASSESS-DOCD LE1/YR: CPT | Mod: S$GLB,,, | Performed by: INTERNAL MEDICINE

## 2022-03-07 PROCEDURE — 1159F PR MEDICATION LIST DOCUMENTED IN MEDICAL RECORD: ICD-10-PCS | Mod: S$GLB,,, | Performed by: INTERNAL MEDICINE

## 2022-03-07 RX ORDER — TAMSULOSIN HYDROCHLORIDE 0.4 MG/1
1 CAPSULE ORAL DAILY
COMMUNITY
Start: 2022-01-26

## 2022-03-07 NOTE — PROGRESS NOTES
Subjective:       Patient ID: Nico Teague is a 71 y.o. male.    Chief Complaint: Follow-up (labs)    Here for follow up.  Followed by [facility] for Afib and  Is on amiodarone, eliquis, metoprolol.  No further gout flares.  Dizziness resolved.      Hypertension  This is a chronic problem. The problem is controlled. Associated symptoms include malaise/fatigue. Pertinent negatives include no anxiety, chest pain, headaches, neck pain, palpitations, peripheral edema or shortness of breath. Past treatments include angiotensin blockers, calcium channel blockers, alpha 1 blockers, diuretics and beta blockers. The current treatment provides significant improvement. There are no compliance problems.  Identifiable causes of hypertension include a thyroid problem.   Thyroid Problem  Presents for follow-up visit. Symptoms include fatigue (no energy in the afternoon). Patient reports no anxiety, cold intolerance, constipation, depressed mood, diaphoresis, diarrhea, heat intolerance, palpitations or tremors. The symptoms have been stable. Past treatments include nothing. His past medical history is significant for hyperlipidemia. Risk factors include amiodarone use.     Review of Systems   Constitutional: Positive for fatigue (no energy in the afternoon) and malaise/fatigue. Negative for activity change, appetite change, chills, diaphoresis, fever and unexpected weight change.   HENT: Negative for congestion, ear discharge, ear pain, hearing loss, nosebleeds, postnasal drip, rhinorrhea, sinus pressure, sinus pain, sneezing, sore throat, tinnitus, trouble swallowing and voice change.    Eyes: Negative for photophobia, pain, discharge, redness, itching and visual disturbance.   Respiratory: Negative for apnea, cough, choking, chest tightness, shortness of breath and wheezing.    Cardiovascular: Negative for chest pain, palpitations and leg swelling.   Gastrointestinal: Negative for abdominal distention, abdominal pain, blood in  stool, constipation, diarrhea, nausea and vomiting.   Endocrine: Negative for cold intolerance, heat intolerance, polydipsia and polyuria.   Genitourinary: Negative for decreased urine volume, difficulty urinating, dysuria, enuresis, flank pain, frequency, genital sores, hematuria, penile discharge, penile pain, scrotal swelling, testicular pain and urgency.   Musculoskeletal: Negative for arthralgias, back pain, gait problem, joint swelling, myalgias, neck pain and neck stiffness.   Skin: Negative for rash and wound.   Allergic/Immunologic: Negative for environmental allergies, food allergies and immunocompromised state.   Neurological: Negative for dizziness, tremors, seizures, syncope, facial asymmetry, speech difficulty, weakness, light-headedness, numbness and headaches.   Hematological: Negative for adenopathy. Bruises/bleeds easily.   Psychiatric/Behavioral: Negative for confusion, decreased concentration, dysphoric mood, hallucinations, self-injury, sleep disturbance and suicidal ideas. The patient is not nervous/anxious.        Past Medical History:   Diagnosis Date    Allergy     Arthritis     Asthma     Atrial fibrillation     Basal cell carcinoma     Fever blister     Hypertension     Joint pain     Melanoma     PVC (premature ventricular contraction)     Skin disease     Squamous cell carcinoma       Past Surgical History:   Procedure Laterality Date    CARDIAC CATHETERIZATION  05/25/2021    EYE SURGERY Left 12/03/2019    EYE SURGERY Right 01/28/2020    LIPOMA RESECTION      NOSE SURGERY      SHOULDER ARTHROSCOPY      SKIN GRAFT         Family History   Problem Relation Age of Onset    Cancer Mother         esophageal    Heart disease Father     Hypertension Father     Arthritis Paternal Grandmother        Social History     Socioeconomic History    Marital status:    Occupational History    Occupation: retired captain    Tobacco Use    Smoking status: Former Smoker      Packs/day: 2.00     Years: 20.00     Pack years: 40.00     Types: Cigarettes     Start date: 1972     Quit date: 1988     Years since quittin.5    Smokeless tobacco: Never Used   Substance and Sexual Activity    Alcohol use: No    Drug use: No    Sexual activity: Never   Social History Narrative    Live with wife     Social Determinants of Health     Financial Resource Strain: Low Risk     Difficulty of Paying Living Expenses: Not hard at all   Food Insecurity: No Food Insecurity    Worried About Running Out of Food in the Last Year: Never true    Ran Out of Food in the Last Year: Never true   Transportation Needs: No Transportation Needs    Lack of Transportation (Medical): No    Lack of Transportation (Non-Medical): No   Physical Activity: Insufficiently Active    Days of Exercise per Week: 3 days    Minutes of Exercise per Session: 20 min   Stress: No Stress Concern Present    Feeling of Stress : Only a little   Social Connections: Unknown    Frequency of Communication with Friends and Family: More than three times a week    Frequency of Social Gatherings with Friends and Family: Once a week    Active Member of Clubs or Organizations: Yes    Attends Club or Organization Meetings: More than 4 times per year    Marital Status:        Current Outpatient Medications   Medication Sig Dispense Refill    albuterol (PROVENTIL/VENTOLIN HFA) 90 mcg/actuation inhaler Inhale 2 puffs into the lungs every 6 (six) hours as needed for Shortness of Breath. Rescue 3 g 3    allopurinoL (ZYLOPRIM) 300 MG tablet Take 1 tablet (300 mg total) by mouth once daily. 90 tablet 1    amiodarone (PACERONE) 200 MG Tab Take 1 tablet by mouth 2 (two) times a day.      apixaban (ELIQUIS) 5 mg Tab Take 5 mg by mouth 2 (two) times daily.      ascorbic acid, vitamin C, (VITAMIN C) 1000 MG tablet Take 1,000 mg by mouth once daily.      aspirin 81 MG Chew Take 81 mg by mouth once daily.       cholecalciferol, vitamin D3, (VITAMIN D3) 25 mcg (1,000 unit) capsule Take 1,000 Units by mouth once daily.      co-enzyme Q-10 50 mg capsule Take 50 mg by mouth once daily.      diclofenac sodium (VOLTAREN) 1 % Gel Apply 2 g topically once daily. 100 g 3    ENTRESTO 24-26 mg per tablet       fluticasone furoate-vilanteroL (BREO ELLIPTA) 100-25 mcg/dose diskus inhaler Inhale 1 puff into the lungs once daily. Controller Rinse after you use it 3 each 4    fluticasone propionate (FLONASE) 50 mcg/actuation nasal spray 1 spray (50 mcg total) by Each Nostril route once daily. 48 g 3    furosemide (LASIX) 40 MG tablet Take 20 mg by mouth once daily.      Lactobacillus rhamnosus GG (CULTURELLE) 10 billion cell capsule Take 1 capsule by mouth.      levothyroxine (SYNTHROID) 75 MCG tablet Take 1 tablet (75 mcg total) by mouth before breakfast. 30 tablet 5    meclizine (ANTIVERT) 12.5 mg tablet Take 1 tablet (12.5 mg total) by mouth 3 (three) times daily as needed for Dizziness. 30 tablet 1    melatonin 10 mg Tab Take 1 tablet by mouth nightly as needed.      montelukast (SINGULAIR) 10 mg tablet TAKE 1 TABLET (10 MG TOTAL) BY MOUTH EVERY EVENING. 90 tablet 6    multivitamin (THERAGRAN) per tablet Take 1 tablet by mouth.      potassium chloride (MICRO-K) 10 MEQ CpSR       rosuvastatin (CRESTOR) 10 MG tablet Take 1 tablet (10 mg total) by mouth every other day. 90 tablet 1    tamsulosin (FLOMAX) 0.4 mg Cap Take 1 tablet by mouth Daily.      triamterene-hydrochlorothiazide 37.5-25 mg (DYAZIDE) 37.5-25 mg per capsule Take 1 capsule by mouth every morning. 90 capsule 1    vitamin E 1000 UNIT capsule Take 1,000 Units by mouth once daily.       No current facility-administered medications for this visit.       Review of patient's allergies indicates:   Allergen Reactions    Bactrim [sulfamethoxazole-trimethoprim]      HIVES/ RASH    Green tea (rashida sinensis) Hives     Pt is has allergies to black tea. Not an  "option under search.     Objective:    HPI     Follow-up      Additional comments: labs          Last edited by Radha Ruffin MA on 3/7/2022  1:36 PM. (History)      Blood pressure 114/64, pulse 64, temperature 96.9 °F (36.1 °C), temperature source Temporal, height 5' 8" (1.727 m), weight 113.4 kg (250 lb), SpO2 96 %. Body mass index is 38.01 kg/m².   Physical Exam  Vitals and nursing note reviewed.   Constitutional:       General: He is not in acute distress.     Appearance: He is well-developed. He is obese. He is not ill-appearing, toxic-appearing or diaphoretic.   HENT:      Head: Normocephalic and atraumatic.   Eyes:      General: No scleral icterus.        Right eye: No discharge.         Left eye: No discharge.      Conjunctiva/sclera: Conjunctivae normal.   Neck:      Vascular: No carotid bruit.   Cardiovascular:      Rate and Rhythm: Normal rate and regular rhythm.      Heart sounds: Normal heart sounds. No murmur heard.  Pulmonary:      Effort: Pulmonary effort is normal. No respiratory distress.      Breath sounds: Normal breath sounds. No decreased breath sounds, wheezing, rhonchi or rales.   Abdominal:      General: There is no distension.      Palpations: Abdomen is soft.      Tenderness: There is no abdominal tenderness. There is no guarding or rebound.   Musculoskeletal:      Right lower leg: No edema.      Left lower leg: No edema.   Skin:     General: Skin is warm and dry.   Neurological:      Mental Status: He is alert.      Motor: No tremor.   Psychiatric:         Mood and Affect: Mood normal.         Speech: Speech normal.         Behavior: Behavior normal.             Assessment:       1. Primary hypertension    2. Hypothyroidism due to medication        Plan:       Nico was seen today for follow-up.    Diagnoses and all orders for this visit:    Primary hypertension  Comments:  Well controlled    Hypothyroidism due to medication  Comments:  Dose just increased to 75mcg last week.  Recheck " next visit

## 2022-03-09 ENCOUNTER — TELEPHONE (OUTPATIENT)
Dept: FAMILY MEDICINE | Facility: CLINIC | Age: 72
End: 2022-03-09
Payer: MEDICARE

## 2022-05-02 ENCOUNTER — OFFICE VISIT (OUTPATIENT)
Dept: PULMONOLOGY | Facility: CLINIC | Age: 72
End: 2022-05-02
Payer: MEDICARE

## 2022-05-02 VITALS
BODY MASS INDEX: 38.65 KG/M2 | OXYGEN SATURATION: 98 % | SYSTOLIC BLOOD PRESSURE: 130 MMHG | DIASTOLIC BLOOD PRESSURE: 80 MMHG | HEART RATE: 89 BPM | HEIGHT: 68 IN | WEIGHT: 255 LBS

## 2022-05-02 DIAGNOSIS — F51.11 PRIMARY HYPERSOMNIA: ICD-10-CM

## 2022-05-02 DIAGNOSIS — R06.00 DYSPNEA, UNSPECIFIED TYPE: ICD-10-CM

## 2022-05-02 DIAGNOSIS — R53.82 CHRONIC FATIGUE: ICD-10-CM

## 2022-05-02 DIAGNOSIS — R06.83 SNORES: ICD-10-CM

## 2022-05-02 DIAGNOSIS — I48.91 ATRIAL FIBRILLATION, UNSPECIFIED TYPE: ICD-10-CM

## 2022-05-02 DIAGNOSIS — J45.40 MODERATE PERSISTENT ASTHMA, UNSPECIFIED WHETHER COMPLICATED: Primary | ICD-10-CM

## 2022-05-02 PROCEDURE — 99214 OFFICE O/P EST MOD 30 MIN: CPT | Mod: S$GLB,,, | Performed by: NURSE PRACTITIONER

## 2022-05-02 PROCEDURE — 1126F PR PAIN SEVERITY QUANTIFIED, NO PAIN PRESENT: ICD-10-PCS | Mod: S$GLB,,, | Performed by: NURSE PRACTITIONER

## 2022-05-02 PROCEDURE — 99214 PR OFFICE/OUTPT VISIT, EST, LEVL IV, 30-39 MIN: ICD-10-PCS | Mod: S$GLB,,, | Performed by: NURSE PRACTITIONER

## 2022-05-02 PROCEDURE — 3079F PR MOST RECENT DIASTOLIC BLOOD PRESSURE 80-89 MM HG: ICD-10-PCS | Mod: S$GLB,,, | Performed by: NURSE PRACTITIONER

## 2022-05-02 PROCEDURE — 1126F AMNT PAIN NOTED NONE PRSNT: CPT | Mod: S$GLB,,, | Performed by: NURSE PRACTITIONER

## 2022-05-02 PROCEDURE — 1159F PR MEDICATION LIST DOCUMENTED IN MEDICAL RECORD: ICD-10-PCS | Mod: S$GLB,,, | Performed by: NURSE PRACTITIONER

## 2022-05-02 PROCEDURE — 3008F PR BODY MASS INDEX (BMI) DOCUMENTED: ICD-10-PCS | Mod: S$GLB,,, | Performed by: NURSE PRACTITIONER

## 2022-05-02 PROCEDURE — 1159F MED LIST DOCD IN RCRD: CPT | Mod: S$GLB,,, | Performed by: NURSE PRACTITIONER

## 2022-05-02 PROCEDURE — 3008F BODY MASS INDEX DOCD: CPT | Mod: S$GLB,,, | Performed by: NURSE PRACTITIONER

## 2022-05-02 PROCEDURE — 3079F DIAST BP 80-89 MM HG: CPT | Mod: S$GLB,,, | Performed by: NURSE PRACTITIONER

## 2022-05-02 PROCEDURE — 3075F PR MOST RECENT SYSTOLIC BLOOD PRESS GE 130-139MM HG: ICD-10-PCS | Mod: S$GLB,,, | Performed by: NURSE PRACTITIONER

## 2022-05-02 PROCEDURE — 3075F SYST BP GE 130 - 139MM HG: CPT | Mod: S$GLB,,, | Performed by: NURSE PRACTITIONER

## 2022-05-02 NOTE — PATIENT INSTRUCTIONS
Try Trelegy and see if feel more benefit from it, still rinse mouth out  Don't use breo with the trelegy, call if like and will send   Continue the Flonase and saline rinses as needed  PFT   In lab sleep study   Follow up in about 3 months (around 8/2/2022

## 2022-05-02 NOTE — PROGRESS NOTES
.    SUBJECTIVE:    Patient ID: Nico Teague is a 71 y.o. male.    Chief Complaint: Follow-up    Patient here today feeling alright. He is using his Breo daily. He has been feeling more short of breath with exertion.  He is on amidarone for atrial fib now.  He is chronically fatigued, states he wakes up 4 or 5 times a night to urinate and is not taking diuretic in the evening. His wife states he snores and has periods of apnea.  He is forgetful and suffers with brain fog per his wife. He naps daily because he is fatigued during the day.     Past Medical History:   Diagnosis Date    Allergy     Arthritis     Asthma     Atrial fibrillation     Basal cell carcinoma     Fever blister     Hypertension     Joint pain     Melanoma     PVC (premature ventricular contraction)     Skin disease     Squamous cell carcinoma      Past Surgical History:   Procedure Laterality Date    CARDIAC CATHETERIZATION  05/25/2021    EYE SURGERY Left 12/03/2019    EYE SURGERY Right 01/28/2020    LIPOMA RESECTION      NOSE SURGERY      SHOULDER ARTHROSCOPY      SKIN GRAFT       Family History   Problem Relation Age of Onset    Cancer Mother         esophageal    Heart disease Father     Hypertension Father     Arthritis Paternal Grandmother         Social History:   Marital Status:   Occupation: retired captain   Alcohol History:  reports no history of alcohol use.  Tobacco History:  reports that he quit smoking about 33 years ago. His smoking use included cigarettes. He started smoking about 50 years ago. He has a 40.00 pack-year smoking history. He has never used smokeless tobacco.  Drug History:  reports no history of drug use.    Review of patient's allergies indicates:   Allergen Reactions    Bactrim [sulfamethoxazole-trimethoprim]      HIVES/ RASH       Current Outpatient Medications   Medication Sig Dispense Refill    albuterol (PROVENTIL/VENTOLIN HFA) 90 mcg/actuation inhaler Inhale 2 puffs into the  lungs every 6 (six) hours as needed for Shortness of Breath. Rescue 3 g 3    allopurinoL (ZYLOPRIM) 300 MG tablet Take 1 tablet (300 mg total) by mouth once daily. 90 tablet 1    amiodarone (PACERONE) 200 MG Tab Take 1 tablet by mouth 2 (two) times a day.      apixaban (ELIQUIS) 5 mg Tab Take 5 mg by mouth 2 (two) times daily.      ascorbic acid, vitamin C, (VITAMIN C) 1000 MG tablet Take 1,000 mg by mouth once daily.      aspirin 81 MG Chew Take 81 mg by mouth once daily.      cholecalciferol, vitamin D3, (VITAMIN D3) 25 mcg (1,000 unit) capsule Take 1,000 Units by mouth once daily.      co-enzyme Q-10 50 mg capsule Take 50 mg by mouth once daily.      ENTRESTO 24-26 mg per tablet       fluticasone furoate-vilanteroL (BREO ELLIPTA) 100-25 mcg/dose diskus inhaler Inhale 1 puff into the lungs once daily. Controller Rinse after you use it 3 each 4    fluticasone propionate (FLONASE) 50 mcg/actuation nasal spray 1 spray (50 mcg total) by Each Nostril route once daily. 48 g 3    furosemide (LASIX) 40 MG tablet Take 20 mg by mouth once daily.      levothyroxine (SYNTHROID) 75 MCG tablet Take 1 tablet (75 mcg total) by mouth before breakfast. 30 tablet 5    meclizine (ANTIVERT) 12.5 mg tablet Take 1 tablet (12.5 mg total) by mouth 3 (three) times daily as needed for Dizziness. 30 tablet 1    melatonin 10 mg Tab Take 1 tablet by mouth nightly as needed.      montelukast (SINGULAIR) 10 mg tablet TAKE 1 TABLET (10 MG TOTAL) BY MOUTH EVERY EVENING. 90 tablet 6    multivitamin (THERAGRAN) per tablet Take 1 tablet by mouth.      potassium chloride (MICRO-K) 10 MEQ CpSR       rosuvastatin (CRESTOR) 10 MG tablet Take 1 tablet (10 mg total) by mouth every other day. 90 tablet 1    tamsulosin (FLOMAX) 0.4 mg Cap Take 1 tablet by mouth Daily.      triamterene-hydrochlorothiazide 37.5-25 mg (DYAZIDE) 37.5-25 mg per capsule TAKE 1 CAPSULE EVERY MORNING 90 capsule 1    vitamin E 1000 UNIT capsule Take 1,000 Units  "by mouth once daily.      diclofenac sodium (VOLTAREN) 1 % Gel Apply 2 g topically once daily. 100 g 3    Lactobacillus rhamnosus GG (CULTURELLE) 10 billion cell capsule Take 1 capsule by mouth.       No current facility-administered medications for this visit.       Last PFT: 4/2018      Review of Systems  General: feels pretty good, chronically fatigued, snores   Eyes: vision is good  ENT: has some nasal stuffiness, better  Heart:: atrial fib   Lungs: dyspnea with exertion   GI: No Nausea, vomiting, constipation, diarrhea, or reflux.  : wakes up 4 times a night to urinate   Musculoskeletal: hip hurts   Skin: No lesions or rashes.  Neuro: has had several falls lately, brain fog, forgetful   Lymph:swelling to legs   Psych: No anxiety or depression.  Endo: weight gain      OBJECTIVE:      /80 (BP Location: Left arm, Patient Position: Sitting, BP Method: Medium (Manual))   Pulse 89   Ht 5' 8" (1.727 m)   Wt 115.7 kg (255 lb)   SpO2 98%   BMI 38.77 kg/m²     Physical Exam  GENERAL: Older patient in no distress.  HEENT: Pupils equal and reactive. Extraocular movements intact. Nose intact.      Pharynx moist.  NECK: Supple.   HEART: regular rate and rhythym  LUNGS: Clear to auscultation and percussion. Lung excursion symmetrical. No change in fremitus. No adventitial noises.  ABDOMEN: Bowel sounds present. Non-tender, no masses palpated.  EXTREMITIES: Normal muscle tone and joint movement, no cyanosis or clubbing.   LYMPHATICS: No adenopathy palpated, 2+ edema  SKIN: Dry, intact, no lesions.   NEURO: Cranial nerves II-XII intact. Motor strength 5/5 bilaterally, upper and lower extremities.  PSYCH: Appropriate affect.     Peak flow is 460    Assessment:       1. Moderate persistent asthma, unspecified whether complicated    2. Dyspnea, unspecified type    3. Atrial fibrillation, unspecified type    4. Snores    5. Chronic fatigue    6. Primary hypersomnia         epworth scale 10  Plan:       Moderate " persistent asthma, unspecified whether complicated    Dyspnea, unspecified type  -     Complete PFT with bronchodilator; Future    Atrial fibrillation, unspecified type  -     Polysomnogram (CPAP will be added if patient meets diagnostic criteria.); Future; Expected date: 05/02/2022    Snores  -     Polysomnogram (CPAP will be added if patient meets diagnostic criteria.); Future; Expected date: 05/02/2022    Chronic fatigue  -     Polysomnogram (CPAP will be added if patient meets diagnostic criteria.); Future; Expected date: 05/02/2022    Primary hypersomnia   -     Polysomnogram (CPAP will be added if patient meets diagnostic criteria.); Future; Expected date: 05/02/2022       Try Trelegy and see if feel more benefit from it, still rinse mouth out  Don't use breo with the trelegy, call if like and will send   Continue the Flonase and saline rinses as needed  PFT   In lab sleep study   Follow up in about 3 months (around 8/2/2022).

## 2022-05-03 ENCOUNTER — PATIENT MESSAGE (OUTPATIENT)
Dept: FAMILY MEDICINE | Facility: CLINIC | Age: 72
End: 2022-05-03

## 2022-05-23 ENCOUNTER — HOSPITAL ENCOUNTER (OUTPATIENT)
Dept: PULMONOLOGY | Facility: HOSPITAL | Age: 72
Discharge: HOME | End: 2022-05-23
Attending: NURSE PRACTITIONER
Payer: MEDICARE

## 2022-05-23 ENCOUNTER — TELEPHONE (OUTPATIENT)
Dept: PULMONOLOGY | Facility: CLINIC | Age: 72
End: 2022-05-23

## 2022-05-23 DIAGNOSIS — R06.00 DYSPNEA, UNSPECIFIED TYPE: ICD-10-CM

## 2022-05-23 PROCEDURE — 94729 DIFFUSING CAPACITY: CPT

## 2022-05-23 PROCEDURE — 94010 BREATHING CAPACITY TEST: CPT

## 2022-05-23 PROCEDURE — 94727 GAS DIL/WSHOT DETER LNG VOL: CPT

## 2022-05-24 ENCOUNTER — PATIENT MESSAGE (OUTPATIENT)
Dept: PULMONOLOGY | Facility: CLINIC | Age: 72
End: 2022-05-24

## 2022-05-30 ENCOUNTER — PATIENT MESSAGE (OUTPATIENT)
Dept: FAMILY MEDICINE | Facility: CLINIC | Age: 72
End: 2022-05-30

## 2022-05-30 DIAGNOSIS — M10.9 GOUT, UNSPECIFIED CAUSE, UNSPECIFIED CHRONICITY, UNSPECIFIED SITE: ICD-10-CM

## 2022-05-30 DIAGNOSIS — E03.2 HYPOTHYROIDISM DUE TO MEDICATION: ICD-10-CM

## 2022-05-30 DIAGNOSIS — I10 PRIMARY HYPERTENSION: Primary | ICD-10-CM

## 2022-05-30 DIAGNOSIS — I48.91 ATRIAL FIBRILLATION, UNSPECIFIED TYPE: ICD-10-CM

## 2022-05-31 ENCOUNTER — PATIENT MESSAGE (OUTPATIENT)
Dept: FAMILY MEDICINE | Facility: CLINIC | Age: 72
End: 2022-05-31

## 2022-06-03 LAB
ALBUMIN SERPL-MCNC: 4.1 G/DL (ref 3.6–5.1)
ALBUMIN/GLOB SERPL: 1.9 (CALC) (ref 1–2.5)
ALP SERPL-CCNC: 61 U/L (ref 35–144)
ALT SERPL-CCNC: 17 U/L (ref 9–46)
AST SERPL-CCNC: 17 U/L (ref 10–35)
BASOPHILS # BLD AUTO: 60 CELLS/UL (ref 0–200)
BASOPHILS NFR BLD AUTO: 0.8 %
BILIRUB SERPL-MCNC: 0.5 MG/DL (ref 0.2–1.2)
BUN SERPL-MCNC: 21 MG/DL (ref 7–25)
BUN/CREAT SERPL: NORMAL (CALC) (ref 6–22)
CALCIUM SERPL-MCNC: 9.2 MG/DL (ref 8.6–10.3)
CHLORIDE SERPL-SCNC: 106 MMOL/L (ref 98–110)
CO2 SERPL-SCNC: 30 MMOL/L (ref 20–32)
CREAT SERPL-MCNC: 1.13 MG/DL (ref 0.7–1.18)
EOSINOPHIL # BLD AUTO: 150 CELLS/UL (ref 15–500)
EOSINOPHIL NFR BLD AUTO: 2 %
ERYTHROCYTE [DISTWIDTH] IN BLOOD BY AUTOMATED COUNT: 13.2 % (ref 11–15)
GLOBULIN SER CALC-MCNC: 2.2 G/DL (CALC) (ref 1.9–3.7)
GLUCOSE SERPL-MCNC: 94 MG/DL (ref 65–99)
HCT VFR BLD AUTO: 43.4 % (ref 38.5–50)
HGB BLD-MCNC: 13.9 G/DL (ref 13.2–17.1)
LYMPHOCYTES # BLD AUTO: 1913 CELLS/UL (ref 850–3900)
LYMPHOCYTES NFR BLD AUTO: 25.5 %
MAGNESIUM SERPL-MCNC: 2.3 MG/DL (ref 1.5–2.5)
MCH RBC QN AUTO: 30.2 PG (ref 27–33)
MCHC RBC AUTO-ENTMCNC: 32 G/DL (ref 32–36)
MCV RBC AUTO: 94.3 FL (ref 80–100)
MONOCYTES # BLD AUTO: 728 CELLS/UL (ref 200–950)
MONOCYTES NFR BLD AUTO: 9.7 %
NEUTROPHILS # BLD AUTO: 4650 CELLS/UL (ref 1500–7800)
NEUTROPHILS NFR BLD AUTO: 62 %
PLATELET # BLD AUTO: 194 THOUSAND/UL (ref 140–400)
PMV BLD REES-ECKER: 11.4 FL (ref 7.5–12.5)
POTASSIUM SERPL-SCNC: 3.6 MMOL/L (ref 3.5–5.3)
PROT SERPL-MCNC: 6.3 G/DL (ref 6.1–8.1)
RBC # BLD AUTO: 4.6 MILLION/UL (ref 4.2–5.8)
SODIUM SERPL-SCNC: 142 MMOL/L (ref 135–146)
T4 FREE SERPL-MCNC: 1.2 NG/DL (ref 0.8–1.8)
TSH SERPL-ACNC: 4.28 MIU/L (ref 0.4–4.5)
WBC # BLD AUTO: 7.5 THOUSAND/UL (ref 3.8–10.8)

## 2022-06-06 ENCOUNTER — PROCEDURE VISIT (OUTPATIENT)
Dept: SLEEP MEDICINE | Facility: HOSPITAL | Age: 72
End: 2022-06-06
Attending: NURSE PRACTITIONER
Payer: MEDICARE

## 2022-06-06 ENCOUNTER — OFFICE VISIT (OUTPATIENT)
Dept: FAMILY MEDICINE | Facility: CLINIC | Age: 72
End: 2022-06-06
Payer: MEDICARE

## 2022-06-06 VITALS
HEIGHT: 68 IN | HEART RATE: 57 BPM | OXYGEN SATURATION: 97 % | DIASTOLIC BLOOD PRESSURE: 78 MMHG | RESPIRATION RATE: 18 BRPM | TEMPERATURE: 98 F | BODY MASS INDEX: 38.25 KG/M2 | SYSTOLIC BLOOD PRESSURE: 126 MMHG | WEIGHT: 252.38 LBS

## 2022-06-06 DIAGNOSIS — E03.2 HYPOTHYROIDISM DUE TO MEDICATION: Primary | ICD-10-CM

## 2022-06-06 DIAGNOSIS — E78.00 PURE HYPERCHOLESTEROLEMIA: ICD-10-CM

## 2022-06-06 DIAGNOSIS — F51.11 PRIMARY HYPERSOMNIA: ICD-10-CM

## 2022-06-06 DIAGNOSIS — R06.83 SNORES: ICD-10-CM

## 2022-06-06 DIAGNOSIS — I48.91 ATRIAL FIBRILLATION, UNSPECIFIED TYPE: ICD-10-CM

## 2022-06-06 DIAGNOSIS — I10 PRIMARY HYPERTENSION: ICD-10-CM

## 2022-06-06 DIAGNOSIS — E66.01 SEVERE OBESITY (BMI 35.0-39.9) WITH COMORBIDITY: ICD-10-CM

## 2022-06-06 DIAGNOSIS — R53.82 CHRONIC FATIGUE: ICD-10-CM

## 2022-06-06 DIAGNOSIS — M10.9 GOUT, UNSPECIFIED CAUSE, UNSPECIFIED CHRONICITY, UNSPECIFIED SITE: ICD-10-CM

## 2022-06-06 DIAGNOSIS — R79.89 ELEVATED TSH: ICD-10-CM

## 2022-06-06 PROCEDURE — 1126F AMNT PAIN NOTED NONE PRSNT: CPT | Mod: CPTII,S$GLB,, | Performed by: INTERNAL MEDICINE

## 2022-06-06 PROCEDURE — 3288F FALL RISK ASSESSMENT DOCD: CPT | Mod: CPTII,S$GLB,, | Performed by: INTERNAL MEDICINE

## 2022-06-06 PROCEDURE — 4010F PR ACE/ARB THEARPY RXD/TAKEN: ICD-10-PCS | Mod: CPTII,S$GLB,, | Performed by: INTERNAL MEDICINE

## 2022-06-06 PROCEDURE — 99214 OFFICE O/P EST MOD 30 MIN: CPT | Mod: S$GLB,,, | Performed by: INTERNAL MEDICINE

## 2022-06-06 PROCEDURE — 1101F PT FALLS ASSESS-DOCD LE1/YR: CPT | Mod: CPTII,S$GLB,, | Performed by: INTERNAL MEDICINE

## 2022-06-06 PROCEDURE — 1126F PR PAIN SEVERITY QUANTIFIED, NO PAIN PRESENT: ICD-10-PCS | Mod: CPTII,S$GLB,, | Performed by: INTERNAL MEDICINE

## 2022-06-06 PROCEDURE — 3074F SYST BP LT 130 MM HG: CPT | Mod: CPTII,S$GLB,, | Performed by: INTERNAL MEDICINE

## 2022-06-06 PROCEDURE — 1159F MED LIST DOCD IN RCRD: CPT | Mod: CPTII,S$GLB,, | Performed by: INTERNAL MEDICINE

## 2022-06-06 PROCEDURE — 99214 PR OFFICE/OUTPT VISIT, EST, LEVL IV, 30-39 MIN: ICD-10-PCS | Mod: S$GLB,,, | Performed by: INTERNAL MEDICINE

## 2022-06-06 PROCEDURE — 3288F PR FALLS RISK ASSESSMENT DOCUMENTED: ICD-10-PCS | Mod: CPTII,S$GLB,, | Performed by: INTERNAL MEDICINE

## 2022-06-06 PROCEDURE — 1159F PR MEDICATION LIST DOCUMENTED IN MEDICAL RECORD: ICD-10-PCS | Mod: CPTII,S$GLB,, | Performed by: INTERNAL MEDICINE

## 2022-06-06 PROCEDURE — 3008F PR BODY MASS INDEX (BMI) DOCUMENTED: ICD-10-PCS | Mod: CPTII,S$GLB,, | Performed by: INTERNAL MEDICINE

## 2022-06-06 PROCEDURE — 3008F BODY MASS INDEX DOCD: CPT | Mod: CPTII,S$GLB,, | Performed by: INTERNAL MEDICINE

## 2022-06-06 PROCEDURE — 3074F PR MOST RECENT SYSTOLIC BLOOD PRESSURE < 130 MM HG: ICD-10-PCS | Mod: CPTII,S$GLB,, | Performed by: INTERNAL MEDICINE

## 2022-06-06 PROCEDURE — 3078F DIAST BP <80 MM HG: CPT | Mod: CPTII,S$GLB,, | Performed by: INTERNAL MEDICINE

## 2022-06-06 PROCEDURE — 3078F PR MOST RECENT DIASTOLIC BLOOD PRESSURE < 80 MM HG: ICD-10-PCS | Mod: CPTII,S$GLB,, | Performed by: INTERNAL MEDICINE

## 2022-06-06 PROCEDURE — 1101F PR PT FALLS ASSESS DOC 0-1 FALLS W/OUT INJ PAST YR: ICD-10-PCS | Mod: CPTII,S$GLB,, | Performed by: INTERNAL MEDICINE

## 2022-06-06 PROCEDURE — 4010F ACE/ARB THERAPY RXD/TAKEN: CPT | Mod: CPTII,S$GLB,, | Performed by: INTERNAL MEDICINE

## 2022-06-06 PROCEDURE — 95810 POLYSOM 6/> YRS 4/> PARAM: CPT

## 2022-06-06 RX ORDER — LEVOTHYROXINE SODIUM 88 UG/1
88 TABLET ORAL
Qty: 30 TABLET | Refills: 3 | Status: SHIPPED | OUTPATIENT
Start: 2022-06-06 | End: 2022-10-03 | Stop reason: SDUPTHER

## 2022-06-06 RX ORDER — ALLOPURINOL 300 MG/1
300 TABLET ORAL DAILY
Qty: 90 TABLET | Refills: 1 | Status: SHIPPED | OUTPATIENT
Start: 2022-06-06 | End: 2022-11-07 | Stop reason: SDUPTHER

## 2022-06-06 RX ORDER — ROSUVASTATIN CALCIUM 10 MG/1
10 TABLET, COATED ORAL EVERY OTHER DAY
Qty: 90 TABLET | Refills: 1 | Status: SHIPPED | OUTPATIENT
Start: 2022-06-06 | End: 2022-11-07 | Stop reason: SDUPTHER

## 2022-06-06 RX ORDER — MAGNESIUM GLUCONATE 27.5 (500)
1 TABLET ORAL ONCE
COMMUNITY

## 2022-06-06 RX ORDER — SACUBITRIL AND VALSARTAN 49; 51 MG/1; MG/1
1 TABLET, FILM COATED ORAL 2 TIMES DAILY
COMMUNITY
Start: 2022-05-05 | End: 2024-03-27

## 2022-06-06 NOTE — PROGRESS NOTES
Subjective:       Patient ID: Nico Teague is a 71 y.o. male.    Chief Complaint: Follow-up, Hypothyroidism, and Hypertension    Here for routine follow up; last visit note, most recent available labs, and health maintenance topics reviewed.   Followed by Cards for Afib and  Is on amiodarone, eliquis, metoprolol.  Recently started magnesium; has noted decrease frequency of PVCs.  No further gout flares or issues with dizziness.   Scheduled for sleep study night for Pulm.      Hypertension  This is a chronic problem. The problem is controlled. Pertinent negatives include no anxiety, chest pain, headaches, neck pain, palpitations, peripheral edema or shortness of breath. Past treatments include angiotensin blockers, calcium channel blockers, alpha 1 blockers, diuretics and beta blockers. The current treatment provides significant improvement. There are no compliance problems.  Identifiable causes of hypertension include a thyroid problem.   Thyroid Problem  Presents for follow-up visit. Patient reports no anxiety, cold intolerance, constipation, depressed mood, diaphoresis, diarrhea, fatigue, heat intolerance, palpitations or tremors. The symptoms have been stable. Past treatments include nothing. His past medical history is significant for hyperlipidemia. Risk factors include amiodarone use.     Review of Systems   Constitutional: Negative.  Negative for activity change, appetite change, chills, diaphoresis, fatigue, fever and unexpected weight change.   HENT: Negative.  Negative for congestion, ear discharge, ear pain, hearing loss, nosebleeds, postnasal drip, rhinorrhea, sinus pressure, sinus pain, sneezing, sore throat, tinnitus, trouble swallowing and voice change.    Eyes: Negative.  Negative for photophobia, pain, discharge, redness, itching and visual disturbance.   Respiratory: Negative.  Negative for apnea, cough, choking, chest tightness, shortness of breath and wheezing.    Cardiovascular: Negative.   Negative for chest pain, palpitations and leg swelling.   Gastrointestinal: Negative.  Negative for abdominal distention, abdominal pain, blood in stool, constipation, diarrhea, nausea and vomiting.   Endocrine: Negative.  Negative for cold intolerance, heat intolerance, polydipsia and polyuria.   Genitourinary: Negative.  Negative for decreased urine volume, difficulty urinating, dysuria, enuresis, flank pain, frequency, genital sores, hematuria, penile discharge, penile pain, scrotal swelling, testicular pain and urgency.   Musculoskeletal: Positive for back pain. Negative for arthralgias, gait problem, joint swelling, myalgias, neck pain and neck stiffness.   Skin: Negative.  Negative for rash and wound.   Allergic/Immunologic: Negative.  Negative for environmental allergies, food allergies and immunocompromised state.   Neurological: Negative.  Negative for dizziness, tremors, seizures, syncope, facial asymmetry, speech difficulty, weakness, light-headedness, numbness and headaches.   Hematological: Negative.  Negative for adenopathy. Does not bruise/bleed easily.   Psychiatric/Behavioral: Negative.  Negative for confusion, decreased concentration, hallucinations, self-injury, sleep disturbance and suicidal ideas. The patient is not nervous/anxious.        Past Medical History:   Diagnosis Date    Allergy     Arthritis     Asthma     Atrial fibrillation     Basal cell carcinoma     Fever blister     Hypertension     Joint pain     Melanoma     PVC (premature ventricular contraction)     Skin disease     Squamous cell carcinoma       Past Surgical History:   Procedure Laterality Date    CARDIAC CATHETERIZATION  05/25/2021    EYE SURGERY Left 12/03/2019    EYE SURGERY Right 01/28/2020    LIPOMA RESECTION      NOSE SURGERY      SHOULDER ARTHROSCOPY      SKIN CANCER EXCISION      Moh's x 2    SKIN GRAFT         Family History   Problem Relation Age of Onset    Cancer Mother         esophageal     Heart disease Father     Hypertension Father     Arthritis Paternal Grandmother        Social History     Socioeconomic History    Marital status:    Occupational History    Occupation: retired captain    Tobacco Use    Smoking status: Former Smoker     Packs/day: 2.00     Years: 20.00     Pack years: 40.00     Types: Cigarettes     Start date: 1972     Quit date: 1988     Years since quittin.7    Smokeless tobacco: Never Used   Substance and Sexual Activity    Alcohol use: No    Drug use: No    Sexual activity: Never   Social History Narrative    Live with wife     Social Determinants of Health     Financial Resource Strain: Low Risk     Difficulty of Paying Living Expenses: Not hard at all   Food Insecurity: No Food Insecurity    Worried About Running Out of Food in the Last Year: Never true    Ran Out of Food in the Last Year: Never true   Transportation Needs: No Transportation Needs    Lack of Transportation (Medical): No    Lack of Transportation (Non-Medical): No   Physical Activity: Insufficiently Active    Days of Exercise per Week: 3 days    Minutes of Exercise per Session: 20 min   Stress: No Stress Concern Present    Feeling of Stress : Only a little   Social Connections: Unknown    Frequency of Communication with Friends and Family: More than three times a week    Frequency of Social Gatherings with Friends and Family: Once a week    Active Member of Clubs or Organizations: Yes    Attends Club or Organization Meetings: More than 4 times per year    Marital Status:        Current Outpatient Medications   Medication Sig Dispense Refill    amiodarone (PACERONE) 200 MG Tab Take 1 tablet by mouth 2 (two) times a day.      apixaban (ELIQUIS) 5 mg Tab Take 5 mg by mouth 2 (two) times daily.      ascorbic acid, vitamin C, (VITAMIN C) 1000 MG tablet Take 1,000 mg by mouth once daily.      aspirin 81 MG Chew Take 81 mg by mouth once daily.       cholecalciferol, vitamin D3, (VITAMIN D3) 25 mcg (1,000 unit) capsule Take 1,000 Units by mouth once daily.      co-enzyme Q-10 50 mg capsule Take 50 mg by mouth once daily.      diclofenac sodium (VOLTAREN) 1 % Gel Apply 2 g topically once daily. 100 g 3    fluticasone propionate (FLONASE) 50 mcg/actuation nasal spray 1 spray (50 mcg total) by Each Nostril route once daily. 48 g 3    furosemide (LASIX) 40 MG tablet Take 20 mg by mouth once daily.      Lactobacillus rhamnosus GG (CULTURELLE) 10 billion cell capsule Take 1 capsule by mouth.      meclizine (ANTIVERT) 12.5 mg tablet Take 1 tablet (12.5 mg total) by mouth 3 (three) times daily as needed for Dizziness. 30 tablet 1    melatonin 10 mg Tab Take 1 tablet by mouth nightly as needed.      montelukast (SINGULAIR) 10 mg tablet TAKE 1 TABLET (10 MG TOTAL) BY MOUTH EVERY EVENING. 90 tablet 6    multivitamin (THERAGRAN) per tablet Take 1 tablet by mouth.      potassium chloride (MICRO-K) 10 MEQ CpSR       tamsulosin (FLOMAX) 0.4 mg Cap Take 1 tablet by mouth Daily.      triamterene-hydrochlorothiazide 37.5-25 mg (DYAZIDE) 37.5-25 mg per capsule TAKE 1 CAPSULE EVERY MORNING 90 capsule 1    vitamin E 1000 UNIT capsule Take 1,000 Units by mouth once daily.      allopurinoL (ZYLOPRIM) 300 MG tablet Take 1 tablet (300 mg total) by mouth once daily. 90 tablet 1    ENTRESTO 49-51 mg per tablet Take 1 tablet by mouth 2 (two) times a day.      fluticasone furoate-vilanteroL (BREO ELLIPTA) 100-25 mcg/dose diskus inhaler Inhale 1 puff into the lungs once daily. Controller Rinse after you use it (Patient not taking: Reported on 6/6/2022) 3 each 4    levothyroxine (SYNTHROID) 88 MCG tablet Take 1 tablet (88 mcg total) by mouth before breakfast. 30 tablet 3    magnesium gluconate 27.5 mg magne- sium (500 mg) Tab Take by mouth once.      rosuvastatin (CRESTOR) 10 MG tablet Take 1 tablet (10 mg total) by mouth every other day. 90 tablet 1     No current  "facility-administered medications for this visit.       Review of patient's allergies indicates:   Allergen Reactions    Bactrim [sulfamethoxazole-trimethoprim]      HIVES/ RASH    Green tea (rashida sinensis) Hives     Pt is has allergies to black tea. Not an option under search.     Objective:      Blood pressure 126/78, pulse (!) 57, temperature 98.1 °F (36.7 °C), resp. rate 18, height 5' 8" (1.727 m), weight 114.5 kg (252 lb 6.4 oz), SpO2 97 %. Body mass index is 38.38 kg/m².   Physical Exam  Vitals and nursing note reviewed.   Constitutional:       General: He is not in acute distress.     Appearance: He is well-developed. He is obese. He is not ill-appearing, toxic-appearing or diaphoretic.   HENT:      Head: Normocephalic and atraumatic.   Eyes:      General: No scleral icterus.        Right eye: No discharge.         Left eye: No discharge.      Conjunctiva/sclera: Conjunctivae normal.   Neck:      Vascular: No carotid bruit.   Cardiovascular:      Rate and Rhythm: Normal rate and regular rhythm.      Heart sounds: Normal heart sounds. No murmur heard.  Pulmonary:      Effort: Pulmonary effort is normal. No respiratory distress.      Breath sounds: Normal breath sounds. No decreased breath sounds, wheezing, rhonchi or rales.   Abdominal:      General: There is no distension.      Palpations: Abdomen is soft.      Tenderness: There is no abdominal tenderness. There is no guarding or rebound.   Musculoskeletal:      Right lower le+ Edema present.      Left lower le+ Edema present.   Skin:     General: Skin is warm and dry.   Neurological:      Mental Status: He is alert.      Motor: No tremor.   Psychiatric:         Mood and Affect: Mood normal.         Speech: Speech normal.         Behavior: Behavior normal.           Patient Message on 2022   Component Date Value Ref Range Status    Glucose 2022 94  65 - 99 mg/dL Final    Comment:               Fasting reference interval         BUN " 06/02/2022 21  7 - 25 mg/dL Final    Creatinine 06/02/2022 1.13  0.70 - 1.18 mg/dL Final    Comment: For patients >49 years of age, the reference limit  for Creatinine is approximately 13% higher for people  identified as -American.         eGFR if non African American 06/02/2022 65  > OR = 60 mL/min/1.73m2 Final    eGFR if  06/02/2022 76  > OR = 60 mL/min/1.73m2 Final    BUN/Creatinine Ratio 06/02/2022 NOT APPLICABLE  6 - 22 (calc) Final    Sodium 06/02/2022 142  135 - 146 mmol/L Final    Potassium 06/02/2022 3.6  3.5 - 5.3 mmol/L Final    Chloride 06/02/2022 106  98 - 110 mmol/L Final    CO2 06/02/2022 30  20 - 32 mmol/L Final    Calcium 06/02/2022 9.2  8.6 - 10.3 mg/dL Final    Total Protein 06/02/2022 6.3  6.1 - 8.1 g/dL Final    Albumin 06/02/2022 4.1  3.6 - 5.1 g/dL Final    Globulin, Total 06/02/2022 2.2  1.9 - 3.7 g/dL (calc) Final    Albumin/Globulin Ratio 06/02/2022 1.9  1.0 - 2.5 (calc) Final    Total Bilirubin 06/02/2022 0.5  0.2 - 1.2 mg/dL Final    Alkaline Phosphatase 06/02/2022 61  35 - 144 U/L Final    AST 06/02/2022 17  10 - 35 U/L Final    ALT 06/02/2022 17  9 - 46 U/L Final    WBC 06/02/2022 7.5  3.8 - 10.8 Thousand/uL Final    RBC 06/02/2022 4.60  4.20 - 5.80 Million/uL Final    Hemoglobin 06/02/2022 13.9  13.2 - 17.1 g/dL Final    Hematocrit 06/02/2022 43.4  38.5 - 50.0 % Final    MCV 06/02/2022 94.3  80.0 - 100.0 fL Final    MCH 06/02/2022 30.2  27.0 - 33.0 pg Final    MCHC 06/02/2022 32.0  32.0 - 36.0 g/dL Final    RDW 06/02/2022 13.2  11.0 - 15.0 % Final    Platelets 06/02/2022 194  140 - 400 Thousand/uL Final    MPV 06/02/2022 11.4  7.5 - 12.5 fL Final    Neutrophils, Abs 06/02/2022 4,650  1,500 - 7,800 cells/uL Final    Lymph # 06/02/2022 1,913  850 - 3,900 cells/uL Final    Mono # 06/02/2022 728  200 - 950 cells/uL Final    Eos # 06/02/2022 150  15 - 500 cells/uL Final    Baso # 06/02/2022 60  0 - 200 cells/uL Final    Neutrophils  Relative 06/02/2022 62  % Final    Lymph % 06/02/2022 25.5  % Final    Mono % 06/02/2022 9.7  % Final    Eosinophil % 06/02/2022 2.0  % Final    Basophil % 06/02/2022 0.8  % Final    Magnesium 06/02/2022 2.3  1.5 - 2.5 mg/dL Final    TSH 06/02/2022 4.28  0.40 - 4.50 mIU/L Final    T4, Free 06/02/2022 1.2  0.8 - 1.8 ng/dL Final   ]  Assessment:       1. Hypothyroidism due to medication    2. Primary hypertension    3. Pure hypercholesterolemia    4. Severe obesity (BMI 35.0-39.9) with comorbidity    5. Elevated TSH    6. Gout, unspecified cause, unspecified chronicity, unspecified site        Plan:       Nico was seen today for follow-up, hypothyroidism and hypertension.    Diagnoses and all orders for this visit:    Hypothyroidism due to medication  Comments:  TSH towards ULN; would likely benefit from higher dose to get TSH 1-2  Orders:  -     levothyroxine (SYNTHROID) 88 MCG tablet; Take 1 tablet (88 mcg total) by mouth before breakfast.  -     TSH; Future  -     TSH    Primary hypertension    Pure hypercholesterolemia  -     rosuvastatin (CRESTOR) 10 MG tablet; Take 1 tablet (10 mg total) by mouth every other day.    Severe obesity (BMI 35.0-39.9) with comorbidity    Elevated TSH    Gout, unspecified cause, unspecified chronicity, unspecified site  -     allopurinoL (ZYLOPRIM) 300 MG tablet; Take 1 tablet (300 mg total) by mouth once daily.

## 2022-06-07 NOTE — PATIENT INSTRUCTIONS
A diagnostic sleep study was performed.  Patient was told that he would be contacted with results.

## 2022-06-09 ENCOUNTER — TELEPHONE (OUTPATIENT)
Dept: PULMONOLOGY | Facility: CLINIC | Age: 72
End: 2022-06-09

## 2022-06-09 ENCOUNTER — PATIENT MESSAGE (OUTPATIENT)
Dept: PULMONOLOGY | Facility: CLINIC | Age: 72
End: 2022-06-09

## 2022-06-09 DIAGNOSIS — G47.33 OSA (OBSTRUCTIVE SLEEP APNEA): Primary | ICD-10-CM

## 2022-06-17 ENCOUNTER — HOSPITAL ENCOUNTER (EMERGENCY)
Facility: HOSPITAL | Age: 72
Discharge: HOME | End: 2022-06-18
Attending: EMERGENCY MEDICINE
Payer: MEDICARE

## 2022-06-17 ENCOUNTER — PATIENT MESSAGE (OUTPATIENT)
Dept: PULMONOLOGY | Facility: CLINIC | Age: 72
End: 2022-06-17

## 2022-06-17 ENCOUNTER — PATIENT MESSAGE (OUTPATIENT)
Dept: FAMILY MEDICINE | Facility: CLINIC | Age: 72
End: 2022-06-17

## 2022-06-17 DIAGNOSIS — R06.02 SHORTNESS OF BREATH: ICD-10-CM

## 2022-06-17 DIAGNOSIS — U07.1 COVID: Primary | ICD-10-CM

## 2022-06-17 LAB
ALBUMIN SERPL BCP-MCNC: 4.2 G/DL (ref 3.5–5.2)
ALP SERPL-CCNC: 62 U/L (ref 55–135)
ALT SERPL W/O P-5'-P-CCNC: 28 U/L (ref 10–44)
ANION GAP SERPL CALC-SCNC: 7 MMOL/L (ref 8–16)
AST SERPL-CCNC: 25 U/L (ref 10–40)
BASOPHILS # BLD AUTO: 0.04 K/UL (ref 0–0.2)
BASOPHILS NFR BLD: 0.4 % (ref 0–1.9)
BILIRUB SERPL-MCNC: 0.8 MG/DL (ref 0.1–1)
BUN SERPL-MCNC: 12 MG/DL (ref 8–23)
CALCIUM SERPL-MCNC: 8.7 MG/DL (ref 8.7–10.5)
CHLORIDE SERPL-SCNC: 99 MMOL/L (ref 95–110)
CO2 SERPL-SCNC: 28 MMOL/L (ref 23–29)
CREAT SERPL-MCNC: 1.2 MG/DL (ref 0.5–1.4)
DIFFERENTIAL METHOD: ABNORMAL
EOSINOPHIL # BLD AUTO: 0 K/UL (ref 0–0.5)
EOSINOPHIL NFR BLD: 0.2 % (ref 0–8)
ERYTHROCYTE [DISTWIDTH] IN BLOOD BY AUTOMATED COUNT: 14.2 % (ref 11.5–14.5)
EST. GFR  (AFRICAN AMERICAN): >60 ML/MIN/1.73 M^2
EST. GFR  (NON AFRICAN AMERICAN): >60 ML/MIN/1.73 M^2
GLUCOSE SERPL-MCNC: 112 MG/DL (ref 70–110)
HCT VFR BLD AUTO: 46 % (ref 40–54)
HGB BLD-MCNC: 14.6 G/DL (ref 14–18)
IMM GRANULOCYTES # BLD AUTO: 0.04 K/UL (ref 0–0.04)
IMM GRANULOCYTES NFR BLD AUTO: 0.4 % (ref 0–0.5)
LYMPHOCYTES # BLD AUTO: 0.6 K/UL (ref 1–4.8)
LYMPHOCYTES NFR BLD: 6.5 % (ref 18–48)
MCH RBC QN AUTO: 30 PG (ref 27–31)
MCHC RBC AUTO-ENTMCNC: 31.7 G/DL (ref 32–36)
MCV RBC AUTO: 95 FL (ref 82–98)
MONOCYTES # BLD AUTO: 1.3 K/UL (ref 0.3–1)
MONOCYTES NFR BLD: 13.1 % (ref 4–15)
NEUTROPHILS # BLD AUTO: 7.6 K/UL (ref 1.8–7.7)
NEUTROPHILS NFR BLD: 79.4 % (ref 38–73)
NRBC BLD-RTO: 0 /100 WBC
PLATELET # BLD AUTO: 174 K/UL (ref 150–450)
PMV BLD AUTO: 10.6 FL (ref 9.2–12.9)
POTASSIUM SERPL-SCNC: 3.7 MMOL/L (ref 3.5–5.1)
PROT SERPL-MCNC: 7.2 G/DL (ref 6–8.4)
RBC # BLD AUTO: 4.86 M/UL (ref 4.6–6.2)
SARS-COV-2 RDRP RESP QL NAA+PROBE: POSITIVE
SODIUM SERPL-SCNC: 134 MMOL/L (ref 136–145)
WBC # BLD AUTO: 9.6 K/UL (ref 3.9–12.7)

## 2022-06-17 PROCEDURE — 63600175 PHARM REV CODE 636 W HCPCS: Performed by: EMERGENCY MEDICINE

## 2022-06-17 PROCEDURE — 93010 EKG 12-LEAD: ICD-10-PCS | Mod: ,,, | Performed by: GENERAL PRACTICE

## 2022-06-17 PROCEDURE — 93005 ELECTROCARDIOGRAM TRACING: CPT | Performed by: GENERAL PRACTICE

## 2022-06-17 PROCEDURE — 25000003 PHARM REV CODE 250: Performed by: EMERGENCY MEDICINE

## 2022-06-17 PROCEDURE — 80053 COMPREHEN METABOLIC PANEL: CPT | Performed by: STUDENT IN AN ORGANIZED HEALTH CARE EDUCATION/TRAINING PROGRAM

## 2022-06-17 PROCEDURE — 96375 TX/PRO/DX INJ NEW DRUG ADDON: CPT

## 2022-06-17 PROCEDURE — 85025 COMPLETE CBC W/AUTO DIFF WBC: CPT | Performed by: STUDENT IN AN ORGANIZED HEALTH CARE EDUCATION/TRAINING PROGRAM

## 2022-06-17 PROCEDURE — U0002 COVID-19 LAB TEST NON-CDC: HCPCS | Performed by: STUDENT IN AN ORGANIZED HEALTH CARE EDUCATION/TRAINING PROGRAM

## 2022-06-17 PROCEDURE — 93010 ELECTROCARDIOGRAM REPORT: CPT | Mod: ,,, | Performed by: GENERAL PRACTICE

## 2022-06-17 PROCEDURE — 96361 HYDRATE IV INFUSION ADD-ON: CPT

## 2022-06-17 PROCEDURE — 96374 THER/PROPH/DIAG INJ IV PUSH: CPT

## 2022-06-17 PROCEDURE — 99284 EMERGENCY DEPT VISIT MOD MDM: CPT | Mod: 25

## 2022-06-17 RX ORDER — ONDANSETRON 2 MG/ML
4 INJECTION INTRAMUSCULAR; INTRAVENOUS
Status: COMPLETED | OUTPATIENT
Start: 2022-06-17 | End: 2022-06-17

## 2022-06-17 RX ORDER — SODIUM CHLORIDE 9 MG/ML
500 INJECTION, SOLUTION INTRAVENOUS
Status: COMPLETED | OUTPATIENT
Start: 2022-06-17 | End: 2022-06-18

## 2022-06-17 RX ORDER — BEBTELOVIMAB 87.5 MG/ML
175 INJECTION, SOLUTION INTRAVENOUS
Status: COMPLETED | OUTPATIENT
Start: 2022-06-17 | End: 2022-06-18

## 2022-06-17 RX ORDER — ACETAMINOPHEN 500 MG
1000 TABLET ORAL
Status: COMPLETED | OUTPATIENT
Start: 2022-06-17 | End: 2022-06-17

## 2022-06-17 RX ADMIN — SODIUM CHLORIDE 500 ML: 0.9 INJECTION, SOLUTION INTRAVENOUS at 10:06

## 2022-06-17 RX ADMIN — ONDANSETRON 4 MG: 2 INJECTION INTRAMUSCULAR; INTRAVENOUS at 10:06

## 2022-06-17 RX ADMIN — ACETAMINOPHEN 1000 MG: 500 TABLET, FILM COATED ORAL at 10:06

## 2022-06-17 NOTE — FIRST PROVIDER EVALUATION
"Medical screening exam completed.  I have conducted a focused provider triage encounter, findings are as follows:    Brief history of present illness:  Covid+, multiple comorbidities, SOB, Cough, Fever    Vitals:    06/17/22 1758   BP: (!) 164/85   BP Location: Left arm   Patient Position: Sitting   Pulse: 75   Resp: 16   Temp: 99.1 °F (37.3 °C)   TempSrc: Oral   SpO2: 95%   Weight: 111.1 kg (245 lb)   Height: 5' 8" (1.727 m)       Pertinent physical exam:  cta x4, rrr, -m/g/r    Brief workup plan:  Sob w/u -  Labs, cxr, ekg    Preliminary workup initiated; this workup will be continued and followed by the physician or advanced practice provider that is assigned to the patient when roomed.  "

## 2022-06-18 VITALS
SYSTOLIC BLOOD PRESSURE: 137 MMHG | WEIGHT: 245 LBS | HEART RATE: 63 BPM | OXYGEN SATURATION: 95 % | DIASTOLIC BLOOD PRESSURE: 64 MMHG | RESPIRATION RATE: 16 BRPM | BODY MASS INDEX: 37.13 KG/M2 | TEMPERATURE: 99 F | HEIGHT: 68 IN

## 2022-06-18 DIAGNOSIS — U07.1 COVID-19 VIRUS DETECTED: ICD-10-CM

## 2022-06-18 PROCEDURE — 63600175 PHARM REV CODE 636 W HCPCS: Performed by: EMERGENCY MEDICINE

## 2022-06-18 PROCEDURE — M0222 HC IV INJECTION, BEBTELOVIMAB, INCL POST ADMIN MONIT: HCPCS | Performed by: EMERGENCY MEDICINE

## 2022-06-18 RX ADMIN — BEBTELOVIMAB 175 MG: 87.5 INJECTION, SOLUTION INTRAVENOUS at 12:06

## 2022-06-18 NOTE — DISCHARGE INSTRUCTIONS
Rest, vitamins, zinc, return for pulse ox 92% or less.  Please return for lost appetite high fever chills weakness.

## 2022-06-18 NOTE — ED PROVIDER NOTES
Encounter Date: 2022       History     Chief Complaint   Patient presents with    Shortness of Breath    Headache     Pt presents to ED with c/o sob, HA, fever and chills that started earlier today. Took home Covid test and was positive. Did not take anything for fever or pain PTA     Chief complaint is COVID symptoms including headache fever body aches stomach ache neck pain.  He has a clear productive cough.  Symptoms began 24 hours ago.  He is on multiple medicines including amiodarone and Flomax which a contraindications for Paxlovid.  He is a good candidate for the monoclonal antibodies.  He has not taken his night medicines as of yet.        Review of patient's allergies indicates:   Allergen Reactions    Bactrim [sulfamethoxazole-trimethoprim]      HIVES/ RASH    Green tea (rashida sinensis) Hives     Pt is has allergies to black tea. Not an option under search.     Past Medical History:   Diagnosis Date    Allergy     Arthritis     Asthma     Atrial fibrillation     Basal cell carcinoma     Fever blister     Hypertension     Joint pain     Melanoma     PVC (premature ventricular contraction)     Skin disease     Squamous cell carcinoma      Past Surgical History:   Procedure Laterality Date    CARDIAC CATHETERIZATION  2021    EYE SURGERY Left 2019    EYE SURGERY Right 2020    LIPOMA RESECTION      NOSE SURGERY      SHOULDER ARTHROSCOPY      SKIN CANCER EXCISION      Moh's x 2    SKIN GRAFT       Family History   Problem Relation Age of Onset    Cancer Mother         esophageal    Heart disease Father     Hypertension Father     Arthritis Paternal Grandmother      Social History     Tobacco Use    Smoking status: Former Smoker     Packs/day: 2.00     Years: 20.00     Pack years: 40.00     Types: Cigarettes     Start date: 1972     Quit date: 1988     Years since quittin.8    Smokeless tobacco: Never Used   Substance Use Topics    Alcohol  use: No    Drug use: No     Review of Systems   Constitutional: Negative for chills and fever.   HENT: Negative for ear pain, rhinorrhea and sore throat.    Eyes: Negative for pain and visual disturbance.   Respiratory: Negative for cough and shortness of breath.    Cardiovascular: Negative for chest pain and palpitations.   Gastrointestinal: Positive for nausea. Negative for abdominal pain, constipation, diarrhea and vomiting.   Genitourinary: Negative for dysuria, frequency, hematuria and urgency.   Musculoskeletal: Positive for arthralgias. Negative for back pain, joint swelling and myalgias.   Skin: Negative for rash.   Neurological: Positive for headaches. Negative for dizziness, seizures and weakness.   Psychiatric/Behavioral: Negative for dysphoric mood. The patient is not nervous/anxious.        Physical Exam     Initial Vitals [06/17/22 1758]   BP Pulse Resp Temp SpO2   (!) 164/85 75 16 99.1 °F (37.3 °C) 95 %      MAP       --         Physical Exam    Nursing note and vitals reviewed.  Constitutional: He appears well-developed and well-nourished.   HENT:   Head: Normocephalic and atraumatic.   Nose: Nose normal.   Eyes: Conjunctivae, EOM and lids are normal. Pupils are equal, round, and reactive to light.   Neck: Trachea normal. Neck supple. No thyroid mass present.   Cardiovascular: Normal rate, regular rhythm and normal heart sounds.   Pulmonary/Chest: Breath sounds normal. No respiratory distress.   Abdominal: Abdomen is soft. There is no abdominal tenderness.   Musculoskeletal:         General: Normal range of motion.      Cervical back: Neck supple.     Neurological: He is alert and oriented to person, place, and time. He has normal strength and normal reflexes. No cranial nerve deficit or sensory deficit.   Skin: Skin is warm and dry.   Psychiatric: He has a normal mood and affect. His speech is normal and behavior is normal. Judgment and thought content normal.         ED Course   Procedures  Labs  Reviewed   CBC W/ AUTO DIFFERENTIAL - Abnormal; Notable for the following components:       Result Value    MCHC 31.7 (*)     Lymph # 0.6 (*)     Mono # 1.3 (*)     Gran % 79.4 (*)     Lymph % 6.5 (*)     All other components within normal limits   COMPREHENSIVE METABOLIC PANEL - Abnormal; Notable for the following components:    Sodium 134 (*)     Glucose 112 (*)     Anion Gap 7 (*)     All other components within normal limits   SARS-COV-2 RNA AMPLIFICATION, QUAL - Abnormal; Notable for the following components:    SARS-CoV-2 RNA, Amplification, Qual Positive (*)     All other components within normal limits    Narrative:     [facility]  result(s) called and verbal readback obtained from [facility]  by FF1 06/17/2022 22:21        ECG Results          EKG 12-lead (Final result)  Result time 06/20/22 21:23:01    Final result by Interface, Lab In [facility] (06/20/22 21:23:01)                 Narrative:    Test Reason : R06.02,    Vent. Rate : 071 BPM     Atrial Rate : 071 BPM     P-R Int : 186 ms          QRS Dur : 124 ms      QT Int : 426 ms       P-R-T Axes : 075 -48 052 degrees     QTc Int : 462 ms    Normal sinus rhythm  Left anterior fascicular block  Abnormal ECG  When compared with ECG of 19-SEP-1996 15:59,  Left anterior fascicular block is now Present  QT has lengthened  Confirmed by Ca BLAIR, Saleem SANON ([id number]) on 6/20/2022 9:22:50 PM    Referred By: [email or web address]   SELF           Confirmed By:Saleem Penaloza MD                            Imaging Results          X-Ray Chest 1 View (Final result)  Result time 06/17/22 19:21:44   Procedure changed from X-Ray Chest PA And Lateral     Final result by Minh Pham MD (06/17/22 19:21:44)                 Narrative:    CLINICAL HISTORY:  71 years (1950) Male dyspnea dyspnea    TECHNIQUE:  Portable AP radiograph the chest.    COMPARISON:  Radiograph from April 1, 2021.    FINDINGS:  The lungs are clear. Costophrenic angles are seen without effusion. No  pneumothorax is identified. There is mild cardiomegaly with perihilar pulmonary vascular prominence. Calcified plaques are seen at the aortic arch. Osseous structures appear within normal limits. The visualized upper abdomen is unremarkable.    IMPRESSION:  No acute cardiac or pulmonary process.                  .            Electronically signed by:  Minh Pham MD  6/17/2022 7:21 PM CDT Workstation: [id number]                               Medications   ondansetron injection 4 mg (4 mg Intravenous Given 6/17/22 2231)   acetaminophen tablet 1,000 mg (1,000 mg Oral Given 6/17/22 2230)   0.9%  NaCl infusion (0 mLs Intravenous Stopped 6/18/22 0011)   bebtelovimab (EUA) 175 mg/2 mL (87.5 mg/mL) injection 175 mg (175 mg Intravenous Given 6/18/22 0003)     Medical Decision Making:   ED Management:  The patient was given monoclonal antibodies will be discharged with COVID instructions.  He was stable for discharge and discharged home with his wife                      Clinical Impression:   Final diagnoses:  [R06.02] Shortness of breath  [U07.1] COVID (Primary)          ED Disposition Condition    Discharge Stable        ED Prescriptions     None        Follow-up Information     Follow up With Specialties Details Why Contact Info    Gelacio Cantu Jr., MD Internal Medicine Schedule an appointment as soon as possible for a visit in 1 week  [address] [place] [address]  [phone]             Kamari Awan MD  06/21/22 [id number]

## 2022-06-19 ENCOUNTER — PATIENT MESSAGE (OUTPATIENT)
Dept: PULMONOLOGY | Facility: CLINIC | Age: 72
End: 2022-06-19

## 2022-06-19 ENCOUNTER — PATIENT MESSAGE (OUTPATIENT)
Dept: FAMILY MEDICINE | Facility: CLINIC | Age: 72
End: 2022-06-19

## 2022-06-20 ENCOUNTER — PATIENT MESSAGE (OUTPATIENT)
Dept: FAMILY MEDICINE | Facility: CLINIC | Age: 72
End: 2022-06-20

## 2022-06-23 ENCOUNTER — PATIENT OUTREACH (OUTPATIENT)
Dept: INFECTIOUS DISEASES | Facility: HOSPITAL | Age: 72
End: 2022-06-23
Payer: MEDICARE

## 2022-06-24 ENCOUNTER — TELEPHONE (OUTPATIENT)
Dept: FAMILY MEDICINE | Facility: CLINIC | Age: 72
End: 2022-06-24

## 2022-06-24 NOTE — TELEPHONE ENCOUNTER
Pt's wife called and said she brought him to ER last Friday and dx with Covid. She said he is still having cough, chest/ head congestion, fatigue, but no fever, or SOB. Asked if he was still taking recommended OTC meds and she said yes. She wants to know if anything can be called in for him to give him some relief.

## 2022-07-07 ENCOUNTER — OFFICE VISIT (OUTPATIENT)
Dept: FAMILY MEDICINE | Facility: CLINIC | Age: 72
End: 2022-07-07
Payer: MEDICARE

## 2022-07-07 ENCOUNTER — HOSPITAL ENCOUNTER (EMERGENCY)
Facility: HOSPITAL | Age: 72
Discharge: HOME | End: 2022-07-07
Attending: EMERGENCY MEDICINE
Payer: MEDICARE

## 2022-07-07 VITALS
RESPIRATION RATE: 16 BRPM | OXYGEN SATURATION: 100 % | DIASTOLIC BLOOD PRESSURE: 82 MMHG | BODY MASS INDEX: 39.1 KG/M2 | TEMPERATURE: 98 F | HEART RATE: 52 BPM | WEIGHT: 258 LBS | HEIGHT: 68 IN | SYSTOLIC BLOOD PRESSURE: 170 MMHG

## 2022-07-07 VITALS
DIASTOLIC BLOOD PRESSURE: 80 MMHG | TEMPERATURE: 97 F | BODY MASS INDEX: 39.1 KG/M2 | HEIGHT: 68 IN | SYSTOLIC BLOOD PRESSURE: 144 MMHG | HEART RATE: 58 BPM | OXYGEN SATURATION: 98 % | WEIGHT: 258 LBS

## 2022-07-07 DIAGNOSIS — R09.A2 SENSATION OF FOREIGN BODY IN ESOPHAGUS: Primary | ICD-10-CM

## 2022-07-07 DIAGNOSIS — R10.13 EPIGASTRIC BURNING SENSATION: ICD-10-CM

## 2022-07-07 DIAGNOSIS — R09.A0 SENSATION OF FOREIGN BODY: ICD-10-CM

## 2022-07-07 DIAGNOSIS — R09.A2 SENSATION OF FOREIGN BODY IN ESOPHAGUS: ICD-10-CM

## 2022-07-07 DIAGNOSIS — T18.9XXA SWALLOWED FOREIGN BODY, INITIAL ENCOUNTER: Primary | ICD-10-CM

## 2022-07-07 LAB
ALBUMIN SERPL BCP-MCNC: 4 G/DL (ref 3.5–5.2)
ALP SERPL-CCNC: 56 U/L (ref 55–135)
ALT SERPL W/O P-5'-P-CCNC: 21 U/L (ref 10–44)
ANION GAP SERPL CALC-SCNC: 6 MMOL/L (ref 8–16)
APTT PPP: 30.8 SEC (ref 23.3–35.1)
AST SERPL-CCNC: 17 U/L (ref 10–40)
BASOPHILS # BLD AUTO: 0.03 K/UL (ref 0–0.2)
BASOPHILS NFR BLD: 0.4 % (ref 0–1.9)
BILIRUB SERPL-MCNC: 0.8 MG/DL (ref 0.1–1)
BUN SERPL-MCNC: 21 MG/DL (ref 8–23)
CALCIUM SERPL-MCNC: 8.8 MG/DL (ref 8.7–10.5)
CHLORIDE SERPL-SCNC: 105 MMOL/L (ref 95–110)
CO2 SERPL-SCNC: 27 MMOL/L (ref 23–29)
CREAT SERPL-MCNC: 1.1 MG/DL (ref 0.5–1.4)
DIFFERENTIAL METHOD: ABNORMAL
EOSINOPHIL # BLD AUTO: 0.1 K/UL (ref 0–0.5)
EOSINOPHIL NFR BLD: 1.6 % (ref 0–8)
ERYTHROCYTE [DISTWIDTH] IN BLOOD BY AUTOMATED COUNT: 14.3 % (ref 11.5–14.5)
EST. GFR  (AFRICAN AMERICAN): >60 ML/MIN/1.73 M^2
EST. GFR  (NON AFRICAN AMERICAN): >60 ML/MIN/1.73 M^2
GLUCOSE SERPL-MCNC: 85 MG/DL (ref 70–110)
HCT VFR BLD AUTO: 43.2 % (ref 40–54)
HGB BLD-MCNC: 13.8 G/DL (ref 14–18)
IMM GRANULOCYTES # BLD AUTO: 0.04 K/UL (ref 0–0.04)
IMM GRANULOCYTES NFR BLD AUTO: 0.5 % (ref 0–0.5)
INR PPP: 1.1
LYMPHOCYTES # BLD AUTO: 2 K/UL (ref 1–4.8)
LYMPHOCYTES NFR BLD: 23.8 % (ref 18–48)
MCH RBC QN AUTO: 30.2 PG (ref 27–31)
MCHC RBC AUTO-ENTMCNC: 31.9 G/DL (ref 32–36)
MCV RBC AUTO: 95 FL (ref 82–98)
MONOCYTES # BLD AUTO: 0.8 K/UL (ref 0.3–1)
MONOCYTES NFR BLD: 9.2 % (ref 4–15)
NEUTROPHILS # BLD AUTO: 5.4 K/UL (ref 1.8–7.7)
NEUTROPHILS NFR BLD: 64.5 % (ref 38–73)
NRBC BLD-RTO: 0 /100 WBC
PLATELET # BLD AUTO: 228 K/UL (ref 150–450)
PMV BLD AUTO: 11 FL (ref 9.2–12.9)
POTASSIUM SERPL-SCNC: 3.7 MMOL/L (ref 3.5–5.1)
PROT SERPL-MCNC: 6.8 G/DL (ref 6–8.4)
PROTHROMBIN TIME: 13.7 SEC (ref 11.4–13.7)
RBC # BLD AUTO: 4.57 M/UL (ref 4.6–6.2)
SARS-COV-2 RDRP RESP QL NAA+PROBE: NEGATIVE
SODIUM SERPL-SCNC: 138 MMOL/L (ref 136–145)
TROPONIN I SERPL DL<=0.01 NG/ML-MCNC: <0.03 NG/ML
WBC # BLD AUTO: 8.37 K/UL (ref 3.9–12.7)

## 2022-07-07 PROCEDURE — 1125F AMNT PAIN NOTED PAIN PRSNT: CPT | Mod: CPTII,S$GLB,, | Performed by: INTERNAL MEDICINE

## 2022-07-07 PROCEDURE — U0002 COVID-19 LAB TEST NON-CDC: HCPCS | Performed by: EMERGENCY MEDICINE

## 2022-07-07 PROCEDURE — 99285 EMERGENCY DEPT VISIT HI MDM: CPT | Mod: 25

## 2022-07-07 PROCEDURE — 3008F PR BODY MASS INDEX (BMI) DOCUMENTED: ICD-10-PCS | Mod: CPTII,S$GLB,, | Performed by: INTERNAL MEDICINE

## 2022-07-07 PROCEDURE — 93010 ELECTROCARDIOGRAM REPORT: CPT | Mod: 76,,, | Performed by: INTERNAL MEDICINE

## 2022-07-07 PROCEDURE — 3008F BODY MASS INDEX DOCD: CPT | Mod: CPTII,S$GLB,, | Performed by: INTERNAL MEDICINE

## 2022-07-07 PROCEDURE — 4010F ACE/ARB THERAPY RXD/TAKEN: CPT | Mod: CPTII,S$GLB,, | Performed by: INTERNAL MEDICINE

## 2022-07-07 PROCEDURE — 1159F PR MEDICATION LIST DOCUMENTED IN MEDICAL RECORD: ICD-10-PCS | Mod: CPTII,S$GLB,, | Performed by: INTERNAL MEDICINE

## 2022-07-07 PROCEDURE — 4010F PR ACE/ARB THEARPY RXD/TAKEN: ICD-10-PCS | Mod: CPTII,S$GLB,, | Performed by: INTERNAL MEDICINE

## 2022-07-07 PROCEDURE — 3079F PR MOST RECENT DIASTOLIC BLOOD PRESSURE 80-89 MM HG: ICD-10-PCS | Mod: CPTII,S$GLB,, | Performed by: INTERNAL MEDICINE

## 2022-07-07 PROCEDURE — 93005 ELECTROCARDIOGRAM TRACING: CPT | Mod: 59 | Performed by: INTERNAL MEDICINE

## 2022-07-07 PROCEDURE — 99152 MOD SED SAME PHYS/QHP 5/>YRS: CPT | Mod: 59 | Performed by: INTERNAL MEDICINE

## 2022-07-07 PROCEDURE — 84484 ASSAY OF TROPONIN QUANT: CPT | Performed by: EMERGENCY MEDICINE

## 2022-07-07 PROCEDURE — 99213 PR OFFICE/OUTPT VISIT, EST, LEVL III, 20-29 MIN: ICD-10-PCS | Mod: S$GLB,,, | Performed by: INTERNAL MEDICINE

## 2022-07-07 PROCEDURE — 93005 ELECTROCARDIOGRAM TRACING: CPT | Performed by: INTERNAL MEDICINE

## 2022-07-07 PROCEDURE — 99213 OFFICE O/P EST LOW 20 MIN: CPT | Mod: S$GLB,,, | Performed by: INTERNAL MEDICINE

## 2022-07-07 PROCEDURE — 3077F PR MOST RECENT SYSTOLIC BLOOD PRESSURE >= 140 MM HG: ICD-10-PCS | Mod: CPTII,S$GLB,, | Performed by: INTERNAL MEDICINE

## 2022-07-07 PROCEDURE — 85730 THROMBOPLASTIN TIME PARTIAL: CPT | Performed by: EMERGENCY MEDICINE

## 2022-07-07 PROCEDURE — 1101F PT FALLS ASSESS-DOCD LE1/YR: CPT | Mod: CPTII,S$GLB,, | Performed by: INTERNAL MEDICINE

## 2022-07-07 PROCEDURE — 3079F DIAST BP 80-89 MM HG: CPT | Mod: CPTII,S$GLB,, | Performed by: INTERNAL MEDICINE

## 2022-07-07 PROCEDURE — 93010 ELECTROCARDIOGRAM REPORT: CPT | Mod: ,,, | Performed by: INTERNAL MEDICINE

## 2022-07-07 PROCEDURE — 1125F PR PAIN SEVERITY QUANTIFIED, PAIN PRESENT: ICD-10-PCS | Mod: CPTII,S$GLB,, | Performed by: INTERNAL MEDICINE

## 2022-07-07 PROCEDURE — 80053 COMPREHEN METABOLIC PANEL: CPT | Performed by: EMERGENCY MEDICINE

## 2022-07-07 PROCEDURE — 43239 EGD BIOPSY SINGLE/MULTIPLE: CPT | Performed by: INTERNAL MEDICINE

## 2022-07-07 PROCEDURE — 25000003 PHARM REV CODE 250: Performed by: PHYSICIAN ASSISTANT

## 2022-07-07 PROCEDURE — 3077F SYST BP >= 140 MM HG: CPT | Mod: CPTII,S$GLB,, | Performed by: INTERNAL MEDICINE

## 2022-07-07 PROCEDURE — 88305 TISSUE EXAM BY PATHOLOGIST: CPT | Mod: TC,59

## 2022-07-07 PROCEDURE — 1101F PR PT FALLS ASSESS DOC 0-1 FALLS W/OUT INJ PAST YR: ICD-10-PCS | Mod: CPTII,S$GLB,, | Performed by: INTERNAL MEDICINE

## 2022-07-07 PROCEDURE — 3288F PR FALLS RISK ASSESSMENT DOCUMENTED: ICD-10-PCS | Mod: CPTII,S$GLB,, | Performed by: INTERNAL MEDICINE

## 2022-07-07 PROCEDURE — 3288F FALL RISK ASSESSMENT DOCD: CPT | Mod: CPTII,S$GLB,, | Performed by: INTERNAL MEDICINE

## 2022-07-07 PROCEDURE — 1159F MED LIST DOCD IN RCRD: CPT | Mod: CPTII,S$GLB,, | Performed by: INTERNAL MEDICINE

## 2022-07-07 PROCEDURE — 85610 PROTHROMBIN TIME: CPT | Performed by: EMERGENCY MEDICINE

## 2022-07-07 PROCEDURE — 85025 COMPLETE CBC W/AUTO DIFF WBC: CPT | Performed by: EMERGENCY MEDICINE

## 2022-07-07 PROCEDURE — 63600175 PHARM REV CODE 636 W HCPCS: Performed by: INTERNAL MEDICINE

## 2022-07-07 PROCEDURE — 93010 EKG 12-LEAD: ICD-10-PCS | Mod: ,,, | Performed by: INTERNAL MEDICINE

## 2022-07-07 PROCEDURE — 27200043 HC FORCEPS, BIOPSY: Performed by: INTERNAL MEDICINE

## 2022-07-07 RX ORDER — FENTANYL CITRATE 50 UG/ML
INJECTION, SOLUTION INTRAMUSCULAR; INTRAVENOUS
Status: COMPLETED | OUTPATIENT
Start: 2022-07-07 | End: 2022-07-07

## 2022-07-07 RX ORDER — DIAZEPAM 10 MG/2ML
INJECTION INTRAMUSCULAR
Status: COMPLETED | OUTPATIENT
Start: 2022-07-07 | End: 2022-07-07

## 2022-07-07 RX ORDER — MAG HYDROX/ALUMINUM HYD/SIMETH 200-200-20
30 SUSPENSION, ORAL (FINAL DOSE FORM) ORAL
Status: DISCONTINUED | OUTPATIENT
Start: 2022-07-07 | End: 2022-07-07 | Stop reason: HOSPADM

## 2022-07-07 RX ORDER — MIDAZOLAM HYDROCHLORIDE 1 MG/ML
INJECTION INTRAMUSCULAR; INTRAVENOUS
Status: COMPLETED | OUTPATIENT
Start: 2022-07-07 | End: 2022-07-07

## 2022-07-07 RX ORDER — OMEPRAZOLE 20 MG/1
20 CAPSULE, DELAYED RELEASE ORAL DAILY
Qty: 30 CAPSULE | Refills: 0 | Status: SHIPPED | OUTPATIENT
Start: 2022-07-07 | End: 2022-11-07

## 2022-07-07 RX ORDER — LIDOCAINE HYDROCHLORIDE 20 MG/ML
10 SOLUTION OROPHARYNGEAL
Status: COMPLETED | OUTPATIENT
Start: 2022-07-07 | End: 2022-07-07

## 2022-07-07 RX ADMIN — ALUMINA, MAGNESIA, AND SIMETHICONE ORAL SUSPENSION REGULAR STRENGTH 30 ML: 1200; 1200; 120 SUSPENSION ORAL at 10:07

## 2022-07-07 RX ADMIN — DIAZEPAM 2.5 MG: 5 INJECTION, SOLUTION INTRAMUSCULAR; INTRAVENOUS at 01:07

## 2022-07-07 RX ADMIN — LIDOCAINE HYDROCHLORIDE 10 ML: 20 SOLUTION ORAL; TOPICAL at 10:07

## 2022-07-07 RX ADMIN — MIDAZOLAM HYDROCHLORIDE 1 MG: 1 INJECTION, SOLUTION INTRAMUSCULAR; INTRAVENOUS at 01:07

## 2022-07-07 RX ADMIN — FENTANYL CITRATE 50 MCG: 50 INJECTION INTRAMUSCULAR; INTRAVENOUS at 01:07

## 2022-07-07 RX ADMIN — MIDAZOLAM HYDROCHLORIDE 2 MG: 1 INJECTION, SOLUTION INTRAMUSCULAR; INTRAVENOUS at 01:07

## 2022-07-07 RX ADMIN — FENTANYL CITRATE 25 MCG: 50 INJECTION INTRAMUSCULAR; INTRAVENOUS at 01:07

## 2022-07-07 NOTE — [employer] PATIENT INSTRUCTIONS
Discharge Summary/Instructions after an Endoscopic Procedure  Patient Name: Nico Teague  Patient MRN: [id number]  Patient YOB: 1950 Thursday, July 7, 2022  Emmanuel Deleon MD  RESTRICTIONS:  During your procedure today, you received medications for sedation.  These   medications may affect your judgment, balance and coordination.  Therefore,   for 24 hours, you have the following restrictions:   - DO NOT drive a car, operate machinery, make legal/financial decisions,   sign important papers or drink alcohol.    ACTIVITY:  Today: no heavy lifting, straining or running due to procedural   sedation/anesthesia.  The following day: return to full activity including work.  DIET:  Eat and drink normally unless instructed otherwise.     TREATMENT FOR COMMON SIDE EFFECTS:  - Mild abdominal pain, nausea, belching, bloating or excessive gas:  rest,   eat lightly and use a heating pad.  - Sore Throat: treat with throat lozenges and/or gargle with warm salt   water.  - Because air was used during the procedure, expelling large amounts of air   from your rectum or belching is normal.  - If a bowel prep was taken, you may not have a bowel movement for 1-3 days.    This is normal.  SYMPTOMS TO WATCH FOR AND REPORT TO YOUR PHYSICIAN:  1. Abdominal pain or bloating, other than gas cramps.  2. Chest pain.  3. Back pain.  4. Signs of infection such as: chills or fever occurring within 24 hours   after the procedure.  5. Rectal bleeding, which would show as bright red, maroon, or black stools.   (A tablespoon of blood from the rectum is not serious, especially if   hemorrhoids are present.)  6. Vomiting.  7. Weakness or dizziness.  GO DIRECTLY TO THE NEAREST EMERGENCY ROOM IF YOU HAVE ANY OF THE FOLLOWING:      Difficulty breathing              Chills and/or fever over 101 F   Persistent vomiting and/or vomiting blood   Severe abdominal pain   Severe chest pain   Black, tarry stools   Bleeding- more than one tablespoon   Any  other symptom or condition that you feel may need urgent attention  Your doctor recommends these additional instructions:  If any biopsies were taken, your doctors clinic will contact you in 1 to 2   weeks with any results.  - Patient has a contact number available for emergencies.  The signs and   symptoms of potential delayed complications were discussed with the   patient.  Return to normal activities tomorrow.  Written discharge   instructions were provided to the patient.   - Resume previous diet.   - Continue present medications.   - Await pathology results.   - Discharge patient to home (with escort).  For questions, problems or results please call your physician - Emmanuel Deleon MD at Work:  [phone].  [facility], EMERGENCY ROOM PHONE NUMBER: [phone]  IF A COMPLICATION OR EMERGENCY SITUATION ARISES AND YOU ARE UNABLE TO REACH   YOUR PHYSICIAN - GO DIRECTLY TO THE EMERGENCY ROOM.  MD Emmanuel Muniz MD  7/7/2022 1:34:28 PM  This report has been verified and signed electronically.  Dear patient,  As a result of recent federal legislation (The Federal Cures Act), you may   receive lab or pathology results from your procedure in your [facility]   account before your physician is able to contact you. Your physician or   their representative will relay the results to you with their   recommendations at their soonest availability.  Thank you,  [employer]ATION

## 2022-07-07 NOTE — PROGRESS NOTES
Subjective:       Patient ID: Nico Teague is a 71 y.o. male.    Chief Complaint: OTHER (Patient swallowed a piece of bay leave and it was stuck in throat  ever since then it feels like it is stuck in chest and feels itchy.)    Here for evaluation of foreign body sensation in distal esophagus.  Patient reports that he was eating red beans about 1.5 weeks ago and swallowed a piece of bay leaf.  He noted that it initially felt like it got stuck in the back of his throat and he couldn't cough it up but seemed to go down after eating some bread.  He didn't notice anything for couple of days but then started to notice some irritation when eating/drinking in his lower chest, in the area of the GE junction.  Describes it as a scratching sensation.      Review of Systems   Constitutional: Positive for unexpected weight change (gain). Negative for activity change, appetite change, chills, diaphoresis, fatigue and fever.   HENT: Positive for congestion and voice change. Negative for ear discharge, ear pain, hearing loss, nosebleeds, postnasal drip, rhinorrhea, sinus pressure, sinus pain, sneezing, sore throat, tinnitus and trouble swallowing.    Eyes: Negative for photophobia, pain, discharge, redness, itching and visual disturbance.   Respiratory: Positive for apnea, cough and shortness of breath. Negative for choking, chest tightness and wheezing.    Cardiovascular: Positive for palpitations and leg swelling. Negative for chest pain.        Afib   Gastrointestinal: Negative for abdominal distention, abdominal pain, blood in stool, constipation, diarrhea, nausea and vomiting.   Endocrine: Negative for cold intolerance, heat intolerance, polydipsia and polyuria.   Genitourinary: Positive for frequency. Negative for decreased urine volume, difficulty urinating, dysuria, enuresis, flank pain, genital sores, hematuria, penile discharge, penile pain, scrotal swelling, testicular pain and urgency.   Musculoskeletal: Positive for  back pain. Negative for arthralgias, gait problem, joint swelling, myalgias, neck pain and neck stiffness.   Skin: Negative for rash and wound.   Allergic/Immunologic: Negative for environmental allergies, food allergies and immunocompromised state.   Neurological: Negative for dizziness, tremors, seizures, syncope, facial asymmetry, speech difficulty, weakness, light-headedness, numbness and headaches.   Hematological: Negative for adenopathy. Bruises/bleeds easily.   Psychiatric/Behavioral: Negative for confusion, decreased concentration, hallucinations, self-injury, sleep disturbance and suicidal ideas. The patient is not nervous/anxious.        Past Medical History:   Diagnosis Date    Allergy     Arthritis     Asthma     Atrial fibrillation     Basal cell carcinoma     Fever blister     Hypertension     Joint pain     Melanoma     PVC (premature ventricular contraction)     Skin disease     Squamous cell carcinoma       Past Surgical History:   Procedure Laterality Date    CARDIAC CATHETERIZATION  2021    EYE SURGERY Left 2019    EYE SURGERY Right 2020    LIPOMA RESECTION      NOSE SURGERY      SHOULDER ARTHROSCOPY      SKIN CANCER EXCISION      Moh's x 2    SKIN GRAFT         Family History   Problem Relation Age of Onset    Cancer Mother         esophageal    Heart disease Father     Hypertension Father     Arthritis Paternal Grandmother        Social History     Socioeconomic History    Marital status:    Occupational History    Occupation: retired captain    Tobacco Use    Smoking status: Former Smoker     Packs/day: 2.00     Years: 20.00     Pack years: 40.00     Types: Cigarettes     Start date: 1972     Quit date: 1988     Years since quittin.8    Smokeless tobacco: Never Used   Substance and Sexual Activity    Alcohol use: No    Drug use: No    Sexual activity: Never   Social History Narrative    Live with wife     Social Determinants  of Health     Financial Resource Strain: Low Risk     Difficulty of Paying Living Expenses: Not hard at all   Food Insecurity: No Food Insecurity    Worried About Running Out of Food in the Last Year: Never true    Ran Out of Food in the Last Year: Never true   Transportation Needs: No Transportation Needs    Lack of Transportation (Medical): No    Lack of Transportation (Non-Medical): No   Physical Activity: Insufficiently Active    Days of Exercise per Week: 3 days    Minutes of Exercise per Session: 20 min   Stress: No Stress Concern Present    Feeling of Stress : Only a little   Social Connections: Unknown    Frequency of Communication with Friends and Family: More than three times a week    Frequency of Social Gatherings with Friends and Family: Once a week    Active Member of Clubs or Organizations: Yes    Attends Club or Organization Meetings: More than 4 times per year    Marital Status:        Current Outpatient Medications   Medication Sig Dispense Refill    allopurinoL (ZYLOPRIM) 300 MG tablet Take 1 tablet (300 mg total) by mouth once daily. 90 tablet 1    amiodarone (PACERONE) 200 MG Tab Take 200 mg by mouth 2 (two) times daily.      apixaban (ELIQUIS) 5 mg Tab Take 5 mg by mouth 2 (two) times daily.      ascorbic acid, vitamin C, (VITAMIN C) 1000 MG tablet Take 1,000 mg by mouth once daily.      aspirin 81 MG Chew Take 81 mg by mouth once daily.      cholecalciferol, vitamin D3, (VITAMIN D3) 25 mcg (1,000 unit) capsule Take 1,000 Units by mouth once daily.      co-enzyme Q-10 50 mg capsule Take 50 mg by mouth once daily.      diclofenac sodium (VOLTAREN) 1 % Gel Apply 2 g topically once daily. 100 g 3    ENTRESTO 49-51 mg per tablet Take 1 tablet by mouth 2 (two) times daily.      fluticasone furoate-vilanteroL (BREO ELLIPTA) 100-25 mcg/dose diskus inhaler Inhale 1 puff into the lungs once daily. Controller Rinse after you use it 3 each 4    fluticasone propionate  "(FLONASE) 50 mcg/actuation nasal spray 1 spray (50 mcg total) by Each Nostril route once daily. 48 g 3    furosemide (LASIX) 40 MG tablet Take 20 mg by mouth 2 (two) times a day.      Lactobacillus rhamnosus GG (CULTURELLE) 10 billion cell capsule Take 1 capsule by mouth.      levothyroxine (SYNTHROID) 88 MCG tablet Take 1 tablet (88 mcg total) by mouth before breakfast. 30 tablet 3    magnesium gluconate 27.5 mg magne- sium (500 mg) Tab Take 1 tablet by mouth once.      meclizine (ANTIVERT) 12.5 mg tablet Take 1 tablet (12.5 mg total) by mouth 3 (three) times daily as needed for Dizziness. 30 tablet 1    melatonin 10 mg Tab Take 1 tablet by mouth nightly as needed.      montelukast (SINGULAIR) 10 mg tablet TAKE 1 TABLET (10 MG TOTAL) BY MOUTH EVERY EVENING. (Patient taking differently: Take 10 mg by mouth once daily.) 90 tablet 6    multivitamin (THERAGRAN) per tablet Take 1 tablet by mouth once daily.      potassium chloride (MICRO-K) 10 MEQ CpSR Take 10 mEq by mouth once daily.      rosuvastatin (CRESTOR) 10 MG tablet Take 1 tablet (10 mg total) by mouth every other day. 90 tablet 1    tamsulosin (FLOMAX) 0.4 mg Cap Take 1 tablet by mouth Daily.      triamterene-hydrochlorothiazide 37.5-25 mg (DYAZIDE) 37.5-25 mg per capsule TAKE 1 CAPSULE EVERY MORNING 90 capsule 1    vitamin E 1000 UNIT capsule Take 1,000 Units by mouth once daily.       No current facility-administered medications for this visit.       Review of patient's allergies indicates:   Allergen Reactions    Bactrim [sulfamethoxazole-trimethoprim]      HIVES/ RASH    Green tea (rashida sinensis) Hives     Pt is has allergies to black tea. Not an option under search.     Objective:      Blood pressure (!) 144/80, pulse (!) 58, temperature 96.5 °F (35.8 °C), temperature source Temporal, height 5' 8" (1.727 m), weight 117 kg (258 lb), SpO2 98 %. Body mass index is 39.23 kg/m².   Physical Exam  Vitals and nursing note reviewed. "   Constitutional:       General: He is not in acute distress.     Appearance: He is well-developed. He is obese. He is not ill-appearing, toxic-appearing or diaphoretic.   HENT:      Head: Normocephalic and atraumatic.   Eyes:      General: No scleral icterus.        Right eye: No discharge.         Left eye: No discharge.      Conjunctiva/sclera: Conjunctivae normal.   Neck:      Vascular: No carotid bruit.   Cardiovascular:      Rate and Rhythm: Normal rate and regular rhythm.      Heart sounds: Normal heart sounds. No murmur heard.  Pulmonary:      Effort: Pulmonary effort is normal. No respiratory distress.      Breath sounds: Normal breath sounds. No decreased breath sounds, wheezing, rhonchi or rales.   Abdominal:      General: There is no distension.      Palpations: Abdomen is soft.      Tenderness: There is no abdominal tenderness. There is no guarding or rebound.   Musculoskeletal:      Right lower le+ Edema present.      Left lower le+ Edema present.   Skin:     General: Skin is warm and dry.   Neurological:      Mental Status: He is alert.      Motor: No tremor.   Psychiatric:         Mood and Affect: Mood normal.         Speech: Speech normal.         Behavior: Behavior normal.             Assessment:       1. Sensation of foreign body in esophagus        Plan:       Nico was seen today for other.    Diagnoses and all orders for this visit:    Sensation of foreign body in esophagus  -     Ambulatory referral/consult to Gastroenterology; Future

## 2022-07-07 NOTE — CONSULTS
"GASTROENTEROLOGY INPATIENT CONSULT NOTE  Patient Name: Nico Teague  Patient MRN: [id number]  Patient : 1950    Admit Date: 2022  Service date: 2022    Reason for Consult: dysphagia    PCP: Gelacio Cantu Jr, MD    Chief Complaint   Patient presents with    Swallowed Foreign Body     Pt reports swallowing a "bay leaf" on the  that got stuck in his throat, moved to a substernal location and has not moved. Pt is able to swallow. Got a referral for GI but the GI office said he needed to come to the ED since they could not perform a scope in the office setting. Pt is AAO, no SOB or drooling.        HPI: Patient is a 71 y.o. male admitted with odynophagia characterized as mild nagging present for 1.5 weeks and suspects piece of bay leaf stuck. No similar events.  No prior endoscopy.  No weight loss.    Past Medical History:  Past Medical History:   Diagnosis Date    Allergy     Arthritis     Asthma     Atrial fibrillation     Basal cell carcinoma     Fever blister     Hypertension     Joint pain     Melanoma     PVC (premature ventricular contraction)     Skin disease     Squamous cell carcinoma         Past Surgical History:  Past Surgical History:   Procedure Laterality Date    CARDIAC CATHETERIZATION  2021    EYE SURGERY Left 2019    EYE SURGERY Right 2020    LIPOMA RESECTION      NOSE SURGERY      SHOULDER ARTHROSCOPY      SKIN CANCER EXCISION      Moh's x 2    SKIN GRAFT          Home Medications:  Medications Prior to Admission   Medication Sig Dispense Refill Last Dose    allopurinoL (ZYLOPRIM) 300 MG tablet Take 1 tablet (300 mg total) by mouth once daily. 90 tablet 1 2022 at Unknown time    amiodarone (PACERONE) 200 MG Tab Take 200 mg by mouth 2 (two) times daily.   2022 at Unknown time    apixaban (ELIQUIS) 5 mg Tab Take 5 mg by mouth 2 (two) times daily.   2022 at 1800    ascorbic acid, vitamin C, (VITAMIN C) 1000 MG tablet Take 1,000 " mg by mouth once daily.   7/6/2022 at Unknown time    aspirin 81 MG Chew Take 81 mg by mouth once daily.   7/6/2022 at Unknown time    cholecalciferol, vitamin D3, (VITAMIN D3) 25 mcg (1,000 unit) capsule Take 1,000 Units by mouth once daily.   7/6/2022 at Unknown time    co-enzyme Q-10 50 mg capsule Take 50 mg by mouth once daily.   7/6/2022 at Unknown time    diclofenac sodium (VOLTAREN) 1 % Gel Apply 2 g topically once daily. 100 g 3 7/6/2022 at Unknown time    ENTRESTO 49-51 mg per tablet Take 1 tablet by mouth 2 (two) times daily.   7/6/2022 at Unknown time    fluticasone furoate-vilanteroL (BREO ELLIPTA) 100-25 mcg/dose diskus inhaler Inhale 1 puff into the lungs once daily. Controller Rinse after you use it 3 each 4 7/6/2022 at Unknown time    fluticasone propionate (FLONASE) 50 mcg/actuation nasal spray 1 spray (50 mcg total) by Each Nostril route once daily. 48 g 3 7/6/2022 at Unknown time    furosemide (LASIX) 40 MG tablet Take 20 mg by mouth 2 (two) times a day.   7/6/2022 at Unknown time    Lactobacillus rhamnosus GG (CULTURELLE) 10 billion cell capsule Take 1 capsule by mouth.   7/6/2022 at Unknown time    levothyroxine (SYNTHROID) 88 MCG tablet Take 1 tablet (88 mcg total) by mouth before breakfast. 30 tablet 3 7/6/2022 at Unknown time    magnesium gluconate 27.5 mg magne- sium (500 mg) Tab Take 1 tablet by mouth once.   7/6/2022 at Unknown time    montelukast (SINGULAIR) 10 mg tablet TAKE 1 TABLET (10 MG TOTAL) BY MOUTH EVERY EVENING. (Patient taking differently: Take 10 mg by mouth once daily.) 90 tablet 6 7/6/2022 at Unknown time    multivitamin (THERAGRAN) per tablet Take 1 tablet by mouth once daily.   7/6/2022 at Unknown time    potassium chloride (MICRO-K) 10 MEQ CpSR Take 10 mEq by mouth once daily.   7/6/2022 at Unknown time    rosuvastatin (CRESTOR) 10 MG tablet Take 1 tablet (10 mg total) by mouth every other day. 90 tablet 1 7/6/2022 at Unknown time    tamsulosin (FLOMAX)  0.4 mg Cap Take 1 tablet by mouth Daily.   2022 at Unknown time    triamterene-hydrochlorothiazide 37.5-25 mg (DYAZIDE) 37.5-25 mg per capsule TAKE 1 CAPSULE EVERY MORNING 90 capsule 1 2022 at Unknown time    vitamin E 1000 UNIT capsule Take 1,000 Units by mouth once daily.   2022 at Unknown time    meclizine (ANTIVERT) 12.5 mg tablet Take 1 tablet (12.5 mg total) by mouth 3 (three) times daily as needed for Dizziness. 30 tablet 1     melatonin 10 mg Tab Take 1 tablet by mouth nightly as needed.   More than a month at Unknown time       Inpatient Medications:   aluminum-magnesium hydroxide-simethicone  30 mL Oral QID (AC & HS)         Review of patient's allergies indicates:   Allergen Reactions    Bactrim [sulfamethoxazole-trimethoprim]      HIVES/ RASH    Green tea (rashida sinensis) Hives     Pt is has allergies to black tea. Not an option under search.       Social History:   Social History     Occupational History    Occupation: retired captain    Tobacco Use    Smoking status: Former Smoker     Packs/day: 2.00     Years: 20.00     Pack years: 40.00     Types: Cigarettes     Start date: 1972     Quit date: 1988     Years since quittin.8    Smokeless tobacco: Never Used   Substance and Sexual Activity    Alcohol use: No    Drug use: No    Sexual activity: Never       Family History:   Family History   Problem Relation Age of Onset    Cancer Mother         esophageal    Heart disease Father     Hypertension Father     Arthritis Paternal Grandmother        Review of Systems:  A 10 point review of systems was performed and was normal, except as mentioned in the HPI, including constitutional, HEENT, heme, lymph, cardiovascular, respiratory, gastrointestinal, genitourinary, neurologic, endocrine, psychiatric and musculoskeletal.      OBJECTIVE:    Physical Exam:  24 Hour Vital Sign Ranges: Temp:  [96.5 °F (35.8 °C)-97.8 °F (36.6 °C)] 97.8 °F (36.6 °C)  Pulse:  [48-58]  "49  Resp:  [16-18] 18  SpO2:  [96 %-98 %] 98 %  BP: (144-205)/() 176/83  Most recent vitals: BP (!) 176/83   Pulse (!) 49   Temp 97.8 °F (36.6 °C) (Oral)   Resp 18   Ht 5' 8" (1.727 m)   Wt 117 kg (258 lb)   SpO2 98%   BMI 39.23 kg/m²    GEN: well-developed, well-nourished, awake and alert, non-toxic appearing adult  HEENT: PERRL, sclera anicteric, oral mucosa pink and moist without lesion  NECK: trachea midline; Good ROM  CV: regular rate and rhythm, no murmurs or gallops  RESP: clear to auscultation bilaterally, no wheezes, rhonci or rales  ABD: soft, non-tender, non-distended, normal bowel sounds  EXT: no swelling or edema, 2+ pulses distally  SKIN: no rashes or jaundice  PSYCH: normal affect    Labs:   Recent Labs     07/07/22  1136   WBC 8.37   MCV 95        Recent Labs     07/07/22  1136      K 3.7      CO2 27   BUN 21   GLU 85     No results for input(s): ALB in the last 72 hours.    Invalid input(s): ALKP, SGOT, SGPT, TBIL, DBIL, TPRO  Recent Labs     07/07/22  1136   INR 1.1         Radiology Review:  X-Ray Chest PA And Lateral   Final Result            IMPRESSION / RECOMMENDATIONS:  Odynophagia  -EGD now  -eliquis last dose last night    Thank you for this consult.    Emmanuel Deleon  7/7/2022  1:12 PM        "

## 2022-07-07 NOTE — ED PROVIDER NOTES
"Encounter Date: 2022       History     Chief Complaint   Patient presents with    Swallowed Foreign Body     Pt reports swallowing a "bay leaf" on the  that got stuck in his throat, moved to a substernal location and has not moved. Pt is able to swallow. Got a referral for GI but the GI office said he needed to come to the ED since they could not perform a scope in the office setting. Pt is AAO, no SOB or drooling.      Patient's wall daily proximally 10 days ago.  Patient reports he feels like something stuck in his esophagus for proximally 2 days.  He is tolerating liquids with absolutely no problems.  He has increased pain with swallowing to his lower sternal area.  No fever chills.  No shortness of breath.        Review of patient's allergies indicates:   Allergen Reactions    Bactrim [sulfamethoxazole-trimethoprim]      HIVES/ RASH    Green tea (rashida sinensis) Hives     Pt is has allergies to black tea. Not an option under search.     Past Medical History:   Diagnosis Date    Allergy     Arthritis     Asthma     Atrial fibrillation     Basal cell carcinoma     Fever blister     Hypertension     Joint pain     Melanoma     PVC (premature ventricular contraction)     Skin disease     Squamous cell carcinoma      Past Surgical History:   Procedure Laterality Date    CARDIAC CATHETERIZATION  2021    EYE SURGERY Left 2019    EYE SURGERY Right 2020    LIPOMA RESECTION      NOSE SURGERY      SHOULDER ARTHROSCOPY      SKIN CANCER EXCISION      Moh's x 2    SKIN GRAFT       Family History   Problem Relation Age of Onset    Cancer Mother         esophageal    Heart disease Father     Hypertension Father     Arthritis Paternal Grandmother      Social History     Tobacco Use    Smoking status: Former Smoker     Packs/day: 2.00     Years: 20.00     Pack years: 40.00     Types: Cigarettes     Start date: 1972     Quit date: 1988     Years since quittin.8 "    Smokeless tobacco: Never Used   Substance Use Topics    Alcohol use: No    Drug use: No     Review of Systems   Constitutional: Negative for chills and fever.   HENT: Negative for sore throat.    Eyes: Negative for photophobia and visual disturbance.   Respiratory: Negative for shortness of breath.    Cardiovascular: Negative for leg swelling.   Gastrointestinal: Negative for abdominal pain and vomiting.   Genitourinary: Negative for dysuria.   Musculoskeletal: Negative for joint swelling.   Skin: Negative for rash.   Neurological: Negative for weakness and headaches.   Psychiatric/Behavioral: Negative for confusion.       Physical Exam     Initial Vitals [07/07/22 0955]   BP Pulse Resp Temp SpO2   (!) 188/106 (!) 50 18 97.5 °F (36.4 °C) 98 %      MAP       --         Physical Exam    Nursing note and vitals reviewed.  Constitutional: He is not diaphoretic. No distress.   HENT:   Head: Normocephalic and atraumatic.   Eyes: Conjunctivae are normal.   Neck:   Normal range of motion.  Cardiovascular: Regular rhythm.   Pulmonary/Chest: Breath sounds normal.   Abdominal: Abdomen is soft. There is no abdominal tenderness.   Musculoskeletal:         General: Normal range of motion.      Cervical back: Normal range of motion.     Neurological: He is alert and oriented to person, place, and time. He has normal strength. No cranial nerve deficit or sensory deficit.   Skin: No rash noted.   Psychiatric: He has a normal mood and affect.         ED Course   Procedures  Labs Reviewed   CBC W/ AUTO DIFFERENTIAL - Abnormal; Notable for the following components:       Result Value    RBC 4.57 (*)     Hemoglobin 13.8 (*)     MCHC 31.9 (*)     All other components within normal limits        ECG Results          EKG 12-lead (In process)  Result time 07/07/22 11:34:03    In process by Interface, Lab In [facility] (07/07/22 11:34:03)                 Narrative:    Test Reason : R10.13,    Vent. Rate : 048 BPM     Atrial Rate : 048  BPM     P-R Int : 182 ms          QRS Dur : 118 ms      QT Int : 458 ms       P-R-T Axes : -04 -22 -25 degrees     QTc Int : 409 ms    Sinus bradycardia  LVH with QRS widening  Nonspecific ST abnormality  Abnormal ECG  When compared with ECG of 17-JUN-2022 21:22,  Vent. rate has decreased BY  23 BPM  T wave inversion now evident in Inferior leads    Referred By: [email or web address]   SELF           Confirmed By:                             Imaging Results          X-Ray Chest PA And Lateral (Final result)  Result time 07/07/22 11:09:15    Final result by Riley Stevenson MD (07/07/22 11:09:15)                 Narrative:    HISTORY: Swallowed foreign body.    FINDINGS: PA and lateral chest radiograph compared to 06/17/2022 show the trachea is midline, with the cardiomediastinal silhouette and pulmonary vascular distribution within normal limits. There are aortic vascular calcifications, with no radiopaque foreign bodies overlying mediastinum or upper abdomen.    The lungs are normally and symmetrically expanded, with no consolidation, pleural effusion or evidence of pulmonary edema. No confluent infiltrates or pneumothorax. No acute fractures or destructive osseous lesions.    IMPRESSION: No evidence of active cardiopulmonary disease. No radiopaque foreign body overlying the mediastinum or upper abdomen.    Electronically signed by:  Riley Stevenson MD  7/7/2022 11:09 AM CDT Workstation: [id number]                               Medications   aluminum-magnesium hydroxide-simethicone 200-200-20 mg/5 mL suspension 30 mL (30 mLs Oral Back Association 7/7/22 1115)   LIDOcaine HCl 2% oral solution 10 mL (10 mLs Oral Given 7/7/22 1011)   midazolam (VERSED) 1 mg/mL injection (1 mg Intravenous Given 7/7/22 1319)   fentaNYL 50 mcg/mL injection (25 mcg Intravenous Given 7/7/22 1319)   diazePAM injection (2.5 mg Intravenous Given 7/7/22 1322)     Medical Decision Making:   History:   Old Medical Records: I decided to obtain old medical  records.  Clinical Tests:   Lab Tests: Reviewed  Radiological Study: Reviewed  Medical Tests: Reviewed  ED Management:  Patient presents with foreign body sensation.  Gastroenterology consulted.  They did perform EGD.  They recommend problems sec.  They will follow up.  There was no foreign body.                      Clinical Impression:   Final diagnoses:  [R10.13] Epigastric burning sensation  [R19.8] Sensation of foreign body in esophagus  [R68.89] Sensation of foreign body  [T18.9XXA] Swallowed foreign body, initial encounter (Primary)                 Robert Lutz MD  07/07/22 6122

## 2022-07-07 NOTE — FIRST PROVIDER EVALUATION
" Emergency Department TeleTriage Encounter Note      CHIEF COMPLAINT    Chief Complaint   Patient presents with    Swallowed Foreign Body     Pt reports swallowing a "bay leaf" on the 27th that got stuck in his throat, moved to a substernal location and has not moved. Pt is able to swallow. Got a referral for GI but the GI office said he needed to come to the ED since they could not perform a scope in the office setting. Pt is AAO, no SOB or drooling.        VITAL SIGNS   Initial Vitals [07/07/22 0955]   BP Pulse Resp Temp SpO2   (!) 188/106 (!) 50 18 97.5 °F (36.4 °C) 98 %      MAP       --            ALLERGIES    Review of patient's allergies indicates:   Allergen Reactions    Bactrim [sulfamethoxazole-trimethoprim]      HIVES/ RASH    Green tea (rashida sinensis) Hives     Pt is has allergies to black tea. Not an option under search.       PROVIDER TRIAGE NOTE  This is a teletriage evaluation of a 71 y.o. male presenting to the ED with c/o concern of stuck bay leaf in distal esophagus swallowed on the 27th. PO intake without issue, though can feel a sensation at the base of his midsternum with swallowing. No SOB/wheezing, no emesis. No CP.     PE:. Non-toxic/well-appearing. No respiratory distress, speaks in full sentences without issue. No active emesis nor cough. Normal eye contact and mentation.     Plan: EKG, GI cocktail, CXR. Further/augmented workup at discretion of examining provider.     All ED beds are full at present; patient notified of this status.  Patient seen and medically screened by ABIMAEL via teletriage. Orders initiated at triage to expedite care.  Patient is stable and will be placed in an ED bed when available.  Care will be transferred to an alternate provider when patient has been placed in an Exam Room further exam, additional orders, and disposition.         ORDERS  Labs Reviewed - No data to display    ED Orders (720h ago, onward)    None            Virtual Visit Note: The provider " triage portion of this emergency department evaluation and documentation was performed via [employer], a HIPAA-compliant telemedicine application, in concert with a tele-presenter in the room. A face to face patient evaluation with one of my colleagues will occur once the patient is placed in an emergency department room.      DISCLAIMER: This note was prepared with [employer] voice recognition transcription software. Garbled syntax, mangled pronouns, and other bizarre constructions may be attributed to that software system.

## 2022-07-13 ENCOUNTER — PATIENT MESSAGE (OUTPATIENT)
Dept: FAMILY MEDICINE | Facility: CLINIC | Age: 72
End: 2022-07-13

## 2022-07-14 ENCOUNTER — OFFICE VISIT (OUTPATIENT)
Dept: PULMONOLOGY | Facility: CLINIC | Age: 72
End: 2022-07-14
Payer: MEDICARE

## 2022-07-14 VITALS
HEIGHT: 68 IN | DIASTOLIC BLOOD PRESSURE: 80 MMHG | BODY MASS INDEX: 38.8 KG/M2 | WEIGHT: 256 LBS | HEART RATE: 61 BPM | OXYGEN SATURATION: 95 % | SYSTOLIC BLOOD PRESSURE: 130 MMHG

## 2022-07-14 DIAGNOSIS — G47.33 OSA (OBSTRUCTIVE SLEEP APNEA): Primary | ICD-10-CM

## 2022-07-14 DIAGNOSIS — J45.909 MODERATE ASTHMA WITHOUT COMPLICATION, UNSPECIFIED WHETHER PERSISTENT: ICD-10-CM

## 2022-07-14 DIAGNOSIS — T17.908A ASPIRATION INTO RESPIRATORY TRACT, INITIAL ENCOUNTER: ICD-10-CM

## 2022-07-14 PROCEDURE — 1126F PR PAIN SEVERITY QUANTIFIED, NO PAIN PRESENT: ICD-10-PCS | Mod: CPTII,S$GLB,, | Performed by: NURSE PRACTITIONER

## 2022-07-14 PROCEDURE — 99214 PR OFFICE/OUTPT VISIT, EST, LEVL IV, 30-39 MIN: ICD-10-PCS | Mod: S$GLB,,, | Performed by: NURSE PRACTITIONER

## 2022-07-14 PROCEDURE — 1159F MED LIST DOCD IN RCRD: CPT | Mod: CPTII,S$GLB,, | Performed by: NURSE PRACTITIONER

## 2022-07-14 PROCEDURE — 3008F PR BODY MASS INDEX (BMI) DOCUMENTED: ICD-10-PCS | Mod: CPTII,S$GLB,, | Performed by: NURSE PRACTITIONER

## 2022-07-14 PROCEDURE — 3075F PR MOST RECENT SYSTOLIC BLOOD PRESS GE 130-139MM HG: ICD-10-PCS | Mod: CPTII,S$GLB,, | Performed by: NURSE PRACTITIONER

## 2022-07-14 PROCEDURE — 3008F BODY MASS INDEX DOCD: CPT | Mod: CPTII,S$GLB,, | Performed by: NURSE PRACTITIONER

## 2022-07-14 PROCEDURE — 3079F PR MOST RECENT DIASTOLIC BLOOD PRESSURE 80-89 MM HG: ICD-10-PCS | Mod: CPTII,S$GLB,, | Performed by: NURSE PRACTITIONER

## 2022-07-14 PROCEDURE — 3079F DIAST BP 80-89 MM HG: CPT | Mod: CPTII,S$GLB,, | Performed by: NURSE PRACTITIONER

## 2022-07-14 PROCEDURE — 1159F PR MEDICATION LIST DOCUMENTED IN MEDICAL RECORD: ICD-10-PCS | Mod: CPTII,S$GLB,, | Performed by: NURSE PRACTITIONER

## 2022-07-14 PROCEDURE — 4010F PR ACE/ARB THEARPY RXD/TAKEN: ICD-10-PCS | Mod: CPTII,S$GLB,, | Performed by: NURSE PRACTITIONER

## 2022-07-14 PROCEDURE — 4010F ACE/ARB THERAPY RXD/TAKEN: CPT | Mod: CPTII,S$GLB,, | Performed by: NURSE PRACTITIONER

## 2022-07-14 PROCEDURE — 3075F SYST BP GE 130 - 139MM HG: CPT | Mod: CPTII,S$GLB,, | Performed by: NURSE PRACTITIONER

## 2022-07-14 PROCEDURE — 99214 OFFICE O/P EST MOD 30 MIN: CPT | Mod: S$GLB,,, | Performed by: NURSE PRACTITIONER

## 2022-07-14 PROCEDURE — 1126F AMNT PAIN NOTED NONE PRSNT: CPT | Mod: CPTII,S$GLB,, | Performed by: NURSE PRACTITIONER

## 2022-07-14 NOTE — Clinical Note
Look see bronch Monday, I put for 8:30.  Arjun please make sure I did it right and it is set up. Dr. Brewer dont biopsy anything cause he is on blood thinners.

## 2022-07-14 NOTE — PROGRESS NOTES
.    SUBJECTIVE:    Patient ID: Nico Teague is a 71 y.o. male.    Chief Complaint: Cough, Shortness of Breath, and Wheezing (COVID in 6/16/22)    Patient had episode over a week ago where he felt like he may have aspirated or piece of bay leaf did not go down when he swallowed.  He went to ER and had an EGD that showed nothing. However the patient is still having a sensation that something is in his mid chest when he swallows and it makes him cough.  Chest xray was fine after the EGD.  He likes Trelegy feels it helps more. He has not had his CPAP titration yet due to him getting Covid when he was supposed to have it done. His atrial fib is under control.  He is on Eliquis.      Past Medical History:   Diagnosis Date    Allergy     Arthritis     Asthma     Atrial fibrillation     Basal cell carcinoma     Fever blister     Hypertension     Joint pain     Melanoma     PVC (premature ventricular contraction)     Skin disease     Squamous cell carcinoma      Past Surgical History:   Procedure Laterality Date    CARDIAC CATHETERIZATION  05/25/2021    ESOPHAGOGASTRODUODENOSCOPY N/A 7/7/2022    Procedure: EGD (ESOPHAGOGASTRODUODENOSCOPY);  Surgeon: Emamnuel Deleon MD;  Location: [facility];  Service: Endoscopy;  Laterality: N/A;    EYE SURGERY Left 12/03/2019    EYE SURGERY Right 01/28/2020    LIPOMA RESECTION      NOSE SURGERY      SHOULDER ARTHROSCOPY      SKIN CANCER EXCISION      Moh's x 2    SKIN GRAFT       Family History   Problem Relation Age of Onset    Cancer Mother         esophageal    Heart disease Father     Hypertension Father     Arthritis Paternal Grandmother         Social History:   Marital Status:   Occupation: retired captain   Alcohol History:  reports no history of alcohol use.  Tobacco History:  reports that he quit smoking about 33 years ago. His smoking use included cigarettes. He started smoking about 50 years ago. He has a 40.00 pack-year smoking history. He has  never used smokeless tobacco.  Drug History:  reports no history of drug use.    Review of patient's allergies indicates:   Allergen Reactions    Bactrim [sulfamethoxazole-trimethoprim]      HIVES/ RASH       Current Outpatient Medications   Medication Sig Dispense Refill    allopurinoL (ZYLOPRIM) 300 MG tablet Take 1 tablet (300 mg total) by mouth once daily. 90 tablet 1    amiodarone (PACERONE) 200 MG Tab Take 200 mg by mouth 2 (two) times daily.      apixaban (ELIQUIS) 5 mg Tab Take 5 mg by mouth 2 (two) times daily.      ascorbic acid, vitamin C, (VITAMIN C) 1000 MG tablet Take 1,000 mg by mouth once daily.      aspirin 81 MG Chew Take 81 mg by mouth once daily.      cholecalciferol, vitamin D3, (VITAMIN D3) 25 mcg (1,000 unit) capsule Take 1,000 Units by mouth once daily.      co-enzyme Q-10 50 mg capsule Take 50 mg by mouth once daily.      diclofenac sodium (VOLTAREN) 1 % Gel Apply 2 g topically once daily. 100 g 3    ENTRESTO 49-51 mg per tablet Take 1 tablet by mouth 2 (two) times daily.      fluticasone propionate (FLONASE) 50 mcg/actuation nasal spray 1 spray (50 mcg total) by Each Nostril route once daily. 48 g 3    furosemide (LASIX) 40 MG tablet Take 20 mg by mouth 2 (two) times a day.      Lactobacillus rhamnosus GG (CULTURELLE) 10 billion cell capsule Take 1 capsule by mouth.      levothyroxine (SYNTHROID) 88 MCG tablet Take 1 tablet (88 mcg total) by mouth before breakfast. 30 tablet 3    magnesium gluconate 27.5 mg magne- sium (500 mg) Tab Take 1 tablet by mouth once.      meclizine (ANTIVERT) 12.5 mg tablet Take 1 tablet (12.5 mg total) by mouth 3 (three) times daily as needed for Dizziness. 30 tablet 1    melatonin 10 mg Tab Take 1 tablet by mouth nightly as needed.      montelukast (SINGULAIR) 10 mg tablet TAKE 1 TABLET (10 MG TOTAL) BY MOUTH EVERY EVENING. (Patient taking differently: Take 10 mg by mouth once daily.) 90 tablet 6    multivitamin (THERAGRAN) per tablet Take 1  "tablet by mouth once daily.      omeprazole (PRILOSEC) 20 MG capsule Take 1 capsule (20 mg total) by mouth once daily. 30 capsule 0    potassium chloride (MICRO-K) 10 MEQ CpSR Take 10 mEq by mouth once daily.      rosuvastatin (CRESTOR) 10 MG tablet Take 1 tablet (10 mg total) by mouth every other day. 90 tablet 1    tamsulosin (FLOMAX) 0.4 mg Cap Take 1 tablet by mouth Daily.      triamterene-hydrochlorothiazide 37.5-25 mg (DYAZIDE) 37.5-25 mg per capsule TAKE 1 CAPSULE EVERY MORNING 90 capsule 1    vitamin E 1000 UNIT capsule Take 1,000 Units by mouth once daily.      fluticasone furoate-vilanteroL (BREO ELLIPTA) 100-25 mcg/dose diskus inhaler Inhale 1 puff into the lungs once daily. Controller Rinse after you use it (Patient not taking: Reported on 7/14/2022) 3 each 4    fluticasone-umeclidin-vilanter (TRELEGY ELLIPTA) 100-62.5-25 mcg DsDv Inhale 1 puff into the lungs once daily. 3 each 6     No current facility-administered medications for this visit.       Last PFT: 4/2018  Chest xray 07/2022  IMPRESSION: No evidence of active cardiopulmonary disease. No radiopaque foreign body overlying the mediastinum or upper abdomen  Review of Systems  General: feels pretty good, chronically fatigued, snores   Eyes: vision is good  ENT: has some nasal stuffiness, better  Heart:: atrial fib better   Lungs: felt to have aspirated on bay leaf over a week ago   GI: No Nausea, vomiting, constipation, diarrhea, or reflux.  : wakes up 4 times a night to urinate   Musculoskeletal: hip hurts   Skin: No lesions or rashes.  Neuro: has had several falls lately, brain fog, forgetful   Lymph:swelling to legs   Psych: No anxiety or depression.  Endo: weight gain      OBJECTIVE:      /80   Pulse 61   Ht 5' 8" (1.727 m)   Wt 116.1 kg (256 lb)   SpO2 95%   BMI 38.92 kg/m²     Physical Exam  GENERAL: Older patient in no distress.  HEENT: Pupils equal and reactive. Extraocular movements intact. Nose intact.      Pharynx " moist.  NECK: Supple.   HEART: regular rate and rhythym  LUNGS: Clear to auscultation and percussion. Lung excursion symmetrical. No change in fremitus. No adventitial noises.  ABDOMEN: Bowel sounds present. Non-tender, no masses palpated.  EXTREMITIES: Normal muscle tone and joint movement, no cyanosis or clubbing.   LYMPHATICS: No adenopathy palpated, 2+ edema  SKIN: Dry, intact, no lesions.   NEURO: Cranial nerves II-XII intact. Motor strength 5/5 bilaterally, upper and lower extremities.  PSYCH: Appropriate affect.     Peak flow is 460    Assessment:       1. BANDAR (obstructive sleep apnea)    2. Moderate asthma without complication, unspecified whether persistent    3. Aspiration into respiratory tract, initial encounter          Plan:       BANDAR (obstructive sleep apnea)    Moderate asthma without complication, unspecified whether persistent    Aspiration into respiratory tract, initial encounter  -     Case Request Endoscopy: Bronchoscopy    Other orders  -     fluticasone-umeclidin-vilanter (TRELEGY ELLIPTA) 100-62.5-25 mcg DsDv; Inhale 1 puff into the lungs once daily.  Dispense: 3 each; Refill: 6       Trelegy 3 month supply   Look see bronch Monday morning 0830 to see if foreign body  Will call with results of sleep study   Follow up in about 3 months (around 10/14/2022).

## 2022-07-18 ENCOUNTER — TELEPHONE (OUTPATIENT)
Dept: PULMONOLOGY | Facility: CLINIC | Age: 72
End: 2022-07-18

## 2022-07-18 ENCOUNTER — ANESTHESIA EVENT (OUTPATIENT)
Dept: SURGERY | Facility: HOSPITAL | Age: 72
End: 2022-07-18
Payer: MEDICARE

## 2022-07-18 ENCOUNTER — PROCEDURE VISIT (OUTPATIENT)
Dept: SLEEP MEDICINE | Facility: HOSPITAL | Age: 72
End: 2022-07-18
Attending: NURSE PRACTITIONER
Payer: MEDICARE

## 2022-07-18 ENCOUNTER — HOSPITAL ENCOUNTER (OUTPATIENT)
Facility: HOSPITAL | Age: 72
Discharge: HOME | End: 2022-07-18
Attending: INTERNAL MEDICINE | Admitting: INTERNAL MEDICINE
Payer: MEDICARE

## 2022-07-18 ENCOUNTER — ANESTHESIA (OUTPATIENT)
Dept: SURGERY | Facility: HOSPITAL | Age: 72
End: 2022-07-18
Payer: MEDICARE

## 2022-07-18 VITALS
HEART RATE: 61 BPM | RESPIRATION RATE: 16 BRPM | TEMPERATURE: 98 F | DIASTOLIC BLOOD PRESSURE: 83 MMHG | SYSTOLIC BLOOD PRESSURE: 171 MMHG | OXYGEN SATURATION: 96 %

## 2022-07-18 DIAGNOSIS — T17.800A ASPIRATION INTO LOWER RESPIRATORY TRACT: ICD-10-CM

## 2022-07-18 DIAGNOSIS — R06.00 DYSPNEA, UNSPECIFIED TYPE: Primary | ICD-10-CM

## 2022-07-18 DIAGNOSIS — G47.33 OSA (OBSTRUCTIVE SLEEP APNEA): ICD-10-CM

## 2022-07-18 PROCEDURE — 25000003 PHARM REV CODE 250: Performed by: INTERNAL MEDICINE

## 2022-07-18 PROCEDURE — 25000242 PHARM REV CODE 250 ALT 637 W/ HCPCS: Performed by: INTERNAL MEDICINE

## 2022-07-18 PROCEDURE — 31622 DX BRONCHOSCOPE/WASH: CPT | Performed by: INTERNAL MEDICINE

## 2022-07-18 PROCEDURE — 95811 POLYSOM 6/>YRS CPAP 4/> PARM: CPT

## 2022-07-18 PROCEDURE — 25000003 PHARM REV CODE 250: Performed by: NURSE ANESTHETIST, CERTIFIED REGISTERED

## 2022-07-18 PROCEDURE — 63600175 PHARM REV CODE 636 W HCPCS: Performed by: NURSE ANESTHETIST, CERTIFIED REGISTERED

## 2022-07-18 PROCEDURE — 37000008 HC ANESTHESIA 1ST 15 MINUTES: Performed by: INTERNAL MEDICINE

## 2022-07-18 PROCEDURE — 31622 DX BRONCHOSCOPE/WASH: CPT | Mod: ,,, | Performed by: INTERNAL MEDICINE

## 2022-07-18 PROCEDURE — 37000009 HC ANESTHESIA EA ADD 15 MINS: Performed by: INTERNAL MEDICINE

## 2022-07-18 PROCEDURE — 31622 PR BRONCHOSCOPY,DIAGNOSTIC: ICD-10-PCS | Mod: ,,, | Performed by: INTERNAL MEDICINE

## 2022-07-18 RX ORDER — PROPOFOL 10 MG/ML
VIAL (ML) INTRAVENOUS
Status: DISCONTINUED | OUTPATIENT
Start: 2022-07-18 | End: 2022-07-18

## 2022-07-18 RX ORDER — LIDOCAINE HYDROCHLORIDE 10 MG/ML
INJECTION INFILTRATION; PERINEURAL
Status: COMPLETED | OUTPATIENT
Start: 2022-07-18 | End: 2022-07-18

## 2022-07-18 RX ORDER — ALBUTEROL SULFATE 0.83 MG/ML
SOLUTION RESPIRATORY (INHALATION)
Status: COMPLETED | OUTPATIENT
Start: 2022-07-18 | End: 2022-07-18

## 2022-07-18 RX ORDER — LIDOCAINE HYDROCHLORIDE 20 MG/ML
INJECTION, SOLUTION INFILTRATION; PERINEURAL
Status: COMPLETED | OUTPATIENT
Start: 2022-07-18 | End: 2022-07-18

## 2022-07-18 RX ORDER — LIDOCAINE HYDROCHLORIDE 40 MG/ML
SOLUTION TOPICAL
Status: COMPLETED | OUTPATIENT
Start: 2022-07-18 | End: 2022-07-18

## 2022-07-18 RX ADMIN — LIDOCAINE HYDROCHLORIDE 1 ML: 10 INJECTION, SOLUTION INFILTRATION; PERINEURAL at 08:07

## 2022-07-18 RX ADMIN — PROPOFOL 150 MG: 10 INJECTION, EMULSION INTRAVENOUS at 08:07

## 2022-07-18 RX ADMIN — ALBUTEROL SULFATE 5 MG: 2.5 SOLUTION RESPIRATORY (INHALATION) at 08:07

## 2022-07-18 RX ADMIN — SODIUM CHLORIDE: 0.9 INJECTION, SOLUTION INTRAVENOUS at 08:07

## 2022-07-18 RX ADMIN — LIDOCAINE HYDROCHLORIDE 4 ML: 40 SOLUTION TOPICAL at 08:07

## 2022-07-18 RX ADMIN — LIDOCAINE HYDROCHLORIDE 1 ML: 20 INJECTION, SOLUTION INFILTRATION; PERINEURAL at 08:07

## 2022-07-18 NOTE — PROCEDURES
Bronchoscopy    Indication:  Foreign body perception  Medications:  Per anesthesia  Specimens:  None    The bronchoscope was introduced through the right nare. The hypopharynx and larynx were unremarkable. The vocal cords were midline and opposed well. The trachea was midline but had some tracheomalacia.. The annemarie was sharp. The right upper lobe, right middle lobe, and right lower lobe subdivided into the usual subsegments.  Again there was some degree of bronchomalacia. There were no endobronchial or submucosal abnormalities noted. The left upper lobe and left lower lobe subdivided into the usual subsegments. There were no endobronchial or submucosal abnormalities noted.The patient tolerated the procedure well.

## 2022-07-18 NOTE — TRANSFER OF CARE
Anesthesia Transfer of Care Note    Patient: Nico Teague    Procedure(s) Performed: Procedure(s) (LRB):  Bronchoscopy (N/A)    Patient location: GI    Anesthesia Type: MAC    Transport from OR: Transported from OR on 6-10 L/min O2 by face mask with adequate spontaneous ventilation    Post pain: adequate analgesia    Post assessment: no apparent anesthetic complications    Post vital signs: stable    Level of consciousness: awake    Nausea/Vomiting: no nausea/vomiting    Complications: none    Transfer of care protocol was followed      Last vitals:   Visit Vitals  BP (!) (P) 185/85   Pulse (!) (P) 53   Temp (P) 37.3 °C (99.1 °F)   Resp (P) 18   SpO2 (P) 98%

## 2022-07-18 NOTE — ANESTHESIA POSTPROCEDURE EVALUATION
Anesthesia Post Evaluation    Patient: Nico Teague    Procedure(s) Performed: Procedure(s) (LRB):  Bronchoscopy (N/A)    Final Anesthesia Type: general      Patient location during evaluation: GI PACU  Patient participation: Yes- Able to Participate  Level of consciousness: awake and alert and oriented  Post-procedure vital signs: reviewed and stable  Pain management: adequate  Airway patency: patent    PONV status at discharge: No PONV  Anesthetic complications: no      Cardiovascular status: blood pressure returned to baseline, hemodynamically stable and stable  Respiratory status: unassisted, spontaneous ventilation and room air  Hydration status: euvolemic  Follow-up not needed.          Vitals Value Taken Time   /91 07/18/22 0950   Temp 36.7 °C (98 °F) 07/18/22 0900   Pulse 60 07/18/22 0953   Resp 16 07/18/22 0953   SpO2 96 % 07/18/22 0953   Vitals shown include unvalidated device data.      No case tracking events are documented in the log.      Pain/Rayo Score: No data recorded

## 2022-07-18 NOTE — H&P
.    SUBJECTIVE:    Patient ID: Nico Teague is a 71 y.o. male.    Chief Complaint: No chief complaint on file.    Patient had episode over a week ago where he felt like he may have aspirated or piece of bay leaf did not go down when he swallowed.  He went to ER and had an EGD that showed nothing. However the patient is still having a sensation that something is in his mid chest when he swallows and it makes him cough.  Chest xray was fine after the EGD.  He likes Trelegy feels it helps more. He has not had his CPAP titration yet due to him getting Covid when he was supposed to have it done. His atrial fib is under control.  He is on Eliquis.      Past Medical History:   Diagnosis Date    Allergy     Arthritis     Asthma     Atrial fibrillation     Basal cell carcinoma     Fever blister     Hypertension     Joint pain     Melanoma     PVC (premature ventricular contraction)     Skin disease     Squamous cell carcinoma      Past Surgical History:   Procedure Laterality Date    CARDIAC CATHETERIZATION  05/25/2021    ESOPHAGOGASTRODUODENOSCOPY N/A 7/7/2022    Procedure: EGD (ESOPHAGOGASTRODUODENOSCOPY);  Surgeon: Emmanuel Deleon MD;  Location: [facility];  Service: Endoscopy;  Laterality: N/A;    EYE SURGERY Left 12/03/2019    EYE SURGERY Right 01/28/2020    LIPOMA RESECTION      NOSE SURGERY      SHOULDER ARTHROSCOPY      SKIN CANCER EXCISION      Moh's x 2    SKIN GRAFT       Family History   Problem Relation Age of Onset    Cancer Mother         esophageal    Heart disease Father     Hypertension Father     Arthritis Paternal Grandmother         Social History:   Marital Status:   Occupation: retired captain   Alcohol History:  reports no history of alcohol use.  Tobacco History:  reports that he quit smoking about 33 years ago. His smoking use included cigarettes. He started smoking about 50 years ago. He has a 40.00 pack-year smoking history. He has never used smokeless  tobacco.  Drug History:  reports no history of drug use.    Review of patient's allergies indicates:   Allergen Reactions    Bactrim [sulfamethoxazole-trimethoprim]      HIVES/ RASH       No current facility-administered medications for this encounter.       Last PFT: 4/2018  Chest xray 07/2022  IMPRESSION: No evidence of active cardiopulmonary disease. No radiopaque foreign body overlying the mediastinum or upper abdomen  Review of Systems  General: feels pretty good, chronically fatigued, snores   Eyes: vision is good  ENT: has some nasal stuffiness, better  Heart:: atrial fib better   Lungs: felt to have aspirated on bay leaf over a week ago   GI: No Nausea, vomiting, constipation, diarrhea, or reflux.  : wakes up 4 times a night to urinate   Musculoskeletal: hip hurts   Skin: No lesions or rashes.  Neuro: has had several falls lately, brain fog, forgetful   Lymph:swelling to legs   Psych: No anxiety or depression.  Endo: weight gain      OBJECTIVE:      BP (!) (P) 185/85   Pulse (!) (P) 53   Temp (P) 99.1 °F (37.3 °C)   Resp (P) 18   SpO2 (P) 98% Comment: RA    Physical Exam  GENERAL: Older patient in no distress.  HEENT: Pupils equal and reactive. Extraocular movements intact. Nose intact.      Pharynx moist.  NECK: Supple.   HEART: regular rate and rhythym  LUNGS: Clear to auscultation and percussion. Lung excursion symmetrical. No change in fremitus. No adventitial noises.  ABDOMEN: Bowel sounds present. Non-tender, no masses palpated.  EXTREMITIES: Normal muscle tone and joint movement, no cyanosis or clubbing.   LYMPHATICS: No adenopathy palpated, 2+ edema  SKIN: Dry, intact, no lesions.   NEURO: Cranial nerves II-XII intact. Motor strength 5/5 bilaterally, upper and lower extremities.  PSYCH: Appropriate affect.     Peak flow is 460    Assessment:       1. Aspiration into lower respiratory tract          Plan:       Aspiration into lower respiratory tract    Other orders  -     Full code;  Standing  -     Place in Outpatient; Standing  -     Vital signs; Standing  -     Discontinue IV; Standing  -     DISCHARGE PATIENT; Standing       Trelegy 3 month supply   Look see bronch Monday morning 0830 to see if foreign body  Will call with results of sleep study   No follow-ups on file.

## 2022-07-18 NOTE — ANESTHESIA PREPROCEDURE EVALUATION
07/18/2022  Nico Teague is a 71 y.o., male.      Pre-op Assessment    I have reviewed the Patient Summary Reports.     I have reviewed the Nursing Notes. I have reviewed the NPO Status.   I have reviewed the Medications.     Review of Systems  Anesthesia Hx:  No problems with previous Anesthesia  Neg history of prior surgery. Denies Family Hx of Anesthesia complications.   Denies Personal Hx of Anesthesia complications.   Social:  Former Smoker, No Alcohol Use    Hematology/Oncology:        Oncology Comments: Hx of Basal cell and Melanoma   Cardiovascular:   Hypertension Dysrhythmias atrial fibrillation    Pulmonary:   Asthma moderate Shortness of breath    Musculoskeletal:   Arthritis     Endocrine:  Obesity / BMI > 30      Patient Active Problem List   Diagnosis    Moderate asthma    BPH (benign prostatic hyperplasia)    Hyperlipidemia    Hypertension    Vertigo    Dyspnea    Post-nasal drip    Severe obesity (BMI 35.0-39.9) with comorbidity       Past Surgical History:   Procedure Laterality Date    CARDIAC CATHETERIZATION  05/25/2021    ESOPHAGOGASTRODUODENOSCOPY N/A 7/7/2022    Procedure: EGD (ESOPHAGOGASTRODUODENOSCOPY);  Surgeon: Emmanuel Deleon MD;  Location: [facility];  Service: Endoscopy;  Laterality: N/A;    EYE SURGERY Left 12/03/2019    EYE SURGERY Right 01/28/2020    LIPOMA RESECTION      NOSE SURGERY      SHOULDER ARTHROSCOPY      SKIN CANCER EXCISION      Moh's x 2    SKIN GRAFT          Tobacco Use:  The patient  reports that he quit smoking about 33 years ago. His smoking use included cigarettes. He started smoking about 50 years ago. He has a 40.00 pack-year smoking history. He has never used smokeless tobacco.     Results for orders placed or performed during the hospital encounter of 07/07/22   EKG 12-lead    Collection Time: 07/07/22  3:30 PM    Narrative    Test  Reason : R68.89,    Vent. Rate : 049 BPM     Atrial Rate : 049 BPM     P-R Int : 198 ms          QRS Dur : 110 ms      QT Int : 504 ms       P-R-T Axes : 045 -11 002 degrees     QTc Int : 455 ms    Sinus bradycardia  Incomplete left bundle branch block  Minimal voltage criteria for LVH, may be normal variant  Nonspecific ST abnormality  Abnormal ECG  When compared with ECG of 07-JUL-2022 11:27,  No significant change was found  Confirmed by Jaden Jamison MD ([id number]) on 7/13/2022 4:56:46 PM    Referred By: ANITA   SELF           Confirmed By:Jaden Jamison MD             Lab Results   Component Value Date    WBC 8.37 07/07/2022    HGB 13.8 (L) 07/07/2022    HCT 43.2 07/07/2022    MCV 95 07/07/2022     07/07/2022     BMP  Lab Results   Component Value Date     07/07/2022    K 3.7 07/07/2022     07/07/2022    CO2 27 07/07/2022    BUN 21 07/07/2022    CREATININE 1.1 07/07/2022    CALCIUM 8.8 07/07/2022    ANIONGAP 6 (L) 07/07/2022    GLU 85 07/07/2022     (H) 06/17/2022    GLU 94 06/02/2022       No results found for this or any previous visit.          Physical Exam  General: Well nourished and Alert    Airway:  Mallampati: II   Mouth Opening: Normal  TM Distance: Normal  Tongue: Normal  Neck ROM: Normal ROM    Dental:  Intact    Chest/Lungs:  Clear to auscultation, Normal Respiratory Rate    Heart:  Rate: Normal  Rhythm: Regular Rhythm  Sounds: Normal        Anesthesia Plan  Type of Anesthesia, risks & benefits discussed:    Anesthesia Type: MAC  Intra-op Monitoring Plan: Standard ASA Monitors  Induction:  IV  Informed Consent: Informed consent signed with the Patient and all parties understand the risks and agree with anesthesia plan.  All questions answered.   ASA Score: 3  Anesthesia Plan Notes: MAC    Ready For Surgery From Anesthesia Perspective.     .

## 2022-07-18 NOTE — TELEPHONE ENCOUNTER
Patient with negative bronch for foreign body. Patient still with sensation that something in lower chest, dyspnea and cough. Will order CT

## 2022-07-19 NOTE — PATIENT INSTRUCTIONS
CPAP was performed and pt was informed that his referring NP Divya Alvarez would call him with his results

## 2022-07-20 ENCOUNTER — TELEPHONE (OUTPATIENT)
Dept: PULMONOLOGY | Facility: CLINIC | Age: 72
End: 2022-07-20

## 2022-07-20 DIAGNOSIS — G47.33 OSA (OBSTRUCTIVE SLEEP APNEA): Primary | ICD-10-CM

## 2022-07-21 ENCOUNTER — PATIENT MESSAGE (OUTPATIENT)
Dept: PULMONOLOGY | Facility: CLINIC | Age: 72
End: 2022-07-21

## 2022-07-28 ENCOUNTER — TELEPHONE (OUTPATIENT)
Dept: PULMONOLOGY | Facility: CLINIC | Age: 72
End: 2022-07-28

## 2022-08-01 ENCOUNTER — HOSPITAL ENCOUNTER (OUTPATIENT)
Dept: RADIOLOGY | Facility: HOSPITAL | Age: 72
Discharge: HOME | End: 2022-08-01
Attending: NURSE PRACTITIONER
Payer: MEDICARE

## 2022-08-01 DIAGNOSIS — R06.00 DYSPNEA, UNSPECIFIED TYPE: ICD-10-CM

## 2022-08-01 PROCEDURE — 71250 CT THORAX DX C-: CPT | Mod: TC,PO

## 2022-08-02 ENCOUNTER — TELEPHONE (OUTPATIENT)
Dept: PULMONOLOGY | Facility: HOSPITAL | Age: 72
End: 2022-08-02

## 2022-08-03 NOTE — TELEPHONE ENCOUNTER
Patient's CT shows no cause for his foreign body sensation.  He states the feeling comes and goes.  He stopped Trelegy because it made him hoarse.  He will come in the office in a month for his first CPAP follow up

## 2022-08-04 ENCOUNTER — PATIENT MESSAGE (OUTPATIENT)
Dept: PULMONOLOGY | Facility: CLINIC | Age: 72
End: 2022-08-04

## 2022-10-03 DIAGNOSIS — E03.2 HYPOTHYROIDISM DUE TO MEDICATION: ICD-10-CM

## 2022-10-03 RX ORDER — LEVOTHYROXINE SODIUM 88 UG/1
88 TABLET ORAL
Qty: 30 TABLET | Refills: 3 | Status: SHIPPED | OUTPATIENT
Start: 2022-10-03 | End: 2022-11-07 | Stop reason: SDUPTHER

## 2022-10-17 ENCOUNTER — CLINICAL SUPPORT (OUTPATIENT)
Dept: FAMILY MEDICINE | Facility: CLINIC | Age: 72
End: 2022-10-17
Payer: MEDICARE

## 2022-10-17 DIAGNOSIS — Z23 NEED FOR INFLUENZA VACCINATION: Primary | ICD-10-CM

## 2022-10-17 PROCEDURE — G0008 ADMIN INFLUENZA VIRUS VAC: HCPCS | Mod: S$GLB,,, | Performed by: INTERNAL MEDICINE

## 2022-10-17 PROCEDURE — G0008 FLU VACCINE - QUADRIVALENT - HIGH DOSE (65+) PRESERVATIVE FREE IM: ICD-10-PCS | Mod: S$GLB,,, | Performed by: INTERNAL MEDICINE

## 2022-10-17 PROCEDURE — 90662 FLU VACCINE - QUADRIVALENT - HIGH DOSE (65+) PRESERVATIVE FREE IM: ICD-10-PCS | Mod: S$GLB,,, | Performed by: INTERNAL MEDICINE

## 2022-10-17 PROCEDURE — 90662 IIV NO PRSV INCREASED AG IM: CPT | Mod: S$GLB,,, | Performed by: INTERNAL MEDICINE

## 2022-10-17 NOTE — PROGRESS NOTES
Pt was seen today to receive High-Dose Influenza vaccination. VIS statement given, pt tolerated well.

## 2022-10-26 ENCOUNTER — OFFICE VISIT (OUTPATIENT)
Dept: PULMONOLOGY | Facility: CLINIC | Age: 72
End: 2022-10-26
Payer: MEDICARE

## 2022-10-26 VITALS
HEIGHT: 68 IN | OXYGEN SATURATION: 96 % | WEIGHT: 255 LBS | DIASTOLIC BLOOD PRESSURE: 80 MMHG | SYSTOLIC BLOOD PRESSURE: 120 MMHG | HEART RATE: 60 BPM | BODY MASS INDEX: 38.65 KG/M2

## 2022-10-26 DIAGNOSIS — G47.33 OSA (OBSTRUCTIVE SLEEP APNEA): Primary | ICD-10-CM

## 2022-10-26 DIAGNOSIS — J45.40 MODERATE PERSISTENT ASTHMA, UNSPECIFIED WHETHER COMPLICATED: ICD-10-CM

## 2022-10-26 DIAGNOSIS — I48.91 ATRIAL FIBRILLATION, UNSPECIFIED TYPE: ICD-10-CM

## 2022-10-26 DIAGNOSIS — R09.82 POST-NASAL DRIP: ICD-10-CM

## 2022-10-26 PROCEDURE — 99214 PR OFFICE/OUTPT VISIT, EST, LEVL IV, 30-39 MIN: ICD-10-PCS | Mod: S$GLB,,, | Performed by: INTERNAL MEDICINE

## 2022-10-26 PROCEDURE — 1159F PR MEDICATION LIST DOCUMENTED IN MEDICAL RECORD: ICD-10-PCS | Mod: CPTII,S$GLB,, | Performed by: INTERNAL MEDICINE

## 2022-10-26 PROCEDURE — 3079F DIAST BP 80-89 MM HG: CPT | Mod: CPTII,S$GLB,, | Performed by: INTERNAL MEDICINE

## 2022-10-26 PROCEDURE — 4010F ACE/ARB THERAPY RXD/TAKEN: CPT | Mod: CPTII,S$GLB,, | Performed by: INTERNAL MEDICINE

## 2022-10-26 PROCEDURE — 1126F AMNT PAIN NOTED NONE PRSNT: CPT | Mod: CPTII,S$GLB,, | Performed by: INTERNAL MEDICINE

## 2022-10-26 PROCEDURE — 99214 OFFICE O/P EST MOD 30 MIN: CPT | Mod: S$GLB,,, | Performed by: INTERNAL MEDICINE

## 2022-10-26 PROCEDURE — 1126F PR PAIN SEVERITY QUANTIFIED, NO PAIN PRESENT: ICD-10-PCS | Mod: CPTII,S$GLB,, | Performed by: INTERNAL MEDICINE

## 2022-10-26 PROCEDURE — 3074F SYST BP LT 130 MM HG: CPT | Mod: CPTII,S$GLB,, | Performed by: INTERNAL MEDICINE

## 2022-10-26 PROCEDURE — 4010F PR ACE/ARB THEARPY RXD/TAKEN: ICD-10-PCS | Mod: CPTII,S$GLB,, | Performed by: INTERNAL MEDICINE

## 2022-10-26 PROCEDURE — 3079F PR MOST RECENT DIASTOLIC BLOOD PRESSURE 80-89 MM HG: ICD-10-PCS | Mod: CPTII,S$GLB,, | Performed by: INTERNAL MEDICINE

## 2022-10-26 PROCEDURE — 3074F PR MOST RECENT SYSTOLIC BLOOD PRESSURE < 130 MM HG: ICD-10-PCS | Mod: CPTII,S$GLB,, | Performed by: INTERNAL MEDICINE

## 2022-10-26 PROCEDURE — 1159F MED LIST DOCD IN RCRD: CPT | Mod: CPTII,S$GLB,, | Performed by: INTERNAL MEDICINE

## 2022-10-26 NOTE — PROGRESS NOTES
.    SUBJECTIVE:    Patient ID: Nico Teague is a 72 y.o. male.    Chief Complaint: Apnea    The patient is here still having the sensation of a foreign body in his epigastrium every few days..  He has had a bronch, a CT and an EGD, all negative.     The  patient reset his machine to ramp starting at 4 and CPAP at 10..  He is sleeping well and sleeping longer, and his a fib is good and his blood pressure is better.  His fatigue is dramatically better.  His AHI is 4.2.  He is completely compliant.    His asthma is doing well.  He is on Trelegy and Singulair.  He has not needed his rescue inhaler in months.  Past Medical History:   Diagnosis Date    Allergy     Arthritis     Asthma     Atrial fibrillation     Basal cell carcinoma     Fever blister     Hypertension     Joint pain     Melanoma     PVC (premature ventricular contraction)     Skin disease     Squamous cell carcinoma      Past Surgical History:   Procedure Laterality Date    BRONCHOSCOPY N/A 7/18/2022    Procedure: Bronchoscopy;  Surgeon: Charlee Brewer MD;  Location: [facility];  Service: Pulmonary;  Laterality: N/A;  monday 7/18/22 at 0830 look see bronch no biopsy    CARDIAC CATHETERIZATION  05/25/2021    ESOPHAGOGASTRODUODENOSCOPY N/A 7/7/2022    Procedure: EGD (ESOPHAGOGASTRODUODENOSCOPY);  Surgeon: Emmanuel Deleon MD;  Location: [facility];  Service: Endoscopy;  Laterality: N/A;    EYE SURGERY Left 12/03/2019    EYE SURGERY Right 01/28/2020    LIPOMA RESECTION      NOSE SURGERY      SHOULDER ARTHROSCOPY      SKIN CANCER EXCISION      Moh's x 2    SKIN GRAFT       Family History   Problem Relation Age of Onset    Cancer Mother         esophageal    Heart disease Father     Hypertension Father     Arthritis Paternal Grandmother         Social History:   Marital Status:   Occupation: retired captain   Alcohol History:  reports no history of alcohol use.  Tobacco History:  reports that he quit smoking about 34 years ago. His smoking use  included cigarettes. He started smoking about 50 years ago. He has a 40.00 pack-year smoking history. He has never used smokeless tobacco.  Drug History:  reports no history of drug use.    Review of patient's allergies indicates:   Allergen Reactions    Bactrim [sulfamethoxazole-trimethoprim]      HIVES/ RASH       Current Outpatient Medications   Medication Sig Dispense Refill    fluticasone propionate (FLONASE) 50 mcg/actuation nasal spray USE 1 SPRAY IN EACH NOSTRIL ONE TIME DAILY 32 g 3    fluticasone-umeclidin-vilanter (TRELEGY ELLIPTA) 100-62.5-25 mcg DsDv Inhale 1 puff into the lungs once daily. 3 each 6    furosemide (LASIX) 40 MG tablet Take 20 mg by mouth 2 (two) times a day.      Lactobacillus rhamnosus GG (CULTURELLE) 10 billion cell capsule Take 1 capsule by mouth.      levothyroxine (SYNTHROID) 88 MCG tablet Take 1 tablet (88 mcg total) by mouth before breakfast. 30 tablet 3    magnesium gluconate 27.5 mg magne- sium (500 mg) Tab Take 1 tablet by mouth once.      meclizine (ANTIVERT) 12.5 mg tablet Take 1 tablet (12.5 mg total) by mouth 3 (three) times daily as needed for Dizziness. 30 tablet 1    melatonin 10 mg Tab Take 1 tablet by mouth nightly as needed.      multivitamin (THERAGRAN) per tablet Take 1 tablet by mouth once daily.      potassium chloride (MICRO-K) 10 MEQ CpSR Take 10 mEq by mouth once daily.      rosuvastatin (CRESTOR) 10 MG tablet Take 1 tablet (10 mg total) by mouth every other day. 90 tablet 1    tamsulosin (FLOMAX) 0.4 mg Cap Take 1 tablet by mouth Daily.      vitamin E 1000 UNIT capsule Take 1,000 Units by mouth once daily.      allopurinoL (ZYLOPRIM) 300 MG tablet Take 1 tablet (300 mg total) by mouth once daily. 90 tablet 1    amiodarone (PACERONE) 200 MG Tab Take 200 mg by mouth 2 (two) times daily.      apixaban (ELIQUIS) 5 mg Tab Take 5 mg by mouth 2 (two) times daily.      ascorbic acid, vitamin C, (VITAMIN C) 1000 MG tablet Take 1,000 mg by mouth once daily.      aspirin  "81 MG Chew Take 81 mg by mouth once daily.      cholecalciferol, vitamin D3, (VITAMIN D3) 25 mcg (1,000 unit) capsule Take 1,000 Units by mouth once daily.      co-enzyme Q-10 50 mg capsule Take 50 mg by mouth once daily.      diclofenac sodium (VOLTAREN) 1 % Gel Apply 2 g topically once daily. 100 g 3    ENTRESTO 49-51 mg per tablet Take 1 tablet by mouth 2 (two) times daily.      fluticasone furoate-vilanteroL (BREO ELLIPTA) 100-25 mcg/dose diskus inhaler Inhale 1 puff into the lungs once daily. Controller Rinse after you use it (Patient not taking: No sig reported) 3 each 4    montelukast (SINGULAIR) 10 mg tablet TAKE 1 TABLET (10 MG TOTAL) BY MOUTH EVERY EVENING. (Patient not taking: Reported on 10/26/2022) 90 tablet 6    omeprazole (PRILOSEC) 20 MG capsule Take 1 capsule (20 mg total) by mouth once daily. 30 capsule 0    triamterene-hydrochlorothiazide 37.5-25 mg (DYAZIDE) 37.5-25 mg per capsule TAKE 1 CAPSULE EVERY MORNING 90 capsule 1     No current facility-administered medications for this visit.       Last PFT: 4/2018  Chest xray 07/2022  IMPRESSION: No evidence of active cardiopulmonary disease. No radiopaque foreign body overlying the mediastinum or upper abdomen      Review of Systems  General: feels pretty good, still gets fatigued in the afternoon, still tries to get a power nap at 1 pm  Eyes: vision is good  ENT: nasal stuffiness comes and goes  Heart:: atrial fib better   Lungs: a little shortness of breath, has had it for a long time  GI: No Nausea, vomiting, constipation, diarrhea, or reflux.  : nocturia down to 2  Musculoskeletal: R shoulder hurts  Skin: No lesions or rashes.  Neuro: not falling, brain fog is a little better  Lymph:swelling to legs   Psych: No anxiety or depression.  Endo: weight stable    OBJECTIVE:      /80 (BP Location: Left arm, Patient Position: Sitting, BP Method: Medium (Manual))   Pulse 60   Ht 5' 8" (1.727 m)   Wt 115.7 kg (255 lb)   SpO2 96%   BMI 38.77 " kg/m²     Physical Exam  GENERAL: Older patient in no distress.  HEENT: Pupils equal and reactive. Extraocular movements intact. Nose intact.      Pharynx moist.  NECK: Supple.   HEART: regular rate and rhythym  LUNGS: Clear to auscultation and percussion. Lung excursion symmetrical. No change in fremitus. No adventitial noises.  ABDOMEN: Bowel sounds present. Non-tender, no masses palpated.  EXTREMITIES: Normal muscle tone and joint movement, no cyanosis or clubbing.   LYMPHATICS: No adenopathy palpated, 2+ edema  SKIN: Dry, intact, no lesions.   NEURO: Cranial nerves II-XII intact. Motor strength 5/5 bilaterally, upper and lower extremities.  PSYCH: Appropriate affect.     Peak flow is 550    Assessment:       1. BANDAR (obstructive sleep apnea)    2. Moderate persistent asthma, unspecified whether complicated    3. Atrial fibrillation, unspecified type    4. Post-nasal drip            Plan:       BANDAR (obstructive sleep apnea)    Moderate persistent asthma, unspecified whether complicated    Atrial fibrillation, unspecified type    Post-nasal drip      Continue sleeping on CPAP  Continue Trelegy and Singulair  Had his flu shot  Follow up in about 6 months (around 4/26/2023).

## 2022-11-02 LAB — TSH SERPL-ACNC: 2.18 MIU/L (ref 0.4–4.5)

## 2022-11-07 ENCOUNTER — OFFICE VISIT (OUTPATIENT)
Dept: FAMILY MEDICINE | Facility: CLINIC | Age: 72
End: 2022-11-07
Payer: MEDICARE

## 2022-11-07 VITALS
OXYGEN SATURATION: 97 % | SYSTOLIC BLOOD PRESSURE: 128 MMHG | TEMPERATURE: 99 F | HEIGHT: 68 IN | BODY MASS INDEX: 38.49 KG/M2 | HEART RATE: 53 BPM | DIASTOLIC BLOOD PRESSURE: 68 MMHG | WEIGHT: 254 LBS

## 2022-11-07 DIAGNOSIS — E03.2 HYPOTHYROIDISM DUE TO MEDICATION: ICD-10-CM

## 2022-11-07 DIAGNOSIS — E78.00 PURE HYPERCHOLESTEROLEMIA: ICD-10-CM

## 2022-11-07 DIAGNOSIS — G47.33 OSA ON CPAP: ICD-10-CM

## 2022-11-07 DIAGNOSIS — M10.9 GOUT, UNSPECIFIED CAUSE, UNSPECIFIED CHRONICITY, UNSPECIFIED SITE: ICD-10-CM

## 2022-11-07 DIAGNOSIS — I10 ESSENTIAL HYPERTENSION: Primary | ICD-10-CM

## 2022-11-07 PROCEDURE — 3078F DIAST BP <80 MM HG: CPT | Mod: CPTII,S$GLB,, | Performed by: INTERNAL MEDICINE

## 2022-11-07 PROCEDURE — 1126F AMNT PAIN NOTED NONE PRSNT: CPT | Mod: CPTII,S$GLB,, | Performed by: INTERNAL MEDICINE

## 2022-11-07 PROCEDURE — 4010F ACE/ARB THERAPY RXD/TAKEN: CPT | Mod: CPTII,S$GLB,, | Performed by: INTERNAL MEDICINE

## 2022-11-07 PROCEDURE — 3074F SYST BP LT 130 MM HG: CPT | Mod: CPTII,S$GLB,, | Performed by: INTERNAL MEDICINE

## 2022-11-07 PROCEDURE — 1160F PR REVIEW ALL MEDS BY PRESCRIBER/CLIN PHARMACIST DOCUMENTED: ICD-10-PCS | Mod: CPTII,S$GLB,, | Performed by: INTERNAL MEDICINE

## 2022-11-07 PROCEDURE — 3288F FALL RISK ASSESSMENT DOCD: CPT | Mod: CPTII,S$GLB,, | Performed by: INTERNAL MEDICINE

## 2022-11-07 PROCEDURE — 99214 OFFICE O/P EST MOD 30 MIN: CPT | Mod: S$GLB,,, | Performed by: INTERNAL MEDICINE

## 2022-11-07 PROCEDURE — 3074F PR MOST RECENT SYSTOLIC BLOOD PRESSURE < 130 MM HG: ICD-10-PCS | Mod: CPTII,S$GLB,, | Performed by: INTERNAL MEDICINE

## 2022-11-07 PROCEDURE — 1159F MED LIST DOCD IN RCRD: CPT | Mod: CPTII,S$GLB,, | Performed by: INTERNAL MEDICINE

## 2022-11-07 PROCEDURE — 3008F PR BODY MASS INDEX (BMI) DOCUMENTED: ICD-10-PCS | Mod: CPTII,S$GLB,, | Performed by: INTERNAL MEDICINE

## 2022-11-07 PROCEDURE — 3008F BODY MASS INDEX DOCD: CPT | Mod: CPTII,S$GLB,, | Performed by: INTERNAL MEDICINE

## 2022-11-07 PROCEDURE — 1126F PR PAIN SEVERITY QUANTIFIED, NO PAIN PRESENT: ICD-10-PCS | Mod: CPTII,S$GLB,, | Performed by: INTERNAL MEDICINE

## 2022-11-07 PROCEDURE — 4010F PR ACE/ARB THEARPY RXD/TAKEN: ICD-10-PCS | Mod: CPTII,S$GLB,, | Performed by: INTERNAL MEDICINE

## 2022-11-07 PROCEDURE — 1159F PR MEDICATION LIST DOCUMENTED IN MEDICAL RECORD: ICD-10-PCS | Mod: CPTII,S$GLB,, | Performed by: INTERNAL MEDICINE

## 2022-11-07 PROCEDURE — 1160F RVW MEDS BY RX/DR IN RCRD: CPT | Mod: CPTII,S$GLB,, | Performed by: INTERNAL MEDICINE

## 2022-11-07 PROCEDURE — 3288F PR FALLS RISK ASSESSMENT DOCUMENTED: ICD-10-PCS | Mod: CPTII,S$GLB,, | Performed by: INTERNAL MEDICINE

## 2022-11-07 PROCEDURE — 1101F PT FALLS ASSESS-DOCD LE1/YR: CPT | Mod: CPTII,S$GLB,, | Performed by: INTERNAL MEDICINE

## 2022-11-07 PROCEDURE — 99214 PR OFFICE/OUTPT VISIT, EST, LEVL IV, 30-39 MIN: ICD-10-PCS | Mod: S$GLB,,, | Performed by: INTERNAL MEDICINE

## 2022-11-07 PROCEDURE — 1101F PR PT FALLS ASSESS DOC 0-1 FALLS W/OUT INJ PAST YR: ICD-10-PCS | Mod: CPTII,S$GLB,, | Performed by: INTERNAL MEDICINE

## 2022-11-07 PROCEDURE — 3078F PR MOST RECENT DIASTOLIC BLOOD PRESSURE < 80 MM HG: ICD-10-PCS | Mod: CPTII,S$GLB,, | Performed by: INTERNAL MEDICINE

## 2022-11-07 RX ORDER — ALLOPURINOL 300 MG/1
300 TABLET ORAL DAILY
Qty: 90 TABLET | Refills: 1 | Status: SHIPPED | OUTPATIENT
Start: 2022-11-07 | End: 2023-07-01

## 2022-11-07 RX ORDER — ROSUVASTATIN CALCIUM 10 MG/1
10 TABLET, COATED ORAL EVERY OTHER DAY
Qty: 90 TABLET | Refills: 1 | Status: SHIPPED | OUTPATIENT
Start: 2022-11-07 | End: 2023-12-28

## 2022-11-07 RX ORDER — TRIAMTERENE AND HYDROCHLOROTHIAZIDE 37.5; 25 MG/1; MG/1
1 CAPSULE ORAL EVERY MORNING
Qty: 90 CAPSULE | Refills: 1 | Status: SHIPPED | OUTPATIENT
Start: 2022-11-07 | End: 2023-05-16

## 2022-11-07 RX ORDER — LEVOTHYROXINE SODIUM 88 UG/1
88 TABLET ORAL
Qty: 30 TABLET | Refills: 3 | Status: SHIPPED | OUTPATIENT
Start: 2022-11-07 | End: 2023-02-22

## 2022-11-07 NOTE — PROGRESS NOTES
Subjective:       Patient ID: Nico Teague is a 72 y.o. male.    Chief Complaint: Hypertension (5 months follow up), Hyperlipidemia, and Thyroid Problem    Here for routine follow up; last visit note, most recent available labs, and health maintenance topics reviewed.   Followed by Cards for Afib and  Is on amiodarone, eliquis, metoprolol.  Recently started magnesium; has noted decrease frequency of PVCs.  No gout flares.   Started on CPAP for BANDAR; sleeping better and feeling better rested.      Hypertension  This is a chronic problem. The problem is controlled. Pertinent negatives include no anxiety, chest pain, headaches, neck pain, palpitations, peripheral edema or shortness of breath. Past treatments include angiotensin blockers, calcium channel blockers, alpha 1 blockers, diuretics and beta blockers. The current treatment provides significant improvement. There are no compliance problems.  Identifiable causes of hypertension include a thyroid problem.   Thyroid Problem  Presents for follow-up visit. Patient reports no anxiety, cold intolerance, constipation, depressed mood, diaphoresis, diarrhea, fatigue, heat intolerance, palpitations or tremors. The symptoms have been improving. His past medical history is significant for hyperlipidemia.   Review of Systems   Constitutional:  Negative for activity change, appetite change, chills, diaphoresis, fatigue, fever and unexpected weight change.   HENT:  Negative for congestion, ear discharge, ear pain, hearing loss, nosebleeds, postnasal drip, rhinorrhea, sinus pressure, sinus pain, sneezing, sore throat, tinnitus, trouble swallowing and voice change.    Eyes:  Negative for photophobia, pain, discharge, redness, itching and visual disturbance.   Respiratory:  Positive for apnea. Negative for cough, choking, chest tightness, shortness of breath and wheezing.    Cardiovascular:  Negative for chest pain, palpitations and leg swelling.   Gastrointestinal:  Negative for  abdominal distention, abdominal pain, blood in stool, constipation, diarrhea, nausea and vomiting.   Endocrine: Negative for cold intolerance, heat intolerance, polydipsia and polyuria.   Genitourinary:  Negative for decreased urine volume, difficulty urinating, dysuria, enuresis, flank pain, frequency, genital sores, hematuria, penile discharge, penile pain, scrotal swelling, testicular pain and urgency.   Musculoskeletal:  Negative for arthralgias, back pain, gait problem, joint swelling, myalgias, neck pain and neck stiffness.   Skin:  Negative for rash and wound.   Allergic/Immunologic: Negative for environmental allergies, food allergies and immunocompromised state.   Neurological:  Positive for dizziness. Negative for tremors, seizures, syncope, facial asymmetry, speech difficulty, weakness, light-headedness, numbness and headaches.   Hematological:  Negative for adenopathy. Bruises/bleeds easily.   Psychiatric/Behavioral:  Negative for confusion, decreased concentration, hallucinations, self-injury, sleep disturbance and suicidal ideas. The patient is not nervous/anxious.      Past Medical History:   Diagnosis Date    Allergy     Arthritis     Asthma     Atrial fibrillation     Basal cell carcinoma     Fever blister     Hypertension     Joint pain     Melanoma     BANDAR on CPAP     PVC (premature ventricular contraction)     Skin disease     Squamous cell carcinoma       Past Surgical History:   Procedure Laterality Date    BRONCHOSCOPY N/A 7/18/2022    Procedure: Bronchoscopy;  Surgeon: Charlee Brewer MD;  Location: [facility];  Service: Pulmonary;  Laterality: N/A;  monday 7/18/22 at 0830 look see bronch no biopsy    CARDIAC CATHETERIZATION  05/25/2021    ESOPHAGOGASTRODUODENOSCOPY N/A 7/7/2022    Procedure: EGD (ESOPHAGOGASTRODUODENOSCOPY);  Surgeon: Emmanuel Deleon MD;  Location: [facility];  Service: Endoscopy;  Laterality: N/A;    EYE SURGERY Left 12/03/2019    EYE SURGERY Right 01/28/2020    LIPOMA  RESECTION      NOSE SURGERY      SHOULDER ARTHROSCOPY      SKIN CANCER EXCISION      Moh's x 2    SKIN GRAFT         Family History   Problem Relation Age of Onset    Cancer Mother         esophageal    Heart disease Father     Hypertension Father     Arthritis Paternal Grandmother        Social History     Socioeconomic History    Marital status:    Occupational History    Occupation: retired captain    Tobacco Use    Smoking status: Former     Packs/day: 2.00     Years: 20.00     Pack years: 40.00     Types: Cigarettes     Start date: 1972     Quit date: 1988     Years since quittin.2    Smokeless tobacco: Never   Substance and Sexual Activity    Alcohol use: No    Drug use: No    Sexual activity: Never   Social History Narrative    Live with wife     Social Determinants of Health     Financial Resource Strain: Low Risk     Difficulty of Paying Living Expenses: Not hard at all   Food Insecurity: No Food Insecurity    Worried About Running Out of Food in the Last Year: Never true    Ran Out of Food in the Last Year: Never true   Transportation Needs: No Transportation Needs    Lack of Transportation (Medical): No    Lack of Transportation (Non-Medical): No   Physical Activity: Insufficiently Active    Days of Exercise per Week: 3 days    Minutes of Exercise per Session: 20 min   Stress: No Stress Concern Present    Feeling of Stress : Only a little   Social Connections: Unknown    Frequency of Communication with Friends and Family: More than three times a week    Frequency of Social Gatherings with Friends and Family: Once a week    Active Member of Clubs or Organizations: Yes    Attends Club or Organization Meetings: More than 4 times per year    Marital Status:        Current Outpatient Medications   Medication Sig Dispense Refill    amiodarone (PACERONE) 200 MG Tab Take 200 mg by mouth 2 (two) times daily.      apixaban (ELIQUIS) 5 mg Tab Take 5 mg by mouth 2 (two) times daily.       ascorbic acid, vitamin C, (VITAMIN C) 1000 MG tablet Take 1,000 mg by mouth once daily.      aspirin 81 MG Chew Take 81 mg by mouth once daily.      cholecalciferol, vitamin D3, (VITAMIN D3) 25 mcg (1,000 unit) capsule Take 1,000 Units by mouth once daily.      co-enzyme Q-10 50 mg capsule Take 50 mg by mouth once daily.      diclofenac sodium (VOLTAREN) 1 % Gel Apply 2 g topically once daily. 100 g 3    ENTRESTO 49-51 mg per tablet Take 1 tablet by mouth 2 (two) times daily.      fluticasone furoate-vilanteroL (BREO ELLIPTA) 100-25 mcg/dose diskus inhaler Inhale 1 puff into the lungs once daily. Controller Rinse after you use it 3 each 4    fluticasone propionate (FLONASE) 50 mcg/actuation nasal spray USE 1 SPRAY IN EACH NOSTRIL ONE TIME DAILY 32 g 3    fluticasone-umeclidin-vilanter (TRELEGY ELLIPTA) 100-62.5-25 mcg DsDv Inhale 1 puff into the lungs once daily. 3 each 6    furosemide (LASIX) 40 MG tablet Take 20 mg by mouth 2 (two) times a day.      Lactobacillus rhamnosus GG (CULTURELLE) 10 billion cell capsule Take 1 capsule by mouth.      magnesium gluconate 27.5 mg magne- sium (500 mg) Tab Take 1 tablet by mouth once.      meclizine (ANTIVERT) 12.5 mg tablet Take 1 tablet (12.5 mg total) by mouth 3 (three) times daily as needed for Dizziness. 30 tablet 1    melatonin 10 mg Tab Take 1 tablet by mouth nightly as needed.      montelukast (SINGULAIR) 10 mg tablet TAKE 1 TABLET (10 MG TOTAL) BY MOUTH EVERY EVENING. 90 tablet 6    multivitamin (THERAGRAN) per tablet Take 1 tablet by mouth once daily.      potassium chloride (MICRO-K) 10 MEQ CpSR Take 10 mEq by mouth once daily.      tamsulosin (FLOMAX) 0.4 mg Cap Take 1 tablet by mouth Daily.      vitamin E 1000 UNIT capsule Take 1,000 Units by mouth once daily.      allopurinoL (ZYLOPRIM) 300 MG tablet Take 1 tablet (300 mg total) by mouth once daily. 90 tablet 1    levothyroxine (SYNTHROID) 88 MCG tablet Take 1 tablet (88 mcg total) by mouth before breakfast. 30  "tablet 3    rosuvastatin (CRESTOR) 10 MG tablet Take 1 tablet (10 mg total) by mouth every other day. 90 tablet 1    triamterene-hydrochlorothiazide 37.5-25 mg (DYAZIDE) 37.5-25 mg per capsule Take 1 capsule by mouth every morning. 90 capsule 1     No current facility-administered medications for this visit.       Review of patient's allergies indicates:   Allergen Reactions    Bactrim [sulfamethoxazole-trimethoprim]      HIVES/ RASH    Green tea (rashida sinensis) Hives     Pt is has allergies to black tea. Not an option under search.     Objective:    HPI     Hypertension     Additional comments: 5 months follow up          Last edited by Radha Ruffin MA on 2022 10:51 AM.      Blood pressure 128/68, pulse (!) 53, temperature 98.5 °F (36.9 °C), temperature source Temporal, height 5' 8" (1.727 m), weight 115.2 kg (254 lb), SpO2 97 %. Body mass index is 38.62 kg/m².   Physical Exam  Vitals and nursing note reviewed.   Constitutional:       General: He is not in acute distress.     Appearance: He is well-developed. He is obese. He is not ill-appearing, toxic-appearing or diaphoretic.   HENT:      Head: Normocephalic and atraumatic.   Eyes:      General: No scleral icterus.        Right eye: No discharge.         Left eye: No discharge.      Conjunctiva/sclera: Conjunctivae normal.   Neck:      Vascular: No carotid bruit.   Cardiovascular:      Rate and Rhythm: Regular rhythm. Bradycardia present.      Heart sounds: Normal heart sounds. No murmur heard.  Pulmonary:      Effort: Pulmonary effort is normal. No respiratory distress.      Breath sounds: Normal breath sounds. No decreased breath sounds, wheezing, rhonchi or rales.   Abdominal:      General: There is no distension.      Palpations: Abdomen is soft.      Tenderness: There is no abdominal tenderness. There is no guarding or rebound.   Musculoskeletal:      Right lower le+ Pitting Edema present.      Left lower le+ Pitting Edema present. "   Skin:     General: Skin is warm and dry.   Neurological:      Mental Status: He is alert.      Motor: No tremor.   Psychiatric:         Mood and Affect: Mood normal.         Speech: Speech normal.         Behavior: Behavior normal.         No visits with results within 1 Month(s) from this visit.   Latest known visit with results is:   Admission on 07/07/2022, Discharged on 07/07/2022   Component Date Value Ref Range Status    PT 07/07/2022 13.7  11.4 - 13.7 sec Final    INR 07/07/2022 1.1   Final    Comment: Coumadin Therapy:  INR: 2.0-3.0 conventional anticoagulation    INR: 2.5-3.5 intensive anticoagulation      aPTT 07/07/2022 30.8  23.3 - 35.1 sec Final    Comment: Therapeutic Range of 67.5-105.1 secs.  Equivalent to Heparin Concentration of anti-Xa 0.3-0.7 IU/ml.      SARS-CoV-2 RNA, Amplification, Qual 07/07/2022 Negative  Negative Final    Comment: This test utilizes isothermal nucleic acid amplification   technology to detect the SARS-CoV-2 RdRp nucleic acid segment.   The analytical sensitivity (limit of detection) is 125 genome   equivalents/mL.     A POSITIVE result implies infection with the SARS-CoV-2 virus;  the patient is presumed to be contagious.    A NEGATIVE result means that SARS-CoV-2 nucleic acids are not  present above the limit of detection. A NEGATIVE result should be   treated as presumptive. It does not rule out the possibility of   COVID-19 and should not be the sole basis for treatment decisions.   If COVID-19 is strongly suspected based on clinical and exposure   history, re-testing using an alternate molecular assay should be   considered.       This test is only for use under the Food and Drug   Administration s Emergency Use Authorization (EUA).   Commercial kits are provided by [employer].   Performance characteristics of the EUA have been independently  verified by [facility]                           ment of  Pathology and Laboratory Medicine.    _________________________________________________________________  The ID NOW COVID-19 Letter of Authorization, along with the   authorized Fact Sheet for Healthcare Providers, the authorized Fact  Sheet for Patients, and authorized labeling are available on the FDA   website:  www.fda.gov/MedicalDevices/Safety/EmergencySituations/lip927147.htm      WBC 07/07/2022 8.37  3.90 - 12.70 K/uL Final    RBC 07/07/2022 4.57 (L)  4.60 - 6.20 M/uL Final    Hemoglobin 07/07/2022 13.8 (L)  14.0 - 18.0 g/dL Final    Hematocrit 07/07/2022 43.2  40.0 - 54.0 % Final    MCV 07/07/2022 95  82 - 98 fL Final    MCH 07/07/2022 30.2  27.0 - 31.0 pg Final    MCHC 07/07/2022 31.9 (L)  32.0 - 36.0 g/dL Final    RDW 07/07/2022 14.3  11.5 - 14.5 % Final    Platelets 07/07/2022 228  150 - 450 K/uL Final    MPV 07/07/2022 11.0  9.2 - 12.9 fL Final    Immature Granulocytes 07/07/2022 0.5  0.0 - 0.5 % Final    Gran # (ANC) 07/07/2022 5.4  1.8 - 7.7 K/uL Final    Immature Grans (Abs) 07/07/2022 0.04  0.00 - 0.04 K/uL Final    Comment: Mild elevation in immature granulocytes is non specific and   can be seen in a variety of conditions including stress response,   acute inflammation, trauma and pregnancy. Correlation with other   laboratory and clinical findings is essential.      Lymph # 07/07/2022 2.0  1.0 - 4.8 K/uL Final    Mono # 07/07/2022 0.8  0.3 - 1.0 K/uL Final    Eos # 07/07/2022 0.1  0.0 - 0.5 K/uL Final    Baso # 07/07/2022 0.03  0.00 - 0.20 K/uL Final    nRBC 07/07/2022 0  0 /100 WBC Final    Gran % 07/07/2022 64.5  38.0 - 73.0 % Final    Lymph % 07/07/2022 23.8  18.0 - 48.0 % Final    Mono % 07/07/2022 9.2  4.0 - 15.0 % Final    Eosinophil % 07/07/2022 1.6  0.0 - 8.0 % Final    Basophil % 07/07/2022 0.4  0.0 - 1.9 % Final    Differential Method 07/07/2022 Automated   Final    Sodium 07/07/2022 138  136 - 145 mmol/L Final    Potassium 07/07/2022 3.7  3.5 - 5.1 mmol/L Final    Chloride 07/07/2022 105  95 - 110 mmol/L Final    CO2  07/07/2022 27  23 - 29 mmol/L Final    Glucose 07/07/2022 85  70 - 110 mg/dL Final    BUN 07/07/2022 21  8 - 23 mg/dL Final    Creatinine 07/07/2022 1.1  0.5 - 1.4 mg/dL Final    Calcium 07/07/2022 8.8  8.7 - 10.5 mg/dL Final    Total Protein 07/07/2022 6.8  6.0 - 8.4 g/dL Final    Albumin 07/07/2022 4.0  3.5 - 5.2 g/dL Final    Total Bilirubin 07/07/2022 0.8  0.1 - 1.0 mg/dL Final    Comment: For infants and newborns, interpretation of results should be based  on gestational age, weight and in agreement with clinical  observations.    Premature Infant recommended reference ranges:  Up to 24 hours.............<8.0 mg/dL  Up to 48 hours............<12.0 mg/dL  3-5 days..................<15.0 mg/dL  6-29 days.................<15.0 mg/dL      Alkaline Phosphatase 07/07/2022 56  55 - 135 U/L Final    AST 07/07/2022 17  10 - 40 U/L Final    ALT 07/07/2022 21  10 - 44 U/L Final    Anion Gap 07/07/2022 6 (L)  8 - 16 mmol/L Final    eGFR if  07/07/2022 >60.0  >60 mL/min/1.73 m^2 Final    eGFR if non African American 07/07/2022 >60.0  >60 mL/min/1.73 m^2 Final    Comment: Calculation used to obtain the estimated glomerular filtration  rate (eGFR) is the CKD-EPI equation.       Troponin I 07/07/2022 <0.030  <=0.040 ng/mL Final   ]  Assessment:       1. Essential hypertension    2. Pure hypercholesterolemia    3. Gout, unspecified cause, unspecified chronicity, unspecified site    4. Hypothyroidism due to medication    5. BANDAR on CPAP        Plan:       Nico was seen today for hypertension, hyperlipidemia and thyroid problem.    Diagnoses and all orders for this visit:    Essential hypertension  -     triamterene-hydrochlorothiazide 37.5-25 mg (DYAZIDE) 37.5-25 mg per capsule; Take 1 capsule by mouth every morning.    Pure hypercholesterolemia  -     rosuvastatin (CRESTOR) 10 MG tablet; Take 1 tablet (10 mg total) by mouth every other day.    Gout, unspecified cause, unspecified chronicity, unspecified  site  -     allopurinoL (ZYLOPRIM) 300 MG tablet; Take 1 tablet (300 mg total) by mouth once daily.    Hypothyroidism due to medication  Comments:  Feels better on current dose  Orders:  -     levothyroxine (SYNTHROID) 88 MCG tablet; Take 1 tablet (88 mcg total) by mouth before breakfast.    BANDAR on CPAP  Comments:  Followed by Pulm

## 2023-01-05 ENCOUNTER — PATIENT MESSAGE (OUTPATIENT)
Dept: FAMILY MEDICINE | Facility: CLINIC | Age: 73
End: 2023-01-05

## 2023-01-05 DIAGNOSIS — M10.9 GOUT, UNSPECIFIED CAUSE, UNSPECIFIED CHRONICITY, UNSPECIFIED SITE: Primary | ICD-10-CM

## 2023-01-05 RX ORDER — COLCHICINE 0.6 MG/1
TABLET ORAL
Qty: 10 TABLET | Refills: 0 | Status: SHIPPED | OUTPATIENT
Start: 2023-01-05 | End: 2023-07-13 | Stop reason: SDUPTHER

## 2023-01-05 RX ORDER — METHYLPREDNISOLONE 4 MG/1
TABLET ORAL
Qty: 21 EACH | Refills: 0 | Status: SHIPPED | OUTPATIENT
Start: 2023-01-05 | End: 2023-01-26

## 2023-01-09 ENCOUNTER — PATIENT MESSAGE (OUTPATIENT)
Dept: FAMILY MEDICINE | Facility: CLINIC | Age: 73
End: 2023-01-09

## 2023-03-06 DIAGNOSIS — M10.9 GOUT, UNSPECIFIED CAUSE, UNSPECIFIED CHRONICITY, UNSPECIFIED SITE: Primary | ICD-10-CM

## 2023-03-06 DIAGNOSIS — G47.33 OSA ON CPAP: ICD-10-CM

## 2023-03-06 DIAGNOSIS — I10 ESSENTIAL HYPERTENSION: ICD-10-CM

## 2023-03-06 DIAGNOSIS — E78.00 PURE HYPERCHOLESTEROLEMIA: ICD-10-CM

## 2023-03-06 DIAGNOSIS — E03.2 HYPOTHYROIDISM DUE TO MEDICATION: ICD-10-CM

## 2023-03-06 DIAGNOSIS — I48.91 ATRIAL FIBRILLATION, UNSPECIFIED TYPE: ICD-10-CM

## 2023-03-18 LAB
ALBUMIN SERPL-MCNC: 4.4 G/DL (ref 3.6–5.1)
ALBUMIN/GLOB SERPL: 1.8 (CALC) (ref 1–2.5)
ALP SERPL-CCNC: 84 U/L (ref 35–144)
ALT SERPL-CCNC: 21 U/L (ref 9–46)
APPEARANCE UR: CLEAR
AST SERPL-CCNC: 14 U/L (ref 10–35)
BASOPHILS # BLD AUTO: 57 CELLS/UL (ref 0–200)
BASOPHILS NFR BLD AUTO: 0.8 %
BILIRUB SERPL-MCNC: 0.6 MG/DL (ref 0.2–1.2)
BILIRUB UR QL STRIP: NEGATIVE
BUN SERPL-MCNC: 20 MG/DL (ref 7–25)
BUN/CREAT SERPL: 16 (CALC) (ref 6–22)
CALCIUM SERPL-MCNC: 9.3 MG/DL (ref 8.6–10.3)
CHLORIDE SERPL-SCNC: 103 MMOL/L (ref 98–110)
CHOLEST SERPL-MCNC: 155 MG/DL
CHOLEST/HDLC SERPL: 3.8 (CALC)
CO2 SERPL-SCNC: 29 MMOL/L (ref 20–32)
COLOR UR: YELLOW
CREAT SERPL-MCNC: 1.29 MG/DL (ref 0.7–1.28)
EGFR: 59 ML/MIN/1.73M2
EOSINOPHIL # BLD AUTO: 263 CELLS/UL (ref 15–500)
EOSINOPHIL NFR BLD AUTO: 3.7 %
ERYTHROCYTE [DISTWIDTH] IN BLOOD BY AUTOMATED COUNT: 13 % (ref 11–15)
GLOBULIN SER CALC-MCNC: 2.4 G/DL (CALC) (ref 1.9–3.7)
GLUCOSE SERPL-MCNC: 81 MG/DL (ref 65–99)
GLUCOSE UR QL STRIP: NEGATIVE
HCT VFR BLD AUTO: 45 % (ref 38.5–50)
HDLC SERPL-MCNC: 41 MG/DL
HGB BLD-MCNC: 15.3 G/DL (ref 13.2–17.1)
HGB UR QL STRIP: NEGATIVE
KETONES UR QL STRIP: ABNORMAL
LDLC SERPL CALC-MCNC: 87 MG/DL (CALC)
LEUKOCYTE ESTERASE UR QL STRIP: ABNORMAL
LYMPHOCYTES # BLD AUTO: 2151 CELLS/UL (ref 850–3900)
LYMPHOCYTES NFR BLD AUTO: 30.3 %
MCH RBC QN AUTO: 31.5 PG (ref 27–33)
MCHC RBC AUTO-ENTMCNC: 34 G/DL (ref 32–36)
MCV RBC AUTO: 92.6 FL (ref 80–100)
MONOCYTES # BLD AUTO: 639 CELLS/UL (ref 200–950)
MONOCYTES NFR BLD AUTO: 9 %
NEUTROPHILS # BLD AUTO: 3990 CELLS/UL (ref 1500–7800)
NEUTROPHILS NFR BLD AUTO: 56.2 %
NITRITE UR QL STRIP: NEGATIVE
NONHDLC SERPL-MCNC: 114 MG/DL (CALC)
PH UR STRIP: 6.5 [PH] (ref 5–8)
PLATELET # BLD AUTO: 219 THOUSAND/UL (ref 140–400)
PMV BLD REES-ECKER: 11 FL (ref 7.5–12.5)
POTASSIUM SERPL-SCNC: 4 MMOL/L (ref 3.5–5.3)
PROT SERPL-MCNC: 6.8 G/DL (ref 6.1–8.1)
PROT UR QL STRIP: NEGATIVE
RBC # BLD AUTO: 4.86 MILLION/UL (ref 4.2–5.8)
SODIUM SERPL-SCNC: 141 MMOL/L (ref 135–146)
SP GR UR STRIP: 1.02 (ref 1–1.03)
TRIGL SERPL-MCNC: 169 MG/DL
TSH SERPL-ACNC: 2.08 MIU/L (ref 0.4–4.5)
URATE SERPL-MCNC: 5.9 MG/DL (ref 4–8)
WBC # BLD AUTO: 7.1 THOUSAND/UL (ref 3.8–10.8)

## 2023-03-23 ENCOUNTER — PATIENT MESSAGE (OUTPATIENT)
Dept: FAMILY MEDICINE | Facility: CLINIC | Age: 73
End: 2023-03-23

## 2023-03-24 ENCOUNTER — PATIENT MESSAGE (OUTPATIENT)
Dept: FAMILY MEDICINE | Facility: CLINIC | Age: 73
End: 2023-03-24

## 2023-04-03 ENCOUNTER — OFFICE VISIT (OUTPATIENT)
Dept: FAMILY MEDICINE | Facility: CLINIC | Age: 73
End: 2023-04-03
Payer: MEDICARE

## 2023-04-03 VITALS
WEIGHT: 254 LBS | HEART RATE: 51 BPM | SYSTOLIC BLOOD PRESSURE: 110 MMHG | TEMPERATURE: 98 F | BODY MASS INDEX: 38.49 KG/M2 | OXYGEN SATURATION: 96 % | HEIGHT: 68 IN | DIASTOLIC BLOOD PRESSURE: 70 MMHG

## 2023-04-03 DIAGNOSIS — E03.2 HYPOTHYROIDISM DUE TO MEDICATION: ICD-10-CM

## 2023-04-03 DIAGNOSIS — I10 PRIMARY HYPERTENSION: Primary | ICD-10-CM

## 2023-04-03 PROCEDURE — 1159F MED LIST DOCD IN RCRD: CPT | Mod: CPTII,S$GLB,, | Performed by: INTERNAL MEDICINE

## 2023-04-03 PROCEDURE — 3074F SYST BP LT 130 MM HG: CPT | Mod: CPTII,S$GLB,, | Performed by: INTERNAL MEDICINE

## 2023-04-03 PROCEDURE — 1101F PT FALLS ASSESS-DOCD LE1/YR: CPT | Mod: CPTII,S$GLB,, | Performed by: INTERNAL MEDICINE

## 2023-04-03 PROCEDURE — 1125F AMNT PAIN NOTED PAIN PRSNT: CPT | Mod: CPTII,S$GLB,, | Performed by: INTERNAL MEDICINE

## 2023-04-03 PROCEDURE — 99213 PR OFFICE/OUTPT VISIT, EST, LEVL III, 20-29 MIN: ICD-10-PCS | Mod: S$GLB,,, | Performed by: INTERNAL MEDICINE

## 2023-04-03 PROCEDURE — 4010F ACE/ARB THERAPY RXD/TAKEN: CPT | Mod: CPTII,S$GLB,, | Performed by: INTERNAL MEDICINE

## 2023-04-03 PROCEDURE — 3078F PR MOST RECENT DIASTOLIC BLOOD PRESSURE < 80 MM HG: ICD-10-PCS | Mod: CPTII,S$GLB,, | Performed by: INTERNAL MEDICINE

## 2023-04-03 PROCEDURE — 4010F PR ACE/ARB THEARPY RXD/TAKEN: ICD-10-PCS | Mod: CPTII,S$GLB,, | Performed by: INTERNAL MEDICINE

## 2023-04-03 PROCEDURE — 3074F PR MOST RECENT SYSTOLIC BLOOD PRESSURE < 130 MM HG: ICD-10-PCS | Mod: CPTII,S$GLB,, | Performed by: INTERNAL MEDICINE

## 2023-04-03 PROCEDURE — 3008F PR BODY MASS INDEX (BMI) DOCUMENTED: ICD-10-PCS | Mod: CPTII,S$GLB,, | Performed by: INTERNAL MEDICINE

## 2023-04-03 PROCEDURE — 3288F FALL RISK ASSESSMENT DOCD: CPT | Mod: CPTII,S$GLB,, | Performed by: INTERNAL MEDICINE

## 2023-04-03 PROCEDURE — 3288F PR FALLS RISK ASSESSMENT DOCUMENTED: ICD-10-PCS | Mod: CPTII,S$GLB,, | Performed by: INTERNAL MEDICINE

## 2023-04-03 PROCEDURE — 99213 OFFICE O/P EST LOW 20 MIN: CPT | Mod: S$GLB,,, | Performed by: INTERNAL MEDICINE

## 2023-04-03 PROCEDURE — 1101F PR PT FALLS ASSESS DOC 0-1 FALLS W/OUT INJ PAST YR: ICD-10-PCS | Mod: CPTII,S$GLB,, | Performed by: INTERNAL MEDICINE

## 2023-04-03 PROCEDURE — 3078F DIAST BP <80 MM HG: CPT | Mod: CPTII,S$GLB,, | Performed by: INTERNAL MEDICINE

## 2023-04-03 PROCEDURE — 1159F PR MEDICATION LIST DOCUMENTED IN MEDICAL RECORD: ICD-10-PCS | Mod: CPTII,S$GLB,, | Performed by: INTERNAL MEDICINE

## 2023-04-03 PROCEDURE — 1125F PR PAIN SEVERITY QUANTIFIED, PAIN PRESENT: ICD-10-PCS | Mod: CPTII,S$GLB,, | Performed by: INTERNAL MEDICINE

## 2023-04-03 PROCEDURE — 3008F BODY MASS INDEX DOCD: CPT | Mod: CPTII,S$GLB,, | Performed by: INTERNAL MEDICINE

## 2023-04-03 RX ORDER — CETIRIZINE HYDROCHLORIDE 10 MG/1
10 TABLET ORAL NIGHTLY
Refills: 0 | COMMUNITY
Start: 2023-04-03 | End: 2023-09-11

## 2023-04-03 NOTE — PROGRESS NOTES
Subjective:       Patient ID: Nico Teague is a 72 y.o. male.    Chief Complaint: Hypertension (5 months follow up), Hyperlipidemia, and Thyroid Problem    Here for routine follow up; last visit note, most recent available labs, and health maintenance topics reviewed.   Followed by [facility] for Afib and  Is on amiodarone, eliquis, metoprolol.  No gout flares.  He did have an episode of recent elbow bursitis and was taking some NSAIDs.  Started on CPAP for BANDAR; sleeping better and feeling better rested.      Hypertension  This is a chronic problem. The problem is controlled. Associated symptoms include shortness of breath. Pertinent negatives include no anxiety, headaches, neck pain, palpitations or peripheral edema. Chest pain: fluid.Past treatments include angiotensin blockers, calcium channel blockers, alpha 1 blockers, diuretics and beta blockers. The current treatment provides significant improvement. There are no compliance problems.  Identifiable causes of hypertension include a thyroid problem.   Hyperlipidemia  This is a chronic problem. The problem is controlled. Recent lipid tests were reviewed and are variable (trig a little high). Associated symptoms include shortness of breath. Pertinent negatives include no myalgias. Chest pain: fluid.Current antihyperlipidemic treatment includes statins. The current treatment provides significant improvement of lipids. There are no compliance problems.    Thyroid Problem  Presents for follow-up visit. Symptoms include constipation. Patient reports no anxiety, cold intolerance, depressed mood, diaphoresis, diarrhea, fatigue, heat intolerance, palpitations or tremors. The symptoms have been stable. His past medical history is significant for hyperlipidemia.   Review of Systems   Constitutional:  Positive for unexpected weight change. Negative for activity change, appetite change, chills, diaphoresis, fatigue and fever.   HENT:  Positive for congestion (chest congestion).  Negative for ear discharge, ear pain, hearing loss, nosebleeds, postnasal drip, rhinorrhea, sinus pressure, sinus pain, sneezing, sore throat, tinnitus, trouble swallowing and voice change.    Eyes:  Negative for photophobia, pain, discharge, redness, itching and visual disturbance.   Respiratory:  Positive for apnea, shortness of breath and wheezing (sometimes at night; has only needed bronchodilator once a month or so). Negative for cough, choking and chest tightness.    Cardiovascular:  Positive for leg swelling. Negative for palpitations. Chest pain: fluid.  Gastrointestinal:  Positive for constipation. Negative for abdominal distention, abdominal pain, blood in stool, diarrhea, nausea and vomiting.   Endocrine: Negative for cold intolerance, heat intolerance, polydipsia and polyuria.   Genitourinary:  Positive for frequency. Negative for decreased urine volume, difficulty urinating, dysuria, enuresis, flank pain, genital sores, hematuria, penile discharge, penile pain, scrotal swelling, testicular pain and urgency.   Musculoskeletal:  Positive for back pain. Negative for arthralgias, gait problem, joint swelling, myalgias, neck pain and neck stiffness.   Skin:  Negative for rash and wound.   Allergic/Immunologic: Negative for environmental allergies, food allergies and immunocompromised state.   Neurological:  Positive for dizziness. Negative for tremors, seizures, syncope, facial asymmetry, speech difficulty, weakness, light-headedness, numbness and headaches.   Hematological:  Negative for adenopathy. Bruises/bleeds easily.   Psychiatric/Behavioral:  Positive for decreased concentration and sleep disturbance. Negative for confusion, hallucinations, self-injury and suicidal ideas. The patient is not nervous/anxious.      Past Medical History:   Diagnosis Date    Allergy     Arthritis     Asthma     Atrial fibrillation     Basal cell carcinoma     Fever blister     Hypertension     Joint pain     Melanoma      BANDAR on CPAP     PVC (premature ventricular contraction)     Skin disease     Squamous cell carcinoma       Past Surgical History:   Procedure Laterality Date    BRONCHOSCOPY N/A 2022    Procedure: Bronchoscopy;  Surgeon: Charlee Brewer MD;  Location: [facility] ENDO;  Service: Pulmonary;  Laterality: N/A;  22 at 0830 look see bronch no biopsy    CARDIAC CATHETERIZATION  2021    ESOPHAGOGASTRODUODENOSCOPY N/A 2022    Procedure: EGD (ESOPHAGOGASTRODUODENOSCOPY);  Surgeon: Emmanuel Deleon MD;  Location: [facility] ENDO;  Service: Endoscopy;  Laterality: N/A;    EYE SURGERY Left 2019    EYE SURGERY Right 2020    LIPOMA RESECTION      NOSE SURGERY      SHOULDER ARTHROSCOPY      SKIN CANCER EXCISION      Moh's x 2    SKIN GRAFT         Family History   Problem Relation Age of Onset    Cancer Mother         esophageal    Heart disease Father     Hypertension Father     Arthritis Paternal Grandmother        Social History     Socioeconomic History    Marital status:    Occupational History    Occupation: retired captain    Tobacco Use    Smoking status: Former     Packs/day: 2.00     Years: 20.00     Pack years: 40.00     Types: Cigarettes     Start date: 1972     Quit date: 1988     Years since quittin.6    Smokeless tobacco: Never   Substance and Sexual Activity    Alcohol use: No    Drug use: No    Sexual activity: Never   Social History Narrative    Live with wife       Current Outpatient Medications   Medication Sig Dispense Refill    allopurinoL (ZYLOPRIM) 300 MG tablet Take 1 tablet (300 mg total) by mouth once daily. 90 tablet 1    amiodarone (PACERONE) 200 MG Tab Take 200 mg by mouth 2 (two) times daily.      apixaban (ELIQUIS) 5 mg Tab Take 5 mg by mouth 2 (two) times daily.      ascorbic acid, vitamin C, (VITAMIN C) 1000 MG tablet Take 1,000 mg by mouth once daily.      aspirin 81 MG Chew Take 81 mg by mouth once daily.      cholecalciferol, vitamin D3, (VITAMIN  D3) 25 mcg (1,000 unit) capsule Take 1,000 Units by mouth once daily.      co-enzyme Q-10 50 mg capsule Take 50 mg by mouth once daily.      colchicine (COLCRYS) 0.6 mg tablet Take 2 tablets initially followed by 1 tablet 2 hours later, then 1 tablet daily for 7 days. 10 tablet 0    ENTRESTO 49-51 mg per tablet Take 1 tablet by mouth 2 (two) times daily.      fluticasone furoate-vilanteroL (BREO ELLIPTA) 100-25 mcg/dose diskus inhaler Inhale 1 puff into the lungs once daily. Controller Rinse after you use it 3 each 4    fluticasone propionate (FLONASE) 50 mcg/actuation nasal spray USE 1 SPRAY IN EACH NOSTRIL ONE TIME DAILY 32 g 3    fluticasone-umeclidin-vilanter (TRELEGY ELLIPTA) 100-62.5-25 mcg DsDv Inhale 1 puff into the lungs once daily. 3 each 6    furosemide (LASIX) 40 MG tablet Take 20 mg by mouth 2 (two) times a day.      Lactobacillus rhamnosus GG (CULTURELLE) 10 billion cell capsule Take 1 capsule by mouth.      levothyroxine (SYNTHROID) 88 MCG tablet TAKE 1 TABLET BEFORE BREAKFAST 90 tablet 1    magnesium gluconate 27.5 mg magne- sium (500 mg) Tab Take 1 tablet by mouth once.      meclizine (ANTIVERT) 12.5 mg tablet Take 1 tablet (12.5 mg total) by mouth 3 (three) times daily as needed for Dizziness. 30 tablet 1    melatonin 10 mg Tab Take 1 tablet by mouth nightly as needed.      montelukast (SINGULAIR) 10 mg tablet TAKE 1 TABLET EVERY EVENING 90 tablet 6    multivitamin (THERAGRAN) per tablet Take 1 tablet by mouth once daily.      potassium chloride (MICRO-K) 10 MEQ CpSR Take 10 mEq by mouth once daily.      rosuvastatin (CRESTOR) 10 MG tablet Take 1 tablet (10 mg total) by mouth every other day. 90 tablet 1    tamsulosin (FLOMAX) 0.4 mg Cap Take 1 tablet by mouth Daily.      triamterene-hydrochlorothiazide 37.5-25 mg (DYAZIDE) 37.5-25 mg per capsule Take 1 capsule by mouth every morning. 90 capsule 1    vitamin E 1000 UNIT capsule Take 1,000 Units by mouth once daily.      cetirizine (ZYRTEC) 10 MG  "tablet Take 1 tablet (10 mg total) by mouth every evening.  0     No current facility-administered medications for this visit.       Review of patient's allergies indicates:   Allergen Reactions    Bactrim [sulfamethoxazole-trimethoprim]      HIVES/ RASH    Green tea (rashida sinensis) Hives     Pt is has allergies to black tea. Not an option under search.     Objective:    HPI     Hypertension     Additional comments: 5 months follow up          Last edited by Radha Ruffin MA on 4/3/2023 10:53 AM.      Blood pressure 110/70, pulse (!) 51, temperature 98.2 °F (36.8 °C), temperature source Temporal, height 5' 8" (1.727 m), weight 115.2 kg (254 lb), SpO2 96 %. Body mass index is 38.62 kg/m².   Physical Exam  Vitals and nursing note reviewed.   Constitutional:       General: He is not in acute distress.     Appearance: He is well-developed. He is obese. He is not ill-appearing, toxic-appearing or diaphoretic.   HENT:      Head: Normocephalic and atraumatic.   Eyes:      General: No scleral icterus.        Right eye: No discharge.         Left eye: No discharge.      Conjunctiva/sclera: Conjunctivae normal.   Neck:      Vascular: No carotid bruit.   Cardiovascular:      Rate and Rhythm: Regular rhythm. Bradycardia present.      Heart sounds: Normal heart sounds. No murmur heard.  Pulmonary:      Effort: Pulmonary effort is normal. No respiratory distress.      Breath sounds: Normal breath sounds. No decreased breath sounds, wheezing, rhonchi or rales.   Abdominal:      General: There is no distension.      Palpations: Abdomen is soft.      Tenderness: There is no abdominal tenderness. There is no guarding or rebound.   Musculoskeletal:      Right lower le+ Pitting Edema present.      Left lower le+ Pitting Edema present.   Skin:     General: Skin is warm and dry.   Neurological:      Mental Status: He is alert.      Motor: No tremor.   Psychiatric:         Mood and Affect: Mood normal.         Speech: Speech " normal.         Behavior: Behavior normal.         Orders Only on 03/06/2023   Component Date Value Ref Range Status    WBC 03/17/2023 7.1  3.8 - 10.8 Thousand/uL Final    RBC 03/17/2023 4.86  4.20 - 5.80 Million/uL Final    Hemoglobin 03/17/2023 15.3  13.2 - 17.1 g/dL Final    Hematocrit 03/17/2023 45.0  38.5 - 50.0 % Final    MCV 03/17/2023 92.6  80.0 - 100.0 fL Final    MCH 03/17/2023 31.5  27.0 - 33.0 pg Final    MCHC 03/17/2023 34.0  32.0 - 36.0 g/dL Final    RDW 03/17/2023 13.0  11.0 - 15.0 % Final    Platelets 03/17/2023 219  140 - 400 Thousand/uL Final    MPV 03/17/2023 11.0  7.5 - 12.5 fL Final    Neutrophils, Abs 03/17/2023 3,990  1,500 - 7,800 cells/uL Final    Lymph # 03/17/2023 2,151  850 - 3,900 cells/uL Final    Mono # 03/17/2023 639  200 - 950 cells/uL Final    Eos # 03/17/2023 263  15 - 500 cells/uL Final    Baso # 03/17/2023 57  0 - 200 cells/uL Final    Neutrophils Relative 03/17/2023 56.2  % Final    Lymph % 03/17/2023 30.3  % Final    Mono % 03/17/2023 9.0  % Final    Eosinophil % 03/17/2023 3.7  % Final    Basophil % 03/17/2023 0.8  % Final    Glucose 03/17/2023 81  65 - 99 mg/dL Final    Comment:               Fasting reference interval         BUN 03/17/2023 20  7 - 25 mg/dL Final    Creatinine 03/17/2023 1.29 (H)  0.70 - 1.28 mg/dL Final    eGFR 03/17/2023 59 (L)  > OR = 60 mL/min/1.73m2 Final    Comment: The eGFR is based on the CKD-EPI 2021 equation. To calculate   the new eGFR from a previous Creatinine or Cystatin C  result, go to https://www.kidney.org/professionals/  kdoqi/gfr%5Fcalculator      BUN/Creatinine Ratio 03/17/2023 16  6 - 22 (calc) Final    Sodium 03/17/2023 141  135 - 146 mmol/L Final    Potassium 03/17/2023 4.0  3.5 - 5.3 mmol/L Final    Chloride 03/17/2023 103  98 - 110 mmol/L Final    CO2 03/17/2023 29  20 - 32 mmol/L Final    Calcium 03/17/2023 9.3  8.6 - 10.3 mg/dL Final    Total Protein 03/17/2023 6.8  6.1 - 8.1 g/dL Final    Albumin 03/17/2023 4.4  3.6 - 5.1 g/dL  Final    Globulin, Total 03/17/2023 2.4  1.9 - 3.7 g/dL (calc) Final    Albumin/Globulin Ratio 03/17/2023 1.8  1.0 - 2.5 (calc) Final    Total Bilirubin 03/17/2023 0.6  0.2 - 1.2 mg/dL Final    Alkaline Phosphatase 03/17/2023 84  35 - 144 U/L Final    AST 03/17/2023 14  10 - 35 U/L Final    ALT 03/17/2023 21  9 - 46 U/L Final    TSH 03/17/2023 2.08  0.40 - 4.50 mIU/L Final    Color, UA 03/17/2023 YELLOW  YELLOW Final    Appearance, UA 03/17/2023 CLEAR  CLEAR Final    Specific Gravity, UA 03/17/2023 1.021  1.001 - 1.035 Final    pH, UA 03/17/2023 6.5  5.0 - 8.0 Final    Glucose, UA 03/17/2023 NEGATIVE  NEGATIVE Final    Bilirubin, UA 03/17/2023 NEGATIVE  NEGATIVE Final    Ketones, UA 03/17/2023 TRACE (A)  NEGATIVE Final    Occult Blood UA 03/17/2023 NEGATIVE  NEGATIVE Final    Protein, UA 03/17/2023 NEGATIVE  NEGATIVE Final    Nitrite, UA 03/17/2023 NEGATIVE  NEGATIVE Final    Leukocytes, UA 03/17/2023 1+ (A)  NEGATIVE Final    Uric Acid 03/17/2023 5.9  4.0 - 8.0 mg/dL Final    Comment: Therapeutic target for gout patients: <6.0 mg/dL          Cholesterol 03/17/2023 155  <200 mg/dL Final    HDL 03/17/2023 41  > OR = 40 mg/dL Final    Triglycerides 03/17/2023 169 (H)  <150 mg/dL Final    LDL Cholesterol 03/17/2023 87  mg/dL (calc) Final    Comment: Reference range: <100     Desirable range <100 mg/dL for primary prevention;    <70 mg/dL for patients with CHD or diabetic patients   with > or = 2 CHD risk factors.     LDL-C is now calculated using the Omer   calculation, which is a validated novel method providing   better accuracy than the Friedewald equation in the   estimation of LDL-C.   Kevin BANG et al. NICOLE. 2013;310(19): 1031-3109   (http://education.Digital Lumens.RocketBolt/faq/LRF605)      HDL/Cholesterol Ratio 03/17/2023 3.8  <5.0 (calc) Final    Non HDL Chol. (LDL+VLDL) 03/17/2023 114  <130 mg/dL (calc) Final    Comment: For patients with diabetes plus 1 major ASCVD risk   factor, treating to a  non-HDL-C goal of <100 mg/dL   (LDL-C of <70 mg/dL) is considered a therapeutic   option.     ]  Assessment:       1. Primary hypertension    2. Hypothyroidism due to medication        Plan:       Nico was seen today for hypertension, hyperlipidemia and thyroid problem.    Diagnoses and all orders for this visit:    Primary hypertension  Comments:  Controlled    Hypothyroidism due to medication  Comments:  TSH fine    Other orders  -     cetirizine (ZYRTEC) 10 MG tablet; Take 1 tablet (10 mg total) by mouth every evening.

## 2023-04-27 ENCOUNTER — OFFICE VISIT (OUTPATIENT)
Dept: PULMONOLOGY | Facility: CLINIC | Age: 73
End: 2023-04-27
Payer: MEDICARE

## 2023-04-27 VITALS
HEART RATE: 82 BPM | DIASTOLIC BLOOD PRESSURE: 70 MMHG | SYSTOLIC BLOOD PRESSURE: 118 MMHG | WEIGHT: 254.38 LBS | OXYGEN SATURATION: 96 % | BODY MASS INDEX: 38.68 KG/M2

## 2023-04-27 DIAGNOSIS — J45.909 ASTHMA, UNSPECIFIED ASTHMA SEVERITY, UNSPECIFIED WHETHER COMPLICATED, UNSPECIFIED WHETHER PERSISTENT: Primary | ICD-10-CM

## 2023-04-27 DIAGNOSIS — G47.33 OSA (OBSTRUCTIVE SLEEP APNEA): ICD-10-CM

## 2023-04-27 PROCEDURE — 1125F AMNT PAIN NOTED PAIN PRSNT: CPT | Mod: CPTII,S$GLB,, | Performed by: NURSE PRACTITIONER

## 2023-04-27 PROCEDURE — 99213 PR OFFICE/OUTPT VISIT, EST, LEVL III, 20-29 MIN: ICD-10-PCS | Mod: S$GLB,,, | Performed by: NURSE PRACTITIONER

## 2023-04-27 PROCEDURE — 3078F DIAST BP <80 MM HG: CPT | Mod: CPTII,S$GLB,, | Performed by: NURSE PRACTITIONER

## 2023-04-27 PROCEDURE — 4010F PR ACE/ARB THEARPY RXD/TAKEN: ICD-10-PCS | Mod: CPTII,S$GLB,, | Performed by: NURSE PRACTITIONER

## 2023-04-27 PROCEDURE — 99213 OFFICE O/P EST LOW 20 MIN: CPT | Mod: S$GLB,,, | Performed by: NURSE PRACTITIONER

## 2023-04-27 PROCEDURE — 3078F PR MOST RECENT DIASTOLIC BLOOD PRESSURE < 80 MM HG: ICD-10-PCS | Mod: CPTII,S$GLB,, | Performed by: NURSE PRACTITIONER

## 2023-04-27 PROCEDURE — 3074F PR MOST RECENT SYSTOLIC BLOOD PRESSURE < 130 MM HG: ICD-10-PCS | Mod: CPTII,S$GLB,, | Performed by: NURSE PRACTITIONER

## 2023-04-27 PROCEDURE — 1159F PR MEDICATION LIST DOCUMENTED IN MEDICAL RECORD: ICD-10-PCS | Mod: CPTII,S$GLB,, | Performed by: NURSE PRACTITIONER

## 2023-04-27 PROCEDURE — 1159F MED LIST DOCD IN RCRD: CPT | Mod: CPTII,S$GLB,, | Performed by: NURSE PRACTITIONER

## 2023-04-27 PROCEDURE — 3008F PR BODY MASS INDEX (BMI) DOCUMENTED: ICD-10-PCS | Mod: CPTII,S$GLB,, | Performed by: NURSE PRACTITIONER

## 2023-04-27 PROCEDURE — 1125F PR PAIN SEVERITY QUANTIFIED, PAIN PRESENT: ICD-10-PCS | Mod: CPTII,S$GLB,, | Performed by: NURSE PRACTITIONER

## 2023-04-27 PROCEDURE — 3074F SYST BP LT 130 MM HG: CPT | Mod: CPTII,S$GLB,, | Performed by: NURSE PRACTITIONER

## 2023-04-27 PROCEDURE — 4010F ACE/ARB THERAPY RXD/TAKEN: CPT | Mod: CPTII,S$GLB,, | Performed by: NURSE PRACTITIONER

## 2023-04-27 PROCEDURE — 3008F BODY MASS INDEX DOCD: CPT | Mod: CPTII,S$GLB,, | Performed by: NURSE PRACTITIONER

## 2023-04-27 NOTE — PROGRESS NOTES
.    SUBJECTIVE:    Patient ID: Nico Teague is a 72 y.o. male.    Chief Complaint: Follow-up (6 month follow up BANDAR)    Patient here today feeling well. His peak flow is 550.  He is on Trelegy and Singulair.  He has been walking around his pond twice a day.  He is sleeping on his CPAP, has had issues some nights due to sinus congestion.  He does feel benefit of sleeping on his machine.   Past Medical History:   Diagnosis Date    Allergy     Arthritis     Asthma     Atrial fibrillation     Basal cell carcinoma     Fever blister     Hypertension     Joint pain     Melanoma     BANDAR on CPAP     PVC (premature ventricular contraction)     Skin disease     Squamous cell carcinoma      Past Surgical History:   Procedure Laterality Date    BRONCHOSCOPY N/A 7/18/2022    Procedure: Bronchoscopy;  Surgeon: Charlee Brewer MD;  Location: [facility];  Service: Pulmonary;  Laterality: N/A;  monday 7/18/22 at 0830 look see bronch no biopsy    CARDIAC CATHETERIZATION  05/25/2021    ESOPHAGOGASTRODUODENOSCOPY N/A 7/7/2022    Procedure: EGD (ESOPHAGOGASTRODUODENOSCOPY);  Surgeon: Emmanuel Deleon MD;  Location: [facility];  Service: Endoscopy;  Laterality: N/A;    EYE SURGERY Left 12/03/2019    EYE SURGERY Right 01/28/2020    LIPOMA RESECTION      NOSE SURGERY      SHOULDER ARTHROSCOPY      SKIN CANCER EXCISION      Moh's x 2    SKIN GRAFT       Family History   Problem Relation Age of Onset    Cancer Mother         esophageal    Heart disease Father     Hypertension Father     Arthritis Paternal Grandmother         Social History:   Marital Status:   Occupation: retired captain   Alcohol History:  reports no history of alcohol use.  Tobacco History:  reports that he quit smoking about 34 years ago. His smoking use included cigarettes. He started smoking about 51 years ago. He has a 40.00 pack-year smoking history. He has never used smokeless tobacco.  Drug History:  reports no history of drug use.    Review of patient's  allergies indicates:   Allergen Reactions    Bactrim [sulfamethoxazole-trimethoprim]      HIVES/ RASH       Current Outpatient Medications   Medication Sig Dispense Refill    allopurinoL (ZYLOPRIM) 300 MG tablet Take 1 tablet (300 mg total) by mouth once daily. 90 tablet 1    amiodarone (PACERONE) 200 MG Tab Take 200 mg by mouth 2 (two) times daily.      apixaban (ELIQUIS) 5 mg Tab Take 5 mg by mouth 2 (two) times daily.      ascorbic acid, vitamin C, (VITAMIN C) 1000 MG tablet Take 1,000 mg by mouth once daily.      aspirin 81 MG Chew Take 81 mg by mouth once daily.      cetirizine (ZYRTEC) 10 MG tablet Take 1 tablet (10 mg total) by mouth every evening.  0    cholecalciferol, vitamin D3, (VITAMIN D3) 25 mcg (1,000 unit) capsule Take 1,000 Units by mouth once daily.      co-enzyme Q-10 50 mg capsule Take 50 mg by mouth once daily.      ENTRESTO 49-51 mg per tablet Take 1 tablet by mouth 2 (two) times daily.      fluticasone propionate (FLONASE) 50 mcg/actuation nasal spray USE 1 SPRAY IN EACH NOSTRIL ONE TIME DAILY 32 g 3    fluticasone-umeclidin-vilanter (TRELEGY ELLIPTA) 100-62.5-25 mcg DsDv Inhale 1 puff into the lungs once daily. 3 each 6    furosemide (LASIX) 40 MG tablet Take 20 mg by mouth 2 (two) times a day.      Lactobacillus rhamnosus GG (CULTURELLE) 10 billion cell capsule Take 1 capsule by mouth.      levothyroxine (SYNTHROID) 88 MCG tablet TAKE 1 TABLET BEFORE BREAKFAST 90 tablet 1    magnesium gluconate 27.5 mg magne- sium (500 mg) Tab Take 1 tablet by mouth once.      meclizine (ANTIVERT) 12.5 mg tablet Take 1 tablet (12.5 mg total) by mouth 3 (three) times daily as needed for Dizziness. 30 tablet 1    melatonin 10 mg Tab Take 1 tablet by mouth nightly as needed.      montelukast (SINGULAIR) 10 mg tablet TAKE 1 TABLET EVERY EVENING 90 tablet 6    multivitamin (THERAGRAN) per tablet Take 1 tablet by mouth once daily.      potassium chloride (MICRO-K) 10 MEQ CpSR Take 10 mEq by mouth once daily.       rosuvastatin (CRESTOR) 10 MG tablet Take 1 tablet (10 mg total) by mouth every other day. 90 tablet 1    tamsulosin (FLOMAX) 0.4 mg Cap Take 1 tablet by mouth Daily.      triamterene-hydrochlorothiazide 37.5-25 mg (DYAZIDE) 37.5-25 mg per capsule Take 1 capsule by mouth every morning. 90 capsule 1    vitamin E 1000 UNIT capsule Take 1,000 Units by mouth once daily.      colchicine (COLCRYS) 0.6 mg tablet Take 2 tablets initially followed by 1 tablet 2 hours later, then 1 tablet daily for 7 days. (Patient not taking: Reported on 4/27/2023) 10 tablet 0    fluticasone furoate-vilanteroL (BREO ELLIPTA) 100-25 mcg/dose diskus inhaler Inhale 1 puff into the lungs once daily. Controller Rinse after you use it (Patient not taking: Reported on 4/27/2023) 3 each 4     No current facility-administered medications for this visit.       Last PFT: 4/2018  Chest xray 07/2022  IMPRESSION: No evidence of active cardiopulmonary disease. No radiopaque foreign body overlying the mediastinum or upper abdomen      Review of Systems  General: good and bad days   Eyes: vision is good  ENT: nasal stuffiness comes and goes  Heart:: no recent atrial fib    Lungs:shortness of breath with exertion stable  GI: No Nausea, vomiting, constipation, diarrhea, or reflux.  : nocturia down to 2  Musculoskeletal: R shoulder hurts  Skin: No lesions or rashes.  Neuro: no brain fog   Lymph:swelling to legs   Psych: No anxiety or depression.  Endo: weight stable    OBJECTIVE:      /70 (BP Location: Right arm, Patient Position: Sitting, BP Method: Large (Manual))   Pulse 82   Wt 115.4 kg (254 lb 6.4 oz)   SpO2 96%   BMI 38.68 kg/m²     Physical Exam  GENERAL: Older patient in no distress.  HEENT: Pupils equal and reactive. Extraocular movements intact. Nose intact.      Pharynx moist.  NECK: Supple.   HEART: regular rate and rhythym  LUNGS: Clear to auscultation and percussion. Lung excursion symmetrical. No change in fremitus. No adventitial  noises.  ABDOMEN: Bowel sounds present. Non-tender, no masses palpated.  EXTREMITIES: Normal muscle tone and joint movement, no cyanosis or clubbing.   LYMPHATICS: No adenopathy palpated, trace edema  SKIN: Dry, intact, no lesions.   NEURO: Cranial nerves II-XII intact. Motor strength 5/5 bilaterally, upper and lower extremities.  PSYCH: Appropriate affect.    Peak flow is 550    Assessment:       1. Asthma, unspecified asthma severity, unspecified whether complicated, unspecified whether persistent    2. BANDAR (obstructive sleep apnea)              Plan:       Asthma, unspecified asthma severity, unspecified whether complicated, unspecified whether persistent    BANDAR (obstructive sleep apnea)          Continue sleeping on CPAP  Continue Trelegy and Singulair  Need download   Try to lose weight.  Follow up in about 6 months (around 10/27/2023).

## 2023-04-28 ENCOUNTER — TELEPHONE (OUTPATIENT)
Dept: PULMONOLOGY | Facility: CLINIC | Age: 73
End: 2023-04-28

## 2023-04-28 ENCOUNTER — PATIENT MESSAGE (OUTPATIENT)
Dept: PULMONOLOGY | Facility: CLINIC | Age: 73
End: 2023-04-28

## 2023-04-28 NOTE — TELEPHONE ENCOUNTER
Received download on patient and reports show no data since October. I called the patient he states he definitely wears it, he has had days where he could not because of sinus congestion but has used. He will reach out to his DME

## 2023-05-16 DIAGNOSIS — I10 ESSENTIAL HYPERTENSION: ICD-10-CM

## 2023-05-16 RX ORDER — TRIAMTERENE AND HYDROCHLOROTHIAZIDE 37.5; 25 MG/1; MG/1
CAPSULE ORAL
Qty: 90 CAPSULE | Refills: 1 | Status: SHIPPED | OUTPATIENT
Start: 2023-05-16 | End: 2024-02-12

## 2023-07-01 DIAGNOSIS — M10.9 GOUT, UNSPECIFIED CAUSE, UNSPECIFIED CHRONICITY, UNSPECIFIED SITE: ICD-10-CM

## 2023-07-01 RX ORDER — ALLOPURINOL 300 MG/1
TABLET ORAL
Qty: 90 TABLET | Refills: 1 | Status: SHIPPED | OUTPATIENT
Start: 2023-07-01

## 2023-07-13 ENCOUNTER — PATIENT MESSAGE (OUTPATIENT)
Dept: FAMILY MEDICINE | Facility: CLINIC | Age: 73
End: 2023-07-13

## 2023-07-13 DIAGNOSIS — M10.9 GOUT, UNSPECIFIED CAUSE, UNSPECIFIED CHRONICITY, UNSPECIFIED SITE: ICD-10-CM

## 2023-07-13 RX ORDER — METHYLPREDNISOLONE 4 MG/1
TABLET ORAL
Qty: 21 EACH | Refills: 0 | Status: SHIPPED | OUTPATIENT
Start: 2023-07-13 | End: 2023-08-03

## 2023-07-13 RX ORDER — COLCHICINE 0.6 MG/1
TABLET ORAL
Qty: 10 TABLET | Refills: 0 | Status: SHIPPED | OUTPATIENT
Start: 2023-07-13 | End: 2023-09-11

## 2023-09-06 ENCOUNTER — PATIENT MESSAGE (OUTPATIENT)
Dept: FAMILY MEDICINE | Facility: CLINIC | Age: 73
End: 2023-09-06

## 2023-09-06 DIAGNOSIS — I10 PRIMARY HYPERTENSION: Primary | ICD-10-CM

## 2023-09-06 DIAGNOSIS — E03.2 HYPOTHYROIDISM DUE TO MEDICATION: ICD-10-CM

## 2023-09-07 ENCOUNTER — PATIENT MESSAGE (OUTPATIENT)
Dept: FAMILY MEDICINE | Facility: CLINIC | Age: 73
End: 2023-09-07

## 2023-09-08 LAB
ALBUMIN SERPL-MCNC: 4.2 G/DL (ref 3.6–5.1)
ALBUMIN/GLOB SERPL: 1.8 (CALC) (ref 1–2.5)
ALP SERPL-CCNC: 77 U/L (ref 35–144)
ALT SERPL-CCNC: 20 U/L (ref 9–46)
AST SERPL-CCNC: 15 U/L (ref 10–35)
BILIRUB SERPL-MCNC: 0.4 MG/DL (ref 0.2–1.2)
BUN SERPL-MCNC: 18 MG/DL (ref 7–25)
BUN/CREAT SERPL: ABNORMAL (CALC) (ref 6–22)
CALCIUM SERPL-MCNC: 9 MG/DL (ref 8.6–10.3)
CHLORIDE SERPL-SCNC: 105 MMOL/L (ref 98–110)
CO2 SERPL-SCNC: 27 MMOL/L (ref 20–32)
CREAT SERPL-MCNC: 1.11 MG/DL (ref 0.7–1.28)
EGFR: 71 ML/MIN/1.73M2
GLOBULIN SER CALC-MCNC: 2.3 G/DL (CALC) (ref 1.9–3.7)
GLUCOSE SERPL-MCNC: 64 MG/DL (ref 65–99)
POTASSIUM SERPL-SCNC: 4.1 MMOL/L (ref 3.5–5.3)
PROT SERPL-MCNC: 6.5 G/DL (ref 6.1–8.1)
SODIUM SERPL-SCNC: 140 MMOL/L (ref 135–146)
TSH SERPL-ACNC: 3.81 MIU/L (ref 0.4–4.5)

## 2023-09-11 ENCOUNTER — OFFICE VISIT (OUTPATIENT)
Dept: FAMILY MEDICINE | Facility: CLINIC | Age: 73
End: 2023-09-11
Payer: MEDICARE

## 2023-09-11 VITALS
HEART RATE: 59 BPM | SYSTOLIC BLOOD PRESSURE: 110 MMHG | OXYGEN SATURATION: 95 % | HEIGHT: 68 IN | TEMPERATURE: 98 F | WEIGHT: 259 LBS | BODY MASS INDEX: 39.25 KG/M2 | DIASTOLIC BLOOD PRESSURE: 66 MMHG

## 2023-09-11 DIAGNOSIS — G47.33 OSA ON CPAP: ICD-10-CM

## 2023-09-11 DIAGNOSIS — I10 ESSENTIAL HYPERTENSION: Primary | ICD-10-CM

## 2023-09-11 DIAGNOSIS — E66.01 SEVERE OBESITY (BMI 35.0-39.9) WITH COMORBIDITY: ICD-10-CM

## 2023-09-11 DIAGNOSIS — E78.00 PURE HYPERCHOLESTEROLEMIA: ICD-10-CM

## 2023-09-11 DIAGNOSIS — E03.2 HYPOTHYROIDISM DUE TO MEDICATION: ICD-10-CM

## 2023-09-11 DIAGNOSIS — I48.91 ATRIAL FIBRILLATION, UNSPECIFIED TYPE: ICD-10-CM

## 2023-09-11 PROBLEM — E03.9 HYPOTHYROIDISM (ACQUIRED): Status: ACTIVE | Noted: 2023-07-31

## 2023-09-11 PROBLEM — I49.3 PVC'S (PREMATURE VENTRICULAR CONTRACTIONS): Status: ACTIVE | Noted: 2023-07-31

## 2023-09-11 PROBLEM — I42.0 DILATED CARDIOMYOPATHY: Status: ACTIVE | Noted: 2023-07-31

## 2023-09-11 PROCEDURE — 3008F PR BODY MASS INDEX (BMI) DOCUMENTED: ICD-10-PCS | Mod: CPTII,S$GLB,, | Performed by: INTERNAL MEDICINE

## 2023-09-11 PROCEDURE — 1101F PR PT FALLS ASSESS DOC 0-1 FALLS W/OUT INJ PAST YR: ICD-10-PCS | Mod: CPTII,S$GLB,, | Performed by: INTERNAL MEDICINE

## 2023-09-11 PROCEDURE — 4010F ACE/ARB THERAPY RXD/TAKEN: CPT | Mod: CPTII,S$GLB,, | Performed by: INTERNAL MEDICINE

## 2023-09-11 PROCEDURE — 1126F PR PAIN SEVERITY QUANTIFIED, NO PAIN PRESENT: ICD-10-PCS | Mod: CPTII,S$GLB,, | Performed by: INTERNAL MEDICINE

## 2023-09-11 PROCEDURE — 4010F PR ACE/ARB THEARPY RXD/TAKEN: ICD-10-PCS | Mod: CPTII,S$GLB,, | Performed by: INTERNAL MEDICINE

## 2023-09-11 PROCEDURE — 3288F PR FALLS RISK ASSESSMENT DOCUMENTED: ICD-10-PCS | Mod: CPTII,S$GLB,, | Performed by: INTERNAL MEDICINE

## 2023-09-11 PROCEDURE — 1159F MED LIST DOCD IN RCRD: CPT | Mod: CPTII,S$GLB,, | Performed by: INTERNAL MEDICINE

## 2023-09-11 PROCEDURE — 3078F PR MOST RECENT DIASTOLIC BLOOD PRESSURE < 80 MM HG: ICD-10-PCS | Mod: CPTII,S$GLB,, | Performed by: INTERNAL MEDICINE

## 2023-09-11 PROCEDURE — 99214 OFFICE O/P EST MOD 30 MIN: CPT | Mod: S$GLB,,, | Performed by: INTERNAL MEDICINE

## 2023-09-11 PROCEDURE — 1159F PR MEDICATION LIST DOCUMENTED IN MEDICAL RECORD: ICD-10-PCS | Mod: CPTII,S$GLB,, | Performed by: INTERNAL MEDICINE

## 2023-09-11 PROCEDURE — 3074F SYST BP LT 130 MM HG: CPT | Mod: CPTII,S$GLB,, | Performed by: INTERNAL MEDICINE

## 2023-09-11 PROCEDURE — 3074F PR MOST RECENT SYSTOLIC BLOOD PRESSURE < 130 MM HG: ICD-10-PCS | Mod: CPTII,S$GLB,, | Performed by: INTERNAL MEDICINE

## 2023-09-11 PROCEDURE — 1101F PT FALLS ASSESS-DOCD LE1/YR: CPT | Mod: CPTII,S$GLB,, | Performed by: INTERNAL MEDICINE

## 2023-09-11 PROCEDURE — 3288F FALL RISK ASSESSMENT DOCD: CPT | Mod: CPTII,S$GLB,, | Performed by: INTERNAL MEDICINE

## 2023-09-11 PROCEDURE — 99214 PR OFFICE/OUTPT VISIT, EST, LEVL IV, 30-39 MIN: ICD-10-PCS | Mod: S$GLB,,, | Performed by: INTERNAL MEDICINE

## 2023-09-11 PROCEDURE — 3008F BODY MASS INDEX DOCD: CPT | Mod: CPTII,S$GLB,, | Performed by: INTERNAL MEDICINE

## 2023-09-11 PROCEDURE — 1126F AMNT PAIN NOTED NONE PRSNT: CPT | Mod: CPTII,S$GLB,, | Performed by: INTERNAL MEDICINE

## 2023-09-11 PROCEDURE — 3078F DIAST BP <80 MM HG: CPT | Mod: CPTII,S$GLB,, | Performed by: INTERNAL MEDICINE

## 2023-09-11 RX ORDER — CEPHALEXIN 500 MG/1
500 CAPSULE ORAL EVERY 6 HOURS
COMMUNITY
Start: 2023-09-06 | End: 2023-09-27

## 2023-09-11 RX ORDER — TRIAMTERENE AND HYDROCHLOROTHIAZIDE 37.5; 25 MG/1; MG/1
1 CAPSULE ORAL EVERY MORNING
Qty: 90 CAPSULE | Refills: 1 | Status: CANCELLED | OUTPATIENT
Start: 2023-09-11

## 2023-09-11 RX ORDER — ROSUVASTATIN CALCIUM 10 MG/1
10 TABLET, COATED ORAL EVERY OTHER DAY
Qty: 90 TABLET | Refills: 1 | Status: CANCELLED | OUTPATIENT
Start: 2023-09-11 | End: 2024-09-10

## 2023-09-11 RX ORDER — LEVOTHYROXINE SODIUM 88 UG/1
88 TABLET ORAL
Qty: 90 TABLET | Refills: 1 | Status: CANCELLED | OUTPATIENT
Start: 2023-09-11

## 2023-09-11 RX ORDER — MONTELUKAST SODIUM 10 MG/1
10 TABLET ORAL NIGHTLY
Qty: 90 TABLET | Refills: 6 | Status: CANCELLED | OUTPATIENT
Start: 2023-09-11

## 2023-09-11 NOTE — PROGRESS NOTES
Subjective:       Patient ID: Nico Teague is a 72 y.o. male.    Chief Complaint: Hypertension (5 months follow up) and Thyroid Problem    Here for routine follow up; last visit note, most recent available labs, and health maintenance topics reviewed.   Followed by Cards for Afib and is on amiodarone, entresto, eliquis.  No gout flares.  Using CPAP for BANDAR; sleeping better and feeling better rested.      Hypertension  This is a chronic problem. The problem is controlled. Associated symptoms include shortness of breath. Pertinent negatives include no anxiety, chest pain, headaches, neck pain, palpitations or peripheral edema. Past treatments include angiotensin blockers, calcium channel blockers, alpha 1 blockers, diuretics and beta blockers. The current treatment provides significant improvement. There are no compliance problems.  Identifiable causes of hypertension include a thyroid problem.   Thyroid Problem  Presents for follow-up visit. Patient reports no anxiety, cold intolerance, constipation, depressed mood, diaphoresis, diarrhea, fatigue, heat intolerance, palpitations or tremors. The symptoms have been stable.   Hyperlipidemia  This is a chronic problem. The problem is controlled. Recent lipid tests were reviewed and are variable (trig a little high). Associated symptoms include shortness of breath. Pertinent negatives include no chest pain or myalgias. Current antihyperlipidemic treatment includes statins. The current treatment provides significant improvement of lipids. There are no compliance problems.      Review of Systems   Constitutional:  Negative for activity change, appetite change, chills, diaphoresis, fatigue, fever and unexpected weight change.   HENT:  Negative for congestion, ear discharge, ear pain, hearing loss, nosebleeds, postnasal drip, rhinorrhea, sinus pressure, sinus pain, sneezing, sore throat, tinnitus, trouble swallowing and voice change.    Eyes:  Negative for photophobia, pain,  discharge, redness, itching and visual disturbance.   Respiratory:  Positive for shortness of breath. Negative for apnea, cough, choking, chest tightness and wheezing.    Cardiovascular:  Negative for chest pain, palpitations and leg swelling.   Gastrointestinal:  Negative for abdominal distention, abdominal pain, blood in stool, constipation, diarrhea, nausea and vomiting.   Endocrine: Negative for cold intolerance, heat intolerance, polydipsia and polyuria.   Genitourinary:  Positive for difficulty urinating and frequency. Negative for decreased urine volume, dysuria, enuresis, flank pain, genital sores, hematuria, penile discharge, penile pain, scrotal swelling, testicular pain and urgency.   Musculoskeletal:  Negative for arthralgias, back pain, gait problem, joint swelling, myalgias, neck pain and neck stiffness.   Skin:  Negative for rash and wound.   Allergic/Immunologic: Negative for environmental allergies, food allergies and immunocompromised state.   Neurological:  Negative for dizziness, tremors, seizures, syncope, facial asymmetry, speech difficulty, weakness, light-headedness, numbness and headaches.   Hematological:  Negative for adenopathy. Bruises/bleeds easily.   Psychiatric/Behavioral:  Positive for decreased concentration. Negative for confusion, hallucinations, self-injury, sleep disturbance and suicidal ideas. The patient is not nervous/anxious.        Past Medical History:   Diagnosis Date    Allergy     Arthritis     Asthma     Atrial fibrillation     Basal cell carcinoma     Fever blister     Hypertension     Hypothyroidism (acquired) 7/31/2023    Joint pain     Melanoma     BANDAR on CPAP     PVC (premature ventricular contraction)     Skin disease     Squamous cell carcinoma       Past Surgical History:   Procedure Laterality Date    BRONCHOSCOPY N/A 7/18/2022    Procedure: Bronchoscopy;  Surgeon: Charlee Brewer MD;  [facility];  Service: Pulmonary;  Laterality: N/A;  monday  22 at 0830 look see bronch no biopsy    CARDIAC CATHETERIZATION  2021    ESOPHAGOGASTRODUODENOSCOPY N/A 2022    Procedure: EGD (ESOPHAGOGASTRODUODENOSCOPY);  Surgeon: Emmanuel Deleon MD;  Location: [facility];  Service: Endoscopy;  Laterality: N/A;    EYE SURGERY Left 2019    EYE SURGERY Right 2020    LIPOMA RESECTION      NOSE SURGERY      SHOULDER ARTHROSCOPY      SKIN CANCER EXCISION      Moh's x 2    SKIN GRAFT         Family History   Problem Relation Age of Onset    Cancer Mother         esophageal    Heart disease Father     Hypertension Father     Arthritis Paternal Grandmother        Social History     Socioeconomic History    Marital status:    Occupational History    Occupation: retired captain    Tobacco Use    Smoking status: Former     Current packs/day: 0.00     Average packs/day: 2.0 packs/day for 20.0 years (40.0 ttl pk-yrs)     Types: Cigarettes     Start date: 1972     Quit date: 1988     Years since quittin.0    Smokeless tobacco: Never   Substance and Sexual Activity    Alcohol use: No    Drug use: No    Sexual activity: Never   Social History Narrative    Live with wife     Social Determinants of Health     Financial Resource Strain: Low Risk  (3/7/2022)    Overall Financial Resource Strain (CARDIA)     Difficulty of Paying Living Expenses: Not hard at all   Food Insecurity: No Food Insecurity (3/7/2022)    Hunger Vital Sign     Worried About Running Out of Food in the Last Year: Never true     Ran Out of Food in the Last Year: Never true   Transportation Needs: No Transportation Needs (3/7/2022)    PRAPARE - Transportation     Lack of Transportation (Medical): No     Lack of Transportation (Non-Medical): No   Physical Activity: Insufficiently Active (3/7/2022)    Exercise Vital Sign     Days of Exercise per Week: 3 days     Minutes of Exercise per Session: 20 min   Stress: No Stress Concern Present (3/7/2022)    [employer] [place] [employer] - Occupational Stress Questionnaire     Feeling of Stress : Only a little   Social Connections: Unknown (3/7/2022)    Social Connection and Isolation Panel [NHANES]     Frequency of Communication with Friends and Family: More than three times a week     Frequency of Social Gatherings with Friends and Family: Once a week     Active Member of Clubs or Organizations: Yes     Attends Club or Organization Meetings: More than 4 times per year     Marital Status:        Current Outpatient Medications   Medication Sig Dispense Refill    allopurinoL (ZYLOPRIM) 300 MG tablet TAKE 1 TABLET ONE TIME DAILY 90 tablet 1    amiodarone (PACERONE) 200 MG Tab Take 200 mg by mouth 2 (two) times daily.      apixaban (ELIQUIS) 5 mg Tab Take 5 mg by mouth 2 (two) times daily.      ascorbic acid, vitamin C, (VITAMIN C) 1000 MG tablet Take 1,000 mg by mouth once daily.      aspirin 81 MG Chew Take 81 mg by mouth once daily.      cholecalciferol, vitamin D3, (VITAMIN D3) 25 mcg (1,000 unit) capsule Take 1,000 Units by mouth once daily.      co-enzyme Q-10 50 mg capsule Take 50 mg by mouth once daily.      ENTRESTO 49-51 mg per tablet Take 1 tablet by mouth 2 (two) times daily.      fluticasone-umeclidin-vilanter (TRELEGY ELLIPTA) 100-62.5-25 mcg DsDv Inhale 1 puff into the lungs once daily. 3 each 6    furosemide (LASIX) 40 MG tablet Take 20 mg by mouth 2 (two) times a day.      Lactobacillus rhamnosus GG (CULTURELLE) 10 billion cell capsule Take 1 capsule by mouth.      levothyroxine (SYNTHROID) 88 MCG tablet TAKE 1 TABLET BEFORE BREAKFAST 90 tablet 1    melatonin 10 mg Tab Take 1 tablet by mouth nightly as needed.      montelukast (SINGULAIR) 10 mg tablet TAKE 1 TABLET EVERY EVENING 90 tablet 6    multivitamin (THERAGRAN) per tablet Take 1 tablet by mouth once daily.      potassium chloride (MICRO-K) 10 MEQ CpSR Take 10 mEq by mouth once daily.      rosuvastatin (CRESTOR) 10 MG tablet Take 1 tablet (10 mg total) by mouth  "every other day. 90 tablet 1    tamsulosin (FLOMAX) 0.4 mg Cap Take 1 tablet by mouth Daily.      triamterene-hydrochlorothiazide 37.5-25 mg (DYAZIDE) 37.5-25 mg per capsule TAKE 1 CAPSULE EVERY MORNING 90 capsule 1    vitamin E 1000 UNIT capsule Take 1,000 Units by mouth once daily.      cephALEXin (KEFLEX) 500 MG capsule Take 500 mg by mouth every 6 (six) hours.      magnesium gluconate 27.5 mg magne- sium (500 mg) Tab Take 1 tablet by mouth once.      meclizine (ANTIVERT) 12.5 mg tablet Take 1 tablet (12.5 mg total) by mouth 3 (three) times daily as needed for Dizziness. 30 tablet 1     No current facility-administered medications for this visit.       Review of patient's allergies indicates:   Allergen Reactions    Bactrim [sulfamethoxazole-trimethoprim]      HIVES/ RASH    Green tea (rashida sinensis) Hives     Pt is has allergies to black tea. Not an option under search.     Objective:    HPI     Hypertension     Additional comments: 5 months follow up          Last edited by Radha Ruffin MA on 9/11/2023 10:36 AM.      Blood pressure 110/66, pulse (!) 59, temperature 98.2 °F (36.8 °C), temperature source Temporal, height 5' 8" (1.727 m), weight 117.5 kg (259 lb), SpO2 95 %. Body mass index is 39.38 kg/m².   Physical Exam  Vitals and nursing note reviewed.   Constitutional:       General: He is not in acute distress.     Appearance: He is well-developed. He is obese. He is not ill-appearing, toxic-appearing or diaphoretic.   HENT:      Head: Normocephalic and atraumatic.   Eyes:      General: No scleral icterus.        Right eye: No discharge.         Left eye: No discharge.      Conjunctiva/sclera: Conjunctivae normal.   Neck:      Vascular: No carotid bruit.   Cardiovascular:      Rate and Rhythm: Regular rhythm. Bradycardia present.      Heart sounds: Normal heart sounds. No murmur heard.  Pulmonary:      Effort: Pulmonary effort is normal. No respiratory distress.      Breath sounds: Normal breath " sounds. No decreased breath sounds, wheezing, rhonchi or rales.   Abdominal:      General: There is no distension.      Palpations: Abdomen is soft.      Tenderness: There is no abdominal tenderness. There is no guarding or rebound.   Musculoskeletal:      Right lower le+ Pitting Edema present.      Left lower le+ Pitting Edema present.   Skin:     General: Skin is warm and dry.   Neurological:      Mental Status: He is alert.      Motor: No tremor.   Psychiatric:         Mood and Affect: Mood normal.         Speech: Speech normal.         Behavior: Behavior normal.           Patient Message on 2023   Component Date Value Ref Range Status    TSH 2023 3.81  0.40 - 4.50 mIU/L Final    Glucose 2023 64 (L)  65 - 99 mg/dL Final    Comment:               Fasting reference interval         BUN 2023 18  7 - 25 mg/dL Final    Creatinine 2023 1.11  0.70 - 1.28 mg/dL Final    eGFR 2023 71  > OR = 60 mL/min/1.73m2 Final    BUN/Creatinine Ratio 2023 SEE NOTE:  6 - 22 (calc) Final    Comment:    Not Reported: BUN and Creatinine are within     reference range.            Sodium 2023 140  135 - 146 mmol/L Final    Potassium 2023 4.1  3.5 - 5.3 mmol/L Final    Chloride 2023 105  98 - 110 mmol/L Final    CO2 2023 27  20 - 32 mmol/L Final    Calcium 2023 9.0  8.6 - 10.3 mg/dL Final    Total Protein 2023 6.5  6.1 - 8.1 g/dL Final    Albumin 2023 4.2  3.6 - 5.1 g/dL Final    Globulin, Total 2023 2.3  1.9 - 3.7 g/dL (calc) Final    Albumin/Globulin Ratio 2023 1.8  1.0 - 2.5 (calc) Final    Total Bilirubin 2023 0.4  0.2 - 1.2 mg/dL Final    Alkaline Phosphatase 2023 77  35 - 144 U/L Final    AST 2023 15  10 - 35 U/L Final    ALT 2023 20  9 - 46 U/L Final   ]  Assessment:       1. Essential hypertension    2. Hypothyroidism due to medication    3. Pure hypercholesterolemia    4. Severe obesity (BMI 35.0-39.9) with  comorbidity    5. Atrial fibrillation, unspecified type    6. BANDAR on CPAP        Plan:       Nico was seen today for hypertension and thyroid problem.    Diagnoses and all orders for this visit:    Essential hypertension  Comments:  Well controlled    Hypothyroidism due to medication  Comments:  Feels fine on current dose    Pure hypercholesterolemia    Severe obesity (BMI 35.0-39.9) with comorbidity    Atrial fibrillation, unspecified type  Comments:  Followed by Cardiology    BANDAR on CPAP  Comments:  Good response to CPAP    Other orders  The following orders have not been finalized:  -     Cancel: triamterene-hydrochlorothiazide 37.5-25 mg (DYAZIDE) 37.5-25 mg per capsule  -     Cancel: levothyroxine (SYNTHROID) 88 MCG tablet  -     Cancel: montelukast (SINGULAIR) 10 mg tablet  -     Cancel: rosuvastatin (CRESTOR) 10 MG tablet

## 2023-09-27 ENCOUNTER — OFFICE VISIT (OUTPATIENT)
Dept: FAMILY MEDICINE | Facility: CLINIC | Age: 73
End: 2023-09-27
Payer: MEDICARE

## 2023-09-27 VITALS
TEMPERATURE: 98 F | HEART RATE: 62 BPM | DIASTOLIC BLOOD PRESSURE: 81 MMHG | SYSTOLIC BLOOD PRESSURE: 140 MMHG | WEIGHT: 259 LBS | OXYGEN SATURATION: 98 % | BODY MASS INDEX: 39.25 KG/M2 | HEIGHT: 68 IN

## 2023-09-27 DIAGNOSIS — J06.9 UPPER RESPIRATORY TRACT INFECTION, UNSPECIFIED TYPE: Primary | ICD-10-CM

## 2023-09-27 LAB
CTP QC/QA: YES
CTP QC/QA: YES
POC MOLECULAR INFLUENZA A AGN: NEGATIVE
POC MOLECULAR INFLUENZA B AGN: NEGATIVE
SARS-COV-2 RDRP RESP QL NAA+PROBE: NEGATIVE

## 2023-09-27 PROCEDURE — 87502 INFLUENZA DNA AMP PROBE: CPT | Mod: QW,,, | Performed by: INTERNAL MEDICINE

## 2023-09-27 PROCEDURE — 3008F PR BODY MASS INDEX (BMI) DOCUMENTED: ICD-10-PCS | Mod: CPTII,S$GLB,, | Performed by: INTERNAL MEDICINE

## 2023-09-27 PROCEDURE — 87635 SARS-COV-2 COVID-19 AMP PRB: CPT | Mod: QW,S$GLB,, | Performed by: INTERNAL MEDICINE

## 2023-09-27 PROCEDURE — 99213 OFFICE O/P EST LOW 20 MIN: CPT | Mod: S$GLB,,, | Performed by: INTERNAL MEDICINE

## 2023-09-27 PROCEDURE — 4010F ACE/ARB THERAPY RXD/TAKEN: CPT | Mod: CPTII,S$GLB,, | Performed by: INTERNAL MEDICINE

## 2023-09-27 PROCEDURE — 3077F SYST BP >= 140 MM HG: CPT | Mod: CPTII,S$GLB,, | Performed by: INTERNAL MEDICINE

## 2023-09-27 PROCEDURE — 3077F PR MOST RECENT SYSTOLIC BLOOD PRESSURE >= 140 MM HG: ICD-10-PCS | Mod: CPTII,S$GLB,, | Performed by: INTERNAL MEDICINE

## 2023-09-27 PROCEDURE — 1159F MED LIST DOCD IN RCRD: CPT | Mod: CPTII,S$GLB,, | Performed by: INTERNAL MEDICINE

## 2023-09-27 PROCEDURE — 1159F PR MEDICATION LIST DOCUMENTED IN MEDICAL RECORD: ICD-10-PCS | Mod: CPTII,S$GLB,, | Performed by: INTERNAL MEDICINE

## 2023-09-27 PROCEDURE — 3079F DIAST BP 80-89 MM HG: CPT | Mod: CPTII,S$GLB,, | Performed by: INTERNAL MEDICINE

## 2023-09-27 PROCEDURE — 3288F FALL RISK ASSESSMENT DOCD: CPT | Mod: CPTII,S$GLB,, | Performed by: INTERNAL MEDICINE

## 2023-09-27 PROCEDURE — 4010F PR ACE/ARB THEARPY RXD/TAKEN: ICD-10-PCS | Mod: CPTII,S$GLB,, | Performed by: INTERNAL MEDICINE

## 2023-09-27 PROCEDURE — 3079F PR MOST RECENT DIASTOLIC BLOOD PRESSURE 80-89 MM HG: ICD-10-PCS | Mod: CPTII,S$GLB,, | Performed by: INTERNAL MEDICINE

## 2023-09-27 PROCEDURE — 87502 POCT INFLUENZA A/B MOLECULAR: ICD-10-PCS | Mod: QW,,, | Performed by: INTERNAL MEDICINE

## 2023-09-27 PROCEDURE — 87635: ICD-10-PCS | Mod: QW,S$GLB,, | Performed by: INTERNAL MEDICINE

## 2023-09-27 PROCEDURE — 3288F PR FALLS RISK ASSESSMENT DOCUMENTED: ICD-10-PCS | Mod: CPTII,S$GLB,, | Performed by: INTERNAL MEDICINE

## 2023-09-27 PROCEDURE — 3008F BODY MASS INDEX DOCD: CPT | Mod: CPTII,S$GLB,, | Performed by: INTERNAL MEDICINE

## 2023-09-27 PROCEDURE — 1101F PR PT FALLS ASSESS DOC 0-1 FALLS W/OUT INJ PAST YR: ICD-10-PCS | Mod: CPTII,S$GLB,, | Performed by: INTERNAL MEDICINE

## 2023-09-27 PROCEDURE — 99213 PR OFFICE/OUTPT VISIT, EST, LEVL III, 20-29 MIN: ICD-10-PCS | Mod: S$GLB,,, | Performed by: INTERNAL MEDICINE

## 2023-09-27 PROCEDURE — 1101F PT FALLS ASSESS-DOCD LE1/YR: CPT | Mod: CPTII,S$GLB,, | Performed by: INTERNAL MEDICINE

## 2023-09-27 NOTE — PROGRESS NOTES
Subjective:       Patient ID: Nico Teague is a 73 y.o. male.    Chief Complaint: Sinus Problem and Generalized Body Aches    URI   This is a new problem. The current episode started yesterday (acute onset about 3 AM). There has been no fever (Tmax 100.1). Associated symptoms include congestion, coughing, rhinorrhea and a sore throat. Pertinent negatives include no abdominal pain, chest pain, diarrhea, dysuria, ear pain, headaches, nausea, neck pain, rash, sinus pain, sneezing, vomiting or wheezing. Associated symptoms comments: Post nasal drip. He has tried acetaminophen for the symptoms. The treatment provided mild relief.     Review of Systems   Constitutional:  Positive for fatigue. Negative for activity change, appetite change, chills, diaphoresis, fever and unexpected weight change.   HENT:  Positive for congestion, rhinorrhea, sinus pressure and sore throat. Negative for ear discharge, ear pain, hearing loss, nosebleeds, postnasal drip, sinus pain, sneezing, tinnitus, trouble swallowing and voice change.    Eyes:  Negative for photophobia, pain, discharge, redness, itching and visual disturbance.   Respiratory:  Positive for cough and shortness of breath. Negative for apnea, choking, chest tightness and wheezing.    Cardiovascular:  Positive for leg swelling. Negative for chest pain and palpitations.   Gastrointestinal:  Negative for abdominal distention, abdominal pain, blood in stool, constipation, diarrhea, nausea and vomiting.   Endocrine: Negative for cold intolerance, heat intolerance, polydipsia and polyuria.   Genitourinary:  Negative for decreased urine volume, difficulty urinating, dysuria, enuresis, flank pain, frequency, genital sores, hematuria, penile discharge, penile pain, scrotal swelling, testicular pain and urgency.   Musculoskeletal:  Positive for back pain. Negative for arthralgias, gait problem, joint swelling, myalgias, neck pain and neck stiffness.   Skin:  Negative for rash and  wound.   Allergic/Immunologic: Negative for environmental allergies, food allergies and immunocompromised state.   Neurological:  Negative for dizziness, tremors, seizures, syncope, facial asymmetry, speech difficulty, weakness, light-headedness, numbness and headaches.   Hematological:  Negative for adenopathy. Does not bruise/bleed easily.   Psychiatric/Behavioral:  Negative for confusion, decreased concentration, hallucinations, self-injury, sleep disturbance and suicidal ideas. The patient is not nervous/anxious.        Past Medical History:   Diagnosis Date    Allergy     Arthritis     Asthma     Atrial fibrillation     Basal cell carcinoma     Fever blister     Hypertension     Hypothyroidism (acquired) 7/31/2023    Joint pain     Melanoma     BANDAR on CPAP     PVC (premature ventricular contraction)     Skin disease     Squamous cell carcinoma       Past Surgical History:   Procedure Laterality Date    BRONCHOSCOPY N/A 7/18/2022    Procedure: Bronchoscopy;  Surgeon: Charlee Brewer MD;  Location: [facility];  Service: Pulmonary;  Laterality: N/A;  monday 7/18/22 at 0830 look see bronch no biopsy    CARDIAC CATHETERIZATION  05/25/2021    ESOPHAGOGASTRODUODENOSCOPY N/A 7/7/2022    Procedure: EGD (ESOPHAGOGASTRODUODENOSCOPY);  Surgeon: Emmanuel Deleon MD;  Location: [facility];  Service: Endoscopy;  Laterality: N/A;    EYE SURGERY Left 12/03/2019    EYE SURGERY Right 01/28/2020    LIPOMA RESECTION      NOSE SURGERY      SHOULDER ARTHROSCOPY      SKIN CANCER EXCISION      Moh's x 2    SKIN GRAFT         Family History   Problem Relation Age of Onset    Cancer Mother         esophageal    Heart disease Father     Hypertension Father     Arthritis Paternal Grandmother        Social History     Socioeconomic History    Marital status:    Occupational History    Occupation: retired captain    Tobacco Use    Smoking status: Former     Current packs/day: 0.00     Average packs/day: 2.0 packs/day for 20.0 years  (40.0 ttl pk-yrs)     Types: Cigarettes     Start date: 1972     Quit date: 1988     Years since quittin.1     Passive exposure: Past    Smokeless tobacco: Never   Substance and Sexual Activity    Alcohol use: No    Drug use: No    Sexual activity: Never   Social History Narrative    Live with wife     Social Determinants of Health     Financial Resource Strain: Low Risk  (3/7/2022)    Overall Financial Resource Strain (CARDIA)     Difficulty of Paying Living Expenses: Not hard at all   Food Insecurity: No Food Insecurity (3/7/2022)    Hunger Vital Sign     Worried About Running Out of Food in the Last Year: Never true     Ran Out of Food in the Last Year: Never true   Transportation Needs: No Transportation Needs (3/7/2022)    PRAPARE - Transportation     Lack of Transportation (Medical): No     Lack of Transportation (Non-Medical): No   Physical Activity: Insufficiently Active (3/7/2022)    Exercise Vital Sign     Days of Exercise per Week: 3 days     Minutes of Exercise per Session: 20 min   Stress: No Stress Concern Present (3/7/2022)    [employer] [place] [employer] - Occupational Stress Questionnaire     Feeling of Stress : Only a little   Social Connections: Unknown (3/7/2022)    Social Connection and Isolation Panel [NHANES]     Frequency of Communication with Friends and Family: More than three times a week     Frequency of Social Gatherings with Friends and Family: Once a week     Active Member of Clubs or Organizations: Yes     Attends Club or Organization Meetings: More than 4 times per year     Marital Status:        Current Outpatient Medications   Medication Sig Dispense Refill    allopurinoL (ZYLOPRIM) 300 MG tablet TAKE 1 TABLET ONE TIME DAILY 90 tablet 1    amiodarone (PACERONE) 200 MG Tab Take 200 mg by mouth 2 (two) times daily.      apixaban (ELIQUIS) 5 mg Tab Take 5 mg by mouth 2 (two) times daily.      ascorbic acid, vitamin C, (VITAMIN C) 1000 MG tablet Take  "1,000 mg by mouth once daily.      aspirin 81 MG Chew Take 81 mg by mouth every 7 days.      cholecalciferol, vitamin D3, (VITAMIN D3) 25 mcg (1,000 unit) capsule Take 1,000 Units by mouth once daily.      co-enzyme Q-10 50 mg capsule Take 50 mg by mouth once daily.      ENTRESTO 49-51 mg per tablet Take 1 tablet by mouth 2 (two) times daily.      fluticasone-umeclidin-vilanter (TRELEGY ELLIPTA) 100-62.5-25 mcg DsDv Inhale 1 puff into the lungs once daily. 3 each 6    furosemide (LASIX) 40 MG tablet Take 20 mg by mouth 2 (two) times a day.      Lactobacillus rhamnosus GG (CULTURELLE) 10 billion cell capsule Take 1 capsule by mouth.      levothyroxine (SYNTHROID) 88 MCG tablet TAKE 1 TABLET BEFORE BREAKFAST 90 tablet 1    magnesium gluconate 27.5 mg magne- sium (500 mg) Tab Take 1 tablet by mouth once.      melatonin 10 mg Tab Take 1 tablet by mouth nightly as needed.      montelukast (SINGULAIR) 10 mg tablet TAKE 1 TABLET EVERY EVENING 90 tablet 6    multivitamin (THERAGRAN) per tablet Take 1 tablet by mouth once daily.      potassium chloride (MICRO-K) 10 MEQ CpSR Take 10 mEq by mouth once daily.      rosuvastatin (CRESTOR) 10 MG tablet Take 1 tablet (10 mg total) by mouth every other day. 90 tablet 1    tamsulosin (FLOMAX) 0.4 mg Cap Take 1 tablet by mouth Daily.      triamterene-hydrochlorothiazide 37.5-25 mg (DYAZIDE) 37.5-25 mg per capsule TAKE 1 CAPSULE EVERY MORNING 90 capsule 1    vitamin E 1000 UNIT capsule Take 1,000 Units by mouth once daily.       No current facility-administered medications for this visit.       Review of patient's allergies indicates:   Allergen Reactions    Bactrim [sulfamethoxazole-trimethoprim]      HIVES/ RASH    Green tea (rashida sinensis) Hives     Pt is has allergies to black tea. Not an option under search.     Objective:      Blood pressure (!) 140/81, pulse 62, temperature 98.1 °F (36.7 °C), height 5' 8" (1.727 m), weight 117.5 kg (259 lb), SpO2 98 %. Body mass index is " 39.38 kg/m².   Physical Exam  Constitutional:       General: He is not in acute distress.     Appearance: He is well-developed. He is not ill-appearing, toxic-appearing or diaphoretic.   HENT:      Head: Normocephalic.      Right Ear: Tympanic membrane, ear canal and external ear normal.      Left Ear: Tympanic membrane, ear canal and external ear normal.      Nose: Nose normal. No rhinorrhea.      Right Sinus: No maxillary sinus tenderness or frontal sinus tenderness.      Left Sinus: No maxillary sinus tenderness or frontal sinus tenderness.      Mouth/Throat:      Pharynx: No oropharyngeal exudate or posterior oropharyngeal erythema.      Tonsils: No tonsillar exudate.   Cardiovascular:      Rate and Rhythm: Normal rate and regular rhythm.      Heart sounds: Normal heart sounds. No murmur heard.     No friction rub. No gallop.   Pulmonary:      Effort: Pulmonary effort is normal. No tachypnea, accessory muscle usage or respiratory distress.      Breath sounds: Normal breath sounds. No wheezing, rhonchi or rales.   Neurological:      Mental Status: He is alert.             Assessment:       1. Upper respiratory tract infection, unspecified type        Plan:       1. Upper respiratory tract infection, unspecified type  Comments:  Supportive care.  Call in no improvement in 7-10 days  Orders:  -     POCT Influenza A/B Molecular  -     POCT COVID-19 Rapid Screening

## 2023-09-29 ENCOUNTER — PATIENT MESSAGE (OUTPATIENT)
Dept: FAMILY MEDICINE | Facility: CLINIC | Age: 73
End: 2023-09-29

## 2023-09-29 RX ORDER — METHYLPREDNISOLONE 4 MG/1
TABLET ORAL
Qty: 21 EACH | Refills: 0 | Status: SHIPPED | OUTPATIENT
Start: 2023-09-29 | End: 2023-10-20

## 2023-10-05 ENCOUNTER — HOSPITAL ENCOUNTER (OUTPATIENT)
Dept: RADIOLOGY | Facility: HOSPITAL | Age: 73
Discharge: HOME | End: 2023-10-05
Attending: NURSE PRACTITIONER
Payer: MEDICARE

## 2023-10-05 ENCOUNTER — TELEPHONE (OUTPATIENT)
Dept: PULMONOLOGY | Facility: CLINIC | Age: 73
End: 2023-10-05

## 2023-10-05 ENCOUNTER — OFFICE VISIT (OUTPATIENT)
Dept: PULMONOLOGY | Facility: CLINIC | Age: 73
End: 2023-10-05
Payer: MEDICARE

## 2023-10-05 VITALS
SYSTOLIC BLOOD PRESSURE: 162 MMHG | WEIGHT: 258.38 LBS | DIASTOLIC BLOOD PRESSURE: 80 MMHG | HEART RATE: 56 BPM | OXYGEN SATURATION: 96 % | BODY MASS INDEX: 39.29 KG/M2

## 2023-10-05 DIAGNOSIS — J45.901 ASTHMA WITH ACUTE EXACERBATION, UNSPECIFIED ASTHMA SEVERITY, UNSPECIFIED WHETHER PERSISTENT: ICD-10-CM

## 2023-10-05 DIAGNOSIS — G47.33 OSA (OBSTRUCTIVE SLEEP APNEA): ICD-10-CM

## 2023-10-05 DIAGNOSIS — R05.9 COUGH, UNSPECIFIED TYPE: ICD-10-CM

## 2023-10-05 DIAGNOSIS — J45.40 MODERATE PERSISTENT ASTHMA WITHOUT COMPLICATION: Primary | ICD-10-CM

## 2023-10-05 PROCEDURE — 3077F SYST BP >= 140 MM HG: CPT | Mod: CPTII,S$GLB,, | Performed by: NURSE PRACTITIONER

## 2023-10-05 PROCEDURE — 99214 OFFICE O/P EST MOD 30 MIN: CPT | Mod: S$GLB,,, | Performed by: NURSE PRACTITIONER

## 2023-10-05 PROCEDURE — 3008F BODY MASS INDEX DOCD: CPT | Mod: CPTII,S$GLB,, | Performed by: NURSE PRACTITIONER

## 2023-10-05 PROCEDURE — 1125F PR PAIN SEVERITY QUANTIFIED, PAIN PRESENT: ICD-10-PCS | Mod: CPTII,S$GLB,, | Performed by: NURSE PRACTITIONER

## 2023-10-05 PROCEDURE — 1159F MED LIST DOCD IN RCRD: CPT | Mod: CPTII,S$GLB,, | Performed by: NURSE PRACTITIONER

## 2023-10-05 PROCEDURE — 1125F AMNT PAIN NOTED PAIN PRSNT: CPT | Mod: CPTII,S$GLB,, | Performed by: NURSE PRACTITIONER

## 2023-10-05 PROCEDURE — 3079F PR MOST RECENT DIASTOLIC BLOOD PRESSURE 80-89 MM HG: ICD-10-PCS | Mod: CPTII,S$GLB,, | Performed by: NURSE PRACTITIONER

## 2023-10-05 PROCEDURE — 4010F ACE/ARB THERAPY RXD/TAKEN: CPT | Mod: CPTII,S$GLB,, | Performed by: NURSE PRACTITIONER

## 2023-10-05 PROCEDURE — 99214 PR OFFICE/OUTPT VISIT, EST, LEVL IV, 30-39 MIN: ICD-10-PCS | Mod: S$GLB,,, | Performed by: NURSE PRACTITIONER

## 2023-10-05 PROCEDURE — 3008F PR BODY MASS INDEX (BMI) DOCUMENTED: ICD-10-PCS | Mod: CPTII,S$GLB,, | Performed by: NURSE PRACTITIONER

## 2023-10-05 PROCEDURE — 71046 X-RAY EXAM CHEST 2 VIEWS: CPT | Mod: TC

## 2023-10-05 PROCEDURE — 1159F PR MEDICATION LIST DOCUMENTED IN MEDICAL RECORD: ICD-10-PCS | Mod: CPTII,S$GLB,, | Performed by: NURSE PRACTITIONER

## 2023-10-05 PROCEDURE — 3079F DIAST BP 80-89 MM HG: CPT | Mod: CPTII,S$GLB,, | Performed by: NURSE PRACTITIONER

## 2023-10-05 PROCEDURE — 4010F PR ACE/ARB THEARPY RXD/TAKEN: ICD-10-PCS | Mod: CPTII,S$GLB,, | Performed by: NURSE PRACTITIONER

## 2023-10-05 PROCEDURE — 3077F PR MOST RECENT SYSTOLIC BLOOD PRESSURE >= 140 MM HG: ICD-10-PCS | Mod: CPTII,S$GLB,, | Performed by: NURSE PRACTITIONER

## 2023-10-05 RX ORDER — LEVOFLOXACIN 500 MG/1
500 TABLET, FILM COATED ORAL DAILY
Qty: 5 TABLET | Refills: 0 | Status: SHIPPED | OUTPATIENT
Start: 2023-10-05 | End: 2023-10-25

## 2023-10-05 NOTE — TELEPHONE ENCOUNTER
Chest xray   IMPRESSION:  Normal chest.      WBC 17.99, procal normal  He is on day 6 of medrol dose pack ordered by PCP.      Patient has been sick for little over 2 weeks now. He has hadHe is now coughing up green mucous and does not feel well.  Will send Levuin for 5 days

## 2023-10-05 NOTE — PROGRESS NOTES
.    SUBJECTIVE:    Patient ID: Nico Teague is a 73 y.o. male.    Chief Complaint: Follow-up and Asthma (6 month follow up Asthma, BANDAR/Patient has chest pain, SOB, wheezing, cough green phlegm sinus issues for about 2 weeks )       Patient here today not feeling well for approximately two weeks.  He states he has been on a medrol dose pack ordered PCP for last several days. However he is not feeling any better, he is coughing up green mucous and feels short of breath.  He has not been using his Trelegy lately he states it was making him feel bad and tired. He has not used it regularly for several months and did not call to discuss before doing so.  However at his previous visit in April his breathing was well on the Trelegy. He also states he has needed steroids approximately 2-3 times since last seen here, but has not reached out.  He states he has not used his CPAP for the last week because he has not been able to breathe well.  However, before getting sick he was using it regularly.    Past Medical History:   Diagnosis Date    Allergy     Arthritis     Asthma     Atrial fibrillation     Basal cell carcinoma     Fever blister     Hypertension     Hypothyroidism (acquired) 7/31/2023    Joint pain     Melanoma     BANDAR on CPAP     PVC (premature ventricular contraction)     Skin disease     Squamous cell carcinoma      Past Surgical History:   Procedure Laterality Date    BRONCHOSCOPY N/A 7/18/2022    Procedure: Bronchoscopy;  Surgeon: Charlee Brewer MD;  Location: [facility];  Service: Pulmonary;  Laterality: N/A;  monday 7/18/22 at 0830 look see bronch no biopsy    CARDIAC CATHETERIZATION  05/25/2021    ESOPHAGOGASTRODUODENOSCOPY N/A 7/7/2022    Procedure: EGD (ESOPHAGOGASTRODUODENOSCOPY);  Surgeon: Emmanuel Deleon MD;  Location: [facility];  Service: Endoscopy;  Laterality: N/A;    EYE SURGERY Left 12/03/2019    EYE SURGERY Right 01/28/2020    LIPOMA RESECTION      NOSE SURGERY      SHOULDER ARTHROSCOPY       SKIN CANCER EXCISION      Moh's x 2    SKIN GRAFT       Family History   Problem Relation Age of Onset    Cancer Mother         esophageal    Heart disease Father     Hypertension Father     Arthritis Paternal Grandmother         Social History:   Marital Status:   Occupation: retired captain   Alcohol History:  reports no history of alcohol use.  Tobacco History:  reports that he quit smoking about 35 years ago. His smoking use included cigarettes. He started smoking about 51 years ago. He has a 40.0 pack-year smoking history. He has been exposed to tobacco smoke. He has never used smokeless tobacco.  Drug History:  reports no history of drug use.    Review of patient's allergies indicates:   Allergen Reactions    Bactrim [sulfamethoxazole-trimethoprim]      HIVES/ RASH       Current Outpatient Medications   Medication Sig Dispense Refill    allopurinoL (ZYLOPRIM) 300 MG tablet TAKE 1 TABLET ONE TIME DAILY 90 tablet 1    amiodarone (PACERONE) 200 MG Tab Take 200 mg by mouth 2 (two) times daily.      apixaban (ELIQUIS) 5 mg Tab Take 5 mg by mouth 2 (two) times daily.      ascorbic acid, vitamin C, (VITAMIN C) 1000 MG tablet Take 1,000 mg by mouth once daily.      aspirin 81 MG Chew Take 81 mg by mouth every 7 days.      cholecalciferol, vitamin D3, (VITAMIN D3) 25 mcg (1,000 unit) capsule Take 1,000 Units by mouth once daily.      co-enzyme Q-10 50 mg capsule Take 50 mg by mouth once daily.      ENTRESTO 49-51 mg per tablet Take 1 tablet by mouth 2 (two) times daily.      fluticasone-umeclidin-vilanter (TRELEGY ELLIPTA) 100-62.5-25 mcg DsDv Inhale 1 puff into the lungs once daily. 3 each 6    furosemide (LASIX) 40 MG tablet Take 20 mg by mouth 2 (two) times a day.      Lactobacillus rhamnosus GG (CULTURELLE) 10 billion cell capsule Take 1 capsule by mouth.      levothyroxine (SYNTHROID) 88 MCG tablet TAKE 1 TABLET BEFORE BREAKFAST 90 tablet 1    magnesium gluconate 27.5 mg magne- sium (500 mg) Tab Take 1  tablet by mouth once.      melatonin 10 mg Tab Take 1 tablet by mouth nightly as needed.      methylPREDNISolone (MEDROL DOSEPACK) 4 mg tablet use as directed 21 each 0    montelukast (SINGULAIR) 10 mg tablet TAKE 1 TABLET EVERY EVENING 90 tablet 6    multivitamin (THERAGRAN) per tablet Take 1 tablet by mouth once daily.      potassium chloride (MICRO-K) 10 MEQ CpSR Take 10 mEq by mouth once daily.      rosuvastatin (CRESTOR) 10 MG tablet Take 1 tablet (10 mg total) by mouth every other day. 90 tablet 1    tamsulosin (FLOMAX) 0.4 mg Cap Take 1 tablet by mouth Daily.      triamterene-hydrochlorothiazide 37.5-25 mg (DYAZIDE) 37.5-25 mg per capsule TAKE 1 CAPSULE EVERY MORNING 90 capsule 1    vitamin E 1000 UNIT capsule Take 1,000 Units by mouth once daily.      levoFLOXacin (LEVAQUIN) 500 MG tablet Take 1 tablet (500 mg total) by mouth once daily. 5 tablet 0     No current facility-administered medications for this visit.       Last PFT: 4/2018  Last CT 08/2022    IMPRESSION:     1. Mild to moderate prominence and with evidence of scattered bullae throughout.  2. No CT evidence of aspiration focus.  3. Benign cyst arising from lower pole left kidney reaching 3.1 cm in the widest dimension.     Review of Systems  General: does not feel well, no fever , fatigued   Eyes: vision is good  ENT: nasal stuffiness comes and goes  Heart:: no recent atrial fib    Lungs:shortness of breath, productive cough with green mucous  GI: No Nausea, vomiting, constipation, diarrhea, or reflux.  : nocturia down to 2  Musculoskeletal: R shoulder hurts  Skin: No lesions or rashes.  Neuro: no brain fog   Lymph:swelling to legs   Psych: No anxiety or depression.  Endo: weight stable    OBJECTIVE:      BP (!) 162/80 (BP Location: Right arm, Patient Position: Sitting, BP Method: Large (Manual))   Pulse (!) 56   Wt 117.2 kg (258 lb 6.4 oz)   SpO2 96%   BMI 39.29 kg/m²     Physical Exam  GENERAL: Older patient in no distress.looks as thought  he does not feel well   HEENT: Pupils equal and reactive. Extraocular movements intact. Nose intact.      Pharynx moist.  NECK: Supple.   HEART: regular rate and rhythym  LUNGS: Clear to auscultation and percussion. Lung excursion symmetrical. No change in fremitus. No adventitial noises.  ABDOMEN: Bowel sounds present. Non-tender, no masses palpated.  EXTREMITIES: Normal muscle tone and joint movement, no cyanosis or clubbing.   LYMPHATICS: No adenopathy palpated, trace edema  SKIN: Dry, intact, no lesions.   NEURO: Cranial nerves II-XII intact. Motor strength 5/5 bilaterally, upper and lower extremities.  PSYCH: Appropriate affect.        Assessment:       1. Moderate persistent asthma without complication    2. BANDAR (obstructive sleep apnea)    3. Cough, unspecified type    4. Asthma with acute exacerbation, unspecified asthma severity, unspecified whether persistent              Secondary infection from upper respiratory virus   Plan:       Moderate persistent asthma without complication    BANDAR (obstructive sleep apnea)    Cough, unspecified type  -     X-Ray Chest PA And Lateral; Future  -     CBC auto differential; Future; Expected date: 10/05/2023  -     Procalcitonin; Future; Expected date: 10/05/2023    Asthma with acute exacerbation, unspecified asthma severity, unspecified whether persistent              Chest xray, cbc and procal   Continue the Singulair every night   Need download off machine   Levaquin 500 for a week.  Get back on your Trelegy daily   You need to notify us if you are having difficulty with your breathing  If you do not get better call me  Follow up if symptoms worsen or fail to improve.

## 2023-10-10 ENCOUNTER — PATIENT MESSAGE (OUTPATIENT)
Dept: PULMONOLOGY | Facility: CLINIC | Age: 73
End: 2023-10-10

## 2023-10-25 ENCOUNTER — OFFICE VISIT (OUTPATIENT)
Dept: FAMILY MEDICINE | Facility: CLINIC | Age: 73
End: 2023-10-25
Payer: MEDICARE

## 2023-10-25 VITALS
HEART RATE: 59 BPM | OXYGEN SATURATION: 96 % | BODY MASS INDEX: 39.71 KG/M2 | WEIGHT: 262 LBS | TEMPERATURE: 98 F | DIASTOLIC BLOOD PRESSURE: 68 MMHG | SYSTOLIC BLOOD PRESSURE: 128 MMHG | HEIGHT: 68 IN

## 2023-10-25 DIAGNOSIS — N30.01 ACUTE CYSTITIS WITH HEMATURIA: ICD-10-CM

## 2023-10-25 DIAGNOSIS — U07.1 COVID: Primary | ICD-10-CM

## 2023-10-25 LAB
BILIRUB UR QL STRIP: NEGATIVE
GLUCOSE UR QL STRIP: NEGATIVE
KETONES UR QL STRIP: NEGATIVE
LEUKOCYTE ESTERASE UR QL STRIP: POSITIVE
PH, POC UA: 5
POC BLOOD, URINE: POSITIVE
POC NITRATES, URINE: NEGATIVE
PROT UR QL STRIP: NEGATIVE
SP GR UR STRIP: 1.02 (ref 1–1.03)
UROBILINOGEN UR STRIP-ACNC: ABNORMAL (ref 0.3–2.2)

## 2023-10-25 PROCEDURE — 99213 PR OFFICE/OUTPT VISIT, EST, LEVL III, 20-29 MIN: ICD-10-PCS | Mod: S$GLB,,, | Performed by: INTERNAL MEDICINE

## 2023-10-25 PROCEDURE — 3078F PR MOST RECENT DIASTOLIC BLOOD PRESSURE < 80 MM HG: ICD-10-PCS | Mod: CPTII,S$GLB,, | Performed by: INTERNAL MEDICINE

## 2023-10-25 PROCEDURE — 1125F PR PAIN SEVERITY QUANTIFIED, PAIN PRESENT: ICD-10-PCS | Mod: CPTII,S$GLB,, | Performed by: INTERNAL MEDICINE

## 2023-10-25 PROCEDURE — 3074F SYST BP LT 130 MM HG: CPT | Mod: CPTII,S$GLB,, | Performed by: INTERNAL MEDICINE

## 2023-10-25 PROCEDURE — 1101F PR PT FALLS ASSESS DOC 0-1 FALLS W/OUT INJ PAST YR: ICD-10-PCS | Mod: CPTII,S$GLB,, | Performed by: INTERNAL MEDICINE

## 2023-10-25 PROCEDURE — 4010F ACE/ARB THERAPY RXD/TAKEN: CPT | Mod: CPTII,S$GLB,, | Performed by: INTERNAL MEDICINE

## 2023-10-25 PROCEDURE — 3288F FALL RISK ASSESSMENT DOCD: CPT | Mod: CPTII,S$GLB,, | Performed by: INTERNAL MEDICINE

## 2023-10-25 PROCEDURE — 3008F PR BODY MASS INDEX (BMI) DOCUMENTED: ICD-10-PCS | Mod: CPTII,S$GLB,, | Performed by: INTERNAL MEDICINE

## 2023-10-25 PROCEDURE — 1159F MED LIST DOCD IN RCRD: CPT | Mod: CPTII,S$GLB,, | Performed by: INTERNAL MEDICINE

## 2023-10-25 PROCEDURE — 81003 URINALYSIS AUTO W/O SCOPE: CPT | Mod: QW,S$GLB,, | Performed by: INTERNAL MEDICINE

## 2023-10-25 PROCEDURE — 3008F BODY MASS INDEX DOCD: CPT | Mod: CPTII,S$GLB,, | Performed by: INTERNAL MEDICINE

## 2023-10-25 PROCEDURE — 99213 OFFICE O/P EST LOW 20 MIN: CPT | Mod: S$GLB,,, | Performed by: INTERNAL MEDICINE

## 2023-10-25 PROCEDURE — 4010F PR ACE/ARB THEARPY RXD/TAKEN: ICD-10-PCS | Mod: CPTII,S$GLB,, | Performed by: INTERNAL MEDICINE

## 2023-10-25 PROCEDURE — 81003 POCT URINALYSIS, DIPSTICK, AUTOMATED, W/O SCOPE: ICD-10-PCS | Mod: QW,S$GLB,, | Performed by: INTERNAL MEDICINE

## 2023-10-25 PROCEDURE — 3074F PR MOST RECENT SYSTOLIC BLOOD PRESSURE < 130 MM HG: ICD-10-PCS | Mod: CPTII,S$GLB,, | Performed by: INTERNAL MEDICINE

## 2023-10-25 PROCEDURE — 1101F PT FALLS ASSESS-DOCD LE1/YR: CPT | Mod: CPTII,S$GLB,, | Performed by: INTERNAL MEDICINE

## 2023-10-25 PROCEDURE — 3078F DIAST BP <80 MM HG: CPT | Mod: CPTII,S$GLB,, | Performed by: INTERNAL MEDICINE

## 2023-10-25 PROCEDURE — 3288F PR FALLS RISK ASSESSMENT DOCUMENTED: ICD-10-PCS | Mod: CPTII,S$GLB,, | Performed by: INTERNAL MEDICINE

## 2023-10-25 PROCEDURE — 1125F AMNT PAIN NOTED PAIN PRSNT: CPT | Mod: CPTII,S$GLB,, | Performed by: INTERNAL MEDICINE

## 2023-10-25 PROCEDURE — 1159F PR MEDICATION LIST DOCUMENTED IN MEDICAL RECORD: ICD-10-PCS | Mod: CPTII,S$GLB,, | Performed by: INTERNAL MEDICINE

## 2023-10-25 RX ORDER — CEFDINIR 300 MG/1
300 CAPSULE ORAL 2 TIMES DAILY
Qty: 28 CAPSULE | Refills: 0 | Status: ON HOLD | OUTPATIENT
Start: 2023-10-25 | End: 2023-11-03 | Stop reason: HOSPADM

## 2023-10-25 NOTE — PATIENT INSTRUCTIONS
Your test was POSITIVE for COVID-19 (coronavirus).       Please isolate yourself at home.  You may leave home and/or return to work once the following conditions are met:    If you were not hospitalized and are not moderately to severely immunocompromised:   More than 5 days since symptoms first appeared AND  More than 24 hours fever free without medications AND  Symptoms are improving  Continue to wear a mask around others for 5 additional days.    If you were hospitalized OR are moderately to severely immunocompromised:  More than 20 days since symptoms first appeared  More than 24 hours fever free without medications  Symptoms have improved    If you had no symptoms but tested positive:  More than 5 days since the date of the first positive test (20 days if moderately to severely immunocompromised). If you develop symptoms, then use the guidelines above.  Continue to wear a mask around others for 5 additional days.      Contact Tracing    As one of the next steps, you will receive a call or text from the [place] Department of [facility] ([facility]) COVID-19 Tracing Team. See the contact information below so you know not to ignore the health departments call. It is important that you contact them back immediately so they can help.      Contact Tracer Number:  [phone]  Caller ID for most carriers: [facility] and [facility]     What is contact tracing?  Contact tracing is a process that helps identify everyone who has been in close contact with an infected person. Contact tracers let those people know they may have been exposed and guide them on next steps. Confidentiality is important for everyone; no one will be told who may have exposed them to the virus.  Your involvement is important. The more we know about where and how this virus is spreading, the better chance we have at stopping it from spreading further.  What does exposure mean?  Exposure means you have been within 6 feet for more than 15 minutes with a person who  has or had COVID-19.  What kind of questions do the contact tracers ask?  A contact tracer will confirm your basic contact information including name, address, phone number, and next of kin, as well as asking about any symptoms you may have had. Theyll also ask you how you think you may have gotten sick, such as places where you may have been exposed to the virus, and people you were with. Those names will never be shared with anyone outside of that call, and will only be used to help trace and stop the spread of the virus.   I have privacy concerns. How will the state use my information?  Your privacy about your health is important. All calls are completed using call centers that use the appropriate health privacy protection measures (HIPAA compliance), meaning that your patient information is safe. No one will ever ask you any questions related to immigration status. Your health comes first.   Do I have to participate?  You do not have to participate, but we strongly encourage you to. Contact tracing can help us catch and control new outbreaks as theyre developing to keep your friends and family safe.   What if I dont hear from anyone?  If you dont receive a call within 24 hours, you can call the number above right away to inquire about your status. That line is open from 8:00 am - 8:00 p.m., 7 days a week.  Contact tracing saves lives! Together, we have the power to beat this virus and keep our loved ones and neighbors safe.    For more information see CDC link below.      https://www.cdc.gov/coronavirus/2019-ncov/hcp/guidance-prevent-spread.html#precautions        Sources:  [facility], [place] [facility] and [facility]           Sincerely,     Gelacio Cantu Jr, MD

## 2023-10-25 NOTE — PROGRESS NOTES
Subjective:       Patient ID: Nico Teague is a 73 y.o. male.    Chief Complaint: Cough (X 2 days), Chest Congestion, Generalized Body Aches, and Sore Throat    Cough  This is a new problem. The current episode started in the past 7 days. The problem has been gradually worsening. Associated symptoms include myalgias, a sore throat and shortness of breath. Pertinent negatives include no chest pain, chills, ear pain, eye redness, fever, headaches, postnasal drip, rash, rhinorrhea or wheezing. Associated symptoms comments: Wife with similar and COVID positive. There is no history of environmental allergies.   Dysuria   This is a new problem. The current episode started in the past 7 days. The problem occurs every urination. The quality of the pain is described as burning. Associated symptoms include frequency (up 5 times last night). Pertinent negatives include no chills, flank pain, hematuria, nausea, urgency, vomiting, constipation or rash.     Review of Systems   Constitutional:  Negative for activity change, appetite change, chills, diaphoresis, fatigue, fever and unexpected weight change.   HENT:  Positive for congestion and sore throat. Negative for ear discharge, ear pain, hearing loss, nosebleeds, postnasal drip, rhinorrhea, sinus pressure, sinus pain, sneezing, tinnitus, trouble swallowing and voice change.    Eyes:  Negative for photophobia, pain, discharge, redness, itching and visual disturbance.   Respiratory:  Positive for apnea, cough, chest tightness and shortness of breath. Negative for choking and wheezing.    Cardiovascular:  Positive for leg swelling. Negative for chest pain and palpitations.   Gastrointestinal:  Positive for diarrhea. Negative for abdominal distention, abdominal pain, blood in stool, constipation, nausea and vomiting.   Endocrine: Negative for cold intolerance, heat intolerance, polydipsia and polyuria.   Genitourinary:  Positive for difficulty urinating, dysuria and frequency  (up 5 times last night). Negative for decreased urine volume, enuresis, flank pain, genital sores, hematuria, penile discharge, penile pain, scrotal swelling, testicular pain and urgency.   Musculoskeletal:  Positive for arthralgias, back pain and myalgias. Negative for gait problem, joint swelling, neck pain and neck stiffness.   Skin:  Negative for rash and wound.   Allergic/Immunologic: Negative for environmental allergies, food allergies and immunocompromised state.   Neurological:  Negative for dizziness, tremors, seizures, syncope, facial asymmetry, speech difficulty, weakness, light-headedness, numbness and headaches.   Hematological:  Negative for adenopathy. Bruises/bleeds easily.   Psychiatric/Behavioral:  Negative for confusion, decreased concentration, hallucinations, self-injury, sleep disturbance and suicidal ideas. The patient is not nervous/anxious.        Past Medical History:   Diagnosis Date    Allergy     Arthritis     Asthma     Atrial fibrillation     Basal cell carcinoma     Fever blister     Hypertension     Hypothyroidism (acquired) 7/31/2023    Joint pain     Melanoma     BANDAR on CPAP     PVC (premature ventricular contraction)     Skin disease     Squamous cell carcinoma       Past Surgical History:   Procedure Laterality Date    BRONCHOSCOPY N/A 7/18/2022    Procedure: Bronchoscopy;  Surgeon: Charlee Brewer MD;  Location: [facility];  Service: Pulmonary;  Laterality: N/A;  monday 7/18/22 at 0830 look see bronch no biopsy    CARDIAC CATHETERIZATION  05/25/2021    ESOPHAGOGASTRODUODENOSCOPY N/A 7/7/2022    Procedure: EGD (ESOPHAGOGASTRODUODENOSCOPY);  Surgeon: Emmanuel Deleon MD;  Location: [facility];  Service: Endoscopy;  Laterality: N/A;    EYE SURGERY Left 12/03/2019    EYE SURGERY Right 01/28/2020    LIPOMA RESECTION      NOSE SURGERY      SHOULDER ARTHROSCOPY      SKIN CANCER EXCISION      Moh's x 2    SKIN GRAFT         Family History   Problem Relation Age of Onset    Cancer Mother          esophageal    Heart disease Father     Hypertension Father     Arthritis Paternal Grandmother        Social History     Socioeconomic History    Marital status:    Occupational History    Occupation: retired captain    Tobacco Use    Smoking status: Former     Current packs/day: 0.00     Average packs/day: 2.0 packs/day for 20.0 years (40.0 ttl pk-yrs)     Types: Cigarettes     Start date: 1972     Quit date: 1988     Years since quittin.1     Passive exposure: Past    Smokeless tobacco: Never   Substance and Sexual Activity    Alcohol use: No    Drug use: No    Sexual activity: Never   Social History Narrative    Live with wife     Social Determinants of Health     Financial Resource Strain: Low Risk  (3/7/2022)    Overall Financial Resource Strain (CARDIA)     Difficulty of Paying Living Expenses: Not hard at all   Food Insecurity: No Food Insecurity (3/7/2022)    Hunger Vital Sign     Worried About Running Out of Food in the Last Year: Never true     Ran Out of Food in the Last Year: Never true   Transportation Needs: No Transportation Needs (3/7/2022)    PRAPARE - Transportation     Lack of Transportation (Medical): No     Lack of Transportation (Non-Medical): No   Physical Activity: Insufficiently Active (3/7/2022)    Exercise Vital Sign     Days of Exercise per Week: 3 days     Minutes of Exercise per Session: 20 min   Stress: No Stress Concern Present (3/7/2022)    [place] [employer] - Occupational Stress Questionnaire     Feeling of Stress : Only a little   Social Connections: Unknown (3/7/2022)    Social Connection and Isolation Panel [NHANES]     Frequency of Communication with Friends and Family: More than three times a week     Frequency of Social Gatherings with Friends and Family: Once a week     Active Member of Clubs or Organizations: Yes     Attends Club or Organization Meetings: More than 4 times per year     Marital Status:        Current  Outpatient Medications   Medication Sig Dispense Refill    allopurinoL (ZYLOPRIM) 300 MG tablet TAKE 1 TABLET ONE TIME DAILY 90 tablet 1    amiodarone (PACERONE) 200 MG Tab Take 200 mg by mouth 2 (two) times daily.      apixaban (ELIQUIS) 5 mg Tab Take 5 mg by mouth 2 (two) times daily.      ascorbic acid, vitamin C, (VITAMIN C) 1000 MG tablet Take 1,000 mg by mouth once daily.      aspirin 81 MG Chew Take 81 mg by mouth every 7 days.      cholecalciferol, vitamin D3, (VITAMIN D3) 25 mcg (1,000 unit) capsule Take 1,000 Units by mouth once daily.      co-enzyme Q-10 50 mg capsule Take 50 mg by mouth once daily.      ENTRESTO 49-51 mg per tablet Take 1 tablet by mouth 2 (two) times daily.      fluticasone-umeclidin-vilanter (TRELEGY ELLIPTA) 100-62.5-25 mcg DsDv Inhale 1 puff into the lungs once daily. 3 each 6    furosemide (LASIX) 40 MG tablet Take 20 mg by mouth 2 (two) times a day.      Lactobacillus rhamnosus GG (CULTURELLE) 10 billion cell capsule Take 1 capsule by mouth.      levothyroxine (SYNTHROID) 88 MCG tablet TAKE 1 TABLET BEFORE BREAKFAST 90 tablet 1    magnesium gluconate 27.5 mg magne- sium (500 mg) Tab Take 1 tablet by mouth once.      melatonin 10 mg Tab Take 1 tablet by mouth nightly as needed.      montelukast (SINGULAIR) 10 mg tablet TAKE 1 TABLET EVERY EVENING 90 tablet 6    multivitamin (THERAGRAN) per tablet Take 1 tablet by mouth once daily.      potassium chloride (MICRO-K) 10 MEQ CpSR Take 10 mEq by mouth once daily.      rosuvastatin (CRESTOR) 10 MG tablet Take 1 tablet (10 mg total) by mouth every other day. 90 tablet 1    tamsulosin (FLOMAX) 0.4 mg Cap Take 1 tablet by mouth Daily.      triamterene-hydrochlorothiazide 37.5-25 mg (DYAZIDE) 37.5-25 mg per capsule TAKE 1 CAPSULE EVERY MORNING 90 capsule 1    vitamin E 1000 UNIT capsule Take 1,000 Units by mouth once daily.      cefdinir (OMNICEF) 300 MG capsule Take 1 capsule (300 mg total) by mouth 2 (two) times daily. for 14 days 28  "capsule 0     No current facility-administered medications for this visit.       Review of patient's allergies indicates:   Allergen Reactions    Bactrim [sulfamethoxazole-trimethoprim]      HIVES/ RASH    Green tea (rashida sinensis) Hives     Pt is has allergies to black tea. Not an option under search.     Objective:    HPI     Cough     Additional comments: X 2 days          Last edited by Radha Ruffin MA on 10/25/2023  2:48 PM.      Blood pressure 128/68, pulse (!) 59, temperature 98.1 °F (36.7 °C), temperature source Temporal, height 5' 8" (1.727 m), weight 118.8 kg (262 lb), SpO2 96 %. Body mass index is 39.84 kg/m².   Physical Exam  Vitals and nursing note reviewed.   Constitutional:       General: He is not in acute distress.     Appearance: Normal appearance. He is obese. He is not ill-appearing, toxic-appearing or diaphoretic.   Cardiovascular:      Rate and Rhythm: Normal rate and regular rhythm.      Heart sounds: Normal heart sounds.   Pulmonary:      Effort: Pulmonary effort is normal. No respiratory distress.      Breath sounds: Normal breath sounds. No stridor. No wheezing, rhonchi or rales.   Neurological:      Mental Status: He is alert.             Assessment:       1. COVID    2. Acute cystitis with hematuria        Plan:       Nico was seen today for cough, chest congestion, generalized body aches and sore throat.    Diagnoses and all orders for this visit:    COVID  Comments:  Not a Paxlovid candidate d/t drug interactions (amiodarone, eliquis).  Monitor pulse ox; to ER if hypoxic.  Supportive care.  Orders:  -     POCT COVID-19 Rapid Screening    Acute cystitis with hematuria  -     POCT Urinalysis, Dipstick, Automated, W/O Scope  -     Urine Culture, Routine  -     cefdinir (OMNICEF) 300 MG capsule; Take 1 capsule (300 mg total) by mouth 2 (two) times daily. for 14 days           "

## 2023-10-26 ENCOUNTER — HOSPITAL ENCOUNTER (INPATIENT)
Facility: HOSPITAL | Age: 73
LOS: 7 days | Discharge: HOME | DRG: 177 | End: 2023-11-03
Attending: EMERGENCY MEDICINE | Admitting: INTERNAL MEDICINE
Payer: MEDICARE

## 2023-10-26 ENCOUNTER — TELEPHONE (OUTPATIENT)
Dept: PULMONOLOGY | Facility: CLINIC | Age: 73
End: 2023-10-26

## 2023-10-26 DIAGNOSIS — R09.02 HYPOXIA: Primary | ICD-10-CM

## 2023-10-26 DIAGNOSIS — J96.01 ACUTE HYPOXEMIC RESPIRATORY FAILURE DUE TO COVID-19: ICD-10-CM

## 2023-10-26 DIAGNOSIS — U07.1 COVID-19: ICD-10-CM

## 2023-10-26 DIAGNOSIS — U07.1 ACUTE HYPOXEMIC RESPIRATORY FAILURE DUE TO COVID-19: ICD-10-CM

## 2023-10-26 DIAGNOSIS — R94.31 ABNORMAL EKG: ICD-10-CM

## 2023-10-26 LAB
ALBUMIN SERPL BCP-MCNC: 4.4 G/DL (ref 3.5–5.2)
ALLENS TEST: ABNORMAL
ALP SERPL-CCNC: 76 U/L (ref 55–135)
ALT SERPL W/O P-5'-P-CCNC: 29 U/L (ref 10–44)
ANION GAP SERPL CALC-SCNC: 10 MMOL/L (ref 8–16)
AST SERPL-CCNC: 22 U/L (ref 10–40)
BASOPHILS # BLD AUTO: 0.03 K/UL (ref 0–0.2)
BASOPHILS NFR BLD: 0.3 % (ref 0–1.9)
BILIRUB SERPL-MCNC: 0.4 MG/DL (ref 0.1–1)
BNP SERPL-MCNC: 85 PG/ML (ref 0–99)
BUN SERPL-MCNC: 13 MG/DL (ref 8–23)
CALCIUM SERPL-MCNC: 9.2 MG/DL (ref 8.7–10.5)
CHLORIDE SERPL-SCNC: 102 MMOL/L (ref 95–110)
CO2 SERPL-SCNC: 24 MMOL/L (ref 23–29)
CREAT SERPL-MCNC: 1.2 MG/DL (ref 0.5–1.4)
D DIMER PPP IA.FEU-MCNC: 0.22 MG/L FEU (ref 0–0.49)
DELSYS: ABNORMAL
DIFFERENTIAL METHOD: ABNORMAL
EOSINOPHIL # BLD AUTO: 0.1 K/UL (ref 0–0.5)
EOSINOPHIL NFR BLD: 0.8 % (ref 0–8)
ERYTHROCYTE [DISTWIDTH] IN BLOOD BY AUTOMATED COUNT: 13.8 % (ref 11.5–14.5)
EST. GFR  (NO RACE VARIABLE): >60 ML/MIN/1.73 M^2
GLUCOSE SERPL-MCNC: 100 MG/DL (ref 70–110)
GLUCOSE SERPL-MCNC: 137 MG/DL (ref 70–110)
HCO3 UR-SCNC: 22.2 MMOL/L (ref 24–28)
HCT VFR BLD AUTO: 44 % (ref 40–54)
HCT VFR BLD CALC: 43 %PCV (ref 36–54)
HGB BLD-MCNC: 14.5 G/DL (ref 14–18)
IMM GRANULOCYTES # BLD AUTO: 0.03 K/UL (ref 0–0.04)
IMM GRANULOCYTES NFR BLD AUTO: 0.3 % (ref 0–0.5)
LYMPHOCYTES # BLD AUTO: 0.8 K/UL (ref 1–4.8)
LYMPHOCYTES NFR BLD: 9 % (ref 18–48)
MAGNESIUM SERPL-MCNC: 1.9 MG/DL (ref 1.6–2.6)
MCH RBC QN AUTO: 31 PG (ref 27–31)
MCHC RBC AUTO-ENTMCNC: 33 G/DL (ref 32–36)
MCV RBC AUTO: 94 FL (ref 82–98)
MONOCYTES # BLD AUTO: 1 K/UL (ref 0.3–1)
MONOCYTES NFR BLD: 11.7 % (ref 4–15)
NEUTROPHILS # BLD AUTO: 6.7 K/UL (ref 1.8–7.7)
NEUTROPHILS NFR BLD: 77.9 % (ref 38–73)
NRBC BLD-RTO: 0 /100 WBC
PCO2 BLDA: 34.3 MMHG (ref 35–45)
PH SMN: 7.42 [PH] (ref 7.35–7.45)
PLATELET # BLD AUTO: 157 K/UL (ref 150–450)
PMV BLD AUTO: 10.6 FL (ref 9.2–12.9)
PO2 BLDA: 57 MMHG (ref 80–100)
POC BE: -2 MMOL/L
POC IONIZED CALCIUM: 1.17 MMOL/L (ref 1.06–1.42)
POC SATURATED O2: 90 % (ref 95–100)
POC TCO2: 23 MMOL/L (ref 23–27)
POTASSIUM BLD-SCNC: 2.7 MMOL/L (ref 3.5–5.1)
POTASSIUM SERPL-SCNC: 3.6 MMOL/L (ref 3.5–5.1)
PROT SERPL-MCNC: 7.2 G/DL (ref 6–8.4)
RBC # BLD AUTO: 4.67 M/UL (ref 4.6–6.2)
SAMPLE: ABNORMAL
SITE: ABNORMAL
SODIUM BLD-SCNC: 136 MMOL/L (ref 136–145)
SODIUM SERPL-SCNC: 136 MMOL/L (ref 136–145)
TROPONIN I SERPL HS-MCNC: 8.5 PG/ML (ref 0–14.9)
WBC # BLD AUTO: 8.6 K/UL (ref 3.9–12.7)

## 2023-10-26 PROCEDURE — 94761 N-INVAS EAR/PLS OXIMETRY MLT: CPT

## 2023-10-26 PROCEDURE — 80053 COMPREHEN METABOLIC PANEL: CPT | Performed by: STUDENT IN AN ORGANIZED HEALTH CARE EDUCATION/TRAINING PROGRAM

## 2023-10-26 PROCEDURE — 93005 ELECTROCARDIOGRAM TRACING: CPT | Performed by: INTERNAL MEDICINE

## 2023-10-26 PROCEDURE — 99285 EMERGENCY DEPT VISIT HI MDM: CPT | Mod: 25

## 2023-10-26 PROCEDURE — 93010 EKG 12-LEAD: ICD-10-PCS | Mod: ,,, | Performed by: INTERNAL MEDICINE

## 2023-10-26 PROCEDURE — 82803 BLOOD GASES ANY COMBINATION: CPT

## 2023-10-26 PROCEDURE — 93010 ELECTROCARDIOGRAM REPORT: CPT | Mod: ,,, | Performed by: INTERNAL MEDICINE

## 2023-10-26 PROCEDURE — 85025 COMPLETE CBC W/AUTO DIFF WBC: CPT | Performed by: STUDENT IN AN ORGANIZED HEALTH CARE EDUCATION/TRAINING PROGRAM

## 2023-10-26 PROCEDURE — 96375 TX/PRO/DX INJ NEW DRUG ADDON: CPT

## 2023-10-26 PROCEDURE — 25000003 PHARM REV CODE 250: Performed by: EMERGENCY MEDICINE

## 2023-10-26 PROCEDURE — 85379 FIBRIN DEGRADATION QUANT: CPT | Performed by: EMERGENCY MEDICINE

## 2023-10-26 PROCEDURE — 94640 AIRWAY INHALATION TREATMENT: CPT

## 2023-10-26 PROCEDURE — 36600 WITHDRAWAL OF ARTERIAL BLOOD: CPT

## 2023-10-26 PROCEDURE — 84484 ASSAY OF TROPONIN QUANT: CPT | Performed by: STUDENT IN AN ORGANIZED HEALTH CARE EDUCATION/TRAINING PROGRAM

## 2023-10-26 PROCEDURE — 83880 ASSAY OF NATRIURETIC PEPTIDE: CPT | Performed by: STUDENT IN AN ORGANIZED HEALTH CARE EDUCATION/TRAINING PROGRAM

## 2023-10-26 PROCEDURE — 99900035 HC TECH TIME PER 15 MIN (STAT)

## 2023-10-26 PROCEDURE — 85651 RBC SED RATE NONAUTOMATED: CPT | Performed by: NURSE PRACTITIONER

## 2023-10-26 PROCEDURE — 63600175 PHARM REV CODE 636 W HCPCS: Performed by: EMERGENCY MEDICINE

## 2023-10-26 PROCEDURE — 99900031 HC PATIENT EDUCATION (STAT)

## 2023-10-26 PROCEDURE — 83735 ASSAY OF MAGNESIUM: CPT | Performed by: STUDENT IN AN ORGANIZED HEALTH CARE EDUCATION/TRAINING PROGRAM

## 2023-10-26 PROCEDURE — 96374 THER/PROPH/DIAG INJ IV PUSH: CPT

## 2023-10-26 PROCEDURE — 25000242 PHARM REV CODE 250 ALT 637 W/ HCPCS: Performed by: STUDENT IN AN ORGANIZED HEALTH CARE EDUCATION/TRAINING PROGRAM

## 2023-10-26 RX ORDER — IPRATROPIUM BROMIDE AND ALBUTEROL SULFATE 2.5; .5 MG/3ML; MG/3ML
3 SOLUTION RESPIRATORY (INHALATION)
Status: DISCONTINUED | OUTPATIENT
Start: 2023-10-26 | End: 2023-10-27

## 2023-10-26 RX ORDER — LABETALOL HYDROCHLORIDE 5 MG/ML
10 INJECTION, SOLUTION INTRAVENOUS
Status: COMPLETED | OUTPATIENT
Start: 2023-10-26 | End: 2023-10-26

## 2023-10-26 RX ORDER — DEXAMETHASONE SODIUM PHOSPHATE 4 MG/ML
8 INJECTION, SOLUTION INTRA-ARTICULAR; INTRALESIONAL; INTRAMUSCULAR; INTRAVENOUS; SOFT TISSUE
Status: COMPLETED | OUTPATIENT
Start: 2023-10-26 | End: 2023-10-26

## 2023-10-26 RX ORDER — ACETAMINOPHEN 500 MG
1000 TABLET ORAL
Status: COMPLETED | OUTPATIENT
Start: 2023-10-26 | End: 2023-10-26

## 2023-10-26 RX ORDER — IPRATROPIUM BROMIDE AND ALBUTEROL SULFATE 2.5; .5 MG/3ML; MG/3ML
3 SOLUTION RESPIRATORY (INHALATION)
Status: COMPLETED | OUTPATIENT
Start: 2023-10-26 | End: 2023-10-26

## 2023-10-26 RX ADMIN — ACETAMINOPHEN 1000 MG: 500 TABLET ORAL at 09:10

## 2023-10-26 RX ADMIN — IPRATROPIUM BROMIDE AND ALBUTEROL SULFATE 3 ML: .5; 3 SOLUTION RESPIRATORY (INHALATION) at 08:10

## 2023-10-26 RX ADMIN — LABETALOL HYDROCHLORIDE 10 MG: 5 INJECTION INTRAVENOUS at 09:10

## 2023-10-26 RX ADMIN — DEXAMETHASONE SODIUM PHOSPHATE 8 MG: 4 INJECTION, SOLUTION INTRA-ARTICULAR; INTRALESIONAL; INTRAMUSCULAR; INTRAVENOUS; SOFT TISSUE at 08:10

## 2023-10-26 NOTE — TELEPHONE ENCOUNTER
Patient is dyspneic and tachypneic.  He is febrile with his COVID.  Advised that he come to the emergency room.

## 2023-10-26 NOTE — TELEPHONE ENCOUNTER
----- Message from Juli Mcfarland sent at 10/26/2023  8:44 AM CDT -----  Regarding: Patient WIfe Elsy Called  Patient's wife Elsy called and would like for  to give her a call. Patient and wife has tested postive for COVID. Patient would like to know if there anything that he can take due to correct medication that he is already. Please give them a call back [phone]

## 2023-10-27 ENCOUNTER — PATIENT MESSAGE (OUTPATIENT)
Dept: PULMONOLOGY | Facility: CLINIC | Age: 73
End: 2023-10-27

## 2023-10-27 PROBLEM — J96.01 ACUTE HYPOXEMIC RESPIRATORY FAILURE DUE TO COVID-19: Status: ACTIVE | Noted: 2023-10-27

## 2023-10-27 PROBLEM — J45.909 ASTHMA: Status: ACTIVE | Noted: 2023-10-27

## 2023-10-27 PROBLEM — U07.1 ACUTE HYPOXEMIC RESPIRATORY FAILURE DUE TO COVID-19: Status: ACTIVE | Noted: 2023-10-27

## 2023-10-27 PROBLEM — U07.1 COVID-19: Status: ACTIVE | Noted: 2023-10-27

## 2023-10-27 LAB
25(OH)D3+25(OH)D2 SERPL-MCNC: 38 NG/ML (ref 30–96)
ALBUMIN SERPL BCP-MCNC: 4.1 G/DL (ref 3.5–5.2)
ALP SERPL-CCNC: 76 U/L (ref 55–135)
ALT SERPL W/O P-5'-P-CCNC: 26 U/L (ref 10–44)
ANION GAP SERPL CALC-SCNC: 7 MMOL/L (ref 8–16)
AST SERPL-CCNC: 18 U/L (ref 10–40)
BILIRUB SERPL-MCNC: 0.3 MG/DL (ref 0.1–1)
BUN SERPL-MCNC: 12 MG/DL (ref 8–23)
CALCIUM SERPL-MCNC: 8.8 MG/DL (ref 8.7–10.5)
CHLORIDE SERPL-SCNC: 106 MMOL/L (ref 95–110)
CO2 SERPL-SCNC: 26 MMOL/L (ref 23–29)
CREAT SERPL-MCNC: 1.1 MG/DL (ref 0.5–1.4)
CRP SERPL-MCNC: 49.9 MG/L (ref 0–8.2)
CRP SERPL-MCNC: 56.6 MG/L (ref 0–8.2)
ERYTHROCYTE [SEDIMENTATION RATE] IN BLOOD BY WESTERGREN METHOD: 39 MM/HR (ref 0–10)
EST. GFR  (NO RACE VARIABLE): >60 ML/MIN/1.73 M^2
FERRITIN SERPL-MCNC: 62.8 NG/ML (ref 20–300)
GLUCOSE SERPL-MCNC: 160 MG/DL (ref 70–110)
LDH SERPL L TO P-CCNC: 189 U/L (ref 110–260)
MAGNESIUM SERPL-MCNC: 2.2 MG/DL (ref 1.6–2.6)
PHOSPHATE SERPL-MCNC: 4 MG/DL (ref 2.7–4.5)
POTASSIUM SERPL-SCNC: 3.6 MMOL/L (ref 3.5–5.1)
PROCALCITONIN SERPL IA-MCNC: 0.07 NG/ML (ref 0–0.5)
PROCALCITONIN SERPL IA-MCNC: 0.09 NG/ML (ref 0–0.5)
PROT SERPL-MCNC: 6.8 G/DL (ref 6–8.4)
SODIUM SERPL-SCNC: 139 MMOL/L (ref 136–145)

## 2023-10-27 PROCEDURE — 80053 COMPREHEN METABOLIC PANEL: CPT | Performed by: NURSE PRACTITIONER

## 2023-10-27 PROCEDURE — 99900031 HC PATIENT EDUCATION (STAT)

## 2023-10-27 PROCEDURE — 84100 ASSAY OF PHOSPHORUS: CPT | Performed by: NURSE PRACTITIONER

## 2023-10-27 PROCEDURE — 12000002 HC ACUTE/MED SURGE SEMI-PRIVATE ROOM

## 2023-10-27 PROCEDURE — 86140 C-REACTIVE PROTEIN: CPT | Mod: 91 | Performed by: NURSE PRACTITIONER

## 2023-10-27 PROCEDURE — 82728 ASSAY OF FERRITIN: CPT | Performed by: NURSE PRACTITIONER

## 2023-10-27 PROCEDURE — 83735 ASSAY OF MAGNESIUM: CPT | Performed by: NURSE PRACTITIONER

## 2023-10-27 PROCEDURE — 82306 VITAMIN D 25 HYDROXY: CPT | Performed by: NURSE PRACTITIONER

## 2023-10-27 PROCEDURE — 36415 COLL VENOUS BLD VENIPUNCTURE: CPT | Performed by: NURSE PRACTITIONER

## 2023-10-27 PROCEDURE — 25000003 PHARM REV CODE 250: Performed by: NURSE PRACTITIONER

## 2023-10-27 PROCEDURE — 25000242 PHARM REV CODE 250 ALT 637 W/ HCPCS: Performed by: INTERNAL MEDICINE

## 2023-10-27 PROCEDURE — 94761 N-INVAS EAR/PLS OXIMETRY MLT: CPT

## 2023-10-27 PROCEDURE — 63600175 PHARM REV CODE 636 W HCPCS: Performed by: NURSE PRACTITIONER

## 2023-10-27 PROCEDURE — 84145 PROCALCITONIN (PCT): CPT | Performed by: NURSE PRACTITIONER

## 2023-10-27 PROCEDURE — 83615 LACTATE (LD) (LDH) ENZYME: CPT | Performed by: NURSE PRACTITIONER

## 2023-10-27 PROCEDURE — 27000221 HC OXYGEN, UP TO 24 HOURS

## 2023-10-27 PROCEDURE — 94799 UNLISTED PULMONARY SVC/PX: CPT

## 2023-10-27 PROCEDURE — 36415 COLL VENOUS BLD VENIPUNCTURE: CPT | Performed by: STUDENT IN AN ORGANIZED HEALTH CARE EDUCATION/TRAINING PROGRAM

## 2023-10-27 PROCEDURE — 25000003 PHARM REV CODE 250: Performed by: STUDENT IN AN ORGANIZED HEALTH CARE EDUCATION/TRAINING PROGRAM

## 2023-10-27 PROCEDURE — 63600175 PHARM REV CODE 636 W HCPCS: Mod: JZ,TB | Performed by: STUDENT IN AN ORGANIZED HEALTH CARE EDUCATION/TRAINING PROGRAM

## 2023-10-27 PROCEDURE — 86140 C-REACTIVE PROTEIN: CPT | Performed by: NURSE PRACTITIONER

## 2023-10-27 PROCEDURE — 84145 PROCALCITONIN (PCT): CPT | Mod: 91 | Performed by: STUDENT IN AN ORGANIZED HEALTH CARE EDUCATION/TRAINING PROGRAM

## 2023-10-27 PROCEDURE — 94640 AIRWAY INHALATION TREATMENT: CPT

## 2023-10-27 PROCEDURE — 99900035 HC TECH TIME PER 15 MIN (STAT)

## 2023-10-27 PROCEDURE — 94760 N-INVAS EAR/PLS OXIMETRY 1: CPT

## 2023-10-27 RX ORDER — GLUCAGON 1 MG
1 KIT INJECTION
Status: DISCONTINUED | OUTPATIENT
Start: 2023-10-27 | End: 2023-11-03 | Stop reason: HOSPADM

## 2023-10-27 RX ORDER — ASCORBIC ACID 500 MG
500 TABLET ORAL 2 TIMES DAILY
Status: DISCONTINUED | OUTPATIENT
Start: 2023-10-27 | End: 2023-11-03 | Stop reason: HOSPADM

## 2023-10-27 RX ORDER — ALBUTEROL SULFATE 0.83 MG/ML
2.5 SOLUTION RESPIRATORY (INHALATION) EVERY 6 HOURS
Status: DISCONTINUED | OUTPATIENT
Start: 2023-10-27 | End: 2023-10-27

## 2023-10-27 RX ORDER — IBUPROFEN 200 MG
24 TABLET ORAL
Status: DISCONTINUED | OUTPATIENT
Start: 2023-10-27 | End: 2023-11-03 | Stop reason: HOSPADM

## 2023-10-27 RX ORDER — IPRATROPIUM BROMIDE AND ALBUTEROL SULFATE 2.5; .5 MG/3ML; MG/3ML
3 SOLUTION RESPIRATORY (INHALATION) EVERY 6 HOURS
Status: DISCONTINUED | OUTPATIENT
Start: 2023-10-27 | End: 2023-11-03

## 2023-10-27 RX ORDER — ASCORBIC ACID 500 MG
1000 TABLET ORAL DAILY
Status: DISCONTINUED | OUTPATIENT
Start: 2023-10-27 | End: 2023-10-27

## 2023-10-27 RX ORDER — LEVOTHYROXINE SODIUM 88 UG/1
88 TABLET ORAL
Status: DISCONTINUED | OUTPATIENT
Start: 2023-10-27 | End: 2023-11-03 | Stop reason: HOSPADM

## 2023-10-27 RX ORDER — ACETAMINOPHEN 325 MG/1
650 TABLET ORAL EVERY 8 HOURS PRN
Status: DISCONTINUED | OUTPATIENT
Start: 2023-10-27 | End: 2023-11-03 | Stop reason: HOSPADM

## 2023-10-27 RX ORDER — SODIUM CHLORIDE 0.9 % (FLUSH) 0.9 %
10 SYRINGE (ML) INJECTION
Status: DISCONTINUED | OUTPATIENT
Start: 2023-10-27 | End: 2023-11-03 | Stop reason: HOSPADM

## 2023-10-27 RX ORDER — AMIODARONE HYDROCHLORIDE 200 MG/1
200 TABLET ORAL 2 TIMES DAILY
Status: DISCONTINUED | OUTPATIENT
Start: 2023-10-27 | End: 2023-11-03 | Stop reason: HOSPADM

## 2023-10-27 RX ORDER — CHOLECALCIFEROL (VITAMIN D3) 25 MCG
1000 TABLET ORAL DAILY
Status: DISCONTINUED | OUTPATIENT
Start: 2023-10-27 | End: 2023-11-03 | Stop reason: HOSPADM

## 2023-10-27 RX ORDER — LANOLIN ALCOHOL/MO/W.PET/CERES
400 CREAM (GRAM) TOPICAL DAILY
Status: DISCONTINUED | OUTPATIENT
Start: 2023-10-27 | End: 2023-11-03 | Stop reason: HOSPADM

## 2023-10-27 RX ORDER — NAPROXEN SODIUM 220 MG/1
81 TABLET, FILM COATED ORAL
Status: DISCONTINUED | OUTPATIENT
Start: 2023-10-27 | End: 2023-11-03 | Stop reason: HOSPADM

## 2023-10-27 RX ORDER — BUDESONIDE 0.5 MG/2ML
0.5 INHALANT ORAL EVERY 12 HOURS
Status: DISCONTINUED | OUTPATIENT
Start: 2023-10-27 | End: 2023-11-03 | Stop reason: HOSPADM

## 2023-10-27 RX ORDER — BENZONATATE 100 MG/1
100 CAPSULE ORAL 3 TIMES DAILY PRN
Status: DISCONTINUED | OUTPATIENT
Start: 2023-10-27 | End: 2023-10-30

## 2023-10-27 RX ORDER — PNV NO.95/FERROUS FUM/FOLIC AC 28MG-0.8MG
1000 TABLET ORAL DAILY
Status: DISCONTINUED | OUTPATIENT
Start: 2023-10-27 | End: 2023-10-27 | Stop reason: DRUGHIGH

## 2023-10-27 RX ORDER — ONDANSETRON 2 MG/ML
4 INJECTION INTRAMUSCULAR; INTRAVENOUS EVERY 8 HOURS PRN
Status: DISCONTINUED | OUTPATIENT
Start: 2023-10-27 | End: 2023-11-03 | Stop reason: HOSPADM

## 2023-10-27 RX ORDER — POTASSIUM CHLORIDE 750 MG/1
10 CAPSULE, EXTENDED RELEASE ORAL DAILY
Status: DISCONTINUED | OUTPATIENT
Start: 2023-10-27 | End: 2023-11-03 | Stop reason: HOSPADM

## 2023-10-27 RX ORDER — MONTELUKAST SODIUM 10 MG/1
10 TABLET ORAL NIGHTLY
Status: DISCONTINUED | OUTPATIENT
Start: 2023-10-27 | End: 2023-11-03 | Stop reason: HOSPADM

## 2023-10-27 RX ORDER — ATORVASTATIN CALCIUM 20 MG/1
20 TABLET, FILM COATED ORAL DAILY
Status: DISCONTINUED | OUTPATIENT
Start: 2023-10-27 | End: 2023-11-03 | Stop reason: HOSPADM

## 2023-10-27 RX ORDER — TALC
6 POWDER (GRAM) TOPICAL NIGHTLY PRN
Status: DISCONTINUED | OUTPATIENT
Start: 2023-10-27 | End: 2023-11-03 | Stop reason: HOSPADM

## 2023-10-27 RX ORDER — IPRATROPIUM BROMIDE 0.5 MG/2.5ML
0.5 SOLUTION RESPIRATORY (INHALATION) EVERY 6 HOURS
Status: DISCONTINUED | OUTPATIENT
Start: 2023-10-27 | End: 2023-10-27

## 2023-10-27 RX ORDER — VITAMIN E MIXED 400 UNIT
400 CAPSULE ORAL DAILY
Status: DISCONTINUED | OUTPATIENT
Start: 2023-10-27 | End: 2023-11-03 | Stop reason: HOSPADM

## 2023-10-27 RX ORDER — ALBUTEROL SULFATE 90 UG/1
2 AEROSOL, METERED RESPIRATORY (INHALATION) EVERY 6 HOURS
Status: DISCONTINUED | OUTPATIENT
Start: 2023-10-27 | End: 2023-10-27

## 2023-10-27 RX ORDER — TAMSULOSIN HYDROCHLORIDE 0.4 MG/1
0.4 CAPSULE ORAL DAILY
Status: DISCONTINUED | OUTPATIENT
Start: 2023-10-27 | End: 2023-11-03 | Stop reason: HOSPADM

## 2023-10-27 RX ORDER — HYDROCODONE BITARTRATE AND ACETAMINOPHEN 5; 325 MG/1; MG/1
1 TABLET ORAL EVERY 6 HOURS PRN
Status: DISCONTINUED | OUTPATIENT
Start: 2023-10-27 | End: 2023-11-03 | Stop reason: HOSPADM

## 2023-10-27 RX ORDER — GUAIFENESIN/DEXTROMETHORPHAN 100-10MG/5
10 SYRUP ORAL EVERY 4 HOURS PRN
Status: DISCONTINUED | OUTPATIENT
Start: 2023-10-27 | End: 2023-11-03 | Stop reason: HOSPADM

## 2023-10-27 RX ORDER — IBUPROFEN 200 MG
16 TABLET ORAL
Status: DISCONTINUED | OUTPATIENT
Start: 2023-10-27 | End: 2023-11-03 | Stop reason: HOSPADM

## 2023-10-27 RX ORDER — ARFORMOTEROL TARTRATE 15 UG/2ML
15 SOLUTION RESPIRATORY (INHALATION) 2 TIMES DAILY
Status: DISCONTINUED | OUTPATIENT
Start: 2023-10-27 | End: 2023-11-03 | Stop reason: HOSPADM

## 2023-10-27 RX ADMIN — BUDESONIDE INHALATION 0.5 MG: 0.5 SUSPENSION RESPIRATORY (INHALATION) at 07:10

## 2023-10-27 RX ADMIN — APIXABAN 5 MG: 5 TABLET, FILM COATED ORAL at 09:10

## 2023-10-27 RX ADMIN — LEVOTHYROXINE SODIUM 88 MCG: 0.09 TABLET ORAL at 09:10

## 2023-10-27 RX ADMIN — SACUBITRIL AND VALSARTAN 1 TABLET: 49; 51 TABLET, FILM COATED ORAL at 08:10

## 2023-10-27 RX ADMIN — Medication 400 MG: at 09:10

## 2023-10-27 RX ADMIN — AMIODARONE HYDROCHLORIDE 200 MG: 200 TABLET ORAL at 09:10

## 2023-10-27 RX ADMIN — ALBUTEROL SULFATE 2.5 MG: 2.5 SOLUTION RESPIRATORY (INHALATION) at 01:10

## 2023-10-27 RX ADMIN — REMDESIVIR 200 MG: 100 INJECTION, POWDER, LYOPHILIZED, FOR SOLUTION INTRAVENOUS at 05:10

## 2023-10-27 RX ADMIN — ARFORMOTEROL TARTRATE 15 MCG: 15 SOLUTION RESPIRATORY (INHALATION) at 07:10

## 2023-10-27 RX ADMIN — SACUBITRIL AND VALSARTAN 1 TABLET: 49; 51 TABLET, FILM COATED ORAL at 09:10

## 2023-10-27 RX ADMIN — AMIODARONE HYDROCHLORIDE 200 MG: 200 TABLET ORAL at 08:10

## 2023-10-27 RX ADMIN — Medication 400 UNITS: at 04:10

## 2023-10-27 RX ADMIN — IPRATROPIUM BROMIDE AND ALBUTEROL SULFATE 3 ML: 2.5; .5 SOLUTION RESPIRATORY (INHALATION) at 07:10

## 2023-10-27 RX ADMIN — DEXAMETHASONE 6 MG: 4 TABLET ORAL at 09:10

## 2023-10-27 RX ADMIN — OXYCODONE HYDROCHLORIDE AND ACETAMINOPHEN 500 MG: 500 TABLET ORAL at 09:10

## 2023-10-27 RX ADMIN — POTASSIUM CHLORIDE 10 MEQ: 750 CAPSULE, EXTENDED RELEASE ORAL at 09:10

## 2023-10-27 RX ADMIN — MONTELUKAST 10 MG: 10 TABLET, FILM COATED ORAL at 08:10

## 2023-10-27 RX ADMIN — ATORVASTATIN CALCIUM 20 MG: 20 TABLET, FILM COATED ORAL at 09:10

## 2023-10-27 RX ADMIN — THERA TABS 1 TABLET: TAB at 09:10

## 2023-10-27 RX ADMIN — IPRATROPIUM BROMIDE AND ALBUTEROL SULFATE 3 ML: 2.5; .5 SOLUTION RESPIRATORY (INHALATION) at 01:10

## 2023-10-27 RX ADMIN — MONTELUKAST 10 MG: 10 TABLET, FILM COATED ORAL at 04:10

## 2023-10-27 RX ADMIN — IPRATROPIUM BROMIDE 0.5 MG: 0.5 SOLUTION RESPIRATORY (INHALATION) at 01:10

## 2023-10-27 RX ADMIN — OXYCODONE HYDROCHLORIDE AND ACETAMINOPHEN 500 MG: 500 TABLET ORAL at 08:10

## 2023-10-27 RX ADMIN — APIXABAN 5 MG: 5 TABLET, FILM COATED ORAL at 08:10

## 2023-10-27 NOTE — ED PROVIDER NOTES
Encounter Date: 10/26/2023       History     Chief Complaint   Patient presents with    Chills     Pt states dx covid + yesterday, worse today, dr umanzor sent to ed, increased sob    Shortness of Breath     This is a 73-year-old male with history hypertension, AFib, asthma, presenting with acute COVID infection, increased shortness of breath.  Patient developed cough, chest congestion, headache, body aches, and shortness a breath 2 days ago.  He tested positive for COVID yesterday.  His PCP could not prescribe him Paxlovid because he was on amiodarone and Eliquis.  Patient says that his shortness of breath worsened today.  He is now short of breath at rest with difficulty talking, and dyspnea is markedly exacerbated with minimal exertion such as standing and walking a few steps.  He is not on home O2.    The history is provided by the patient.     Review of patient's allergies indicates:   Allergen Reactions    Bactrim [sulfamethoxazole-trimethoprim]      HIVES/ RASH    Green tea (rashida sinensis) Hives     Pt is has allergies to black tea. Not an option under search.     Past Medical History:   Diagnosis Date    Allergy     Arthritis     Asthma     Atrial fibrillation     Basal cell carcinoma     Fever blister     Hypertension     Hypothyroidism (acquired) 7/31/2023    Joint pain     Melanoma     BANDAR on CPAP     PVC (premature ventricular contraction)     Skin disease     Squamous cell carcinoma      Past Surgical History:   Procedure Laterality Date    BRONCHOSCOPY N/A 7/18/2022    Procedure: Bronchoscopy;  Surgeon: Charlee Umanzor MD;  Location: [facility];  Service: Pulmonary;  Laterality: N/A;  monday 7/18/22 at 0830 look see bronch no biopsy    CARDIAC CATHETERIZATION  05/25/2021    ESOPHAGOGASTRODUODENOSCOPY N/A 7/7/2022    Procedure: EGD (ESOPHAGOGASTRODUODENOSCOPY);  Surgeon: Emmanuel Deleon MD;  Location: [facility];  Service: Endoscopy;  Laterality: N/A;    EYE SURGERY Left 12/03/2019    EYE SURGERY  Right 2020    LIPOMA RESECTION      NOSE SURGERY      SHOULDER ARTHROSCOPY      SKIN CANCER EXCISION      Moh's x 2    SKIN GRAFT       Family History   Problem Relation Age of Onset    Cancer Mother         esophageal    Heart disease Father     Hypertension Father     Arthritis Paternal Grandmother      Social History     Tobacco Use    Smoking status: Former     Current packs/day: 0.00     Average packs/day: 2.0 packs/day for 20.0 years (40.0 ttl pk-yrs)     Types: Cigarettes     Start date: 1972     Quit date: 1988     Years since quittin.1     Passive exposure: Past    Smokeless tobacco: Never   Substance Use Topics    Alcohol use: No    Drug use: No     Review of Systems   Constitutional:  Positive for fatigue.   HENT:  Positive for congestion.    Respiratory:  Positive for cough and shortness of breath.    Musculoskeletal:  Positive for myalgias.   Neurological:  Positive for headaches.   All other systems reviewed and are negative.      Physical Exam     Initial Vitals [10/26/23 1914]   BP Pulse Resp Temp SpO2   (!) 189/93 72 18 99.4 °F (37.4 °C) (!) 93 %      MAP       --         Physical Exam    Nursing note and vitals reviewed.  Constitutional: He appears well-developed and well-nourished. He is not diaphoretic. No distress.   Ill-appearing   HENT:   Head: Normocephalic and atraumatic.   Eyes: Conjunctivae are normal.   Neck: Neck supple.   Normal range of motion.  Cardiovascular:  Normal rate.           Pulmonary/Chest: No respiratory distress. He has no wheezes.   Mildly dyspneic   Abdominal: He exhibits no distension.   Musculoskeletal:         General: No edema.      Cervical back: Normal range of motion and neck supple.     Neurological: He is alert. He has normal strength.   Skin: Skin is warm and dry. No rash noted. No erythema.   Psychiatric: He has a normal mood and affect.         ED Course   Procedures  Labs Reviewed   CBC W/ AUTO DIFFERENTIAL - Abnormal; Notable for the  following components:       Result Value    Lymph # 0.8 (*)     Gran % 77.9 (*)     Lymph % 9.0 (*)     All other components within normal limits   ISTAT PROCEDURE - Abnormal; Notable for the following components:    POC PCO2 34.3 (*)     POC PO2 57 (*)     POC HCO3 22.2 (*)     POC Glucose 137 (*)     POC Potassium 2.7 (*)     All other components within normal limits   COMPREHENSIVE METABOLIC PANEL   B-TYPE NATRIURETIC PEPTIDE   TROPONIN I HIGH SENSITIVITY   MAGNESIUM   D DIMER, QUANTITATIVE     EKG Readings: (Independently Interpreted)   Sinus rhythm.  Eighty-four beats/minute.  Left axis deviation.  No ST elevation.       Imaging Results              X-Ray Chest PA And Lateral (Final result)  Result time 10/26/23 19:57:11      Final result by Jimmy Hendrix MD (10/26/23 19:57:11)                   Narrative:    History: Chills and shortness of breath.    FINDINGS: PA and lateral views of the chest were obtained. Comparison to previous study dated October 5, 2023. No focal infiltrate or pleural effusion is seen. Atherosclerotic changes of the thoracic aorta are present. Cardiac silhouette is within normal limits in size. Bony thorax appears intact.    IMPRESSION:  1. No acute cardiopulmonary change seen.    Electronically signed by:  Jimmy Hendrix MD  10/26/2023 07:57 PM CDT Workstation: [id number]                                     Medications   albuterol-ipratropium 2.5 mg-0.5 mg/3 mL nebulizer solution 3 mL ( Nebulization Canceled Entry 10/26/23 2015)   albuterol-ipratropium 2.5 mg-0.5 mg/3 mL nebulizer solution 3 mL (3 mLs Nebulization Given 10/26/23 2013)   dexAMETHasone injection 8 mg (8 mg Intravenous Given 10/26/23 2059)   labetaloL injection 10 mg (10 mg Intravenous Given 10/26/23 2148)   acetaminophen tablet 1,000 mg (1,000 mg Oral Given 10/26/23 2148)     Medical Decision Making  Patient is quite dyspneic, unable to talk in full sentences.  On room air pulse ox is 90%.  Normal pH and pCO2.  Pulse  ox increases in the low to mid 90s on 2 L nasal cannula.  CXR is clear without evidence of pneumonia.  D-dimer is normal.  Serum chemistries unremarkable.  Patient was given IV Decadron and albuterol nebulizer.  Given his hypoxia he will require admission for inpatient treatment.  Hospitalist has been consulted for admission.    Risk  OTC drugs.  Prescription drug management.  Decision regarding hospitalization.                               Clinical Impression:   Final diagnoses:  [U07.1] COVID-19  [R09.02] Hypoxia (Primary)        ED Disposition Condition    Admit Stable                Yosi Britton MD  10/26/23 2197

## 2023-10-27 NOTE — NURSING
Nurses Note -- 4 Eyes      10/27/2023   5:00 PM      Skin assessed during: Admit      [x] No Altered Skin Integrity Present    [x]Prevention Measures Documented      [] Yes- Altered Skin Integrity Present or Discovered   [] LDA Added if Not in Epic (Describe Wound)   [] New Altered Skin Integrity was Present on Admit and Documented in LDA   [] Wound Image Taken    Wound Care Consulted? No    Attending Nurse:  Dunia Douglas RN/Staff Member:   jude cain

## 2023-10-27 NOTE — CARE UPDATE
10/27/23 0759   Patient Assessment/Suction   Level of Consciousness (AVPU) alert   Respiratory Effort Normal   Expansion/Accessory Muscles/Retractions no use of accessory muscles   All Lung Fields Breath Sounds diminished   PRE-TX-O2   Device (Oxygen Therapy) nasal cannula   $ Is the patient on Low Flow Oxygen? Yes   Flow (L/min) 4   SpO2 95 %   Pulse Oximetry Type Continuous   $ Pulse Oximetry - Multiple Charge Pulse Oximetry - Multiple   Pulse 61   Resp 18   BP (!) 157/80   Aerosol Therapy   $ Aerosol Therapy Charges Aerosol Treatment   Daily Review of Necessity (SVN) completed   Respiratory Treatment Status (SVN) given   Treatment Route (SVN) mask   Patient Position (SVN) HOB elevated   Post Treatment Assessment (SVN) breath sounds improved   Signs of Intolerance (SVN) none   Education   $ Education Bronchodilator;15 min   Respiratory Evaluation   $ Care Plan Tech Time 15 min   $ Eval/Re-eval Charges Evaluation

## 2023-10-27 NOTE — PROGRESS NOTES
Automatic Inhaler to Nebulizer Interchange    fluticasone/umeclidinium/vilanterol (Trelegy) 100 mcg/ 62.5 mcg/ 25 mcg changed to budesonide 0.5 mg twice daily AND ipratropium 0.5 mg every 6 hour scheduled AND arformoterol 15 mcg twice daily per [facility] Automatic Therapeutic Substitutions Protocol.    Please contact pharmacy at extension [phone] with any questions.     Thank you,   Kg Hoffmann

## 2023-10-27 NOTE — FIRST PROVIDER EVALUATION
"Medical screening examination initiated.  I have conducted a focused provider triage encounter, findings are as follows:    Brief history of present illness:  known covid, sent by pulm for eval s/t worsening SOB    Vitals:    10/26/23 1914   BP: (!) 189/93   Pulse: 72   Resp: 18   Temp: 99.4 °F (37.4 °C)   TempSrc: Oral   SpO2: (!) 93%   Weight: 115.7 kg (255 lb)   Height: 5' 8" (1.727 m)       Pertinent physical exam:  ill-appearing, hypoxic, increased work of breathing    Brief workup plan:  labs, ekg, cxr    Preliminary workup initiated; this workup will be continued and followed by the physician or advanced practice provider that is assigned to the patient when roomed.  "

## 2023-10-27 NOTE — SUBJECTIVE & OBJECTIVE
Interval History: Both wife and him got covid-19 - Pt was feeling congested and cold but fever has improved- no chest pain, sob, urine or bowel problem.     Review of Systems   Constitutional:  Positive for activity change, chills and fatigue. Negative for appetite change and fever.   HENT:  Negative for congestion, ear pain and nosebleeds.    Eyes:  Negative for itching and visual disturbance.   Respiratory:  Positive for cough and shortness of breath. Negative for apnea, chest tightness and wheezing.    Cardiovascular:  Negative for chest pain, palpitations and leg swelling.   Gastrointestinal:  Negative for abdominal distention, abdominal pain, constipation, diarrhea, nausea and vomiting.   Genitourinary:  Negative for dysuria and flank pain.   Musculoskeletal:  Negative for back pain.   Neurological:  Negative for dizziness and headaches.     Objective:     Vital Signs (Most Recent):  Temp: 98.4 °F (36.9 °C) (10/27/23 1653)  Pulse: 65 (10/27/23 1653)  Resp: 20 (10/27/23 1653)  BP: (!) 158/82 (notified nurse) (10/27/23 1653)  SpO2: 98 % (10/27/23 1653) Vital Signs (24h Range):  Temp:  [98.4 °F (36.9 °C)-99.4 °F (37.4 °C)] 98.4 °F (36.9 °C)  Pulse:  [54-88] 65  Resp:  [18-33] 20  SpO2:  [91 %-99 %] 98 %  BP: (132-189)/(60-93) 158/82     Weight: 115.7 kg (255 lb)  Body mass index is 38.77 kg/m².  No intake or output data in the 24 hours ending 10/27/23 1740      Physical Exam  Vitals reviewed.   Constitutional:       General: He is not in acute distress.     Appearance: Normal appearance. He is not ill-appearing, toxic-appearing or diaphoretic.   HENT:      Head: Normocephalic and atraumatic.      Mouth/Throat:      Mouth: Mucous membranes are moist.      Pharynx: Oropharynx is clear.   Eyes:      General: No scleral icterus.     Conjunctiva/sclera: Conjunctivae normal.   Neck:      Vascular: No carotid bruit.   Cardiovascular:      Rate and Rhythm: Normal rate and regular rhythm.      Pulses: Normal pulses.       Heart sounds: Normal heart sounds.   Pulmonary:      Effort: Pulmonary effort is normal.      Breath sounds: Wheezing present.   Abdominal:      General: Abdomen is flat. Bowel sounds are normal.      Palpations: Abdomen is soft.   Musculoskeletal:         General: Normal range of motion.   Skin:     General: Skin is warm.   Neurological:      General: No focal deficit present.      Mental Status: He is alert and oriented to person, place, and time. Mental status is at baseline.   Psychiatric:         Mood and Affect: Mood normal.         Behavior: Behavior normal.             Significant Labs: All pertinent labs within the past 24 hours have been reviewed.  BMP:   Recent Labs   Lab 10/27/23  0502   *      K 3.6      CO2 26   BUN 12   CREATININE 1.1   CALCIUM 8.8   MG 2.2     CBC:   Recent Labs   Lab 10/26/23  2024 10/26/23  2117   WBC 8.60  --    HGB 14.5  --    HCT 44.0 43     --      CMP:   Recent Labs   Lab 10/26/23  2024 10/27/23  0502    139   K 3.6 3.6    106   CO2 24 26    160*   BUN 13 12   CREATININE 1.2 1.1   CALCIUM 9.2 8.8   PROT 7.2 6.8   ALBUMIN 4.4 4.1   BILITOT 0.4 0.3   ALKPHOS 76 76   AST 22 18   ALT 29 26   ANIONGAP 10 7*     Magnesium:   Recent Labs   Lab 10/26/23  2024 10/27/23  0502   MG 1.9 2.2       Significant Imaging: I have reviewed all pertinent imaging results/findings within the past 24 hours.

## 2023-10-27 NOTE — PROGRESS NOTES
[facility]  Progress Note    Patient Name: Nico Teague  MRN: [id number]  Patient Class: IP- Inpatient   Admission Date: 10/26/2023  Length of Stay: 0 days  Attending Physician: Anita Gonzalez MD  Primary Care Provider: Gelacio Cantu Jr., MD        Subjective:     Principal Problem:Acute hypoxemic respiratory failure due to COVID-19        HPI:  No notes on file    Overview/Hospital Course:  No notes on file    Interval History: Both wife and him got covid-19 - Pt was feeling congested and cold but fever has improved- no chest pain, sob, urine or bowel problem.     Review of Systems   Constitutional:  Positive for activity change, chills and fatigue. Negative for appetite change and fever.   HENT:  Negative for congestion, ear pain and nosebleeds.    Eyes:  Negative for itching and visual disturbance.   Respiratory:  Positive for cough and shortness of breath. Negative for apnea, chest tightness and wheezing.    Cardiovascular:  Negative for chest pain, palpitations and leg swelling.   Gastrointestinal:  Negative for abdominal distention, abdominal pain, constipation, diarrhea, nausea and vomiting.   Genitourinary:  Negative for dysuria and flank pain.   Musculoskeletal:  Negative for back pain.   Neurological:  Negative for dizziness and headaches.     Objective:     Vital Signs (Most Recent):  Temp: 98.4 °F (36.9 °C) (10/27/23 1653)  Pulse: 65 (10/27/23 1653)  Resp: 20 (10/27/23 1653)  BP: (!) 158/82 (notified nurse) (10/27/23 1653)  SpO2: 98 % (10/27/23 1653) Vital Signs (24h Range):  Temp:  [98.4 °F (36.9 °C)-99.4 °F (37.4 °C)] 98.4 °F (36.9 °C)  Pulse:  [54-88] 65  Resp:  [18-33] 20  SpO2:  [91 %-99 %] 98 %  BP: (132-189)/(60-93) 158/82     Weight: 115.7 kg (255 lb)  Body mass index is 38.77 kg/m².  No intake or output data in the 24 hours ending 10/27/23 1740      Physical Exam  Vitals reviewed.   Constitutional:       General: He is not in acute distress.     Appearance:  Normal appearance. He is not ill-appearing, toxic-appearing or diaphoretic.   HENT:      Head: Normocephalic and atraumatic.      Mouth/Throat:      Mouth: Mucous membranes are moist.      Pharynx: Oropharynx is clear.   Eyes:      General: No scleral icterus.     Conjunctiva/sclera: Conjunctivae normal.   Neck:      Vascular: No carotid bruit.   Cardiovascular:      Rate and Rhythm: Normal rate and regular rhythm.      Pulses: Normal pulses.      Heart sounds: Normal heart sounds.   Pulmonary:      Effort: Pulmonary effort is normal.      Breath sounds: Wheezing present.   Abdominal:      General: Abdomen is flat. Bowel sounds are normal.      Palpations: Abdomen is soft.   Musculoskeletal:         General: Normal range of motion.   Skin:     General: Skin is warm.   Neurological:      General: No focal deficit present.      Mental Status: He is alert and oriented to person, place, and time. Mental status is at baseline.   Psychiatric:         Mood and Affect: Mood normal.         Behavior: Behavior normal.             Significant Labs: All pertinent labs within the past 24 hours have been reviewed.  BMP:   Recent Labs   Lab 10/27/23  0502   *      K 3.6      CO2 26   BUN 12   CREATININE 1.1   CALCIUM 8.8   MG 2.2     CBC:   Recent Labs   Lab 10/26/23  2024 10/26/23  2117   WBC 8.60  --    HGB 14.5  --    HCT 44.0 43     --      CMP:   Recent Labs   Lab 10/26/23  2024 10/27/23  0502    139   K 3.6 3.6    106   CO2 24 26    160*   BUN 13 12   CREATININE 1.2 1.1   CALCIUM 9.2 8.8   PROT 7.2 6.8   ALBUMIN 4.4 4.1   BILITOT 0.4 0.3   ALKPHOS 76 76   AST 22 18   ALT 29 26   ANIONGAP 10 7*     Magnesium:   Recent Labs   Lab 10/26/23  2024 10/27/23  0502   MG 1.9 2.2       Significant Imaging: I have reviewed all pertinent imaging results/findings within the past 24 hours.      Assessment/Plan:      * Acute hypoxemic respiratory failure due to COVID-19  Pt still using oxygen-  started remdesivir  C/w decadron and breathing rx  Cough meds prn    Asthma  C/w steroids and breathing rx as scheduled      COVID-19  Vaccinated  Pt still using oxygen- started remdesivir  C/w decadron and breathing rx  Cough meds prn    Hypothyroidism (acquired)  C/w home meds      Atrial fibrillation  C/w home meds    BPH (benign prostatic hyperplasia)  C/w home meds        VTE Risk Mitigation (From admission, onward)         Ordered     apixaban tablet 5 mg  2 times daily         10/27/23 0010                Discharge Planning   CL: 10/30/2023     Code Status: Full Code   Is the patient medically ready for discharge?:     Reason for patient still in hospital (select all that apply): Treatment  Discharge Plan A: Home with family                  Anita Gonzalez MD  Department of Hospital Medicine   [facility]

## 2023-10-27 NOTE — H&P
[facility] Medicine History & Physical Examination   Patient Name: Nico Teague  MRN: [id number]  Patient Class: Emergency   Admission Date: 10/26/2023  7:08 PM  Length of Stay: 0  Attending Physician:   Primary Care Provider: Gelacio Cantu Jr., MD  Face-to-Face encounter date: 10/26/2023  Code Status:Full Code  MPOA:  Chief Complaint: Chills (Pt states dx covid + yesterday, worse today, dr umanzor sent to ed, increased sob) and Shortness of Breath        Patient information was obtained from patient, past medical records and ER records.   HISTORY OF PRESENT ILLNESS:   Nico Teague is a 73 y.o. old male who  has a past medical history of Allergy, Arthritis, Asthma, Atrial fibrillation, Basal cell carcinoma, Fever blister, Hypertension, Hypothyroidism (acquired) (7/31/2023), Joint pain, Melanoma, BANDAR on CPAP, PVC (premature ventricular contraction), Skin disease, and Squamous cell carcinoma.. The patient presented to [facility] on 10/26/2023 with a primary complaint of Chills (Pt states dx covid + yesterday, worse today, dr umanzor sent to ed, increased sob) and Shortness of Breath  .     73-year-old  male presents to the emergency room with complaints of shortness a breath    The patient was seen by his primary care doctor after having a 2 day history of body aches headache shortness of breath chest congestion and a dry cough.  Tested positive for COVID 2 days ago.  Was sent to the emergency room secondary to increased shortness of breath by Dr. Umanzor.    The patient was not prescribed Paxlovid secondary to his use of amiodarone and Eliquis.    On arrival in emergency room the patient was found to be markedly dyspneic his oxygen saturations were 94.  He was given DuoNeb treatments and IV steroids.  Blood gases were performed with a pH 7.418 pCO2 34.4 PO2 67 bicarb 22.2.  He was placed on supplemental oxygen    Later CT of the chest was performed that was negative  for pneumonia the CT was without contrast    The patient has a past medical history significant for asthma, skin cancer, obstructive sleep apnea on CPAP paroxysmal atrial fibrillation anticoagulated with Eliquis and on amiodarone  REVIEW OF SYSTEMS:   10 Point Review of System was performed and was found to be negative except for that mentioned already in the HPI and   Review of Systems (Negative unless checked off)  Review of Systems   Constitutional:  Positive for chills, fever and malaise/fatigue.   HENT:  Positive for congestion.    Eyes: Negative.    Respiratory:  Positive for cough and shortness of breath.    Cardiovascular: Negative.    Gastrointestinal: Negative.    Musculoskeletal:  Positive for myalgias.   Neurological:  Positive for weakness.   Endo/Heme/Allergies: Negative.    Psychiatric/Behavioral: Negative.             PAST MEDICAL HISTORY:     Past Medical History:   Diagnosis Date    Allergy     Arthritis     Asthma     Atrial fibrillation     Basal cell carcinoma     Fever blister     Hypertension     Hypothyroidism (acquired) 7/31/2023    Joint pain     Melanoma     BANDAR on CPAP     PVC (premature ventricular contraction)     Skin disease     Squamous cell carcinoma        PAST SURGICAL HISTORY:     Past Surgical History:   Procedure Laterality Date    BRONCHOSCOPY N/A 7/18/2022    Procedure: Bronchoscopy;  Surgeon: Charlee Brewer MD;  Location: [facility];  Service: Pulmonary;  Laterality: N/A;  monday 7/18/22 at 0830 look see bronch no biopsy    CARDIAC CATHETERIZATION  05/25/2021    ESOPHAGOGASTRODUODENOSCOPY N/A 7/7/2022    Procedure: EGD (ESOPHAGOGASTRODUODENOSCOPY);  Surgeon: Emmanuel Deleon MD;  Location: [facility];  Service: Endoscopy;  Laterality: N/A;    EYE SURGERY Left 12/03/2019    EYE SURGERY Right 01/28/2020    LIPOMA RESECTION      NOSE SURGERY      SHOULDER ARTHROSCOPY      SKIN CANCER EXCISION      Moh's x 2    SKIN GRAFT         ALLERGIES:   Bactrim  [sulfamethoxazole-trimethoprim] and Green tea (rashida sinensis)    FAMILY HISTORY:     Family History   Problem Relation Age of Onset    Cancer Mother         esophageal    Heart disease Father     Hypertension Father     Arthritis Paternal Grandmother        SOCIAL HISTORY:     Social History     Tobacco Use    Smoking status: Former     Current packs/day: 0.00     Average packs/day: 2.0 packs/day for 20.0 years (40.0 ttl pk-yrs)     Types: Cigarettes     Start date: 1972     Quit date: 1988     Years since quittin.1     Passive exposure: Past    Smokeless tobacco: Never   Substance Use Topics    Alcohol use: No        Social History     Substance and Sexual Activity   Sexual Activity Never        HOME MEDICATIONS:     Prior to Admission medications    Medication Sig Start Date End Date Taking? Authorizing Provider   allopurinoL (ZYLOPRIM) 300 MG tablet TAKE 1 TABLET ONE TIME DAILY 23   Gelacio Cantu Jr., MD   amiodarone (PACERONE) 200 MG Tab Take 200 mg by mouth 2 (two) times daily. 21   Provider, Historical   apixaban (ELIQUIS) 5 mg Tab Take 5 mg by mouth 2 (two) times daily.    Provider, Historical   ascorbic acid, vitamin C, (VITAMIN C) 1000 MG tablet Take 1,000 mg by mouth once daily.    Provider, Historical   aspirin 81 MG Chew Take 81 mg by mouth every 7 days.    Provider, Historical   cefdinir (OMNICEF) 300 MG capsule Take 1 capsule (300 mg total) by mouth 2 (two) times daily. for 14 days 10/25/23 11/8/23  Gelacio Cantu Jr., MD   cholecalciferol, vitamin D3, (VITAMIN D3) 25 mcg (1,000 unit) capsule Take 1,000 Units by mouth once daily.    Provider, Historical   co-enzyme Q-10 50 mg capsule Take 50 mg by mouth once daily.    Provider, Historical   ENTRESTO 49-51 mg per tablet Take 1 tablet by mouth 2 (two) times daily. 22   Provider, Historical   fluticasone-umeclidin-vilanter (TRELEGY ELLIPTA) 100-62.5-25 mcg DsDv Inhale 1 puff into the lungs once daily. 22    "Divya Alvarez FNP   furosemide (LASIX) 40 MG tablet Take 20 mg by mouth 2 (two) times a day.    Provider, Historical   Lactobacillus rhamnosus GG (CULTURELLE) 10 billion cell capsule Take 1 capsule by mouth.    Provider, Historical   levothyroxine (SYNTHROID) 88 MCG tablet TAKE 1 TABLET BEFORE BREAKFAST 2/22/23   Gelacio Cantu Jr., MD   magnesium gluconate 27.5 mg magne- sium (500 mg) Tab Take 1 tablet by mouth once.    Provider, Historical   melatonin 10 mg Tab Take 1 tablet by mouth nightly as needed.    Provider, Historical   montelukast (SINGULAIR) 10 mg tablet TAKE 1 TABLET EVERY EVENING 1/15/23   Divya Alvarez FNP   multivitamin (THERAGRAN) per tablet Take 1 tablet by mouth once daily.    Provider, Historical   potassium chloride (MICRO-K) 10 MEQ CpSR Take 10 mEq by mouth once daily. 3/22/21   Provider, Historical   rosuvastatin (CRESTOR) 10 MG tablet Take 1 tablet (10 mg total) by mouth every other day. 11/7/22 11/7/23  Gelacio Cantu Jr., MD   tamsulosin (FLOMAX) 0.4 mg Cap Take 1 tablet by mouth Daily. 1/26/22   Provider, Historical   triamterene-hydrochlorothiazide 37.5-25 mg (DYAZIDE) 37.5-25 mg per capsule TAKE 1 CAPSULE EVERY MORNING 5/16/23   Gelacio Cantu Jr., MD   vitamin E 1000 UNIT capsule Take 1,000 Units by mouth once daily.    Provider, Historical         PHYSICAL EXAM:   BP (!) 150/71   Pulse 66   Temp 99.4 °F (37.4 °C) (Oral)   Resp 18   Ht 5' 8" (1.727 m)   Wt 115.7 kg (255 lb)   SpO2 99%   BMI 38.77 kg/m²   Vitals Reviewed  General appearance:  Fatigued appearing male in no apparent distress.  Skin: No Rash.   Neuro: Motor and sensory exams grossly intact. Good tone. Power in all 4 extremities 5/5.   HENT: Atraumatic head. Moist mucous membranes of oral cavity.  Eyes: Normal extraocular movements.   Neck: Supple. No evidence of lymphadenopathy. No thyroidomegaly.  Lungs:  Rhonchi to bases bilaterally.  Faint expiratory wheezing present.  Mild " "tachypnea  Heart: Regular rate and rhythm. S1 and S2 present with no murmurs/gallop/rub. No pedal edema. No JVD present.   Abdomen: Soft, non-distended, non-tender. No rebound tenderness/guarding. No masses or organomegaly. Bowel sounds are normal. Bladder is not palpable.   Extremities: No cyanosis, clubbing, or edema.  Psych/mental status: Alert and oriented. Cooperative. Responds appropriately to questions.   EMERGENCY DEPARTMENT LABS AND IMAGING:   Following labs were Reviewed   Recent Labs   Lab 10/26/23  2024 10/26/23  2117   WBC 8.60  --    HGB 14.5  --    HCT 44.0 43     --    CALCIUM 9.2  --    ALBUMIN 4.4  --    PROT 7.2  --      --    K 3.6  --    CO2 24  --      --    BUN 13  --    CREATININE 1.2  --    ALKPHOS 76  --    ALT 29  --    AST 22  --    BILITOT 0.4  --          BMP:   Recent Labs   Lab 10/26/23  2024         K 3.6      CO2 24   BUN 13   CREATININE 1.2   CALCIUM 9.2   MG 1.9   , CMP   Recent Labs   Lab 10/26/23  2024      K 3.6      CO2 24      BUN 13   CREATININE 1.2   CALCIUM 9.2   PROT 7.2   ALBUMIN 4.4   BILITOT 0.4   ALKPHOS 76   AST 22   ALT 29   ANIONGAP 10   , CBC   Recent Labs   Lab 10/26/23  2024 10/26/23  2117   WBC 8.60  --    HGB 14.5  --    HCT 44.0 43     --    , INR   Lab Results   Component Value Date    INR 1.1 07/07/2022   , Lipid Panel   Lab Results   Component Value Date    CHOL 155 03/17/2023    HDL 41 03/17/2023    LDLCALC 87 03/17/2023    TRIG 169 (H) 03/17/2023    CHOLHDL 3.8 03/17/2023   , Troponin No results for input(s): "TROPONINI" in the last 168 hours., A1C: No results for input(s): "HGBA1C" in the last 4320 hours., and All labs within the past 24 hours have been reviewed  Microbiology Results (last 7 days)       ** No results found for the last 168 hours. **          X-Ray Chest PA And Lateral   Final Result        X-Ray Chest PA And Lateral    Result Date: 10/26/2023  History: Chills and shortness " of breath. FINDINGS: PA and lateral views of the chest were obtained. Comparison to previous study dated October 5, 2023. No focal infiltrate or pleural effusion is seen. Atherosclerotic changes of the thoracic aorta are present. Cardiac silhouette is within normal limits in size. Bony thorax appears intact. IMPRESSION: 1. No acute cardiopulmonary change seen. Electronically signed by:  Jimmy Hendrix MD  10/26/2023 07:57 PM CDT Workstation: [id number]    X-Ray Chest PA And Lateral    Result Date: 10/5/2023  Reason: cough FINDINGS: PA and lateral chest 7/7/2022 show normal cardiomediastinal silhouette. Lungs are clear. Pulmonary vasculature is normal. Bones are normal. IMPRESSION: Normal chest. Electronically signed by:  Lianne Lang MD  10/05/2023 11:48 AM CDT Workstation: [id number]    ASSESSMENT & PLAN:   Nico Teague is a 73 y.o. male admitted for      Covid 19 with Hypoxia  -steroids with Decadron  -metered-dose inhaler with albuterol and Atrovent  -multi vitamins including vitamin-C and vitamin-D  -the supplemental oxygen  -continue Eliquis  -pulmonary consult if not improving      Inflammatory Markers  Ferritin: (62.8)  Procalcitonin: (0.093)  ESR : (39)  CRP: ()  D-Dimer: (0.22)  LDH: (189)     2. Acute asthma exacerbation  Clinically-stable without wheezing.   -Will continue home regimen of ICS/DEBBIE/LABA   - have as-needed DEBBIE/LAMA (zuri-neb) available.   - supplemental O2      3 Paroxysmal atrial Fib  - continue Eliquis  - continue Amiodarone  - rate controlled      DVT Prophylaxis: will be placed on Eliquis x for DVT prophylaxis and will be advised to be as mobile as possible and sit in a chair as tolerated.   ________________________________________________________________   Face-to-Face encounter date: 10/26/2023  Encounter included review of the medical records, interviewing and examining the patient face-to-face, discussion with family and other health care providers including emergency medicine  physician, admission orders, interpreting lab/test results and formulating a plan of care.   Medical Decision Making during this encounter was  [_] Low Complexity  [_] Moderate Complexity  [x] High Complexity  _________________________________________________________________________________    INPATIENT LIST OF MEDICATIONS     Current Facility-Administered Medications:     albuterol-ipratropium 2.5 mg-0.5 mg/3 mL nebulizer solution 3 mL, 3 mL, Nebulization, ED 1 Time, Yosi Britton MD    Current Outpatient Medications:     allopurinoL (ZYLOPRIM) 300 MG tablet, TAKE 1 TABLET ONE TIME DAILY, Disp: 90 tablet, Rfl: 1    amiodarone (PACERONE) 200 MG Tab, Take 200 mg by mouth 2 (two) times daily., Disp: , Rfl:     apixaban (ELIQUIS) 5 mg Tab, Take 5 mg by mouth 2 (two) times daily., Disp: , Rfl:     ascorbic acid, vitamin C, (VITAMIN C) 1000 MG tablet, Take 1,000 mg by mouth once daily., Disp: , Rfl:     aspirin 81 MG Chew, Take 81 mg by mouth every 7 days., Disp: , Rfl:     cefdinir (OMNICEF) 300 MG capsule, Take 1 capsule (300 mg total) by mouth 2 (two) times daily. for 14 days, Disp: 28 capsule, Rfl: 0    cholecalciferol, vitamin D3, (VITAMIN D3) 25 mcg (1,000 unit) capsule, Take 1,000 Units by mouth once daily., Disp: , Rfl:     co-enzyme Q-10 50 mg capsule, Take 50 mg by mouth once daily., Disp: , Rfl:     ENTRESTO 49-51 mg per tablet, Take 1 tablet by mouth 2 (two) times daily., Disp: , Rfl:     fluticasone-umeclidin-vilanter (TRELEGY ELLIPTA) 100-62.5-25 mcg DsDv, Inhale 1 puff into the lungs once daily., Disp: 3 each, Rfl: 6    furosemide (LASIX) 40 MG tablet, Take 20 mg by mouth 2 (two) times a day., Disp: , Rfl:     Lactobacillus rhamnosus GG (CULTURELLE) 10 billion cell capsule, Take 1 capsule by mouth., Disp: , Rfl:     levothyroxine (SYNTHROID) 88 MCG tablet, TAKE 1 TABLET BEFORE BREAKFAST, Disp: 90 tablet, Rfl: 1    magnesium gluconate 27.5 mg magne- sium (500 mg) Tab, Take 1 tablet by mouth once., Disp:  , Rfl:     melatonin 10 mg Tab, Take 1 tablet by mouth nightly as needed., Disp: , Rfl:     montelukast (SINGULAIR) 10 mg tablet, TAKE 1 TABLET EVERY EVENING, Disp: 90 tablet, Rfl: 6    multivitamin (THERAGRAN) per tablet, Take 1 tablet by mouth once daily., Disp: , Rfl:     potassium chloride (MICRO-K) 10 MEQ CpSR, Take 10 mEq by mouth once daily., Disp: , Rfl:     rosuvastatin (CRESTOR) 10 MG tablet, Take 1 tablet (10 mg total) by mouth every other day., Disp: 90 tablet, Rfl: 1    tamsulosin (FLOMAX) 0.4 mg Cap, Take 1 tablet by mouth Daily., Disp: , Rfl:     triamterene-hydrochlorothiazide 37.5-25 mg (DYAZIDE) 37.5-25 mg per capsule, TAKE 1 CAPSULE EVERY MORNING, Disp: 90 capsule, Rfl: 1    vitamin E 1000 UNIT capsule, Take 1,000 Units by mouth once daily., Disp: , Rfl:       Scheduled Meds:   albuterol-ipratropium  3 mL Nebulization ED 1 Time     Continuous Infusions:  PRN Meds:.      Vanessa Rincon  [facility] Hospitalist NP  10/26/2023

## 2023-10-27 NOTE — PROGRESS NOTES
Automatic Inhaler to Nebulizer Interchange    albuterol (Ventolin, ProAir, or Proventil)  mcg given multiple times per day changed to albuterol 2.5 mg Q6H  per [facility] Automatic Therapeutic Substitutions Protocol.    Please contact pharmacy at extension [phone] with any questions.     Thank you,   Kg Hoffmann

## 2023-10-27 NOTE — PLAN OF CARE
[facility] - Emergency Dept  Initial Discharge Assessment       Primary Care Provider: Gelacio Cantu Jr., MD    Admission Diagnosis: Acute hypoxemic respiratory failure due to COVID-19 [U07.1, J96.01]    Admission Date: 10/26/2023  Expected Discharge Date: 10/30/2023     / Social work intern met with Pt at bedside to complete discharge assessment. Pt AAOx4s. Demographics, PCP, and insurance verified. No home health. No dialysis, Eliquis. Pt reports ability to complete ADLs without assistance. Pt verbalized plan to discharge home via family transport. Pt has no other needs to be addressed at this time.    Transition of Care Barriers: None    Payor: [employer] MANAGED MEDICARE / Plan: [employer] MEDICARE HMO / Product Type: Capitation /     Extended Emergency Contact Information  Primary Emergency Contact: Elsy Teague  Address: [address] [place], [place] [id number] [place] [address]  Home Phone: [phone]  Mobile Phone: [phone]  Relation: Spouse    Discharge Plan A: Home with family  Discharge Plan B: Home      [id number] - [place], [place] - [address]  [address]  [facility] [address]  Phone: [phone] Fax: [phone]    [facility] - [place], [place] - [address]  [address] [address]  Phone: [phone] Fax: [phone]    [facility] - [place], [place] - [address]  [address] [address]  [place] [address]  Phone: [phone] Fax: [phone]      Initial Assessment (most recent)       Adult Discharge Assessment - 10/27/23 1404          Discharge Assessment    Assessment Type Discharge Planning Assessment     Confirmed/corrected address, phone number and insurance Yes     Confirmed Demographics Correct on Facesheet     Source of Information patient     When was your last doctors appointment? 10/25/23     Communicated CL with patient/caregiver Date not available/Unable to determine      Reason For Admission Acute hypoxemic respiratory failure due to COVID-19     People in Home spouse     Facility Arrived From: home     Do you expect to return to your current living situation? Yes     Do you have help at home or someone to help you manage your care at home? Yes     Who are your caregiver(s) and their phone number(s)? Elsy Teague/spouse [phone]     Prior to hospitilization cognitive status: Alert/Oriented     Current cognitive status: Alert/Oriented     Equipment Currently Used at Home CPAP     Readmission within 30 days? No     Patient currently being followed by outpatient case management? No     Do you currently have service(s) that help you manage your care at home? No     Do you take prescription medications? Yes     Do you have prescription coverage? Yes     Coverage Payor:  [employer] MANAGED MEDICARE - [employer] MEDICARE HMO     Do you have any problems affording any of your prescribed medications? No     Is the patient taking medications as prescribed? yes     Who is going to help you get home at discharge? Elsy Teague/wife [phone]     How do you get to doctors appointments? family or friend will provide     Are you on dialysis? No     Do you take coumadin? Yes     Who monitors your labs? Eliquis     DME Needed Upon Discharge  none     Discharge Plan discussed with: Patient;Spouse/sig other     Name(s) and Number(s) Elsy Teague/wife [phone]     Transition of Care Barriers None     Discharge Plan A Home with family     Discharge Plan B Home

## 2023-10-27 NOTE — ASSESSMENT & PLAN NOTE
Vaccinated  Pt still using oxygen- started remdesivir  C/w decadron and breathing rx  Cough meds prn

## 2023-10-28 LAB
ALBUMIN SERPL BCP-MCNC: 3.8 G/DL (ref 3.5–5.2)
ALP SERPL-CCNC: 64 U/L (ref 55–135)
ALT SERPL W/O P-5'-P-CCNC: 21 U/L (ref 10–44)
ANION GAP SERPL CALC-SCNC: 6 MMOL/L (ref 8–16)
AST SERPL-CCNC: 17 U/L (ref 10–40)
BASOPHILS # BLD AUTO: 0.02 K/UL (ref 0–0.2)
BASOPHILS NFR BLD: 0.2 % (ref 0–1.9)
BILIRUB SERPL-MCNC: 0.2 MG/DL (ref 0.1–1)
BUN SERPL-MCNC: 22 MG/DL (ref 8–23)
CALCIUM SERPL-MCNC: 8.5 MG/DL (ref 8.7–10.5)
CHLORIDE SERPL-SCNC: 107 MMOL/L (ref 95–110)
CO2 SERPL-SCNC: 26 MMOL/L (ref 23–29)
CREAT SERPL-MCNC: 1 MG/DL (ref 0.5–1.4)
DIFFERENTIAL METHOD: ABNORMAL
EOSINOPHIL # BLD AUTO: 0 K/UL (ref 0–0.5)
EOSINOPHIL NFR BLD: 0 % (ref 0–8)
ERYTHROCYTE [DISTWIDTH] IN BLOOD BY AUTOMATED COUNT: 14 % (ref 11.5–14.5)
EST. GFR  (NO RACE VARIABLE): >60 ML/MIN/1.73 M^2
GLUCOSE SERPL-MCNC: 121 MG/DL (ref 70–110)
HCT VFR BLD AUTO: 47.8 % (ref 40–54)
HGB BLD-MCNC: 15.4 G/DL (ref 14–18)
IMM GRANULOCYTES # BLD AUTO: 0.07 K/UL (ref 0–0.04)
IMM GRANULOCYTES NFR BLD AUTO: 0.5 % (ref 0–0.5)
LYMPHOCYTES # BLD AUTO: 1.4 K/UL (ref 1–4.8)
LYMPHOCYTES NFR BLD: 10.2 % (ref 18–48)
MAGNESIUM SERPL-MCNC: 2.2 MG/DL (ref 1.6–2.6)
MCH RBC QN AUTO: 30.6 PG (ref 27–31)
MCHC RBC AUTO-ENTMCNC: 32.2 G/DL (ref 32–36)
MCV RBC AUTO: 95 FL (ref 82–98)
MONOCYTES # BLD AUTO: 1 K/UL (ref 0.3–1)
MONOCYTES NFR BLD: 7.7 % (ref 4–15)
NEUTROPHILS # BLD AUTO: 10.8 K/UL (ref 1.8–7.7)
NEUTROPHILS NFR BLD: 81.4 % (ref 38–73)
NRBC BLD-RTO: 0 /100 WBC
PHOSPHATE SERPL-MCNC: 3.2 MG/DL (ref 2.7–4.5)
PLATELET # BLD AUTO: 191 K/UL (ref 150–450)
PMV BLD AUTO: 11.8 FL (ref 9.2–12.9)
POTASSIUM SERPL-SCNC: 3.7 MMOL/L (ref 3.5–5.1)
PROT SERPL-MCNC: 6.4 G/DL (ref 6–8.4)
RBC # BLD AUTO: 5.03 M/UL (ref 4.6–6.2)
SODIUM SERPL-SCNC: 139 MMOL/L (ref 136–145)
WBC # BLD AUTO: 13.32 K/UL (ref 3.9–12.7)

## 2023-10-28 PROCEDURE — 12000002 HC ACUTE/MED SURGE SEMI-PRIVATE ROOM

## 2023-10-28 PROCEDURE — 25000003 PHARM REV CODE 250: Performed by: NURSE PRACTITIONER

## 2023-10-28 PROCEDURE — 27000221 HC OXYGEN, UP TO 24 HOURS

## 2023-10-28 PROCEDURE — 94660 CPAP INITIATION&MGMT: CPT

## 2023-10-28 PROCEDURE — 94799 UNLISTED PULMONARY SVC/PX: CPT

## 2023-10-28 PROCEDURE — 80053 COMPREHEN METABOLIC PANEL: CPT | Performed by: NURSE PRACTITIONER

## 2023-10-28 PROCEDURE — 25000003 PHARM REV CODE 250: Performed by: STUDENT IN AN ORGANIZED HEALTH CARE EDUCATION/TRAINING PROGRAM

## 2023-10-28 PROCEDURE — 94760 N-INVAS EAR/PLS OXIMETRY 1: CPT

## 2023-10-28 PROCEDURE — 99900035 HC TECH TIME PER 15 MIN (STAT)

## 2023-10-28 PROCEDURE — 99900031 HC PATIENT EDUCATION (STAT)

## 2023-10-28 PROCEDURE — 84100 ASSAY OF PHOSPHORUS: CPT | Performed by: NURSE PRACTITIONER

## 2023-10-28 PROCEDURE — 83735 ASSAY OF MAGNESIUM: CPT | Performed by: NURSE PRACTITIONER

## 2023-10-28 PROCEDURE — 63600175 PHARM REV CODE 636 W HCPCS: Mod: JZ,TB | Performed by: STUDENT IN AN ORGANIZED HEALTH CARE EDUCATION/TRAINING PROGRAM

## 2023-10-28 PROCEDURE — 25000242 PHARM REV CODE 250 ALT 637 W/ HCPCS: Performed by: INTERNAL MEDICINE

## 2023-10-28 PROCEDURE — 36415 COLL VENOUS BLD VENIPUNCTURE: CPT | Performed by: STUDENT IN AN ORGANIZED HEALTH CARE EDUCATION/TRAINING PROGRAM

## 2023-10-28 PROCEDURE — 85025 COMPLETE CBC W/AUTO DIFF WBC: CPT | Performed by: STUDENT IN AN ORGANIZED HEALTH CARE EDUCATION/TRAINING PROGRAM

## 2023-10-28 PROCEDURE — 94640 AIRWAY INHALATION TREATMENT: CPT

## 2023-10-28 PROCEDURE — 63600175 PHARM REV CODE 636 W HCPCS: Performed by: NURSE PRACTITIONER

## 2023-10-28 PROCEDURE — 94761 N-INVAS EAR/PLS OXIMETRY MLT: CPT

## 2023-10-28 PROCEDURE — 36415 COLL VENOUS BLD VENIPUNCTURE: CPT | Performed by: NURSE PRACTITIONER

## 2023-10-28 RX ADMIN — Medication 400 UNITS: at 08:10

## 2023-10-28 RX ADMIN — POTASSIUM CHLORIDE 10 MEQ: 750 CAPSULE, EXTENDED RELEASE ORAL at 08:10

## 2023-10-28 RX ADMIN — APIXABAN 5 MG: 5 TABLET, FILM COATED ORAL at 08:10

## 2023-10-28 RX ADMIN — IPRATROPIUM BROMIDE AND ALBUTEROL SULFATE 3 ML: 2.5; .5 SOLUTION RESPIRATORY (INHALATION) at 07:10

## 2023-10-28 RX ADMIN — ATORVASTATIN CALCIUM 20 MG: 20 TABLET, FILM COATED ORAL at 08:10

## 2023-10-28 RX ADMIN — SACUBITRIL AND VALSARTAN 1 TABLET: 49; 51 TABLET, FILM COATED ORAL at 08:10

## 2023-10-28 RX ADMIN — ARFORMOTEROL TARTRATE 15 MCG: 15 SOLUTION RESPIRATORY (INHALATION) at 07:10

## 2023-10-28 RX ADMIN — BUDESONIDE INHALATION 0.5 MG: 0.5 SUSPENSION RESPIRATORY (INHALATION) at 07:10

## 2023-10-28 RX ADMIN — AMIODARONE HYDROCHLORIDE 200 MG: 200 TABLET ORAL at 08:10

## 2023-10-28 RX ADMIN — TAMSULOSIN HYDROCHLORIDE 0.4 MG: 0.4 CAPSULE ORAL at 08:10

## 2023-10-28 RX ADMIN — IPRATROPIUM BROMIDE AND ALBUTEROL SULFATE 3 ML: 2.5; .5 SOLUTION RESPIRATORY (INHALATION) at 01:10

## 2023-10-28 RX ADMIN — OXYCODONE HYDROCHLORIDE AND ACETAMINOPHEN 500 MG: 500 TABLET ORAL at 08:10

## 2023-10-28 RX ADMIN — DEXAMETHASONE 6 MG: 4 TABLET ORAL at 08:10

## 2023-10-28 RX ADMIN — THERA TABS 1 TABLET: TAB at 08:10

## 2023-10-28 RX ADMIN — REMDESIVIR 100 MG: 100 INJECTION, POWDER, LYOPHILIZED, FOR SOLUTION INTRAVENOUS at 05:10

## 2023-10-28 RX ADMIN — Medication 400 MG: at 08:10

## 2023-10-28 RX ADMIN — Medication 1000 UNITS: at 08:10

## 2023-10-28 RX ADMIN — LEVOTHYROXINE SODIUM 88 MCG: 0.09 TABLET ORAL at 05:10

## 2023-10-28 RX ADMIN — MONTELUKAST 10 MG: 10 TABLET, FILM COATED ORAL at 08:10

## 2023-10-28 RX ADMIN — IPRATROPIUM BROMIDE AND ALBUTEROL SULFATE 3 ML: 2.5; .5 SOLUTION RESPIRATORY (INHALATION) at 02:10

## 2023-10-28 NOTE — PLAN OF CARE
Problem: Adult Inpatient Plan of Care  Goal: Plan of Care Review  Outcome: Ongoing, Progressing  Goal: Patient-Specific Goal (Individualized)  Outcome: Ongoing, Progressing  Goal: Absence of Hospital-Acquired Illness or Injury  Outcome: Ongoing, Progressing  Goal: Optimal Comfort and Wellbeing  Outcome: Ongoing, Progressing  Goal: Readiness for Transition of Care  Outcome: Ongoing, Progressing     Problem: Fall Injury Risk  Goal: Absence of Fall and Fall-Related Injury  Outcome: Ongoing, Progressing     Problem: Gas Exchange Impaired  Goal: Optimal Gas Exchange  Outcome: Ongoing, Progressing     Problem: Coping Ineffective  Goal: Effective Coping  Outcome: Ongoing, Progressing     Problem: Pain Acute  Goal: Acceptable Pain Control and Functional Ability  Outcome: Ongoing, Progressing

## 2023-10-28 NOTE — PROGRESS NOTES
[facility] Medicine  Progress Note    Patient Name: Nico Teague  MRN: [id number]  Patient Class: IP- Inpatient   Admission Date: 10/26/2023  Length of Stay: 1 days  Attending Physician: Anita Gonzalez MD  Primary Care Provider: Gelacio Cantu Jr., MD        Subjective:     Principal Problem:Acute hypoxemic respiratory failure due to COVID-19        HPI:  No notes on file    Overview/Hospital Course:  No notes on file    Interval History: Pt was sitting in bed and was reporting of feeling better, no chest pain, mild sob, no fever, chills, some cough but no dizziness, urine or bowel problem.     Review of Systems   Constitutional:  Negative for activity change, appetite change, chills and fever.   HENT:  Negative for congestion, ear pain and nosebleeds.    Eyes:  Negative for itching and visual disturbance.   Respiratory:  Positive for cough and shortness of breath. Negative for apnea, chest tightness and wheezing.    Cardiovascular:  Negative for chest pain, palpitations and leg swelling.   Gastrointestinal:  Negative for abdominal distention, abdominal pain, constipation, diarrhea, nausea and vomiting.   Genitourinary:  Negative for dysuria and flank pain.   Musculoskeletal:  Negative for back pain.   Neurological:  Negative for dizziness and headaches.     Objective:     Vital Signs (Most Recent):  Temp: 99 °F (37.2 °C) (10/28/23 1111)  Pulse: (!) 58 (notified nurse mishel) (10/28/23 1111)  Resp: 18 (10/28/23 1111)  BP: 138/64 (10/28/23 1111)  SpO2: 96 % (10/28/23 1111) Vital Signs (24h Range):  Temp:  [97.7 °F (36.5 °C)-99 °F (37.2 °C)] 99 °F (37.2 °C)  Pulse:  [57-68] 58  Resp:  [17-25] 18  SpO2:  [93 %-98 %] 96 %  BP: (138-175)/(64-87) 138/64     Weight: 117.9 kg (259 lb 14.8 oz)  Body mass index is 39.52 kg/m².    Intake/Output Summary (Last 24 hours) at 10/28/2023 1218  Last data filed at 10/28/2023 0522  Gross per 24 hour   Intake 300 ml   Output 300 ml   Net 0 ml         Physical  Exam  Vitals reviewed.   Constitutional:       General: He is not in acute distress.     Appearance: Normal appearance. He is not ill-appearing, toxic-appearing or diaphoretic.   HENT:      Head: Normocephalic and atraumatic.      Mouth/Throat:      Mouth: Mucous membranes are moist.      Pharynx: Oropharynx is clear.   Eyes:      General: No scleral icterus.     Conjunctiva/sclera: Conjunctivae normal.   Neck:      Vascular: No carotid bruit.   Cardiovascular:      Rate and Rhythm: Normal rate and regular rhythm.      Pulses: Normal pulses.      Heart sounds: Normal heart sounds.   Pulmonary:      Effort: Pulmonary effort is normal.      Breath sounds: Wheezing present.   Abdominal:      General: Abdomen is flat. Bowel sounds are normal.      Palpations: Abdomen is soft.   Musculoskeletal:         General: Normal range of motion.   Skin:     General: Skin is warm.   Neurological:      General: No focal deficit present.      Mental Status: He is alert and oriented to person, place, and time. Mental status is at baseline.   Psychiatric:         Mood and Affect: Mood normal.         Behavior: Behavior normal.             Significant Labs: All pertinent labs within the past 24 hours have been reviewed.  BMP:   Recent Labs   Lab 10/28/23  0536   *      K 3.7      CO2 26   BUN 22   CREATININE 1.0   CALCIUM 8.5*   MG 2.2     CBC:   Recent Labs   Lab 10/26/23  2024 10/26/23  2117 10/28/23  0841   WBC 8.60  --  13.32*   HGB 14.5  --  15.4   HCT 44.0 43 47.8     --  191     CMP:   Recent Labs   Lab 10/26/23  2024 10/27/23  0502 10/28/23  0536    139 139   K 3.6 3.6 3.7    106 107   CO2 24 26 26    160* 121*   BUN 13 12 22   CREATININE 1.2 1.1 1.0   CALCIUM 9.2 8.8 8.5*   PROT 7.2 6.8 6.4   ALBUMIN 4.4 4.1 3.8   BILITOT 0.4 0.3 0.2   ALKPHOS 76 76 64   AST 22 18 17   ALT 29 26 21   ANIONGAP 10 7* 6*     Magnesium:   Recent Labs   Lab 10/26/23  2024 10/27/23  0502 10/28/23  0536    MG 1.9 2.2 2.2       Significant Imaging: I have reviewed all pertinent imaging results/findings within the past 24 hours.      Assessment/Plan:      * Acute hypoxemic respiratory failure due to COVID-19  Pt still using oxygen- started remdesivir  C/w decadron and breathing rx  Cough meds prn    Asthma  C/w steroids and breathing rx as scheduled      COVID-19  Vaccinated  Pt still using oxygen- started remdesivir  C/w decadron and breathing rx  Cough meds prn    Hypothyroidism (acquired)  C/w home meds      Atrial fibrillation  C/w home meds    BPH (benign prostatic hyperplasia)  C/w home meds        VTE Risk Mitigation (From admission, onward)         Ordered     apixaban tablet 5 mg  2 times daily         10/27/23 0010                Discharge Planning   CL: 10/30/2023     Code Status: Full Code   Is the patient medically ready for discharge?:     Reason for patient still in hospital (select all that apply): Treatment  Discharge Plan A: Home with family                  Anita Gonzalez MD  Department of Hospital Medicine   [facility]

## 2023-10-28 NOTE — SUBJECTIVE & OBJECTIVE
Interval History: Pt was sitting in bed and was reporting of feeling better, no chest pain, mild sob, no fever, chills, some cough but no dizziness, urine or bowel problem.     Review of Systems   Constitutional:  Negative for activity change, appetite change, chills and fever.   HENT:  Negative for congestion, ear pain and nosebleeds.    Eyes:  Negative for itching and visual disturbance.   Respiratory:  Positive for cough and shortness of breath. Negative for apnea, chest tightness and wheezing.    Cardiovascular:  Negative for chest pain, palpitations and leg swelling.   Gastrointestinal:  Negative for abdominal distention, abdominal pain, constipation, diarrhea, nausea and vomiting.   Genitourinary:  Negative for dysuria and flank pain.   Musculoskeletal:  Negative for back pain.   Neurological:  Negative for dizziness and headaches.     Objective:     Vital Signs (Most Recent):  Temp: 99 °F (37.2 °C) (10/28/23 1111)  Pulse: (!) 58 (notified nurse mishel) (10/28/23 1111)  Resp: 18 (10/28/23 1111)  BP: 138/64 (10/28/23 1111)  SpO2: 96 % (10/28/23 1111) Vital Signs (24h Range):  Temp:  [97.7 °F (36.5 °C)-99 °F (37.2 °C)] 99 °F (37.2 °C)  Pulse:  [57-68] 58  Resp:  [17-25] 18  SpO2:  [93 %-98 %] 96 %  BP: (138-175)/(64-87) 138/64     Weight: 117.9 kg (259 lb 14.8 oz)  Body mass index is 39.52 kg/m².    Intake/Output Summary (Last 24 hours) at 10/28/2023 1218  Last data filed at 10/28/2023 0522  Gross per 24 hour   Intake 300 ml   Output 300 ml   Net 0 ml         Physical Exam  Vitals reviewed.   Constitutional:       General: He is not in acute distress.     Appearance: Normal appearance. He is not ill-appearing, toxic-appearing or diaphoretic.   HENT:      Head: Normocephalic and atraumatic.      Mouth/Throat:      Mouth: Mucous membranes are moist.      Pharynx: Oropharynx is clear.   Eyes:      General: No scleral icterus.     Conjunctiva/sclera: Conjunctivae normal.   Neck:      Vascular: No carotid bruit.    Cardiovascular:      Rate and Rhythm: Normal rate and regular rhythm.      Pulses: Normal pulses.      Heart sounds: Normal heart sounds.   Pulmonary:      Effort: Pulmonary effort is normal.      Breath sounds: Wheezing present.   Abdominal:      General: Abdomen is flat. Bowel sounds are normal.      Palpations: Abdomen is soft.   Musculoskeletal:         General: Normal range of motion.   Skin:     General: Skin is warm.   Neurological:      General: No focal deficit present.      Mental Status: He is alert and oriented to person, place, and time. Mental status is at baseline.   Psychiatric:         Mood and Affect: Mood normal.         Behavior: Behavior normal.             Significant Labs: All pertinent labs within the past 24 hours have been reviewed.  BMP:   Recent Labs   Lab 10/28/23  0536   *      K 3.7      CO2 26   BUN 22   CREATININE 1.0   CALCIUM 8.5*   MG 2.2     CBC:   Recent Labs   Lab 10/26/23  2024 10/26/23  2117 10/28/23  0841   WBC 8.60  --  13.32*   HGB 14.5  --  15.4   HCT 44.0 43 47.8     --  191     CMP:   Recent Labs   Lab 10/26/23  2024 10/27/23  0502 10/28/23  0536    139 139   K 3.6 3.6 3.7    106 107   CO2 24 26 26    160* 121*   BUN 13 12 22   CREATININE 1.2 1.1 1.0   CALCIUM 9.2 8.8 8.5*   PROT 7.2 6.8 6.4   ALBUMIN 4.4 4.1 3.8   BILITOT 0.4 0.3 0.2   ALKPHOS 76 76 64   AST 22 18 17   ALT 29 26 21   ANIONGAP 10 7* 6*     Magnesium:   Recent Labs   Lab 10/26/23  2024 10/27/23  0502 10/28/23  0536   MG 1.9 2.2 2.2       Significant Imaging: I have reviewed all pertinent imaging results/findings within the past 24 hours.

## 2023-10-28 NOTE — CARE UPDATE
10/28/23 0748   Patient Assessment/Suction   Level of Consciousness (AVPU) alert   Respiratory Effort Normal;Unlabored   Expansion/Accessory Muscles/Retractions expansion symmetric;no retractions;no use of accessory muscles   All Lung Fields Breath Sounds diminished   PRE-TX-O2   Device (Oxygen Therapy) nasal cannula   $ Is the patient on Low Flow Oxygen? Yes   Flow (L/min) 3   SpO2 96 %   Pulse Oximetry Type Intermittent   $ Pulse Oximetry - Multiple Charge Pulse Oximetry - Multiple   Pulse 60   Resp 18   Aerosol Therapy   $ Aerosol Therapy Charges Aerosol Treatment   Daily Review of Necessity (SVN) completed   Respiratory Treatment Status (SVN) given   Treatment Route (SVN) mask;oxygen   Patient Position (SVN) HOB elevated   Post Treatment Assessment (SVN) breath sounds improved   Signs of Intolerance (SVN) none   Education   $ Education 15 min;Bronchodilator   Respiratory Evaluation   $ Care Plan Tech Time 15 min   $ Eval/Re-eval Charges Re-evaluation        10/28/23 0748   Patient Assessment/Suction   Level of Consciousness (AVPU) alert   Respiratory Effort Normal;Unlabored   Expansion/Accessory Muscles/Retractions expansion symmetric;no retractions;no use of accessory muscles   All Lung Fields Breath Sounds diminished   PRE-TX-O2   Device (Oxygen Therapy) nasal cannula   $ Is the patient on Low Flow Oxygen? Yes   Flow (L/min) 3   SpO2 96 %   Pulse Oximetry Type Intermittent   $ Pulse Oximetry - Multiple Charge Pulse Oximetry - Multiple   Pulse 60   Resp 18   Aerosol Therapy   $ Aerosol Therapy Charges Aerosol Treatment   Daily Review of Necessity (SVN) completed   Respiratory Treatment Status (SVN) given   Treatment Route (SVN) mask;oxygen   Patient Position (SVN) HOB elevated   Post Treatment Assessment (SVN) breath sounds improved   Signs of Intolerance (SVN) none   Education   $ Education 15 min;Bronchodilator   Respiratory Evaluation   $ Care Plan Tech Time 15 min   $ Eval/Re-eval Charges Re-evaluation

## 2023-10-29 LAB
ALBUMIN SERPL BCP-MCNC: 3.7 G/DL (ref 3.5–5.2)
ALP SERPL-CCNC: 62 U/L (ref 55–135)
ALT SERPL W/O P-5'-P-CCNC: 21 U/L (ref 10–44)
ANION GAP SERPL CALC-SCNC: 5 MMOL/L (ref 8–16)
AST SERPL-CCNC: 18 U/L (ref 10–40)
BILIRUB SERPL-MCNC: 0.3 MG/DL (ref 0.1–1)
BUN SERPL-MCNC: 24 MG/DL (ref 8–23)
CALCIUM SERPL-MCNC: 8.6 MG/DL (ref 8.7–10.5)
CHLORIDE SERPL-SCNC: 106 MMOL/L (ref 95–110)
CO2 SERPL-SCNC: 28 MMOL/L (ref 23–29)
CREAT SERPL-MCNC: 0.9 MG/DL (ref 0.5–1.4)
CRP SERPL-MCNC: 12.2 MG/L (ref 0–8.2)
EST. GFR  (NO RACE VARIABLE): >60 ML/MIN/1.73 M^2
GLUCOSE SERPL-MCNC: 124 MG/DL (ref 70–110)
LDH SERPL L TO P-CCNC: 159 U/L (ref 110–260)
MAGNESIUM SERPL-MCNC: 2.2 MG/DL (ref 1.6–2.6)
PHOSPHATE SERPL-MCNC: 2.9 MG/DL (ref 2.7–4.5)
POTASSIUM SERPL-SCNC: 4 MMOL/L (ref 3.5–5.1)
PROT SERPL-MCNC: 6.1 G/DL (ref 6–8.4)
SODIUM SERPL-SCNC: 139 MMOL/L (ref 136–145)

## 2023-10-29 PROCEDURE — 94761 N-INVAS EAR/PLS OXIMETRY MLT: CPT

## 2023-10-29 PROCEDURE — 99900031 HC PATIENT EDUCATION (STAT)

## 2023-10-29 PROCEDURE — 25000003 PHARM REV CODE 250: Performed by: STUDENT IN AN ORGANIZED HEALTH CARE EDUCATION/TRAINING PROGRAM

## 2023-10-29 PROCEDURE — 86140 C-REACTIVE PROTEIN: CPT | Performed by: NURSE PRACTITIONER

## 2023-10-29 PROCEDURE — 25000242 PHARM REV CODE 250 ALT 637 W/ HCPCS: Performed by: INTERNAL MEDICINE

## 2023-10-29 PROCEDURE — 25000003 PHARM REV CODE 250: Performed by: NURSE PRACTITIONER

## 2023-10-29 PROCEDURE — 63600175 PHARM REV CODE 636 W HCPCS: Mod: JZ,TB | Performed by: STUDENT IN AN ORGANIZED HEALTH CARE EDUCATION/TRAINING PROGRAM

## 2023-10-29 PROCEDURE — 99900035 HC TECH TIME PER 15 MIN (STAT)

## 2023-10-29 PROCEDURE — 80053 COMPREHEN METABOLIC PANEL: CPT | Performed by: NURSE PRACTITIONER

## 2023-10-29 PROCEDURE — 83615 LACTATE (LD) (LDH) ENZYME: CPT | Performed by: NURSE PRACTITIONER

## 2023-10-29 PROCEDURE — 83735 ASSAY OF MAGNESIUM: CPT | Performed by: NURSE PRACTITIONER

## 2023-10-29 PROCEDURE — 94640 AIRWAY INHALATION TREATMENT: CPT

## 2023-10-29 PROCEDURE — 12000002 HC ACUTE/MED SURGE SEMI-PRIVATE ROOM

## 2023-10-29 PROCEDURE — 84100 ASSAY OF PHOSPHORUS: CPT | Performed by: NURSE PRACTITIONER

## 2023-10-29 PROCEDURE — 63600175 PHARM REV CODE 636 W HCPCS: Performed by: NURSE PRACTITIONER

## 2023-10-29 RX ORDER — FUROSEMIDE 40 MG/1
40 TABLET ORAL DAILY
Status: DISCONTINUED | OUTPATIENT
Start: 2023-10-29 | End: 2023-11-03 | Stop reason: HOSPADM

## 2023-10-29 RX ORDER — POLYETHYLENE GLYCOL 3350 17 G/17G
17 POWDER, FOR SOLUTION ORAL DAILY PRN
Status: DISCONTINUED | OUTPATIENT
Start: 2023-10-29 | End: 2023-11-01

## 2023-10-29 RX ORDER — HYDRALAZINE HYDROCHLORIDE 10 MG/1
10 TABLET, FILM COATED ORAL ONCE
Status: COMPLETED | OUTPATIENT
Start: 2023-10-29 | End: 2023-10-29

## 2023-10-29 RX ADMIN — Medication 400 UNITS: at 10:10

## 2023-10-29 RX ADMIN — IPRATROPIUM BROMIDE AND ALBUTEROL SULFATE 3 ML: 2.5; .5 SOLUTION RESPIRATORY (INHALATION) at 01:10

## 2023-10-29 RX ADMIN — OXYCODONE HYDROCHLORIDE AND ACETAMINOPHEN 500 MG: 500 TABLET ORAL at 10:10

## 2023-10-29 RX ADMIN — BUDESONIDE INHALATION 0.5 MG: 0.5 SUSPENSION RESPIRATORY (INHALATION) at 07:10

## 2023-10-29 RX ADMIN — FUROSEMIDE 40 MG: 40 TABLET ORAL at 02:10

## 2023-10-29 RX ADMIN — DEXAMETHASONE 6 MG: 4 TABLET ORAL at 10:10

## 2023-10-29 RX ADMIN — ARFORMOTEROL TARTRATE 15 MCG: 15 SOLUTION RESPIRATORY (INHALATION) at 07:10

## 2023-10-29 RX ADMIN — AMIODARONE HYDROCHLORIDE 200 MG: 200 TABLET ORAL at 08:10

## 2023-10-29 RX ADMIN — BENZONATATE 100 MG: 100 CAPSULE ORAL at 08:10

## 2023-10-29 RX ADMIN — APIXABAN 5 MG: 5 TABLET, FILM COATED ORAL at 10:10

## 2023-10-29 RX ADMIN — AMIODARONE HYDROCHLORIDE 200 MG: 200 TABLET ORAL at 10:10

## 2023-10-29 RX ADMIN — IPRATROPIUM BROMIDE AND ALBUTEROL SULFATE 3 ML: 2.5; .5 SOLUTION RESPIRATORY (INHALATION) at 07:10

## 2023-10-29 RX ADMIN — POTASSIUM CHLORIDE 10 MEQ: 750 CAPSULE, EXTENDED RELEASE ORAL at 10:10

## 2023-10-29 RX ADMIN — THERA TABS 1 TABLET: TAB at 10:10

## 2023-10-29 RX ADMIN — OXYCODONE HYDROCHLORIDE AND ACETAMINOPHEN 500 MG: 500 TABLET ORAL at 08:10

## 2023-10-29 RX ADMIN — APIXABAN 5 MG: 5 TABLET, FILM COATED ORAL at 08:10

## 2023-10-29 RX ADMIN — GUAIFENESIN AND DEXTROMETHORPHAN 10 ML: 100; 10 SYRUP ORAL at 05:10

## 2023-10-29 RX ADMIN — LEVOTHYROXINE SODIUM 88 MCG: 0.09 TABLET ORAL at 05:10

## 2023-10-29 RX ADMIN — Medication 1000 UNITS: at 10:10

## 2023-10-29 RX ADMIN — TAMSULOSIN HYDROCHLORIDE 0.4 MG: 0.4 CAPSULE ORAL at 10:10

## 2023-10-29 RX ADMIN — HYDRALAZINE HYDROCHLORIDE 10 MG: 10 TABLET, FILM COATED ORAL at 05:10

## 2023-10-29 RX ADMIN — REMDESIVIR 100 MG: 100 INJECTION, POWDER, LYOPHILIZED, FOR SOLUTION INTRAVENOUS at 05:10

## 2023-10-29 RX ADMIN — GUAIFENESIN AND DEXTROMETHORPHAN 10 ML: 100; 10 SYRUP ORAL at 09:10

## 2023-10-29 RX ADMIN — SACUBITRIL AND VALSARTAN 1 TABLET: 49; 51 TABLET, FILM COATED ORAL at 08:10

## 2023-10-29 RX ADMIN — POLYETHYLENE GLYCOL 3350 17 G: 17 POWDER, FOR SOLUTION ORAL at 04:10

## 2023-10-29 RX ADMIN — BENZONATATE 100 MG: 100 CAPSULE ORAL at 03:10

## 2023-10-29 RX ADMIN — Medication 400 MG: at 10:10

## 2023-10-29 RX ADMIN — MONTELUKAST 10 MG: 10 TABLET, FILM COATED ORAL at 08:10

## 2023-10-29 RX ADMIN — ATORVASTATIN CALCIUM 20 MG: 20 TABLET, FILM COATED ORAL at 10:10

## 2023-10-29 RX ADMIN — IPRATROPIUM BROMIDE AND ALBUTEROL SULFATE 3 ML: 2.5; .5 SOLUTION RESPIRATORY (INHALATION) at 02:10

## 2023-10-29 RX ADMIN — SACUBITRIL AND VALSARTAN 1 TABLET: 49; 51 TABLET, FILM COATED ORAL at 10:10

## 2023-10-29 NOTE — SUBJECTIVE & OBJECTIVE
Interval History: Pt was not wearing oxygen, was stating he is feeling fine, but was looking little distressed, otherwise no chest pain, fever, chills, n/v, urine or bowel problem.     Review of Systems   Constitutional:  Negative for activity change, appetite change, chills and fever.   HENT:  Negative for congestion, ear pain and nosebleeds.    Eyes:  Negative for itching and visual disturbance.   Respiratory:  Negative for apnea, cough, chest tightness, shortness of breath and wheezing.    Cardiovascular:  Negative for chest pain, palpitations and leg swelling.   Gastrointestinal:  Negative for abdominal distention, abdominal pain, constipation, diarrhea, nausea and vomiting.   Genitourinary:  Negative for dysuria and flank pain.   Musculoskeletal:  Negative for back pain.   Neurological:  Negative for dizziness and headaches.     Objective:     Vital Signs (Most Recent):  Temp: 98.2 °F (36.8 °C) (10/29/23 0724)  Pulse: (!) 55 (10/29/23 1252)  Resp: 16 (10/29/23 0727)  BP: (!) 179/88 (10/29/23 1252)  SpO2: 97 % (10/29/23 0740) Vital Signs (24h Range):  Temp:  [97.9 °F (36.6 °C)-98.9 °F (37.2 °C)] 98.2 °F (36.8 °C)  Pulse:  [53-71] 55  Resp:  [16-20] 16  SpO2:  [94 %-97 %] 97 %  BP: (130-179)/(64-88) 179/88     Weight: 117.9 kg (259 lb 14.8 oz)  Body mass index is 39.52 kg/m².    Intake/Output Summary (Last 24 hours) at 10/29/2023 1334  Last data filed at 10/29/2023 0523  Gross per 24 hour   Intake 650 ml   Output 150 ml   Net 500 ml         Physical Exam  Vitals reviewed.   Constitutional:       General: He is not in acute distress.     Appearance: Normal appearance. He is not ill-appearing, toxic-appearing or diaphoretic.   HENT:      Head: Normocephalic and atraumatic.      Mouth/Throat:      Mouth: Mucous membranes are moist.      Pharynx: Oropharynx is clear.   Eyes:      General: No scleral icterus.     Conjunctiva/sclera: Conjunctivae normal.   Neck:      Vascular: No carotid bruit.   Cardiovascular:       Rate and Rhythm: Normal rate and regular rhythm.      Pulses: Normal pulses.      Heart sounds: Normal heart sounds.   Pulmonary:      Effort: Pulmonary effort is normal.      Breath sounds: Wheezing present.   Abdominal:      General: Abdomen is flat. Bowel sounds are normal.      Palpations: Abdomen is soft.   Musculoskeletal:         General: Normal range of motion.   Skin:     General: Skin is warm.   Neurological:      General: No focal deficit present.      Mental Status: He is alert and oriented to person, place, and time. Mental status is at baseline.   Psychiatric:         Mood and Affect: Mood normal.         Behavior: Behavior normal.             Significant Labs: All pertinent labs within the past 24 hours have been reviewed.  BMP:   Recent Labs   Lab 10/29/23  0536   *      K 4.0      CO2 28   BUN 24*   CREATININE 0.9   CALCIUM 8.6*   MG 2.2     CBC:   Recent Labs   Lab 10/28/23  0841   WBC 13.32*   HGB 15.4   HCT 47.8        CMP:   Recent Labs   Lab 10/28/23  0536 10/29/23  0536    139   K 3.7 4.0    106   CO2 26 28   * 124*   BUN 22 24*   CREATININE 1.0 0.9   CALCIUM 8.5* 8.6*   PROT 6.4 6.1   ALBUMIN 3.8 3.7   BILITOT 0.2 0.3   ALKPHOS 64 62   AST 17 18   ALT 21 21   ANIONGAP 6* 5*     Magnesium:   Recent Labs   Lab 10/28/23  0536 10/29/23  0536   MG 2.2 2.2       Significant Imaging: I have reviewed all pertinent imaging results/findings within the past 24 hours.

## 2023-10-29 NOTE — CARE UPDATE
10/29/23 0727   Patient Assessment/Suction   Level of Consciousness (AVPU) alert   Respiratory Effort Normal;Unlabored   Expansion/Accessory Muscles/Retractions no use of accessory muscles   All Lung Fields Breath Sounds diminished;clear   Rhythm/Pattern, Respiratory unlabored;pattern regular;depth regular   PRE-TX-O2   Device (Oxygen Therapy) room air   SpO2 96 %   Pulse Oximetry Type Intermittent   $ Pulse Oximetry - Multiple Charge Pulse Oximetry - Multiple   Pulse (!) 54   Resp 16   Positioning HOB elevated 45 degrees   Aerosol Therapy   $ Aerosol Therapy Charges Aerosol Treatment  (brovana)   Daily Review of Necessity (SVN) completed   Respiratory Treatment Status (SVN) given   Treatment Route (SVN) mouthpiece;oxygen   Patient Position (SVN) HOB elevated   Post Treatment Assessment (SVN) breath sounds unchanged;vital signs unchanged   Signs of Intolerance (SVN) none   Education   $ Education BiPAP;Bronchodilator;15 min   Respiratory Evaluation   $ Care Plan Tech Time 15 min

## 2023-10-29 NOTE — PROGRESS NOTES
[facility] Medicine  Progress Note    Patient Name: Nico Teague  MRN: [id number]  Patient Class: IP- Inpatient   Admission Date: 10/26/2023  Length of Stay: 2 days  Attending Physician: Anita Gonzalez MD  Primary Care Provider: Gelacio Cantu Jr., MD        Subjective:     Principal Problem:Acute hypoxemic respiratory failure due to COVID-19        HPI:  No notes on file    Overview/Hospital Course:  No notes on file    Interval History: Pt was not wearing oxygen, was stating he is feeling fine, but was looking little distressed, otherwise no chest pain, fever, chills, n/v, urine or bowel problem.     Review of Systems   Constitutional:  Negative for activity change, appetite change, chills and fever.   HENT:  Negative for congestion, ear pain and nosebleeds.    Eyes:  Negative for itching and visual disturbance.   Respiratory:  Negative for apnea, cough, chest tightness, shortness of breath and wheezing.    Cardiovascular:  Negative for chest pain, palpitations and leg swelling.   Gastrointestinal:  Negative for abdominal distention, abdominal pain, constipation, diarrhea, nausea and vomiting.   Genitourinary:  Negative for dysuria and flank pain.   Musculoskeletal:  Negative for back pain.   Neurological:  Negative for dizziness and headaches.     Objective:     Vital Signs (Most Recent):  Temp: 98.2 °F (36.8 °C) (10/29/23 0724)  Pulse: (!) 55 (10/29/23 1252)  Resp: 16 (10/29/23 0727)  BP: (!) 179/88 (10/29/23 1252)  SpO2: 97 % (10/29/23 0740) Vital Signs (24h Range):  Temp:  [97.9 °F (36.6 °C)-98.9 °F (37.2 °C)] 98.2 °F (36.8 °C)  Pulse:  [53-71] 55  Resp:  [16-20] 16  SpO2:  [94 %-97 %] 97 %  BP: (130-179)/(64-88) 179/88     Weight: 117.9 kg (259 lb 14.8 oz)  Body mass index is 39.52 kg/m².    Intake/Output Summary (Last 24 hours) at 10/29/2023 1334  Last data filed at 10/29/2023 0523  Gross per 24 hour   Intake 650 ml   Output 150 ml   Net 500 ml         Physical Exam  Vitals  reviewed.   Constitutional:       General: He is not in acute distress.     Appearance: Normal appearance. He is not ill-appearing, toxic-appearing or diaphoretic.   HENT:      Head: Normocephalic and atraumatic.      Mouth/Throat:      Mouth: Mucous membranes are moist.      Pharynx: Oropharynx is clear.   Eyes:      General: No scleral icterus.     Conjunctiva/sclera: Conjunctivae normal.   Neck:      Vascular: No carotid bruit.   Cardiovascular:      Rate and Rhythm: Normal rate and regular rhythm.      Pulses: Normal pulses.      Heart sounds: Normal heart sounds.   Pulmonary:      Effort: Pulmonary effort is normal.      Breath sounds: Wheezing present.   Abdominal:      General: Abdomen is flat. Bowel sounds are normal.      Palpations: Abdomen is soft.   Musculoskeletal:         General: Normal range of motion.   Skin:     General: Skin is warm.   Neurological:      General: No focal deficit present.      Mental Status: He is alert and oriented to person, place, and time. Mental status is at baseline.   Psychiatric:         Mood and Affect: Mood normal.         Behavior: Behavior normal.             Significant Labs: All pertinent labs within the past 24 hours have been reviewed.  BMP:   Recent Labs   Lab 10/29/23  0536   *      K 4.0      CO2 28   BUN 24*   CREATININE 0.9   CALCIUM 8.6*   MG 2.2     CBC:   Recent Labs   Lab 10/28/23  0841   WBC 13.32*   HGB 15.4   HCT 47.8        CMP:   Recent Labs   Lab 10/28/23  0536 10/29/23  0536    139   K 3.7 4.0    106   CO2 26 28   * 124*   BUN 22 24*   CREATININE 1.0 0.9   CALCIUM 8.5* 8.6*   PROT 6.4 6.1   ALBUMIN 3.8 3.7   BILITOT 0.2 0.3   ALKPHOS 64 62   AST 17 18   ALT 21 21   ANIONGAP 6* 5*     Magnesium:   Recent Labs   Lab 10/28/23  0536 10/29/23  0536   MG 2.2 2.2       Significant Imaging: I have reviewed all pertinent imaging results/findings within the past 24 hours.      Assessment/Plan:      * Acute  hypoxemic respiratory failure due to COVID-19  Improving- getting remdesivir  C/w decadron and breathing rx  Cough meds prn    Asthma  C/w steroids and breathing rx as scheduled      COVID-19  Vaccinated  improving  C/w decadron and breathing rx  Cough meds prn    Hypothyroidism (acquired)  C/w home meds      Atrial fibrillation  C/w home meds    BPH (benign prostatic hyperplasia)  C/w home meds        VTE Risk Mitigation (From admission, onward)         Ordered     apixaban tablet 5 mg  2 times daily         10/27/23 0010                Discharge Planning   CL: 10/30/2023     Code Status: Full Code   Is the patient medically ready for discharge?:     Reason for patient still in hospital (select all that apply): Treatment  Discharge Plan A: Home with family                  Anita Gonzalez MD  Department of Hospital Medicine   [facility]

## 2023-10-30 PROBLEM — R05.9 COUGH: Status: ACTIVE | Noted: 2023-10-30

## 2023-10-30 LAB
ALBUMIN SERPL BCP-MCNC: 3.6 G/DL (ref 3.5–5.2)
ALP SERPL-CCNC: 61 U/L (ref 55–135)
ALT SERPL W/O P-5'-P-CCNC: 23 U/L (ref 10–44)
ANION GAP SERPL CALC-SCNC: 6 MMOL/L (ref 8–16)
AST SERPL-CCNC: 20 U/L (ref 10–40)
BASOPHILS # BLD AUTO: 0.01 K/UL (ref 0–0.2)
BASOPHILS NFR BLD: 0.1 % (ref 0–1.9)
BILIRUB SERPL-MCNC: 0.2 MG/DL (ref 0.1–1)
BUN SERPL-MCNC: 21 MG/DL (ref 8–23)
CALCIUM SERPL-MCNC: 8.4 MG/DL (ref 8.7–10.5)
CHLORIDE SERPL-SCNC: 108 MMOL/L (ref 95–110)
CO2 SERPL-SCNC: 26 MMOL/L (ref 23–29)
CREAT SERPL-MCNC: 1 MG/DL (ref 0.5–1.4)
DIFFERENTIAL METHOD: ABNORMAL
EOSINOPHIL # BLD AUTO: 0 K/UL (ref 0–0.5)
EOSINOPHIL NFR BLD: 0 % (ref 0–8)
ERYTHROCYTE [DISTWIDTH] IN BLOOD BY AUTOMATED COUNT: 13.8 % (ref 11.5–14.5)
EST. GFR  (NO RACE VARIABLE): >60 ML/MIN/1.73 M^2
GLUCOSE SERPL-MCNC: 118 MG/DL (ref 70–110)
HCT VFR BLD AUTO: 43.7 % (ref 40–54)
HGB BLD-MCNC: 14.2 G/DL (ref 14–18)
IMM GRANULOCYTES # BLD AUTO: 0.09 K/UL (ref 0–0.04)
IMM GRANULOCYTES NFR BLD AUTO: 0.7 % (ref 0–0.5)
LYMPHOCYTES # BLD AUTO: 1.3 K/UL (ref 1–4.8)
LYMPHOCYTES NFR BLD: 9.6 % (ref 18–48)
MAGNESIUM SERPL-MCNC: 2.1 MG/DL (ref 1.6–2.6)
MCH RBC QN AUTO: 30.7 PG (ref 27–31)
MCHC RBC AUTO-ENTMCNC: 32.5 G/DL (ref 32–36)
MCV RBC AUTO: 94 FL (ref 82–98)
MONOCYTES # BLD AUTO: 1.6 K/UL (ref 0.3–1)
MONOCYTES NFR BLD: 11.8 % (ref 4–15)
NEUTROPHILS # BLD AUTO: 10.6 K/UL (ref 1.8–7.7)
NEUTROPHILS NFR BLD: 77.8 % (ref 38–73)
NRBC BLD-RTO: 0 /100 WBC
PHOSPHATE SERPL-MCNC: 2.8 MG/DL (ref 2.7–4.5)
PLATELET # BLD AUTO: 238 K/UL (ref 150–450)
PMV BLD AUTO: 11.1 FL (ref 9.2–12.9)
POTASSIUM SERPL-SCNC: 4.1 MMOL/L (ref 3.5–5.1)
PROT SERPL-MCNC: 6.1 G/DL (ref 6–8.4)
RBC # BLD AUTO: 4.63 M/UL (ref 4.6–6.2)
SODIUM SERPL-SCNC: 140 MMOL/L (ref 136–145)
WBC # BLD AUTO: 13.59 K/UL (ref 3.9–12.7)

## 2023-10-30 PROCEDURE — 99900031 HC PATIENT EDUCATION (STAT)

## 2023-10-30 PROCEDURE — 25000003 PHARM REV CODE 250: Performed by: STUDENT IN AN ORGANIZED HEALTH CARE EDUCATION/TRAINING PROGRAM

## 2023-10-30 PROCEDURE — 63600175 PHARM REV CODE 636 W HCPCS: Performed by: NURSE PRACTITIONER

## 2023-10-30 PROCEDURE — 94761 N-INVAS EAR/PLS OXIMETRY MLT: CPT

## 2023-10-30 PROCEDURE — 63600175 PHARM REV CODE 636 W HCPCS: Mod: JZ,TB | Performed by: STUDENT IN AN ORGANIZED HEALTH CARE EDUCATION/TRAINING PROGRAM

## 2023-10-30 PROCEDURE — 94660 CPAP INITIATION&MGMT: CPT

## 2023-10-30 PROCEDURE — 80053 COMPREHEN METABOLIC PANEL: CPT | Performed by: NURSE PRACTITIONER

## 2023-10-30 PROCEDURE — 12000002 HC ACUTE/MED SURGE SEMI-PRIVATE ROOM

## 2023-10-30 PROCEDURE — 25000242 PHARM REV CODE 250 ALT 637 W/ HCPCS: Performed by: INTERNAL MEDICINE

## 2023-10-30 PROCEDURE — 85025 COMPLETE CBC W/AUTO DIFF WBC: CPT | Performed by: STUDENT IN AN ORGANIZED HEALTH CARE EDUCATION/TRAINING PROGRAM

## 2023-10-30 PROCEDURE — 25000003 PHARM REV CODE 250: Performed by: NURSE PRACTITIONER

## 2023-10-30 PROCEDURE — 94640 AIRWAY INHALATION TREATMENT: CPT

## 2023-10-30 PROCEDURE — 36415 COLL VENOUS BLD VENIPUNCTURE: CPT | Performed by: STUDENT IN AN ORGANIZED HEALTH CARE EDUCATION/TRAINING PROGRAM

## 2023-10-30 PROCEDURE — 93010 ELECTROCARDIOGRAM REPORT: CPT | Mod: ,,, | Performed by: GENERAL PRACTICE

## 2023-10-30 PROCEDURE — 36415 COLL VENOUS BLD VENIPUNCTURE: CPT | Performed by: NURSE PRACTITIONER

## 2023-10-30 PROCEDURE — 99900035 HC TECH TIME PER 15 MIN (STAT)

## 2023-10-30 PROCEDURE — 25000003 PHARM REV CODE 250: Performed by: INTERNAL MEDICINE

## 2023-10-30 PROCEDURE — 84100 ASSAY OF PHOSPHORUS: CPT | Performed by: NURSE PRACTITIONER

## 2023-10-30 PROCEDURE — 83735 ASSAY OF MAGNESIUM: CPT | Performed by: NURSE PRACTITIONER

## 2023-10-30 PROCEDURE — 93010 EKG 12-LEAD: ICD-10-PCS | Mod: ,,, | Performed by: GENERAL PRACTICE

## 2023-10-30 PROCEDURE — 93005 ELECTROCARDIOGRAM TRACING: CPT | Performed by: GENERAL PRACTICE

## 2023-10-30 PROCEDURE — 94799 UNLISTED PULMONARY SVC/PX: CPT

## 2023-10-30 RX ORDER — HYDRALAZINE HYDROCHLORIDE 20 MG/ML
10 INJECTION INTRAMUSCULAR; INTRAVENOUS EVERY 6 HOURS PRN
Status: DISCONTINUED | OUTPATIENT
Start: 2023-10-30 | End: 2023-11-03 | Stop reason: HOSPADM

## 2023-10-30 RX ORDER — BENZONATATE 100 MG/1
200 CAPSULE ORAL 3 TIMES DAILY PRN
Status: DISCONTINUED | OUTPATIENT
Start: 2023-10-30 | End: 2023-11-03 | Stop reason: HOSPADM

## 2023-10-30 RX ORDER — MUPIROCIN 20 MG/G
OINTMENT TOPICAL 2 TIMES DAILY
Status: DISCONTINUED | OUTPATIENT
Start: 2023-10-30 | End: 2023-11-03 | Stop reason: HOSPADM

## 2023-10-30 RX ORDER — GUAIFENESIN 100 MG/5ML
200 SOLUTION ORAL 2 TIMES DAILY
Status: DISCONTINUED | OUTPATIENT
Start: 2023-10-30 | End: 2023-10-31

## 2023-10-30 RX ORDER — BENZONATATE 100 MG/1
200 CAPSULE ORAL 3 TIMES DAILY
Status: DISCONTINUED | OUTPATIENT
Start: 2023-10-30 | End: 2023-11-03 | Stop reason: HOSPADM

## 2023-10-30 RX ORDER — AMLODIPINE BESYLATE 5 MG/1
10 TABLET ORAL DAILY
Status: DISCONTINUED | OUTPATIENT
Start: 2023-10-30 | End: 2023-11-03 | Stop reason: HOSPADM

## 2023-10-30 RX ORDER — HYDROCODONE POLISTIREX AND CHLORPHENIRAMINE POLISTIREX 10; 8 MG/5ML; MG/5ML
5 SUSPENSION, EXTENDED RELEASE ORAL EVERY 12 HOURS
Status: DISCONTINUED | OUTPATIENT
Start: 2023-10-30 | End: 2023-11-03 | Stop reason: HOSPADM

## 2023-10-30 RX ADMIN — MONTELUKAST 10 MG: 10 TABLET, FILM COATED ORAL at 09:10

## 2023-10-30 RX ADMIN — POTASSIUM CHLORIDE 10 MEQ: 750 CAPSULE, EXTENDED RELEASE ORAL at 08:10

## 2023-10-30 RX ADMIN — Medication 400 MG: at 08:10

## 2023-10-30 RX ADMIN — THERA TABS 1 TABLET: TAB at 08:10

## 2023-10-30 RX ADMIN — OXYCODONE HYDROCHLORIDE AND ACETAMINOPHEN 500 MG: 500 TABLET ORAL at 09:10

## 2023-10-30 RX ADMIN — LEVOTHYROXINE SODIUM 88 MCG: 0.09 TABLET ORAL at 05:10

## 2023-10-30 RX ADMIN — AMIODARONE HYDROCHLORIDE 200 MG: 200 TABLET ORAL at 09:10

## 2023-10-30 RX ADMIN — AMIODARONE HYDROCHLORIDE 200 MG: 200 TABLET ORAL at 08:10

## 2023-10-30 RX ADMIN — REMDESIVIR 100 MG: 100 INJECTION, POWDER, LYOPHILIZED, FOR SOLUTION INTRAVENOUS at 06:10

## 2023-10-30 RX ADMIN — AMLODIPINE BESYLATE 10 MG: 5 TABLET ORAL at 08:10

## 2023-10-30 RX ADMIN — SACUBITRIL AND VALSARTAN 1 TABLET: 49; 51 TABLET, FILM COATED ORAL at 09:10

## 2023-10-30 RX ADMIN — FUROSEMIDE 40 MG: 40 TABLET ORAL at 08:10

## 2023-10-30 RX ADMIN — Medication 400 UNITS: at 08:10

## 2023-10-30 RX ADMIN — HYDROCODONE BITARTRATE AND ACETAMINOPHEN 1 TABLET: 5; 325 TABLET ORAL at 08:10

## 2023-10-30 RX ADMIN — IPRATROPIUM BROMIDE AND ALBUTEROL SULFATE 3 ML: 2.5; .5 SOLUTION RESPIRATORY (INHALATION) at 01:10

## 2023-10-30 RX ADMIN — BENZONATATE 200 MG: 100 CAPSULE ORAL at 09:10

## 2023-10-30 RX ADMIN — DEXAMETHASONE 6 MG: 4 TABLET ORAL at 08:10

## 2023-10-30 RX ADMIN — ATORVASTATIN CALCIUM 20 MG: 20 TABLET, FILM COATED ORAL at 08:10

## 2023-10-30 RX ADMIN — SACUBITRIL AND VALSARTAN 1 TABLET: 49; 51 TABLET, FILM COATED ORAL at 08:10

## 2023-10-30 RX ADMIN — IPRATROPIUM BROMIDE AND ALBUTEROL SULFATE 3 ML: 2.5; .5 SOLUTION RESPIRATORY (INHALATION) at 08:10

## 2023-10-30 RX ADMIN — GUAIFENESIN 200 MG: 200 SOLUTION ORAL at 10:10

## 2023-10-30 RX ADMIN — APIXABAN 5 MG: 5 TABLET, FILM COATED ORAL at 08:10

## 2023-10-30 RX ADMIN — ARFORMOTEROL TARTRATE 15 MCG: 15 SOLUTION RESPIRATORY (INHALATION) at 08:10

## 2023-10-30 RX ADMIN — HYDRALAZINE HYDROCHLORIDE 10 MG: 20 INJECTION, SOLUTION INTRAMUSCULAR; INTRAVENOUS at 10:10

## 2023-10-30 RX ADMIN — BUDESONIDE INHALATION 0.5 MG: 0.5 SUSPENSION RESPIRATORY (INHALATION) at 08:10

## 2023-10-30 RX ADMIN — Medication 1000 UNITS: at 08:10

## 2023-10-30 RX ADMIN — TAMSULOSIN HYDROCHLORIDE 0.4 MG: 0.4 CAPSULE ORAL at 08:10

## 2023-10-30 RX ADMIN — OXYCODONE HYDROCHLORIDE AND ACETAMINOPHEN 500 MG: 500 TABLET ORAL at 08:10

## 2023-10-30 RX ADMIN — GUAIFENESIN 200 MG: 200 SOLUTION ORAL at 09:10

## 2023-10-30 RX ADMIN — BENZONATATE 200 MG: 100 CAPSULE ORAL at 05:10

## 2023-10-30 RX ADMIN — MUPIROCIN 1 G: 20 OINTMENT TOPICAL at 10:10

## 2023-10-30 RX ADMIN — GUAIFENESIN AND DEXTROMETHORPHAN 10 ML: 100; 10 SYRUP ORAL at 05:10

## 2023-10-30 RX ADMIN — BENZONATATE 100 MG: 100 CAPSULE ORAL at 05:10

## 2023-10-30 RX ADMIN — MUPIROCIN 1 G: 20 OINTMENT TOPICAL at 09:10

## 2023-10-30 RX ADMIN — HYDROCODONE POLISTIREX AND CHLORPHENIRAMINE POLISTIREX 5 ML: 10; 8 SUSPENSION, EXTENDED RELEASE ORAL at 09:10

## 2023-10-30 RX ADMIN — APIXABAN 5 MG: 5 TABLET, FILM COATED ORAL at 09:10

## 2023-10-30 RX ADMIN — HYDROCODONE BITARTRATE AND ACETAMINOPHEN 1 TABLET: 5; 325 TABLET ORAL at 05:10

## 2023-10-30 NOTE — ASSESSMENT & PLAN NOTE
Improving- getting remdesivir- will complete total 5 days  C/w decadron and breathing rx  Cough meds prn

## 2023-10-30 NOTE — SUBJECTIVE & OBJECTIVE
Interval History: Pt is coughing and was concerned that if this cough any sign of worsening covid-19 then we had discussion about his covid-19 situation getting better- otherwise he is not using oxygen and not reporting of any chest pain, sob, fever, chills, n/v, urine or bowel problem.     Review of Systems   Constitutional:  Negative for activity change, appetite change, chills and fever.   HENT:  Negative for congestion, ear pain and nosebleeds.    Eyes:  Negative for itching and visual disturbance.   Respiratory:  Positive for cough. Negative for apnea, chest tightness, shortness of breath and wheezing.    Cardiovascular:  Negative for chest pain, palpitations and leg swelling.   Gastrointestinal:  Negative for abdominal distention, abdominal pain, constipation, diarrhea, nausea and vomiting.   Genitourinary:  Negative for dysuria and flank pain.   Musculoskeletal:  Negative for back pain.   Neurological:  Negative for dizziness and headaches.     Objective:     Vital Signs (Most Recent):  Temp: 98.2 °F (36.8 °C) (10/30/23 1143)  Pulse: 67 (10/30/23 1143)  Resp: 18 (10/30/23 1143)  BP: (!) 144/73 (informed nurse) (10/30/23 1143)  SpO2: 95 % (10/30/23 1143) Vital Signs (24h Range):  Temp:  [97.5 °F (36.4 °C)-98.8 °F (37.1 °C)] 98.2 °F (36.8 °C)  Pulse:  [54-70] 67  Resp:  [16-19] 18  SpO2:  [92 %-98 %] 95 %  BP: (144-189)/(68-89) 144/73     Weight: 117.9 kg (259 lb 14.8 oz)  Body mass index is 39.52 kg/m².    Intake/Output Summary (Last 24 hours) at 10/30/2023 1332  Last data filed at 10/30/2023 0541  Gross per 24 hour   Intake 750 ml   Output 200 ml   Net 550 ml         Physical Exam  Vitals reviewed.   Constitutional:       General: He is not in acute distress.     Appearance: Normal appearance. He is not ill-appearing, toxic-appearing or diaphoretic.   HENT:      Head: Normocephalic and atraumatic.      Mouth/Throat:      Mouth: Mucous membranes are moist.      Pharynx: Oropharynx is clear.   Eyes:       General: No scleral icterus.     Conjunctiva/sclera: Conjunctivae normal.   Neck:      Vascular: No carotid bruit.   Cardiovascular:      Rate and Rhythm: Normal rate and regular rhythm.      Pulses: Normal pulses.      Heart sounds: Normal heart sounds.   Pulmonary:      Effort: Pulmonary effort is normal.      Breath sounds: Normal breath sounds.   Abdominal:      General: Abdomen is flat. Bowel sounds are normal.      Palpations: Abdomen is soft.   Musculoskeletal:         General: Normal range of motion.   Skin:     General: Skin is warm.   Neurological:      General: No focal deficit present.      Mental Status: He is alert and oriented to person, place, and time. Mental status is at baseline.   Psychiatric:         Mood and Affect: Mood normal.         Behavior: Behavior normal.             Significant Labs: All pertinent labs within the past 24 hours have been reviewed.  BMP:   Recent Labs   Lab 10/30/23  0417   *      K 4.1      CO2 26   BUN 21   CREATININE 1.0   CALCIUM 8.4*   MG 2.1     CBC:   Recent Labs   Lab 10/30/23  0815   WBC 13.59*   HGB 14.2   HCT 43.7        CMP:   Recent Labs   Lab 10/29/23  0536 10/30/23  0417    140   K 4.0 4.1    108   CO2 28 26   * 118*   BUN 24* 21   CREATININE 0.9 1.0   CALCIUM 8.6* 8.4*   PROT 6.1 6.1   ALBUMIN 3.7 3.6   BILITOT 0.3 0.2   ALKPHOS 62 61   AST 18 20   ALT 21 23   ANIONGAP 5* 6*     Magnesium:   Recent Labs   Lab 10/29/23  0536 10/30/23  0417   MG 2.2 2.1       Significant Imaging: I have reviewed all pertinent imaging results/findings within the past 24 hours.

## 2023-10-30 NOTE — PROGRESS NOTES
[facility] Medicine  Progress Note    Patient Name: Nico Teague  MRN: [id number]  Patient Class: IP- Inpatient   Admission Date: 10/26/2023  Length of Stay: 3 days  Attending Physician: Anita Gonzalez MD  Primary Care Provider: Gelacio Cantu Jr., MD        Subjective:     Principal Problem:Acute hypoxemic respiratory failure due to COVID-19        HPI:  No notes on file    Overview/Hospital Course:  No notes on file    Interval History: Pt is coughing and was concerned that if this cough any sign of worsening covid-19 then we had discussion about his covid-19 situation getting better- otherwise he is not using oxygen and not reporting of any chest pain, sob, fever, chills, n/v, urine or bowel problem.     Review of Systems   Constitutional:  Negative for activity change, appetite change, chills and fever.   HENT:  Negative for congestion, ear pain and nosebleeds.    Eyes:  Negative for itching and visual disturbance.   Respiratory:  Positive for cough. Negative for apnea, chest tightness, shortness of breath and wheezing.    Cardiovascular:  Negative for chest pain, palpitations and leg swelling.   Gastrointestinal:  Negative for abdominal distention, abdominal pain, constipation, diarrhea, nausea and vomiting.   Genitourinary:  Negative for dysuria and flank pain.   Musculoskeletal:  Negative for back pain.   Neurological:  Negative for dizziness and headaches.     Objective:     Vital Signs (Most Recent):  Temp: 98.2 °F (36.8 °C) (10/30/23 1143)  Pulse: 67 (10/30/23 1143)  Resp: 18 (10/30/23 1143)  BP: (!) 144/73 (informed nurse) (10/30/23 1143)  SpO2: 95 % (10/30/23 1143) Vital Signs (24h Range):  Temp:  [97.5 °F (36.4 °C)-98.8 °F (37.1 °C)] 98.2 °F (36.8 °C)  Pulse:  [54-70] 67  Resp:  [16-19] 18  SpO2:  [92 %-98 %] 95 %  BP: (144-189)/(68-89) 144/73     Weight: 117.9 kg (259 lb 14.8 oz)  Body mass index is 39.52 kg/m².    Intake/Output Summary (Last 24 hours) at 10/30/2023  1332  Last data filed at 10/30/2023 0541  Gross per 24 hour   Intake 750 ml   Output 200 ml   Net 550 ml         Physical Exam  Vitals reviewed.   Constitutional:       General: He is not in acute distress.     Appearance: Normal appearance. He is not ill-appearing, toxic-appearing or diaphoretic.   HENT:      Head: Normocephalic and atraumatic.      Mouth/Throat:      Mouth: Mucous membranes are moist.      Pharynx: Oropharynx is clear.   Eyes:      General: No scleral icterus.     Conjunctiva/sclera: Conjunctivae normal.   Neck:      Vascular: No carotid bruit.   Cardiovascular:      Rate and Rhythm: Normal rate and regular rhythm.      Pulses: Normal pulses.      Heart sounds: Normal heart sounds.   Pulmonary:      Effort: Pulmonary effort is normal.      Breath sounds: Normal breath sounds.   Abdominal:      General: Abdomen is flat. Bowel sounds are normal.      Palpations: Abdomen is soft.   Musculoskeletal:         General: Normal range of motion.   Skin:     General: Skin is warm.   Neurological:      General: No focal deficit present.      Mental Status: He is alert and oriented to person, place, and time. Mental status is at baseline.   Psychiatric:         Mood and Affect: Mood normal.         Behavior: Behavior normal.             Significant Labs: All pertinent labs within the past 24 hours have been reviewed.  BMP:   Recent Labs   Lab 10/30/23  0417   *      K 4.1      CO2 26   BUN 21   CREATININE 1.0   CALCIUM 8.4*   MG 2.1     CBC:   Recent Labs   Lab 10/30/23  0815   WBC 13.59*   HGB 14.2   HCT 43.7        CMP:   Recent Labs   Lab 10/29/23  0536 10/30/23  0417    140   K 4.0 4.1    108   CO2 28 26   * 118*   BUN 24* 21   CREATININE 0.9 1.0   CALCIUM 8.6* 8.4*   PROT 6.1 6.1   ALBUMIN 3.7 3.6   BILITOT 0.3 0.2   ALKPHOS 62 61   AST 18 20   ALT 21 23   ANIONGAP 5* 6*     Magnesium:   Recent Labs   Lab 10/29/23  0536 10/30/23  0417   MG 2.2 2.1        Significant Imaging: I have reviewed all pertinent imaging results/findings within the past 24 hours.      Assessment/Plan:      * Acute hypoxemic respiratory failure due to COVID-19  Improving- getting remdesivir- will complete total 5 days  C/w decadron and breathing rx  Cough meds prn    Cough  Likely post-infectious from covid-19 bronchitis.   C/w cough meds      Asthma  C/w steroids and breathing rx as scheduled      COVID-19  Vaccinated  improving  C/w decadron and breathing rx  Cough meds prn    Hypothyroidism (acquired)  C/w home meds      Atrial fibrillation  C/w home meds    BPH (benign prostatic hyperplasia)  C/w home meds        VTE Risk Mitigation (From admission, onward)         Ordered     apixaban tablet 5 mg  2 times daily         10/27/23 0010                Discharge Planning   LC: 10/31/2023     Code Status: Full Code   Is the patient medically ready for discharge?:     Reason for patient still in hospital (select all that apply): Treatment  Discharge Plan A: Home with family                  Anita Gonzalez MD  Department of Hospital Medicine   [facility]

## 2023-10-30 NOTE — CARE UPDATE
10/30/23 0808   Patient Assessment/Suction   Level of Consciousness (AVPU) alert   Respiratory Effort Normal;Unlabored   Expansion/Accessory Muscles/Retractions expansion symmetric;no retractions;no use of accessory muscles   All Lung Fields Breath Sounds coarse   PRE-TX-O2   Device (Oxygen Therapy) room air   SpO2 98 %   Pulse Oximetry Type Intermittent   $ Pulse Oximetry - Multiple Charge Pulse Oximetry - Multiple   Aerosol Therapy   $ Aerosol Therapy Charges Aerosol Treatment   Daily Review of Necessity (SVN) completed   Respiratory Treatment Status (SVN) given   Treatment Route (SVN) mask;oxygen   Patient Position (SVN) HOB elevated   Post Treatment Assessment (SVN) breath sounds improved   Signs of Intolerance (SVN) none   Preset CPAP/BiPAP Settings   Mode Of Delivery CPAP   $ CPAP/BiPAP Daily Charge BiPAP/CPAP Daily   $ Is patient using? No/refused   Education   $ Education Bronchodilator;15 min   Respiratory Evaluation   $ Care Plan Tech Time 15 min   $ Eval/Re-eval Charges Re-evaluation

## 2023-10-30 NOTE — PROGRESS NOTES
Did not know patient was in the hospital.  He is still coughing excessively.  Will add Tessalon and Tussionex.  Will see him in the morning.

## 2023-10-31 LAB
ALBUMIN SERPL BCP-MCNC: 3.5 G/DL (ref 3.5–5.2)
ALP SERPL-CCNC: 70 U/L (ref 55–135)
ALT SERPL W/O P-5'-P-CCNC: 37 U/L (ref 10–44)
ANION GAP SERPL CALC-SCNC: 7 MMOL/L (ref 8–16)
AST SERPL-CCNC: 31 U/L (ref 10–40)
BILIRUB SERPL-MCNC: 0.4 MG/DL (ref 0.1–1)
BUN SERPL-MCNC: 22 MG/DL (ref 8–23)
CALCIUM SERPL-MCNC: 8.6 MG/DL (ref 8.7–10.5)
CHLORIDE SERPL-SCNC: 107 MMOL/L (ref 95–110)
CO2 SERPL-SCNC: 27 MMOL/L (ref 23–29)
CREAT SERPL-MCNC: 1 MG/DL (ref 0.5–1.4)
CRP SERPL-MCNC: 8.7 MG/L (ref 0–8.2)
EST. GFR  (NO RACE VARIABLE): >60 ML/MIN/1.73 M^2
GLUCOSE SERPL-MCNC: 107 MG/DL (ref 70–110)
GLUCOSE SERPL-MCNC: 126 MG/DL (ref 70–110)
GLUCOSE SERPL-MCNC: 188 MG/DL (ref 70–110)
GLUCOSE SERPL-MCNC: 90 MG/DL (ref 70–110)
LDH SERPL L TO P-CCNC: 212 U/L (ref 110–260)
MAGNESIUM SERPL-MCNC: 2.2 MG/DL (ref 1.6–2.6)
PHOSPHATE SERPL-MCNC: 3.2 MG/DL (ref 2.7–4.5)
POTASSIUM SERPL-SCNC: 3.8 MMOL/L (ref 3.5–5.1)
PROT SERPL-MCNC: 6 G/DL (ref 6–8.4)
SODIUM SERPL-SCNC: 141 MMOL/L (ref 136–145)

## 2023-10-31 PROCEDURE — 63600175 PHARM REV CODE 636 W HCPCS: Performed by: INTERNAL MEDICINE

## 2023-10-31 PROCEDURE — 99900031 HC PATIENT EDUCATION (STAT)

## 2023-10-31 PROCEDURE — 99900035 HC TECH TIME PER 15 MIN (STAT)

## 2023-10-31 PROCEDURE — 86140 C-REACTIVE PROTEIN: CPT | Performed by: NURSE PRACTITIONER

## 2023-10-31 PROCEDURE — 99223 1ST HOSP IP/OBS HIGH 75: CPT | Mod: ,,, | Performed by: INTERNAL MEDICINE

## 2023-10-31 PROCEDURE — 83735 ASSAY OF MAGNESIUM: CPT | Performed by: NURSE PRACTITIONER

## 2023-10-31 PROCEDURE — 94660 CPAP INITIATION&MGMT: CPT

## 2023-10-31 PROCEDURE — 94799 UNLISTED PULMONARY SVC/PX: CPT

## 2023-10-31 PROCEDURE — 63600175 PHARM REV CODE 636 W HCPCS: Performed by: NURSE PRACTITIONER

## 2023-10-31 PROCEDURE — 25000003 PHARM REV CODE 250: Performed by: STUDENT IN AN ORGANIZED HEALTH CARE EDUCATION/TRAINING PROGRAM

## 2023-10-31 PROCEDURE — 99223 PR INITIAL HOSPITAL CARE,LEVL III: ICD-10-PCS | Mod: ,,, | Performed by: INTERNAL MEDICINE

## 2023-10-31 PROCEDURE — 25000003 PHARM REV CODE 250: Performed by: INTERNAL MEDICINE

## 2023-10-31 PROCEDURE — 94761 N-INVAS EAR/PLS OXIMETRY MLT: CPT

## 2023-10-31 PROCEDURE — 94640 AIRWAY INHALATION TREATMENT: CPT

## 2023-10-31 PROCEDURE — 25000003 PHARM REV CODE 250: Performed by: NURSE PRACTITIONER

## 2023-10-31 PROCEDURE — 80053 COMPREHEN METABOLIC PANEL: CPT | Performed by: NURSE PRACTITIONER

## 2023-10-31 PROCEDURE — 25000242 PHARM REV CODE 250 ALT 637 W/ HCPCS: Performed by: INTERNAL MEDICINE

## 2023-10-31 PROCEDURE — 12000002 HC ACUTE/MED SURGE SEMI-PRIVATE ROOM

## 2023-10-31 PROCEDURE — 84100 ASSAY OF PHOSPHORUS: CPT | Performed by: NURSE PRACTITIONER

## 2023-10-31 PROCEDURE — 83615 LACTATE (LD) (LDH) ENZYME: CPT | Performed by: NURSE PRACTITIONER

## 2023-10-31 RX ORDER — BISACODYL 5 MG
10 TABLET, DELAYED RELEASE (ENTERIC COATED) ORAL DAILY
Status: DISCONTINUED | OUTPATIENT
Start: 2023-10-31 | End: 2023-11-02

## 2023-10-31 RX ORDER — GUAIFENESIN 600 MG/1
1200 TABLET, EXTENDED RELEASE ORAL 2 TIMES DAILY
Status: DISCONTINUED | OUTPATIENT
Start: 2023-10-31 | End: 2023-11-03 | Stop reason: HOSPADM

## 2023-10-31 RX ADMIN — Medication 6 MG: at 08:10

## 2023-10-31 RX ADMIN — DEXAMETHASONE 6 MG: 4 TABLET ORAL at 09:10

## 2023-10-31 RX ADMIN — LEVOTHYROXINE SODIUM 88 MCG: 0.09 TABLET ORAL at 05:10

## 2023-10-31 RX ADMIN — MUPIROCIN 1 G: 20 OINTMENT TOPICAL at 09:10

## 2023-10-31 RX ADMIN — APIXABAN 5 MG: 5 TABLET, FILM COATED ORAL at 09:10

## 2023-10-31 RX ADMIN — AMIODARONE HYDROCHLORIDE 200 MG: 200 TABLET ORAL at 09:10

## 2023-10-31 RX ADMIN — ARFORMOTEROL TARTRATE 15 MCG: 15 SOLUTION RESPIRATORY (INHALATION) at 08:10

## 2023-10-31 RX ADMIN — ARFORMOTEROL TARTRATE 15 MCG: 15 SOLUTION RESPIRATORY (INHALATION) at 07:10

## 2023-10-31 RX ADMIN — Medication 1000 UNITS: at 09:10

## 2023-10-31 RX ADMIN — OXYCODONE HYDROCHLORIDE AND ACETAMINOPHEN 500 MG: 500 TABLET ORAL at 09:10

## 2023-10-31 RX ADMIN — MONTELUKAST 10 MG: 10 TABLET, FILM COATED ORAL at 08:10

## 2023-10-31 RX ADMIN — GUAIFENESIN 1200 MG: 600 TABLET, EXTENDED RELEASE ORAL at 10:10

## 2023-10-31 RX ADMIN — BENZONATATE 200 MG: 100 CAPSULE ORAL at 08:10

## 2023-10-31 RX ADMIN — BISACODYL 10 MG: 5 TABLET, COATED ORAL at 10:10

## 2023-10-31 RX ADMIN — IPRATROPIUM BROMIDE AND ALBUTEROL SULFATE 3 ML: 2.5; .5 SOLUTION RESPIRATORY (INHALATION) at 02:10

## 2023-10-31 RX ADMIN — Medication 400 MG: at 09:10

## 2023-10-31 RX ADMIN — BENZONATATE 200 MG: 100 CAPSULE ORAL at 05:10

## 2023-10-31 RX ADMIN — ATORVASTATIN CALCIUM 20 MG: 20 TABLET, FILM COATED ORAL at 09:10

## 2023-10-31 RX ADMIN — METHYLPREDNISOLONE SODIUM SUCCINATE 60 MG: 40 INJECTION, POWDER, FOR SOLUTION INTRAMUSCULAR; INTRAVENOUS at 11:10

## 2023-10-31 RX ADMIN — IPRATROPIUM BROMIDE AND ALBUTEROL SULFATE 3 ML: 2.5; .5 SOLUTION RESPIRATORY (INHALATION) at 07:10

## 2023-10-31 RX ADMIN — BUDESONIDE INHALATION 0.5 MG: 0.5 SUSPENSION RESPIRATORY (INHALATION) at 08:10

## 2023-10-31 RX ADMIN — OXYCODONE HYDROCHLORIDE AND ACETAMINOPHEN 500 MG: 500 TABLET ORAL at 08:10

## 2023-10-31 RX ADMIN — GUAIFENESIN 200 MG: 200 SOLUTION ORAL at 09:10

## 2023-10-31 RX ADMIN — TAMSULOSIN HYDROCHLORIDE 0.4 MG: 0.4 CAPSULE ORAL at 09:10

## 2023-10-31 RX ADMIN — AMIODARONE HYDROCHLORIDE 200 MG: 200 TABLET ORAL at 08:10

## 2023-10-31 RX ADMIN — IPRATROPIUM BROMIDE AND ALBUTEROL SULFATE 3 ML: 2.5; .5 SOLUTION RESPIRATORY (INHALATION) at 12:10

## 2023-10-31 RX ADMIN — Medication 400 UNITS: at 09:10

## 2023-10-31 RX ADMIN — AMLODIPINE BESYLATE 10 MG: 5 TABLET ORAL at 09:10

## 2023-10-31 RX ADMIN — SACUBITRIL AND VALSARTAN 1 TABLET: 49; 51 TABLET, FILM COATED ORAL at 09:10

## 2023-10-31 RX ADMIN — SACUBITRIL AND VALSARTAN 1 TABLET: 49; 51 TABLET, FILM COATED ORAL at 08:10

## 2023-10-31 RX ADMIN — METHYLPREDNISOLONE SODIUM SUCCINATE 60 MG: 40 INJECTION, POWDER, FOR SOLUTION INTRAMUSCULAR; INTRAVENOUS at 10:10

## 2023-10-31 RX ADMIN — GUAIFENESIN 1200 MG: 600 TABLET, EXTENDED RELEASE ORAL at 08:10

## 2023-10-31 RX ADMIN — IPRATROPIUM BROMIDE AND ALBUTEROL SULFATE 3 ML: 2.5; .5 SOLUTION RESPIRATORY (INHALATION) at 08:10

## 2023-10-31 RX ADMIN — BUDESONIDE INHALATION 0.5 MG: 0.5 SUSPENSION RESPIRATORY (INHALATION) at 07:10

## 2023-10-31 RX ADMIN — BENZONATATE 200 MG: 100 CAPSULE ORAL at 09:10

## 2023-10-31 RX ADMIN — FUROSEMIDE 40 MG: 40 TABLET ORAL at 09:10

## 2023-10-31 RX ADMIN — MUPIROCIN 1 G: 20 OINTMENT TOPICAL at 08:10

## 2023-10-31 RX ADMIN — APIXABAN 5 MG: 5 TABLET, FILM COATED ORAL at 08:10

## 2023-10-31 RX ADMIN — THERA TABS 1 TABLET: TAB at 09:10

## 2023-10-31 RX ADMIN — POTASSIUM CHLORIDE 10 MEQ: 750 CAPSULE, EXTENDED RELEASE ORAL at 09:10

## 2023-10-31 NOTE — PROGRESS NOTES
[facility] Medicine  Progress Note    Patient Name: Nico Teague  MRN: [id number]  Patient Class: IP- Inpatient   Admission Date: 10/26/2023  Length of Stay: 4 days  Attending Physician: Anita Gonzalez MD  Primary Care Provider: Gelacio Cantu Jr., MD        Subjective:     Principal Problem:Acute hypoxemic respiratory failure due to COVID-19        HPI:  No notes on file    Overview/Hospital Course:  No notes on file    Interval History: Pt is wheezing, coughing still but no sob, fever, chills, malaise, no chest pain, urine problem. But getting constipated.     Review of Systems   Constitutional:  Negative for activity change, appetite change, chills and fever.   HENT:  Negative for congestion, ear pain and nosebleeds.    Eyes:  Negative for itching and visual disturbance.   Respiratory:  Positive for cough and wheezing. Negative for apnea, chest tightness and shortness of breath.    Cardiovascular:  Negative for chest pain, palpitations and leg swelling.   Gastrointestinal:  Negative for abdominal distention, abdominal pain, constipation, diarrhea, nausea and vomiting.   Genitourinary:  Negative for dysuria and flank pain.   Musculoskeletal:  Negative for back pain.   Neurological:  Negative for dizziness and headaches.     Objective:     Vital Signs (Most Recent):  Temp: 98.2 °F (36.8 °C) (10/31/23 1057)  Pulse: 71 (10/31/23 1413)  Resp: 18 (10/31/23 1413)  BP: (!) 142/79 (10/31/23 1057)  SpO2: 95 % (10/31/23 1413) Vital Signs (24h Range):  Temp:  [98 °F (36.7 °C)-98.5 °F (36.9 °C)] 98.2 °F (36.8 °C)  Pulse:  [50-86] 71  Resp:  [16-18] 18  SpO2:  [92 %-96 %] 95 %  BP: (142-189)/(71-87) 142/79     Weight: 117.9 kg (259 lb 14.8 oz)  Body mass index is 39.52 kg/m².    Intake/Output Summary (Last 24 hours) at 10/31/2023 1519  Last data filed at 10/31/2023 0959  Gross per 24 hour   Intake 920 ml   Output 400 ml   Net 520 ml         Physical Exam  Vitals reviewed.   Constitutional:        General: He is not in acute distress.     Appearance: Normal appearance. He is not ill-appearing, toxic-appearing or diaphoretic.   HENT:      Head: Normocephalic and atraumatic.      Mouth/Throat:      Mouth: Mucous membranes are moist.      Pharynx: Oropharynx is clear.   Eyes:      General: No scleral icterus.     Conjunctiva/sclera: Conjunctivae normal.   Neck:      Vascular: No carotid bruit.   Cardiovascular:      Rate and Rhythm: Normal rate and regular rhythm.      Pulses: Normal pulses.      Heart sounds: Normal heart sounds.   Pulmonary:      Effort: Pulmonary effort is normal.      Breath sounds: Wheezing present.   Abdominal:      General: Abdomen is flat. Bowel sounds are normal.      Palpations: Abdomen is soft.   Musculoskeletal:         General: Normal range of motion.   Skin:     General: Skin is warm.   Neurological:      General: No focal deficit present.      Mental Status: He is alert and oriented to person, place, and time. Mental status is at baseline.   Psychiatric:         Mood and Affect: Mood normal.         Behavior: Behavior normal.             Significant Labs: All pertinent labs within the past 24 hours have been reviewed.  BMP:   Recent Labs   Lab 10/31/23  0538         K 3.8      CO2 27   BUN 22   CREATININE 1.0   CALCIUM 8.6*   MG 2.2     CBC:   Recent Labs   Lab 10/30/23  0815   WBC 13.59*   HGB 14.2   HCT 43.7        CMP:   Recent Labs   Lab 10/30/23  0417 10/31/23  0538    141   K 4.1 3.8    107   CO2 26 27   * 107   BUN 21 22   CREATININE 1.0 1.0   CALCIUM 8.4* 8.6*   PROT 6.1 6.0   ALBUMIN 3.6 3.5   BILITOT 0.2 0.4   ALKPHOS 61 70   AST 20 31   ALT 23 37   ANIONGAP 6* 7*     Magnesium:   Recent Labs   Lab 10/30/23  0417 10/31/23  0538   MG 2.1 2.2       Significant Imaging: I have reviewed all pertinent imaging results/findings within the past 24 hours.      Assessment/Plan:      * Acute hypoxemic respiratory failure due to  COVID-19  Improving- s/p remdesivir  C/w solumedrol and breathing rx  Cough meds prn    Cough  Likely post-infectious from covid-19 bronchitis.   C/w cough meds      Asthma  C/w steroids and breathing rx as scheduled      COVID-19  Vaccinated  improving  C/w solumedrol and breathing rx  Cough meds prn    Hypothyroidism (acquired)  C/w home meds      Atrial fibrillation  C/w home meds    BPH (benign prostatic hyperplasia)  C/w home meds      VTE Risk Mitigation (From admission, onward)         Ordered     apixaban tablet 5 mg  2 times daily         10/27/23 0010                Discharge Planning   CL: 11/1/2023     Code Status: Full Code   Is the patient medically ready for discharge?:     Reason for patient still in hospital (select all that apply): Treatment  Discharge Plan A: Home                  Anita Gonzalez MD  Department of Hospital Medicine   [facility]

## 2023-10-31 NOTE — PLAN OF CARE
Pt continues to require hospitalization. Possible discharge tomorrow, per Dr Brewer's note.       10/31/23 1019   Discharge Reassessment   Assessment Type Discharge Planning Reassessment   Did the patient's condition or plan change since previous assessment? No   Discharge Plan A Home   Discharge Plan B Home   DME Needed Upon Discharge  none   Why the patient remains in the hospital Requires continued medical care

## 2023-10-31 NOTE — CARE UPDATE
10/31/23 0743   Patient Assessment/Suction   Level of Consciousness (AVPU) alert   Respiratory Effort Normal;Unlabored   Expansion/Accessory Muscles/Retractions expansion symmetric;no retractions;no use of accessory muscles   All Lung Fields Breath Sounds coarse   PRE-TX-O2   Device (Oxygen Therapy) room air   SpO2 96 %   Pulse Oximetry Type Intermittent   $ Pulse Oximetry - Multiple Charge Pulse Oximetry - Multiple   Pulse 62   Resp 18   Aerosol Therapy   $ Aerosol Therapy Charges Aerosol Treatment   Daily Review of Necessity (SVN) completed   Respiratory Treatment Status (SVN) given   Treatment Route (SVN) mask;oxygen   Patient Position (SVN) HOB elevated   Post Treatment Assessment (SVN) breath sounds unchanged   Signs of Intolerance (SVN) none   Preset CPAP/BiPAP Settings   Mode Of Delivery Standby   $ CPAP/BiPAP Daily Charge BiPAP/CPAP Daily   $ Is patient using? No/refused   Education   $ Education Bronchodilator;15 min   Respiratory Evaluation   $ Care Plan Tech Time 15 min   $ Eval/Re-eval Charges Re-evaluation

## 2023-10-31 NOTE — SUBJECTIVE & OBJECTIVE
Interval History: Pt is wheezing, coughing still but no sob, fever, chills, malaise, no chest pain, urine problem. But getting constipated.     Review of Systems   Constitutional:  Negative for activity change, appetite change, chills and fever.   HENT:  Negative for congestion, ear pain and nosebleeds.    Eyes:  Negative for itching and visual disturbance.   Respiratory:  Positive for cough and wheezing. Negative for apnea, chest tightness and shortness of breath.    Cardiovascular:  Negative for chest pain, palpitations and leg swelling.   Gastrointestinal:  Negative for abdominal distention, abdominal pain, constipation, diarrhea, nausea and vomiting.   Genitourinary:  Negative for dysuria and flank pain.   Musculoskeletal:  Negative for back pain.   Neurological:  Negative for dizziness and headaches.     Objective:     Vital Signs (Most Recent):  Temp: 98.2 °F (36.8 °C) (10/31/23 1057)  Pulse: 71 (10/31/23 1413)  Resp: 18 (10/31/23 1413)  BP: (!) 142/79 (10/31/23 1057)  SpO2: 95 % (10/31/23 1413) Vital Signs (24h Range):  Temp:  [98 °F (36.7 °C)-98.5 °F (36.9 °C)] 98.2 °F (36.8 °C)  Pulse:  [50-86] 71  Resp:  [16-18] 18  SpO2:  [92 %-96 %] 95 %  BP: (142-189)/(71-87) 142/79     Weight: 117.9 kg (259 lb 14.8 oz)  Body mass index is 39.52 kg/m².    Intake/Output Summary (Last 24 hours) at 10/31/2023 1519  Last data filed at 10/31/2023 0959  Gross per 24 hour   Intake 920 ml   Output 400 ml   Net 520 ml         Physical Exam  Vitals reviewed.   Constitutional:       General: He is not in acute distress.     Appearance: Normal appearance. He is not ill-appearing, toxic-appearing or diaphoretic.   HENT:      Head: Normocephalic and atraumatic.      Mouth/Throat:      Mouth: Mucous membranes are moist.      Pharynx: Oropharynx is clear.   Eyes:      General: No scleral icterus.     Conjunctiva/sclera: Conjunctivae normal.   Neck:      Vascular: No carotid bruit.   Cardiovascular:      Rate and Rhythm: Normal rate and  regular rhythm.      Pulses: Normal pulses.      Heart sounds: Normal heart sounds.   Pulmonary:      Effort: Pulmonary effort is normal.      Breath sounds: Wheezing present.   Abdominal:      General: Abdomen is flat. Bowel sounds are normal.      Palpations: Abdomen is soft.   Musculoskeletal:         General: Normal range of motion.   Skin:     General: Skin is warm.   Neurological:      General: No focal deficit present.      Mental Status: He is alert and oriented to person, place, and time. Mental status is at baseline.   Psychiatric:         Mood and Affect: Mood normal.         Behavior: Behavior normal.             Significant Labs: All pertinent labs within the past 24 hours have been reviewed.  BMP:   Recent Labs   Lab 10/31/23  0538         K 3.8      CO2 27   BUN 22   CREATININE 1.0   CALCIUM 8.6*   MG 2.2     CBC:   Recent Labs   Lab 10/30/23  0815   WBC 13.59*   HGB 14.2   HCT 43.7        CMP:   Recent Labs   Lab 10/30/23  0417 10/31/23  0538    141   K 4.1 3.8    107   CO2 26 27   * 107   BUN 21 22   CREATININE 1.0 1.0   CALCIUM 8.4* 8.6*   PROT 6.1 6.0   ALBUMIN 3.6 3.5   BILITOT 0.2 0.4   ALKPHOS 61 70   AST 20 31   ALT 23 37   ANIONGAP 6* 7*     Magnesium:   Recent Labs   Lab 10/30/23  0417 10/31/23  0538   MG 2.1 2.2       Significant Imaging: I have reviewed all pertinent imaging results/findings within the past 24 hours.

## 2023-10-31 NOTE — CONSULTS
Pulmonary/Critical Care Consult      PATIENT NAME: Nico Teague  MRN: [id number]  TODAY'S DATE: 10/31/2023  9:07 AM  ADMIT DATE: 10/26/2023  AGE: 73 y.o. : 1950    CONSULT REQUESTED BY: Anita Gonzalez MD    REASON FOR CONSULT:   COVID and cough and asthma and BANDAR    HPI:  Patient is a 73-year-old male whom we follow for asthma and obstructive sleep apnea who called on the  to say that he was febrile and very short of breath and COVID positive.  He was referred to the ER where he was admitted.  I got a notice yesterday that he was still in the hospital and still coughing a great deal.    The patient complains he did not sleep at all last night because he is coughing so much and did not find any relief with the Tessalon and Tussionex.  He is making upper airway noises.  He complains of being terribly constipated.  His chest and abdomen hurt from coughing.  He was only able to wear CPAP till midnight because he was coughing so much.  He feels terrible.  On the bright side, he is neither febrile nor hypoxemic    REVIEW OF SYSTEMS  GENERAL: Feeling terrible  EYES: Vision is good.  ENT: No sinusitis or pharyngitis.   HEART: No chest pain or palpitations.  LUNGS:  He is coughing and bringing up small balls of dark green mucous.  Is coughing too much to tolerate his CPAP  GI:  He is terribly constipated  : No dysuria, hesitancy, or nocturia.  SKIN: No lesions or rashes.  MUSCULOSKELETAL: No joint pain or myalgias.  NEURO:  He has bilateral tremors because he is exhausted.  His wife reports 2 episodes of right-sided total body tremors lasting about 30 seconds each time.  LYMPH: No edema or adenopathy.  PSYCH: No anxiety or depression.  ENDO: No weight change.    ALLERGIES  Review of patient's allergies indicates:   Allergen Reactions    Bactrim [sulfamethoxazole-trimethoprim]      HIVES/ RASH    Green tea (rashida sinensis) Hives     Pt is has allergies to black tea. Not an option under search.       INPATIENT  SCHEDULED MEDICATIONS   albuterol-ipratropium  3 mL Nebulization Q6H    amiodarone  200 mg Oral BID    amLODIPine  10 mg Oral Daily    apixaban  5 mg Oral BID    arformoteroL  15 mcg Nebulization BID    ascorbic acid (vitamin C)  500 mg Oral BID    aspirin  81 mg Oral Q7 Days    atorvastatin  20 mg Oral Daily    benzonatate  200 mg Oral TID    budesonide  0.5 mg Nebulization Q12H    dexAMETHasone  6 mg Oral Daily    furosemide  40 mg Oral Daily    guaiFENesin 100 mg/5 ml  200 mg Oral BID    hydrocodone-chlorpheniramine  5 mL Oral Q12H    levothyroxine  88 mcg Oral Before breakfast    magnesium oxide  400 mg Oral Daily    montelukast  10 mg Oral QHS    multivitamin  1 tablet Oral Daily    mupirocin   Nasal BID    potassium chloride  10 mEq Oral Daily    sacubitriL-valsartan  1 tablet Oral BID    tamsulosin  0.4 mg Oral Daily    vitamin D  1,000 Units Oral Daily    vitamin E (dl, acetate)  400 Units Oral Daily         MEDICAL AND SURGICAL HISTORY  Past Medical History:   Diagnosis Date    Allergy     Arthritis     Asthma     Atrial fibrillation     Basal cell carcinoma     Fever blister     Hypertension     Hypothyroidism (acquired) 7/31/2023    Joint pain     Melanoma     BANDAR on CPAP     PVC (premature ventricular contraction)     Skin disease     Squamous cell carcinoma      Past Surgical History:   Procedure Laterality Date    BRONCHOSCOPY N/A 7/18/2022    Procedure: Bronchoscopy;  Surgeon: Charlee Brewer MD;  Location: [facility];  Service: Pulmonary;  Laterality: N/A;  monday 7/18/22 at 0830 look see bronch no biopsy    CARDIAC CATHETERIZATION  05/25/2021    ESOPHAGOGASTRODUODENOSCOPY N/A 7/7/2022    Procedure: EGD (ESOPHAGOGASTRODUODENOSCOPY);  Surgeon: Emmanuel Deleon MD;  Location: [facility];  Service: Endoscopy;  Laterality: N/A;    EYE SURGERY Left 12/03/2019    EYE SURGERY Right 01/28/2020    LIPOMA RESECTION      NOSE SURGERY      SHOULDER ARTHROSCOPY      SKIN CANCER EXCISION      Moh's x 2    SKIN  GRAFT         ALCOHOL, TOBACCO AND DRUG USE  Social History     Tobacco Use   Smoking Status Former    Current packs/day: 0.00    Average packs/day: 2.0 packs/day for 20.0 years (40.0 ttl pk-yrs)    Types: Cigarettes    Start date: 1972    Quit date: 1988    Years since quittin.2    Passive exposure: Past   Smokeless Tobacco Never     Social History     Substance and Sexual Activity   Alcohol Use No     Social History     Substance and Sexual Activity   Drug Use No       FAMILY HISTORY  Family History   Problem Relation Age of Onset    Cancer Mother         esophageal    Heart disease Father     Hypertension Father     Arthritis Paternal Grandmother        VITAL SIGNS (MOST RECENT)  Temp: 98.1 °F (36.7 °C) (10/31/23 0712)  Pulse: 62 (10/31/23 0743)  Resp: 18 (10/31/23 0743)  BP: (!) 169/71 (notified nurse anita) (10/31/23 0712)  SpO2: 96 % (10/31/23 0743)    INTAKE AND OUTPUT (LAST 24 HOURS):  Intake/Output Summary (Last 24 hours) at 10/31/2023 0907  Last data filed at 10/31/2023 0319  Gross per 24 hour   Intake 440 ml   Output 400 ml   Net 40 ml       WEIGHT  Wt Readings from Last 1 Encounters:   10/27/23 117.9 kg (259 lb 14.8 oz)       PHYSICAL EXAM  GENERAL: Older patient who appears anxious  HEENT: Pupils equal and reactive. Extraocular movements intact. Nose intact. Pharynx moist.  He has some upper airway nasal noise when he breathes  NECK: Supple.   HEART: Regular rate and rhythm. No murmur or gallop auscultated.  LUNGS: Clear to auscultation and percussion. Lung excursion symmetrical. No change in fremitus. No adventitial noises.  ABDOMEN: Bowel sounds present.  Abdomen is firm and distended.   : Normal anatomy.  EXTREMITIES: Normal muscle tone and joint movement, no cyanosis or clubbing.   LYMPHATICS: No adenopathy palpated, no edema.  SKIN: Dry, intact, no lesions.   NEURO: Cranial nerves II-XII intact. Motor strength 5/5 bilaterally, upper and lower extremities.  PSYCH:  Anxious    ACUTE  "PHASE REACTANT (LAST 24 HOURS)  Recent Labs   Lab 10/31/23  0538          CBC LAST (LAST 24 HOURS)  No results for input(s): "WBC", "RBC", "HGB", "HCT", "MCV", "MCH", "MCHC", "RDW", "PLT", "MPV", "GRAN", "LYMPH", "MONO", "BASO", "NRBC" in the last 24 hours.    CHEMISTRY LAST (LAST 24 HOURS)  Recent Labs   Lab 10/31/23  0538      K 3.8      CO2 27   ANIONGAP 7*   BUN 22   CREATININE 1.0      CALCIUM 8.6*   MG 2.2   ALBUMIN 3.5   PROT 6.0   ALKPHOS 70   ALT 37   AST 31   BILITOT 0.4         CARDIAC PROFILE (LAST 24 HOURS)  Recent Labs   Lab 10/26/23  2024 10/27/23  0016 10/31/23  0538   BNP 85  --   --    LDH  --    < > 212   TROPONINIHS 8.5  --   --     < > = values in this interval not displayed.       LAST 7 DAYS MICROBIOLOGY   Microbiology Results (last 7 days)       ** No results found for the last 168 hours. **            MOST RECENT IMAGING  X-Ray Chest 1 View  HISTORY: Chills and dyspnea.    FINDINGS: Portable chest radiograph at 1425 hours compared to prior exams shows the cardiomediastinal silhouette and pulmonary vasculature are within normal limits. There are aortic vascular calcifications.    The lungs are normally expanded, with no consolidation, large pleural effusion, or evidence of pulmonary edema. No confluent infiltrates or pneumothorax. There are no significant osseous abnormalities.    IMPRESSION: No evidence of active cardiopulmonary disease.    Electronically signed by:  Riley Stevenson MD  10/29/2023 02:58 PM CDT Workstation: [id number]      CURRENT VISIT EKG  Results for orders placed or performed during the hospital encounter of 10/26/23   EKG 12-lead    Narrative    Test Reason : R94.31,    Vent. Rate : 072 BPM     Atrial Rate : 072 BPM     P-R Int : 166 ms          QRS Dur : 112 ms      QT Int : 428 ms       P-R-T Axes : 032 -43 047 degrees     QTc Int : 468 ms    Sinus rhythm with sinus arrhythmia with occasional Premature ventricular  complexes  Left axis " deviation  Moderate voltage criteria for LVH, may be normal variant ( R in aVL ,   Ra product )  Abnormal ECG  When compared with ECG of 26-OCT-2023 20:40,  Premature ventricular complexes are now Present    Referred By: ANITA   SELF           Confirmed By:        ECHOCARDIOGRAM RESULTS  No results found for this or any previous visit.             The patient is on room air         LAST ARTERIAL BLOOD GAS  ABG  Recent Labs   Lab 10/26/23  2117   PH 7.418   PO2 57*   PCO2 34.3*   HCO3 22.2*   BE -2       IMPRESSION AND PLAN  COVID-19 infection  Asthma  - will ask respiratory to check a peak flow and call me  - will increase his steroids  - add 1200 mg Mucinex to help clear the inspissated secretion  Constipation  - 10 mg Dulcolax daily  Obstructive sleep apnea  - encourage compliance with his CPAP  Anxiety  Tremors    Hopefully we can get patient turned around today and home tomorrow.  I believe anxiety is playing a significant role in his degree of misery  Charlee Brewer MD  Date of Service: 10/31/2023  9:07 AM

## 2023-11-01 PROBLEM — K59.00 CONSTIPATION: Status: ACTIVE | Noted: 2023-11-01

## 2023-11-01 LAB
ALBUMIN SERPL BCP-MCNC: 3.5 G/DL (ref 3.5–5.2)
ALP SERPL-CCNC: 67 U/L (ref 55–135)
ALT SERPL W/O P-5'-P-CCNC: 38 U/L (ref 10–44)
ANION GAP SERPL CALC-SCNC: 7 MMOL/L (ref 8–16)
AST SERPL-CCNC: 26 U/L (ref 10–40)
BILIRUB SERPL-MCNC: 0.4 MG/DL (ref 0.1–1)
BUN SERPL-MCNC: 27 MG/DL (ref 8–23)
CALCIUM SERPL-MCNC: 8.7 MG/DL (ref 8.7–10.5)
CHLORIDE SERPL-SCNC: 105 MMOL/L (ref 95–110)
CO2 SERPL-SCNC: 25 MMOL/L (ref 23–29)
CREAT SERPL-MCNC: 1.1 MG/DL (ref 0.5–1.4)
EST. GFR  (NO RACE VARIABLE): >60 ML/MIN/1.73 M^2
GLUCOSE SERPL-MCNC: 164 MG/DL (ref 70–110)
MAGNESIUM SERPL-MCNC: 2.3 MG/DL (ref 1.6–2.6)
PHOSPHATE SERPL-MCNC: 3.9 MG/DL (ref 2.7–4.5)
POTASSIUM SERPL-SCNC: 3.9 MMOL/L (ref 3.5–5.1)
PROT SERPL-MCNC: 6 G/DL (ref 6–8.4)
SODIUM SERPL-SCNC: 137 MMOL/L (ref 136–145)

## 2023-11-01 PROCEDURE — 36415 COLL VENOUS BLD VENIPUNCTURE: CPT | Performed by: NURSE PRACTITIONER

## 2023-11-01 PROCEDURE — 84100 ASSAY OF PHOSPHORUS: CPT | Performed by: NURSE PRACTITIONER

## 2023-11-01 PROCEDURE — 25000003 PHARM REV CODE 250: Performed by: NURSE PRACTITIONER

## 2023-11-01 PROCEDURE — 94640 AIRWAY INHALATION TREATMENT: CPT

## 2023-11-01 PROCEDURE — 25000003 PHARM REV CODE 250: Performed by: INTERNAL MEDICINE

## 2023-11-01 PROCEDURE — 94660 CPAP INITIATION&MGMT: CPT

## 2023-11-01 PROCEDURE — 94761 N-INVAS EAR/PLS OXIMETRY MLT: CPT

## 2023-11-01 PROCEDURE — 25000003 PHARM REV CODE 250: Performed by: STUDENT IN AN ORGANIZED HEALTH CARE EDUCATION/TRAINING PROGRAM

## 2023-11-01 PROCEDURE — 99900035 HC TECH TIME PER 15 MIN (STAT)

## 2023-11-01 PROCEDURE — 99233 SBSQ HOSP IP/OBS HIGH 50: CPT | Mod: ,,, | Performed by: INTERNAL MEDICINE

## 2023-11-01 PROCEDURE — 99233 PR SUBSEQUENT HOSPITAL CARE,LEVL III: ICD-10-PCS | Mod: ,,, | Performed by: INTERNAL MEDICINE

## 2023-11-01 PROCEDURE — 80053 COMPREHEN METABOLIC PANEL: CPT | Performed by: NURSE PRACTITIONER

## 2023-11-01 PROCEDURE — 83735 ASSAY OF MAGNESIUM: CPT | Performed by: NURSE PRACTITIONER

## 2023-11-01 PROCEDURE — 12000002 HC ACUTE/MED SURGE SEMI-PRIVATE ROOM

## 2023-11-01 PROCEDURE — 99900031 HC PATIENT EDUCATION (STAT)

## 2023-11-01 PROCEDURE — 94799 UNLISTED PULMONARY SVC/PX: CPT

## 2023-11-01 PROCEDURE — 63600175 PHARM REV CODE 636 W HCPCS: Performed by: INTERNAL MEDICINE

## 2023-11-01 PROCEDURE — 25000242 PHARM REV CODE 250 ALT 637 W/ HCPCS: Performed by: INTERNAL MEDICINE

## 2023-11-01 RX ORDER — GABAPENTIN 300 MG/1
300 CAPSULE ORAL 3 TIMES DAILY
Status: DISCONTINUED | OUTPATIENT
Start: 2023-11-01 | End: 2023-11-03 | Stop reason: HOSPADM

## 2023-11-01 RX ORDER — DEXTROMETHORPHAN POLISTIREX 30 MG/5 ML
1 SUSPENSION, EXTENDED RELEASE 12 HR ORAL ONCE
Status: COMPLETED | OUTPATIENT
Start: 2023-11-01 | End: 2023-11-01

## 2023-11-01 RX ORDER — LACTULOSE 10 G/15ML
20 SOLUTION ORAL EVERY 6 HOURS PRN
Status: DISCONTINUED | OUTPATIENT
Start: 2023-11-01 | End: 2023-11-03 | Stop reason: HOSPADM

## 2023-11-01 RX ADMIN — AMIODARONE HYDROCHLORIDE 200 MG: 200 TABLET ORAL at 10:11

## 2023-11-01 RX ADMIN — MONTELUKAST 10 MG: 10 TABLET, FILM COATED ORAL at 09:11

## 2023-11-01 RX ADMIN — ARFORMOTEROL TARTRATE 15 MCG: 15 SOLUTION RESPIRATORY (INHALATION) at 08:11

## 2023-11-01 RX ADMIN — OXYCODONE HYDROCHLORIDE AND ACETAMINOPHEN 500 MG: 500 TABLET ORAL at 10:11

## 2023-11-01 RX ADMIN — METHYLPREDNISOLONE SODIUM SUCCINATE 60 MG: 40 INJECTION, POWDER, FOR SOLUTION INTRAMUSCULAR; INTRAVENOUS at 10:11

## 2023-11-01 RX ADMIN — IPRATROPIUM BROMIDE AND ALBUTEROL SULFATE 3 ML: 2.5; .5 SOLUTION RESPIRATORY (INHALATION) at 01:11

## 2023-11-01 RX ADMIN — Medication 400 MG: at 10:11

## 2023-11-01 RX ADMIN — MUPIROCIN 1 G: 20 OINTMENT TOPICAL at 09:11

## 2023-11-01 RX ADMIN — GUAIFENESIN 1200 MG: 600 TABLET, EXTENDED RELEASE ORAL at 10:11

## 2023-11-01 RX ADMIN — Medication 1000 UNITS: at 10:11

## 2023-11-01 RX ADMIN — GABAPENTIN 300 MG: 300 CAPSULE ORAL at 09:11

## 2023-11-01 RX ADMIN — GUAIFENESIN 1200 MG: 600 TABLET, EXTENDED RELEASE ORAL at 09:11

## 2023-11-01 RX ADMIN — LACTULOSE 20 G: 20 SOLUTION ORAL at 10:11

## 2023-11-01 RX ADMIN — OXYCODONE HYDROCHLORIDE AND ACETAMINOPHEN 500 MG: 500 TABLET ORAL at 09:11

## 2023-11-01 RX ADMIN — MINERAL OIL 1 ENEMA: 100 ENEMA RECTAL at 10:11

## 2023-11-01 RX ADMIN — MUPIROCIN 1 G: 20 OINTMENT TOPICAL at 10:11

## 2023-11-01 RX ADMIN — AMLODIPINE BESYLATE 10 MG: 5 TABLET ORAL at 10:11

## 2023-11-01 RX ADMIN — POTASSIUM CHLORIDE 10 MEQ: 750 CAPSULE, EXTENDED RELEASE ORAL at 10:11

## 2023-11-01 RX ADMIN — GABAPENTIN 300 MG: 300 CAPSULE ORAL at 02:11

## 2023-11-01 RX ADMIN — SACUBITRIL AND VALSARTAN 1 TABLET: 49; 51 TABLET, FILM COATED ORAL at 09:11

## 2023-11-01 RX ADMIN — LEVOTHYROXINE SODIUM 88 MCG: 0.09 TABLET ORAL at 06:11

## 2023-11-01 RX ADMIN — ARFORMOTEROL TARTRATE 15 MCG: 15 SOLUTION RESPIRATORY (INHALATION) at 07:11

## 2023-11-01 RX ADMIN — LACTULOSE 20 G: 20 SOLUTION ORAL at 07:11

## 2023-11-01 RX ADMIN — ATORVASTATIN CALCIUM 20 MG: 20 TABLET, FILM COATED ORAL at 10:11

## 2023-11-01 RX ADMIN — FUROSEMIDE 40 MG: 40 TABLET ORAL at 10:11

## 2023-11-01 RX ADMIN — APIXABAN 5 MG: 5 TABLET, FILM COATED ORAL at 10:11

## 2023-11-01 RX ADMIN — BENZONATATE 200 MG: 100 CAPSULE ORAL at 02:11

## 2023-11-01 RX ADMIN — GUAIFENESIN AND DEXTROMETHORPHAN 10 ML: 100; 10 SYRUP ORAL at 02:11

## 2023-11-01 RX ADMIN — Medication 6 MG: at 10:11

## 2023-11-01 RX ADMIN — GUAIFENESIN AND DEXTROMETHORPHAN 10 ML: 100; 10 SYRUP ORAL at 09:11

## 2023-11-01 RX ADMIN — SACUBITRIL AND VALSARTAN 1 TABLET: 49; 51 TABLET, FILM COATED ORAL at 10:11

## 2023-11-01 RX ADMIN — IPRATROPIUM BROMIDE AND ALBUTEROL SULFATE 3 ML: 2.5; .5 SOLUTION RESPIRATORY (INHALATION) at 08:11

## 2023-11-01 RX ADMIN — BENZONATATE 200 MG: 100 CAPSULE ORAL at 10:11

## 2023-11-01 RX ADMIN — ACETAMINOPHEN 650 MG: 325 TABLET ORAL at 10:11

## 2023-11-01 RX ADMIN — IPRATROPIUM BROMIDE AND ALBUTEROL SULFATE 3 ML: 2.5; .5 SOLUTION RESPIRATORY (INHALATION) at 07:11

## 2023-11-01 RX ADMIN — APIXABAN 5 MG: 5 TABLET, FILM COATED ORAL at 09:11

## 2023-11-01 RX ADMIN — THERA TABS 1 TABLET: TAB at 09:11

## 2023-11-01 RX ADMIN — BISACODYL 10 MG: 5 TABLET, COATED ORAL at 10:11

## 2023-11-01 RX ADMIN — BUDESONIDE INHALATION 0.5 MG: 0.5 SUSPENSION RESPIRATORY (INHALATION) at 08:11

## 2023-11-01 RX ADMIN — BUDESONIDE INHALATION 0.5 MG: 0.5 SUSPENSION RESPIRATORY (INHALATION) at 07:11

## 2023-11-01 RX ADMIN — Medication 400 UNITS: at 10:11

## 2023-11-01 RX ADMIN — TAMSULOSIN HYDROCHLORIDE 0.4 MG: 0.4 CAPSULE ORAL at 10:11

## 2023-11-01 RX ADMIN — AMIODARONE HYDROCHLORIDE 200 MG: 200 TABLET ORAL at 09:11

## 2023-11-01 RX ADMIN — BENZONATATE 200 MG: 100 CAPSULE ORAL at 09:11

## 2023-11-01 NOTE — PROGRESS NOTES
[facility] Medicine  Progress Note    Patient Name: Nico Teague  MRN: [id number]  Patient Class: IP- Inpatient   Admission Date: 10/26/2023  Length of Stay: 5 days  Attending Physician: Anita Gonzalez MD  Primary Care Provider: Gelacio Cantu Jr., MD        Subjective:     Principal Problem:Acute hypoxemic respiratory failure due to COVID-19        HPI:  No notes on file    Overview/Hospital Course:  No notes on file    Interval History: Pt is still constipated, coughing which he says is not improving with cough medications, otherwise denies sob, fever, chills, n/v, urine problem.    Review of Systems   Constitutional:  Negative for activity change, appetite change, chills and fever.   HENT:  Negative for congestion, ear pain and nosebleeds.    Eyes:  Negative for itching and visual disturbance.   Respiratory:  Positive for cough and wheezing. Negative for apnea, chest tightness and shortness of breath.    Cardiovascular:  Negative for chest pain, palpitations and leg swelling.   Gastrointestinal:  Negative for abdominal distention, abdominal pain, constipation, diarrhea, nausea and vomiting.   Genitourinary:  Negative for dysuria and flank pain.   Musculoskeletal:  Negative for back pain.   Neurological:  Negative for dizziness and headaches.     Objective:     Vital Signs (Most Recent):  Temp: 98.4 °F (36.9 °C) (11/01/23 1621)  Pulse: 65 (11/01/23 1621)  Resp: 18 (11/01/23 1621)  BP: (!) 143/66 (11/01/23 1621)  SpO2: 95 % (11/01/23 1621) Vital Signs (24h Range):  Temp:  [97.8 °F (36.6 °C)-98.5 °F (36.9 °C)] 98.4 °F (36.9 °C)  Pulse:  [58-77] 65  Resp:  [18-24] 18  SpO2:  [91 %-99 %] 95 %  BP: (143-166)/(63-83) 143/66     Weight: 117.9 kg (259 lb 14.8 oz)  Body mass index is 39.52 kg/m².  No intake or output data in the 24 hours ending 11/01/23 1800      Physical Exam  Vitals reviewed.   Constitutional:       General: He is not in acute distress.     Appearance: Normal appearance. He is  "not ill-appearing, toxic-appearing or diaphoretic.   HENT:      Head: Normocephalic and atraumatic.      Mouth/Throat:      Mouth: Mucous membranes are moist.      Pharynx: Oropharynx is clear.   Eyes:      General: No scleral icterus.     Conjunctiva/sclera: Conjunctivae normal.   Neck:      Vascular: No carotid bruit.   Cardiovascular:      Rate and Rhythm: Normal rate and regular rhythm.      Pulses: Normal pulses.      Heart sounds: Normal heart sounds.   Pulmonary:      Effort: Pulmonary effort is normal.      Breath sounds: Wheezing present.   Abdominal:      General: Abdomen is flat. Bowel sounds are normal.      Palpations: Abdomen is soft.   Musculoskeletal:         General: Normal range of motion.   Skin:     General: Skin is warm.   Neurological:      General: No focal deficit present.      Mental Status: He is alert and oriented to person, place, and time. Mental status is at baseline.   Psychiatric:         Mood and Affect: Mood normal.         Behavior: Behavior normal.           Significant Labs: All pertinent labs within the past 24 hours have been reviewed.  BMP:   Recent Labs   Lab 11/01/23  0435   *      K 3.9      CO2 25   BUN 27*   CREATININE 1.1   CALCIUM 8.7   MG 2.3     CBC: No results for input(s): "WBC", "HGB", "HCT", "PLT" in the last 48 hours.  CMP:   Recent Labs   Lab 10/31/23  0538 11/01/23  0435    137   K 3.8 3.9    105   CO2 27 25    164*   BUN 22 27*   CREATININE 1.0 1.1   CALCIUM 8.6* 8.7   PROT 6.0 6.0   ALBUMIN 3.5 3.5   BILITOT 0.4 0.4   ALKPHOS 70 67   AST 31 26   ALT 37 38   ANIONGAP 7* 7*     Magnesium:   Recent Labs   Lab 10/31/23  0538 11/01/23  0435   MG 2.2 2.3       Significant Imaging: I have reviewed all pertinent imaging results/findings within the past 24 hours.      Assessment/Plan:      * Acute hypoxemic respiratory failure due to COVID-19  Improving- s/p remdesivir  C/w solumedrol and breathing rx  Cough meds " prn    Constipation  Tried enema but pt did not retain it so giving lactulose.       Cough  Likely post-infectious from covid-19 bronchitis.   C/w cough meds  Still coughing      Asthma  C/w steroids and breathing rx as scheduled      COVID-19  Vaccinated  improving  C/w solumedrol and breathing rx  Cough meds prn    Hypothyroidism (acquired)  C/w home meds      Atrial fibrillation  C/w home meds    BPH (benign prostatic hyperplasia)  C/w home meds        VTE Risk Mitigation (From admission, onward)         Ordered     apixaban tablet 5 mg  2 times daily         10/27/23 0010                Discharge Planning   CL: 11/2/2023     Code Status: Full Code   Is the patient medically ready for discharge?:     Reason for patient still in hospital (select all that apply): Treatment  Discharge Plan A: Home                  Anita Gonzalez MD  Department of Hospital Medicine   [facility]

## 2023-11-01 NOTE — CARE UPDATE
10/31/23 2024   Patient Assessment/Suction   Level of Consciousness (AVPU) alert   Respiratory Effort Short of breath   Expansion/Accessory Muscles/Retractions no use of accessory muscles   All Lung Fields Breath Sounds coarse;diminished   Rhythm/Pattern, Respiratory shortness of breath   Cough Frequency frequent   Cough Type congested;nonproductive   PRE-TX-O2   Device (Oxygen Therapy) room air   SpO2 96 %   Pulse Oximetry Type Intermittent   $ Pulse Oximetry - Multiple Charge Pulse Oximetry - Multiple   Pulse 71   Resp (!) 22   Aerosol Therapy   $ Aerosol Therapy Charges Aerosol Treatment   Daily Review of Necessity (SVN) completed   Respiratory Treatment Status (SVN) given   Treatment Route (SVN) mouthpiece;oxygen   Patient Position (SVN) HOB elevated   Post Treatment Assessment (SVN) breath sounds unchanged   Signs of Intolerance (SVN) none   Breath Sounds Post-Respiratory Treatment   Post-treatment Heart Rate (beats/min) 72   Post-treatment Resp Rate (breaths/min) 20   Preset CPAP/BiPAP Settings   Mode Of Delivery CPAP;Standby  (Will use when ready.)

## 2023-11-01 NOTE — SUBJECTIVE & OBJECTIVE
Interval History: Pt is still constipated, coughing which he says is not improving with cough medications, otherwise denies sob, fever, chills, n/v, urine problem.    Review of Systems   Constitutional:  Negative for activity change, appetite change, chills and fever.   HENT:  Negative for congestion, ear pain and nosebleeds.    Eyes:  Negative for itching and visual disturbance.   Respiratory:  Positive for cough and wheezing. Negative for apnea, chest tightness and shortness of breath.    Cardiovascular:  Negative for chest pain, palpitations and leg swelling.   Gastrointestinal:  Negative for abdominal distention, abdominal pain, constipation, diarrhea, nausea and vomiting.   Genitourinary:  Negative for dysuria and flank pain.   Musculoskeletal:  Negative for back pain.   Neurological:  Negative for dizziness and headaches.     Objective:     Vital Signs (Most Recent):  Temp: 98.4 °F (36.9 °C) (11/01/23 1621)  Pulse: 65 (11/01/23 1621)  Resp: 18 (11/01/23 1621)  BP: (!) 143/66 (11/01/23 1621)  SpO2: 95 % (11/01/23 1621) Vital Signs (24h Range):  Temp:  [97.8 °F (36.6 °C)-98.5 °F (36.9 °C)] 98.4 °F (36.9 °C)  Pulse:  [58-77] 65  Resp:  [18-24] 18  SpO2:  [91 %-99 %] 95 %  BP: (143-166)/(63-83) 143/66     Weight: 117.9 kg (259 lb 14.8 oz)  Body mass index is 39.52 kg/m².  No intake or output data in the 24 hours ending 11/01/23 1800      Physical Exam  Vitals reviewed.   Constitutional:       General: He is not in acute distress.     Appearance: Normal appearance. He is not ill-appearing, toxic-appearing or diaphoretic.   HENT:      Head: Normocephalic and atraumatic.      Mouth/Throat:      Mouth: Mucous membranes are moist.      Pharynx: Oropharynx is clear.   Eyes:      General: No scleral icterus.     Conjunctiva/sclera: Conjunctivae normal.   Neck:      Vascular: No carotid bruit.   Cardiovascular:      Rate and Rhythm: Normal rate and regular rhythm.      Pulses: Normal pulses.      Heart sounds: Normal  "heart sounds.   Pulmonary:      Effort: Pulmonary effort is normal.      Breath sounds: Wheezing present.   Abdominal:      General: Abdomen is flat. Bowel sounds are normal.      Palpations: Abdomen is soft.   Musculoskeletal:         General: Normal range of motion.   Skin:     General: Skin is warm.   Neurological:      General: No focal deficit present.      Mental Status: He is alert and oriented to person, place, and time. Mental status is at baseline.   Psychiatric:         Mood and Affect: Mood normal.         Behavior: Behavior normal.           Significant Labs: All pertinent labs within the past 24 hours have been reviewed.  BMP:   Recent Labs   Lab 11/01/23  0435   *      K 3.9      CO2 25   BUN 27*   CREATININE 1.1   CALCIUM 8.7   MG 2.3     CBC: No results for input(s): "WBC", "HGB", "HCT", "PLT" in the last 48 hours.  CMP:   Recent Labs   Lab 10/31/23  0538 11/01/23  0435    137   K 3.8 3.9    105   CO2 27 25    164*   BUN 22 27*   CREATININE 1.0 1.1   CALCIUM 8.6* 8.7   PROT 6.0 6.0   ALBUMIN 3.5 3.5   BILITOT 0.4 0.4   ALKPHOS 70 67   AST 31 26   ALT 37 38   ANIONGAP 7* 7*     Magnesium:   Recent Labs   Lab 10/31/23  0538 11/01/23  0435   MG 2.2 2.3       Significant Imaging: I have reviewed all pertinent imaging results/findings within the past 24 hours.  "

## 2023-11-01 NOTE — PROGRESS NOTES
Pulmonary/Critical Care Consult      PATIENT NAME: Nico Teague  MRN: [id number]  TODAY'S DATE: 2023  9:07 AM  ADMIT DATE: 10/26/2023  AGE: 73 y.o. : 1950    CONSULT REQUESTED BY: Anita Gonzalez MD    REASON FOR CONSULT:   COVID and cough and asthma and BANDAR    HPI:  Patient is a 73-year-old male whom we follow for asthma and obstructive sleep apnea who called on the  to say that he was febrile and very short of breath and COVID positive.  He was referred to the ER where he was admitted.  I got a notice yesterday that he was still in the hospital and still coughing a great deal.    The patient complains he did not sleep at all last night because he is coughing so much and did not find any relief with the Tessalon and Tussionex.  He is making upper airway noises.  He complains of being terribly constipated.  His chest and abdomen hurt from coughing.  He was only able to wear CPAP till midnight because he was coughing so much.  He feels terrible.  On the bright side, he is neither febrile nor hypoxemic    Eleven 1 the patient perceives he is dying.  He is very constipated in his abdomen is distended.  His abdomen hurts.  He is asking for narcotics.  Explained that having narcotics worsens his constipation.  Will start lactulose he is coughing up blood-tinged beige sputum.  I do not hear any wheezing but will start checking peak flows.    REVIEW OF SYSTEMS  GENERAL: Feeling terrible  EYES: Vision is good.  ENT: No sinusitis or pharyngitis.   HEART: No chest pain or palpitations.  LUNGS:  He is coughing and bringing up beige blood-tinged mucus he is producing more mucus today.  GI:  He is terribly constipated  : No dysuria, hesitancy, or nocturia.  SKIN: No lesions or rashes.  MUSCULOSKELETAL: No joint pain or myalgias.  NEURO:  He has bilateral tremors because he is exhausted.  His wife reports 2 episodes of right-sided total body tremors lasting about 30 seconds each time.  LYMPH: No edema or  adenopathy.  PSYCH: No anxiety or depression.  ENDO: No weight change.    ALLERGIES  Review of patient's allergies indicates:   Allergen Reactions    Bactrim [sulfamethoxazole-trimethoprim]      HIVES/ RASH    Green tea (rashida sinensis) Hives     Pt is has allergies to black tea. Not an option under search.       INPATIENT SCHEDULED MEDICATIONS   albuterol-ipratropium  3 mL Nebulization Q6H    amiodarone  200 mg Oral BID    amLODIPine  10 mg Oral Daily    apixaban  5 mg Oral BID    arformoteroL  15 mcg Nebulization BID    ascorbic acid (vitamin C)  500 mg Oral BID    aspirin  81 mg Oral Q7 Days    atorvastatin  20 mg Oral Daily    benzonatate  200 mg Oral TID    bisacodyL  10 mg Oral Daily    budesonide  0.5 mg Nebulization Q12H    furosemide  40 mg Oral Daily    guaiFENesin  1,200 mg Oral BID    hydrocodone-chlorpheniramine  5 mL Oral Q12H    levothyroxine  88 mcg Oral Before breakfast    magnesium oxide  400 mg Oral Daily    methylPREDNISolone sodium succinate injection  60 mg Intravenous Q12H    montelukast  10 mg Oral QHS    multivitamin  1 tablet Oral Daily    mupirocin   Nasal BID    potassium chloride  10 mEq Oral Daily    sacubitriL-valsartan  1 tablet Oral BID    tamsulosin  0.4 mg Oral Daily    vitamin D  1,000 Units Oral Daily    vitamin E (dl, acetate)  400 Units Oral Daily         MEDICAL AND SURGICAL HISTORY  Past Medical History:   Diagnosis Date    Allergy     Arthritis     Asthma     Atrial fibrillation     Basal cell carcinoma     Fever blister     Hypertension     Hypothyroidism (acquired) 7/31/2023    Joint pain     Melanoma     BANDAR on CPAP     PVC (premature ventricular contraction)     Skin disease     Squamous cell carcinoma      Past Surgical History:   Procedure Laterality Date    BRONCHOSCOPY N/A 7/18/2022    Procedure: Bronchoscopy;  Surgeon: Charlee Brewer MD;  Location: [facility] [place];  Service: Pulmonary;  Laterality: N/A;  monday 7/18/22 at 0830 look see bronch no biopsy    CARDIAC  CATHETERIZATION  2021    ESOPHAGOGASTRODUODENOSCOPY N/A 2022    Procedure: EGD (ESOPHAGOGASTRODUODENOSCOPY);  Surgeon: Emmanuel Deleon MD;  Location: [facility];  Service: Endoscopy;  Laterality: N/A;    EYE SURGERY Left 2019    EYE SURGERY Right 2020    LIPOMA RESECTION      NOSE SURGERY      SHOULDER ARTHROSCOPY      SKIN CANCER EXCISION      Moh's x 2    SKIN GRAFT         ALCOHOL, TOBACCO AND DRUG USE  Social History     Tobacco Use   Smoking Status Former    Current packs/day: 0.00    Average packs/day: 2.0 packs/day for 20.0 years (40.0 ttl pk-yrs)    Types: Cigarettes    Start date: 1972    Quit date: 1988    Years since quittin.2    Passive exposure: Past   Smokeless Tobacco Never     Social History     Substance and Sexual Activity   Alcohol Use No     Social History     Substance and Sexual Activity   Drug Use No       FAMILY HISTORY  Family History   Problem Relation Age of Onset    Cancer Mother         esophageal    Heart disease Father     Hypertension Father     Arthritis Paternal Grandmother        VITAL SIGNS (MOST RECENT)  Temp: 98 °F (36.7 °C) (23)  Pulse: (!) 58 (23)  Resp: (!) 22 (23)  BP: (!) 152/63 (notified nurse crissy) (23)  SpO2: (!) 91 % (23)    INTAKE AND OUTPUT (LAST 24 HOURS):  Intake/Output Summary (Last 24 hours) at 2023 0837  Last data filed at 10/31/2023 1245  Gross per 24 hour   Intake 1020 ml   Output --   Net 1020 ml       WEIGHT  Wt Readings from Last 1 Encounters:   10/27/23 117.9 kg (259 lb 14.8 oz)       PHYSICAL EXAM  GENERAL: Older patient who appears uncomfortable  HEENT: Pupils equal and reactive. Extraocular movements intact. Nose intact. Pharynx moist.  He has some upper airway nasal noise when he breathes  NECK: Supple.   HEART: Regular rate and rhythm. No murmur or gallop auscultated.  LUNGS: Clear to auscultation and percussion. Lung excursion symmetrical. No change in  "fremitus.  The patient has a frequent productive sounding cough.  ABDOMEN: Bowel sounds present.  Abdomen is firm and distended.   : Normal anatomy.  EXTREMITIES: Normal muscle tone and joint movement, no cyanosis or clubbing.   LYMPHATICS: No adenopathy palpated, no edema.  SKIN: Dry, intact, no lesions.   NEURO: Cranial nerves II-XII intact. Motor strength 5/5 bilaterally, upper and lower extremities.  PSYCH:  Anxious    ACUTE PHASE REACTANT (LAST 24 HOURS)  No results for input(s): "FERRITIN", "CRP", "LDH", "DDIMER" in the last 24 hours.      CBC LAST (LAST 24 HOURS)  No results for input(s): "WBC", "RBC", "HGB", "HCT", "MCV", "MCH", "MCHC", "RDW", "PLT", "MPV", "GRAN", "LYMPH", "MONO", "BASO", "NRBC" in the last 24 hours.    CHEMISTRY LAST (LAST 24 HOURS)  Recent Labs   Lab 11/01/23  0435      K 3.9      CO2 25   ANIONGAP 7*   BUN 27*   CREATININE 1.1   *   CALCIUM 8.7   MG 2.3   ALBUMIN 3.5   PROT 6.0   ALKPHOS 67   ALT 38   AST 26   BILITOT 0.4         CARDIAC PROFILE (LAST 24 HOURS)  Recent Labs   Lab 10/26/23  2024 10/27/23  0016 10/31/23  0538   BNP 85  --   --    LDH  --    < > 212   TROPONINIHS 8.5  --   --     < > = values in this interval not displayed.       LAST 7 DAYS MICROBIOLOGY   Microbiology Results (last 7 days)       ** No results found for the last 168 hours. **            MOST RECENT IMAGING  X-Ray Chest 1 View  HISTORY: Chills and dyspnea.    FINDINGS: Portable chest radiograph at 1425 hours compared to prior exams shows the cardiomediastinal silhouette and pulmonary vasculature are within normal limits. There are aortic vascular calcifications.    The lungs are normally expanded, with no consolidation, large pleural effusion, or evidence of pulmonary edema. No confluent infiltrates or pneumothorax. There are no significant osseous abnormalities.    IMPRESSION: No evidence of active cardiopulmonary disease.    Electronically signed by:  Riley Stevenson MD  10/29/2023 02:58 " PM CDT Workstation: [id number]      CURRENT VISIT EKG  Results for orders placed or performed during the hospital encounter of 10/26/23   EKG 12-lead    Narrative    Test Reason : R94.31,    Vent. Rate : 072 BPM     Atrial Rate : 072 BPM     P-R Int : 166 ms          QRS Dur : 112 ms      QT Int : 428 ms       P-R-T Axes : 032 -43 047 degrees     QTc Int : 468 ms    Sinus rhythm with sinus arrhythmia with occasional Premature ventricular  complexes  Left axis deviation  Moderate voltage criteria for LVH, may be normal variant ( R in aVL ,   Elliott product )  Abnormal ECG  When compared with ECG of 26-OCT-2023 20:40,  Premature ventricular complexes are now Present    Referred By: [email or web address]   SELF           Confirmed By:        ECHOCARDIOGRAM RESULTS  No results found for this or any previous visit.             The patient is on room air         LAST ARTERIAL BLOOD GAS  ABG  Recent Labs   Lab 10/26/23  2117   PH 7.418   PO2 57*   PCO2 34.3*   HCO3 22.2*   BE -2       IMPRESSION AND PLAN  COVID-19 infection  Asthma  - will ask respiratory to check a peak flow q.6 and call me the 1st 1  - will continue his steroids  - continue 1200 mg Mucinex to help clear the inspissated secretions  - check chest x-ray  Constipation  - lactulose q.6 until results  Obstructive sleep apnea  - encourage compliance with his CPAP  Anxiety  Tremors    Unfortunately, patient is not ready for discharge    Charlee Brewer MD  Date of Service: 11/01/2023  9:07 AM

## 2023-11-01 NOTE — PLAN OF CARE
11/01/23 1350   Patient Assessment/Suction   Level of Consciousness (AVPU) alert   Respiratory Effort Unlabored   All Lung Fields Breath Sounds coarse   PRE-TX-O2   Device (Oxygen Therapy) nasal cannula   Flow (L/min) 2   SpO2 95 %   Pulse Oximetry Type Continuous   $ Pulse Oximetry - Multiple Charge Pulse Oximetry - Multiple   Pulse 67   Resp 20   Aerosol Therapy   $ Aerosol Therapy Charges Aerosol Treatment   Respiratory Treatment Status (SVN) given   Treatment Route (SVN) mask   Patient Position (SVN) semi-Bryan's   Signs of Intolerance (SVN) none   Breath Sounds Post-Respiratory Treatment   Post-treatment Heart Rate (beats/min) 69   Post-treatment Resp Rate (breaths/min) 20   Peak Flow   $ Peak Flow Charges Given;Pocket Peak Flow Meter - Supply   PEFR PREtreatment (L/Min) 280   PEFR POST-Treatment (L/Min) 250   Effort (PEFR) good   Patient Position (PEFR) sitting on edge of bed   Change Pre/Post Treatment (%) -10.71

## 2023-11-02 LAB
ALBUMIN SERPL BCP-MCNC: 3.5 G/DL (ref 3.5–5.2)
ALP SERPL-CCNC: 68 U/L (ref 55–135)
ALT SERPL W/O P-5'-P-CCNC: 46 U/L (ref 10–44)
ANION GAP SERPL CALC-SCNC: 11 MMOL/L (ref 8–16)
AST SERPL-CCNC: 33 U/L (ref 10–40)
BILIRUB SERPL-MCNC: 0.3 MG/DL (ref 0.1–1)
BUN SERPL-MCNC: 38 MG/DL (ref 8–23)
CALCIUM SERPL-MCNC: 9.4 MG/DL (ref 8.7–10.5)
CHLORIDE SERPL-SCNC: 102 MMOL/L (ref 95–110)
CO2 SERPL-SCNC: 24 MMOL/L (ref 23–29)
CREAT SERPL-MCNC: 1.4 MG/DL (ref 0.5–1.4)
CRP SERPL-MCNC: 27.8 MG/L (ref 0–8.2)
EST. GFR  (NO RACE VARIABLE): 53.4 ML/MIN/1.73 M^2
GLUCOSE SERPL-MCNC: 140 MG/DL (ref 70–110)
GLUCOSE SERPL-MCNC: 167 MG/DL (ref 70–110)
LDH SERPL L TO P-CCNC: 195 U/L (ref 110–260)
MAGNESIUM SERPL-MCNC: 2.7 MG/DL (ref 1.6–2.6)
PHOSPHATE SERPL-MCNC: 4.7 MG/DL (ref 2.7–4.5)
POTASSIUM SERPL-SCNC: 3.8 MMOL/L (ref 3.5–5.1)
PROT SERPL-MCNC: 6.3 G/DL (ref 6–8.4)
SODIUM SERPL-SCNC: 137 MMOL/L (ref 136–145)

## 2023-11-02 PROCEDURE — 25000003 PHARM REV CODE 250: Performed by: INTERNAL MEDICINE

## 2023-11-02 PROCEDURE — 99900035 HC TECH TIME PER 15 MIN (STAT)

## 2023-11-02 PROCEDURE — 94660 CPAP INITIATION&MGMT: CPT

## 2023-11-02 PROCEDURE — 80053 COMPREHEN METABOLIC PANEL: CPT | Performed by: NURSE PRACTITIONER

## 2023-11-02 PROCEDURE — 36415 COLL VENOUS BLD VENIPUNCTURE: CPT | Performed by: NURSE PRACTITIONER

## 2023-11-02 PROCEDURE — 25000003 PHARM REV CODE 250: Performed by: STUDENT IN AN ORGANIZED HEALTH CARE EDUCATION/TRAINING PROGRAM

## 2023-11-02 PROCEDURE — 99232 PR SUBSEQUENT HOSPITAL CARE,LEVL II: ICD-10-PCS | Mod: ,,, | Performed by: INTERNAL MEDICINE

## 2023-11-02 PROCEDURE — 94640 AIRWAY INHALATION TREATMENT: CPT

## 2023-11-02 PROCEDURE — 12000002 HC ACUTE/MED SURGE SEMI-PRIVATE ROOM

## 2023-11-02 PROCEDURE — 86140 C-REACTIVE PROTEIN: CPT | Performed by: NURSE PRACTITIONER

## 2023-11-02 PROCEDURE — 99232 SBSQ HOSP IP/OBS MODERATE 35: CPT | Mod: ,,, | Performed by: INTERNAL MEDICINE

## 2023-11-02 PROCEDURE — 83735 ASSAY OF MAGNESIUM: CPT | Performed by: NURSE PRACTITIONER

## 2023-11-02 PROCEDURE — 25000242 PHARM REV CODE 250 ALT 637 W/ HCPCS: Performed by: INTERNAL MEDICINE

## 2023-11-02 PROCEDURE — 27000221 HC OXYGEN, UP TO 24 HOURS

## 2023-11-02 PROCEDURE — 83615 LACTATE (LD) (LDH) ENZYME: CPT | Performed by: NURSE PRACTITIONER

## 2023-11-02 PROCEDURE — 25000003 PHARM REV CODE 250: Performed by: NURSE PRACTITIONER

## 2023-11-02 PROCEDURE — 63600175 PHARM REV CODE 636 W HCPCS: Performed by: INTERNAL MEDICINE

## 2023-11-02 PROCEDURE — 94761 N-INVAS EAR/PLS OXIMETRY MLT: CPT

## 2023-11-02 PROCEDURE — 84100 ASSAY OF PHOSPHORUS: CPT | Performed by: NURSE PRACTITIONER

## 2023-11-02 RX ORDER — AMOXICILLIN 250 MG
1 CAPSULE ORAL DAILY
Status: DISCONTINUED | OUTPATIENT
Start: 2023-11-02 | End: 2023-11-03 | Stop reason: HOSPADM

## 2023-11-02 RX ADMIN — BUDESONIDE INHALATION 0.5 MG: 0.5 SUSPENSION RESPIRATORY (INHALATION) at 07:11

## 2023-11-02 RX ADMIN — Medication 400 UNITS: at 09:11

## 2023-11-02 RX ADMIN — BENZONATATE 200 MG: 100 CAPSULE ORAL at 05:11

## 2023-11-02 RX ADMIN — OXYCODONE HYDROCHLORIDE AND ACETAMINOPHEN 500 MG: 500 TABLET ORAL at 09:11

## 2023-11-02 RX ADMIN — LEVOTHYROXINE SODIUM 88 MCG: 0.09 TABLET ORAL at 06:11

## 2023-11-02 RX ADMIN — GUAIFENESIN AND DEXTROMETHORPHAN 10 ML: 100; 10 SYRUP ORAL at 06:11

## 2023-11-02 RX ADMIN — FUROSEMIDE 40 MG: 40 TABLET ORAL at 09:11

## 2023-11-02 RX ADMIN — BISACODYL 10 MG: 5 TABLET, COATED ORAL at 09:11

## 2023-11-02 RX ADMIN — Medication 1000 UNITS: at 09:11

## 2023-11-02 RX ADMIN — IPRATROPIUM BROMIDE AND ALBUTEROL SULFATE 3 ML: 2.5; .5 SOLUTION RESPIRATORY (INHALATION) at 01:11

## 2023-11-02 RX ADMIN — TAMSULOSIN HYDROCHLORIDE 0.4 MG: 0.4 CAPSULE ORAL at 09:11

## 2023-11-02 RX ADMIN — GABAPENTIN 300 MG: 300 CAPSULE ORAL at 08:11

## 2023-11-02 RX ADMIN — ARFORMOTEROL TARTRATE 15 MCG: 15 SOLUTION RESPIRATORY (INHALATION) at 07:11

## 2023-11-02 RX ADMIN — BENZONATATE 200 MG: 100 CAPSULE ORAL at 09:11

## 2023-11-02 RX ADMIN — METHYLPREDNISOLONE SODIUM SUCCINATE 60 MG: 40 INJECTION, POWDER, FOR SOLUTION INTRAMUSCULAR; INTRAVENOUS at 11:11

## 2023-11-02 RX ADMIN — AMLODIPINE BESYLATE 10 MG: 5 TABLET ORAL at 09:11

## 2023-11-02 RX ADMIN — LACTULOSE 20 G: 20 SOLUTION ORAL at 09:11

## 2023-11-02 RX ADMIN — BENZONATATE 200 MG: 100 CAPSULE ORAL at 08:11

## 2023-11-02 RX ADMIN — MUPIROCIN 1 G: 20 OINTMENT TOPICAL at 08:11

## 2023-11-02 RX ADMIN — GUAIFENESIN 1200 MG: 600 TABLET, EXTENDED RELEASE ORAL at 08:11

## 2023-11-02 RX ADMIN — ATORVASTATIN CALCIUM 20 MG: 20 TABLET, FILM COATED ORAL at 09:11

## 2023-11-02 RX ADMIN — APIXABAN 5 MG: 5 TABLET, FILM COATED ORAL at 09:11

## 2023-11-02 RX ADMIN — AMIODARONE HYDROCHLORIDE 200 MG: 200 TABLET ORAL at 08:11

## 2023-11-02 RX ADMIN — IPRATROPIUM BROMIDE AND ALBUTEROL SULFATE 3 ML: 2.5; .5 SOLUTION RESPIRATORY (INHALATION) at 12:11

## 2023-11-02 RX ADMIN — THERA TABS 1 TABLET: TAB at 09:11

## 2023-11-02 RX ADMIN — POTASSIUM CHLORIDE 10 MEQ: 750 CAPSULE, EXTENDED RELEASE ORAL at 09:11

## 2023-11-02 RX ADMIN — Medication 400 MG: at 09:11

## 2023-11-02 RX ADMIN — GABAPENTIN 300 MG: 300 CAPSULE ORAL at 05:11

## 2023-11-02 RX ADMIN — IPRATROPIUM BROMIDE AND ALBUTEROL SULFATE 3 ML: 2.5; .5 SOLUTION RESPIRATORY (INHALATION) at 08:11

## 2023-11-02 RX ADMIN — METHYLPREDNISOLONE SODIUM SUCCINATE 60 MG: 40 INJECTION, POWDER, FOR SOLUTION INTRAMUSCULAR; INTRAVENOUS at 10:11

## 2023-11-02 RX ADMIN — SACUBITRIL AND VALSARTAN 1 TABLET: 49; 51 TABLET, FILM COATED ORAL at 09:11

## 2023-11-02 RX ADMIN — MONTELUKAST 10 MG: 10 TABLET, FILM COATED ORAL at 08:11

## 2023-11-02 RX ADMIN — SENNOSIDES AND DOCUSATE SODIUM 1 TABLET: 50; 8.6 TABLET ORAL at 05:11

## 2023-11-02 RX ADMIN — MUPIROCIN 1 G: 20 OINTMENT TOPICAL at 09:11

## 2023-11-02 RX ADMIN — LACTULOSE 20 G: 20 SOLUTION ORAL at 06:11

## 2023-11-02 RX ADMIN — APIXABAN 5 MG: 5 TABLET, FILM COATED ORAL at 08:11

## 2023-11-02 RX ADMIN — ARFORMOTEROL TARTRATE 15 MCG: 15 SOLUTION RESPIRATORY (INHALATION) at 08:11

## 2023-11-02 RX ADMIN — AMIODARONE HYDROCHLORIDE 200 MG: 200 TABLET ORAL at 09:11

## 2023-11-02 RX ADMIN — Medication 6 MG: at 08:11

## 2023-11-02 RX ADMIN — BUDESONIDE INHALATION 0.5 MG: 0.5 SUSPENSION RESPIRATORY (INHALATION) at 08:11

## 2023-11-02 RX ADMIN — GUAIFENESIN AND DEXTROMETHORPHAN 10 ML: 100; 10 SYRUP ORAL at 11:11

## 2023-11-02 RX ADMIN — SACUBITRIL AND VALSARTAN 1 TABLET: 49; 51 TABLET, FILM COATED ORAL at 08:11

## 2023-11-02 RX ADMIN — GUAIFENESIN AND DEXTROMETHORPHAN 10 ML: 100; 10 SYRUP ORAL at 09:11

## 2023-11-02 RX ADMIN — GUAIFENESIN 1200 MG: 600 TABLET, EXTENDED RELEASE ORAL at 09:11

## 2023-11-02 RX ADMIN — OXYCODONE HYDROCHLORIDE AND ACETAMINOPHEN 500 MG: 500 TABLET ORAL at 08:11

## 2023-11-02 RX ADMIN — GUAIFENESIN AND DEXTROMETHORPHAN 10 ML: 100; 10 SYRUP ORAL at 05:11

## 2023-11-02 RX ADMIN — IPRATROPIUM BROMIDE AND ALBUTEROL SULFATE 3 ML: 2.5; .5 SOLUTION RESPIRATORY (INHALATION) at 07:11

## 2023-11-02 RX ADMIN — GABAPENTIN 300 MG: 300 CAPSULE ORAL at 09:11

## 2023-11-02 NOTE — PLAN OF CARE
Problem: Adult Inpatient Plan of Care  Goal: Plan of Care Review  11/2/2023 1730 by Kylah Avendano RN  Outcome: Ongoing, Progressing  11/2/2023 1723 by Kylah Avendano RN  Outcome: Ongoing, Progressing  Goal: Patient-Specific Goal (Individualized)  11/2/2023 1730 by Kylah Avendano RN  Outcome: Ongoing, Progressing  11/2/2023 1723 by Kylah Avendano RN  Outcome: Ongoing, Progressing  Goal: Absence of Hospital-Acquired Illness or Injury  11/2/2023 1730 by Kylah Avendano RN  Outcome: Ongoing, Progressing  11/2/2023 1723 by Kylah Avendano RN  Outcome: Ongoing, Progressing  Goal: Optimal Comfort and Wellbeing  11/2/2023 1730 by Kylah Avendano RN  Outcome: Ongoing, Progressing  11/2/2023 1723 by Kylah Avendano RN  Outcome: Ongoing, Progressing  Goal: Readiness for Transition of Care  11/2/2023 1730 by Kylah Avendano RN  Outcome: Ongoing, Progressing  11/2/2023 1723 by Kylah Avendano RN  Outcome: Ongoing, Progressing     Problem: Fall Injury Risk  Goal: Absence of Fall and Fall-Related Injury  11/2/2023 1730 by Kylah Avendano RN  Outcome: Ongoing, Progressing  11/2/2023 1723 by Kylah Avendano RN  Outcome: Ongoing, Progressing     Problem: Gas Exchange Impaired  Goal: Optimal Gas Exchange  11/2/2023 1730 by Kylah Avendano RN  Outcome: Ongoing, Progressing  11/2/2023 1723 by Kylah Avendano RN  Outcome: Ongoing, Progressing     Problem: Coping Ineffective  Goal: Effective Coping  11/2/2023 1730 by Kylah Avendano RN  Outcome: Ongoing, Progressing  11/2/2023 1723 by Kylah Avendano RN  Outcome: Ongoing, Progressing     Problem: Pain Acute  Goal: Acceptable Pain Control and Functional Ability  11/2/2023 1730 by Kylah Avendano RN  Outcome: Ongoing, Progressing  11/2/2023 1723 by Kylah Avendano RN  Outcome: Ongoing, Progressing

## 2023-11-02 NOTE — PROGRESS NOTES
Pulmonary/Critical Care Consult      PATIENT NAME: Nico Teague  MRN: [id number]  TODAY'S DATE: 2023  9:07 AM  ADMIT DATE: 10/26/2023  AGE: 73 y.o. : 1950    CONSULT REQUESTED BY: Noah June MD    REASON FOR CONSULT:   COVID and cough and asthma and BANDAR    HPI:  Patient is a 73-year-old male whom we follow for asthma and obstructive sleep apnea who called on the  to say that he was febrile and very short of breath and COVID positive.  He was referred to the ER where he was admitted.  I got a notice yesterday that he was still in the hospital and still coughing a great deal.    The patient complains he did not sleep at all last night because he is coughing so much and did not find any relief with the Tessalon and Tussionex.  He is making upper airway noises.  He complains of being terribly constipated.  His chest and abdomen hurt from coughing.  He was only able to wear CPAP till midnight because he was coughing so much.  He feels terrible.  On the bright side, he is neither febrile nor hypoxemic     the patient perceives he is dying.  He is very constipated in his abdomen is distended.  His abdomen hurts.  He is asking for narcotics.  Explained that having narcotics worsens his constipation.  Will start lactulose he is coughing up blood-tinged beige sputum.  I do not hear any wheezing but will start checking peak flows.       The patient is still very uncomfortable.  He is still coughing.  His peak flow is in the mid 200s.  He is yet to have a real bowel movement.  He did have a small when this morning.  He is passing gas.    REVIEW OF SYSTEMS  GENERAL: Feeling terrible  EYES: Vision is good.  ENT: No sinusitis or pharyngitis.   HEART: No chest pain or palpitations.  LUNGS:  He is coughing and bringing up beige blood-tinged mucus he is producing   Less mucus  GI:  He is terribly constipated his abdominal wall hurts terribly from coughing.  : No dysuria, hesitancy, or  "nocturia.  SKIN: No lesions or rashes.  MUSCULOSKELETAL: No joint pain or myalgias.  NEURO:  He has bilateral tremors because he is exhausted.  His wife reports 2 episodes of right-sided total body tremors lasting about 30 seconds each time.  LYMPH: No edema or adenopathy.  PSYCH: No anxiety or depression.  ENDO: No weight change.    No change in the patient's Past Medical History, Past Surgical History, Social History or Family History since admission.      VITAL SIGNS (MOST RECENT)  Temp: 97.6 °F (36.4 °C) (11/02/23 0800)  Pulse: 79 (11/02/23 0816)  Resp: 20 (11/02/23 0816)  BP: (!) 160/73 (notified nurse) (11/02/23 0800)  SpO2: (!) 93 % (on room air at this time) (11/02/23 0816)    INTAKE AND OUTPUT (LAST 24 HOURS):  Intake/Output Summary (Last 24 hours) at 11/2/2023 0949  Last data filed at 11/2/2023 0922  Gross per 24 hour   Intake 540 ml   Output --   Net 540 ml       WEIGHT  Wt Readings from Last 1 Encounters:   10/27/23 117.9 kg (259 lb 14.8 oz)       PHYSICAL EXAM  GENERAL: Older patient who appears uncomfortable  HEENT: Pupils equal and reactive. Extraocular movements intact. Nose intact. Pharynx moist.    NECK: Supple.   HEART: Regular rate and rhythm. No murmur or gallop auscultated.  LUNGS: Clear to auscultation and percussion. Lung excursion symmetrical. No change in fremitus.  The patient has a frequent productive sounding cough.  ABDOMEN: Bowel sounds present.  Abdomen is firm and distended.   : Normal anatomy.  EXTREMITIES: Normal muscle tone and joint movement, no cyanosis or clubbing.   LYMPHATICS: No adenopathy palpated, no edema.  SKIN: Dry, intact, no lesions.   NEURO: Cranial nerves II-XII intact. Motor strength 5/5 bilaterally, upper and lower extremities.  PSYCH:  Anxious    ACUTE PHASE REACTANT (LAST 24 HOURS)  Recent Labs   Lab 11/02/23  0011            CBC LAST (LAST 24 HOURS)  No results for input(s): "WBC", "RBC", "HGB", "HCT", "MCV", "MCH", "MCHC", "RDW", "PLT", "MPV", "GRAN", " ""LYMPH", "MONO", "BASO", "NRBC" in the last 24 hours.    CHEMISTRY LAST (LAST 24 HOURS)  No results for input(s): "NA", "K", "CL", "CO2", "ANIONGAP", "BUN", "CREATININE", "GLU", "CALCIUM", "PH", "MG", "ALBUMIN", "PROT", "ALKPHOS", "ALT", "AST", "BILITOT" in the last 24 hours.        CARDIAC PROFILE (LAST 24 HOURS)  Recent Labs   Lab 10/26/23  2024 10/27/23  0016 11/02/23  0011   BNP 85  --   --    LDH  --    < > 195   TROPONINIHS 8.5  --   --     < > = values in this interval not displayed.       LAST 7 DAYS MICROBIOLOGY   Microbiology Results (last 7 days)       ** No results found for the last 168 hours. **            MOST RECENT IMAGING  X-Ray Chest PA And Lateral  Reason: Rule out pneumonia portable chest x-ray at 7:54 AM is compared to prior study 10/29/2023    Clinical history is shortness of breath    There is hypoinflation. The cardiomediastinal silhouette is normal in size. There are no confluent infiltrates. There are no acute osseous abnormalities.    IMPRESSION: Hypoinflation with no acute pulmonary process Taken on deepest inspiration    FINDINGS:  PA and lateral chest without comparisons show normal cardiomediastinal silhouette.    Lungs are clear. Pulmonary vasculature is normal. Bones are normal.    IMPRESSION:  Normal chest.    Electronically signed by:  Lianne Lang MD  11/02/2023 08:03 AM CDT Workstation: [id number]      CURRENT VISIT EKG  Results for orders placed or performed during the hospital encounter of 10/26/23   EKG 12-lead    Narrative    Test Reason : R94.31,    Vent. Rate : 072 BPM     Atrial Rate : 072 BPM     P-R Int : 166 ms          QRS Dur : 112 ms      QT Int : 428 ms       P-R-T Axes : 032 -43 047 degrees     QTc Int : 468 ms    Sinus rhythm with sinus arrhythmia with occasional Premature ventricular  complexes  Left axis deviation  Moderate voltage criteria for LVH, may be normal variant ( R in aVL ,   Ra product )  Abnormal ECG  When compared with ECG of 26-OCT-2023 " 20:40,  Premature ventricular complexes are now Present    Referred By: ANITA   SELF           Confirmed By:        ECHOCARDIOGRAM RESULTS  No results found for this or any previous visit.             The patient is on room air         LAST ARTERIAL BLOOD GAS  ABG  Recent Labs   Lab 10/26/23  2117   PH 7.418   PO2 57*   PCO2 34.3*   HCO3 22.2*   BE -2       IMPRESSION AND PLAN  COVID-19 infection  Asthma exacerbation  -  peak flow in the 250 range, normally 500  - will continue his steroids  - continue 1200 mg Mucinex to help clear the inspissated secretions  -  chest x-ray remains clear  Constipation  - lactulose q.6 until results  Obstructive sleep apnea  - encourage compliance with his CPAP  Anxiety  Tremors    Unfortunately, patient is not ready for discharge. Hopefully   He will beafter he has a bowel movement.  Discussed changing his IV with the nurse it is been there since he was admitted. I wish to continue his Solu-Medrol    Charlee Brewer MD  Date of Service: 11/02/2023  9:07 AM

## 2023-11-02 NOTE — PROGRESS NOTES
[facility] Medicine  Progress Note    Patient name: Nico Teague  MRN: [id number]  Admit Date: 10/26/2023   LOS: 6 days     SUBJECTIVE:     Principal problem: Acute hypoxemic respiratory failure due to COVID-19    Interval History:  Patient admitted with acute hypoxic respiration failure due to covid 19 infection.  He has Asthma and BANDRA on cpap at night.  He has completed remdesivir.  Started on Dexamethasone but transitioned to solumedrol by Pulmonary.   Peak flow is below his normal range.  CXR is clear.  Constipated with two small BMs today in response to lactulose but his abdomen still feels much more bloated than his usual- soft on exam and passing gas.     I found him on room air appearing comfortable with the exception of pain when he coughs and a bloated feeling to his abdomen.     Hospital Course:    Scheduled Meds:   albuterol-ipratropium  3 mL Nebulization Q6H    amiodarone  200 mg Oral BID    amLODIPine  10 mg Oral Daily    apixaban  5 mg Oral BID    arformoteroL  15 mcg Nebulization BID    ascorbic acid (vitamin C)  500 mg Oral BID    aspirin  81 mg Oral Q7 Days    atorvastatin  20 mg Oral Daily    benzonatate  200 mg Oral TID    bisacodyL  10 mg Oral Daily    budesonide  0.5 mg Nebulization Q12H    furosemide  40 mg Oral Daily    gabapentin  300 mg Oral TID    guaiFENesin  1,200 mg Oral BID    hydrocodone-chlorpheniramine  5 mL Oral Q12H    levothyroxine  88 mcg Oral Before breakfast    magnesium oxide  400 mg Oral Daily    methylPREDNISolone sodium succinate injection  60 mg Intravenous Q12H    montelukast  10 mg Oral QHS    multivitamin  1 tablet Oral Daily    mupirocin   Nasal BID    potassium chloride  10 mEq Oral Daily    sacubitriL-valsartan  1 tablet Oral BID    tamsulosin  0.4 mg Oral Daily    vitamin D  1,000 Units Oral Daily    vitamin E (dl, acetate)  400 Units Oral Daily     Continuous Infusions:  PRN Meds:acetaminophen, benzonatate, dextromethorphan-guaiFENesin   mg/5 ml, dextrose 50%, dextrose 50%, glucagon (human recombinant), glucose, glucose, hydrALAZINE, HYDROcodone-acetaminophen, lactulose, melatonin, ondansetron, sodium chloride 0.9%    Review of patient's allergies indicates:   Allergen Reactions    Bactrim [sulfamethoxazole-trimethoprim]      HIVES/ RASH    Green tea (rashida sinensis) Hives     Pt is has allergies to black tea. Not an option under search.       Review of Systems: As per interval history    OBJECTIVE:     Vital Signs (Most Recent)  Temp: 99.6 °F (37.6 °C) (11/02/23 1100)  Pulse: 83 (11/02/23 1323)  Resp: 20 (11/02/23 1323)  BP: (!) 101/50 (notified nurse) (11/02/23 1100)  SpO2: (!) 94 % (11/02/23 1323)    Vital Signs Range (Last 24H):  Temp:  [97.6 °F (36.4 °C)-99.6 °F (37.6 °C)]   Pulse:  []   Resp:  [18-20]   BP: (101-160)/(50-86)   SpO2:  [91 %-98 %]     I & O (Last 24H):  Intake/Output Summary (Last 24 hours) at 11/2/2023 1401  Last data filed at 11/2/2023 1309  Gross per 24 hour   Intake 780 ml   Output --   Net 780 ml       Physical Exam:    Vitals and nursing note reviewed.     Constitutional:       General: Not in acute distress.     Appearance: Well-developed.   HENT:      Head: Normocephalic and atraumatic.   Eyes:      Pupils: Pupils are equal, round, and reactive to light.   Cardiovascular:      Rate and Rhythm: Regular rhythm.   Pulmonary:      Effort: Pulmonary effort is normal.      Breath sounds: Normal breath sounds. No wheezing.   Abdominal:      General: There is no distension.      Palpations: Abdomen is soft.      Tenderness: There is no abdominal tenderness. There is no guarding or rebound.   Musculoskeletal:         General: Normal range of motion.      Cervical back: Normal range of motion.   Skin:     Findings: No rash.   Neurological:      Mental Status: Alert and oriented to person, place, and time.      Cranial Nerves: No cranial nerve deficit.      Sensory: No sensory deficit.     Laboratory:  I have  reviewed all pertinent lab results within the past 24 hours.  CBC:   Recent Labs   Lab 10/30/23  0815   WBC 13.59*   RBC 4.63   HGB 14.2   HCT 43.7      MCV 94   MCH 30.7   MCHC 32.5     CMP:   Recent Labs   Lab 11/02/23  0546   *   CALCIUM 9.4   ALBUMIN 3.5   PROT 6.3      K 3.8   CO2 24      BUN 38*   CREATININE 1.4   ALKPHOS 68   ALT 46*   AST 33   BILITOT 0.3     Microbiology Results (last 7 days)       ** No results found for the last 168 hours. **            Diagnostic Results:      ASSESSMENT/PLAN:         Active Hospital Problems    Diagnosis  POA    *Acute hypoxemic respiratory failure due to COVID-19 [U07.1, J96.01]  Yes    Constipation [K59.00]  Unknown    Cough [R05.9]  No    COVID-19 [U07.1]  Yes    Asthma [J45.909]  Unknown    Hypothyroidism (acquired) [E03.9]  Yes    Atrial fibrillation [I48.91]  Yes    BPH (benign prostatic hyperplasia) [N40.0]  Yes      Resolved Hospital Problems   No resolved problems to display.         Plan:     * Acute hypoxemic respiratory failure due to COVID-19  Improving- s/p remdesivir  C/w solumedrol and breathing rx  Cough meds prn     Constipation  Prn lactuose  Senna-ducosate  KUB to eval for obstruction.  2 small BMs 11/2 but last normal BM 7 days ago.        Cough  Likely post-infectious from covid-19 bronchitis.   C/w cough meds  Still coughing        Asthma  C/w steroids and breathing rx as scheduled        COVID-19  Vaccinated  improving  C/w solumedrol and breathing rx  Cough meds prn     Hypothyroidism (acquired)  C/w home meds        Atrial fibrillation  C/w home meds     BPH (benign prostatic hyperplasia)  C/w home meds       VTE Risk Mitigation (From admission, onward)           Ordered     apixaban tablet 5 mg  2 times daily         10/27/23 0010                      Department Hospital Medicine  [facility]  Noah June MD  Date of service: 11/02/2023

## 2023-11-02 NOTE — PHYSICIAN QUERY
PT Name: Nico Teague  MR #: [id number]     DOCUMENTATION CLARIFICATION      CDS/: Nadege Torres               Contact information:[phone] or through epic messenger  This form is a permanent document in the medical record.    Query Date: November 2, 2023    By submitting this query, we are merely seeking further clarification of documentation to reflect the severity of illness of your patient. Please utilize your independent clinical judgment when addressing the question(s) below.     The Medical Record contains the following:   Indicators   Supporting Clinical Findings Location in Medical Record    Documentation/History of condition Likely post-infectious from covid-19 bronchitis  Hospitalist's progress notes 10/30-11/2    Treatment/Medication C/w solumedrol and breathing rx  Cough meds prn  Hospitalist's progress notes 10/30-11/2      Provider, please specify the acuity/chronicity of COVID 19 Bronchitis:    [   ] Acute   [   ] Chronic   [   ] Acute on chronic   [   ] Past history only, not a current diagnosis       Please document in your progress notes daily for the duration of treatment until resolved, and include in your discharge summary.

## 2023-11-02 NOTE — PLAN OF CARE
11/02/23 1323   Patient Assessment/Suction   Level of Consciousness (AVPU) alert   Respiratory Effort Unlabored   All Lung Fields Breath Sounds coarse;clear   Cough Frequency frequent   Cough Type nonproductive   PRE-TX-O2   Device (Oxygen Therapy) room air  (o2 on sb)   SpO2 (!) 94 %   Pulse Oximetry Type Intermittent   $ Pulse Oximetry - Multiple Charge Pulse Oximetry - Multiple   Pulse 83   Resp 20   Aerosol Therapy   $ Aerosol Therapy Charges Aerosol Treatment   Respiratory Treatment Status (SVN) given   Treatment Route (SVN) mask   Patient Position (SVN) sitting on edge of bed   Post Treatment Assessment (SVN) breath sounds unchanged   Signs of Intolerance (SVN) none   Breath Sounds Post-Respiratory Treatment   Post-treatment Heart Rate (beats/min) 77   Post-treatment Resp Rate (breaths/min) 20   Peak Flow   $ Peak Flow Charges Given   PEFR PREtreatment (L/Min) 280   PEFR POST-Treatment (L/Min) 310   Effort (PEFR) good   Patient Position (PEFR) sitting on edge of bed   Change Pre/Post Treatment (%) 10.71   Preset CPAP/BiPAP Settings   Mode Of Delivery Standby;CPAP   Equipment Type [place]

## 2023-11-02 NOTE — PLAN OF CARE
11/02/23 0816   Patient Assessment/Suction   Level of Consciousness (AVPU) alert   Respiratory Effort Unlabored   All Lung Fields Breath Sounds coarse;diminished   Cough Frequency frequent   PRE-TX-O2   Device (Oxygen Therapy) nasal cannula   $ Is the patient on Low Flow Oxygen? Yes   Flow (L/min) 2   SpO2 (!) 93 %  (on room air at this time)   Pulse Oximetry Type Continuous   $ Pulse Oximetry - Multiple Charge Pulse Oximetry - Multiple   Pulse 79   Resp 20   Aerosol Therapy   $ Aerosol Therapy Charges Aerosol Treatment   Respiratory Treatment Status (SVN) given   Treatment Route (SVN) mask   Patient Position (SVN) sitting on edge of bed   Post Treatment Assessment (SVN) breath sounds unchanged   Signs of Intolerance (SVN) none   Breath Sounds Post-Respiratory Treatment   Post-treatment Heart Rate (beats/min) 75   Post-treatment Resp Rate (breaths/min) 18   Peak Flow   $ Peak Flow Charges Given   PEFR PREtreatment (L/Min) 240   PEFR POST-Treatment (L/Min) 290   Effort (PEFR) good   Patient Position (PEFR) sitting on edge of bed   Change Pre/Post Treatment (%) 20.83   Preset CPAP/BiPAP Settings   Mode Of Delivery Standby;CPAP   $ CPAP/BiPAP Daily Charge BiPAP/CPAP Daily   Equipment Type V60

## 2023-11-02 NOTE — PLAN OF CARE
11/02/23 0816   Patient Assessment/Suction   Level of Consciousness (AVPU) alert   Respiratory Effort Unlabored   All Lung Fields Breath Sounds coarse;diminished   Cough Frequency frequent   PRE-TX-O2   Device (Oxygen Therapy) nasal cannula   $ Is the patient on Low Flow Oxygen? Yes   Flow (L/min) 2   SpO2 (!) 93 %  (on room air at this time)   Pulse Oximetry Type Continuous   $ Pulse Oximetry - Multiple Charge Pulse Oximetry - Multiple   Pulse 79   Resp 20   Aerosol Therapy   $ Aerosol Therapy Charges Aerosol Treatment   Respiratory Treatment Status (SVN) given   Treatment Route (SVN) mask   Patient Position (SVN) sitting on edge of bed   Post Treatment Assessment (SVN) breath sounds unchanged   Signs of Intolerance (SVN) none   Breath Sounds Post-Respiratory Treatment   Post-treatment Heart Rate (beats/min) 75   Post-treatment Resp Rate (breaths/min) 18   Peak Flow   $ Peak Flow Charges Given   PEFR PREtreatment (L/Min) 240   PEFR POST-Treatment (L/Min) 290   Effort (PEFR) good   Patient Position (PEFR) sitting on edge of bed   Change Pre/Post Treatment (%) 20.83   Preset CPAP/BiPAP Settings   Mode Of Delivery Standby;CPAP   $ CPAP/BiPAP Daily Charge BiPAP/CPAP Daily   Equipment Type [place]

## 2023-11-03 VITALS
HEIGHT: 68 IN | SYSTOLIC BLOOD PRESSURE: 137 MMHG | BODY MASS INDEX: 39.4 KG/M2 | DIASTOLIC BLOOD PRESSURE: 60 MMHG | OXYGEN SATURATION: 91 % | RESPIRATION RATE: 18 BRPM | WEIGHT: 259.94 LBS | HEART RATE: 64 BPM | TEMPERATURE: 98 F

## 2023-11-03 LAB
ALBUMIN SERPL BCP-MCNC: 3.3 G/DL (ref 3.5–5.2)
ALP SERPL-CCNC: 64 U/L (ref 55–135)
ALT SERPL W/O P-5'-P-CCNC: 48 U/L (ref 10–44)
ANION GAP SERPL CALC-SCNC: 4 MMOL/L (ref 8–16)
AST SERPL-CCNC: 31 U/L (ref 10–40)
BILIRUB SERPL-MCNC: 0.4 MG/DL (ref 0.1–1)
BUN SERPL-MCNC: 37 MG/DL (ref 8–23)
CALCIUM SERPL-MCNC: 9.1 MG/DL (ref 8.7–10.5)
CHLORIDE SERPL-SCNC: 103 MMOL/L (ref 95–110)
CO2 SERPL-SCNC: 32 MMOL/L (ref 23–29)
CREAT SERPL-MCNC: 1.4 MG/DL (ref 0.5–1.4)
EST. GFR  (NO RACE VARIABLE): 53.4 ML/MIN/1.73 M^2
GLUCOSE SERPL-MCNC: 158 MG/DL (ref 70–110)
MAGNESIUM SERPL-MCNC: 2.6 MG/DL (ref 1.6–2.6)
PHOSPHATE SERPL-MCNC: 4.2 MG/DL (ref 2.7–4.5)
POTASSIUM SERPL-SCNC: 4.3 MMOL/L (ref 3.5–5.1)
POTASSIUM SERPL-SCNC: 5.5 MMOL/L (ref 3.5–5.1)
PROT SERPL-MCNC: 5.8 G/DL (ref 6–8.4)
SODIUM SERPL-SCNC: 139 MMOL/L (ref 136–145)

## 2023-11-03 PROCEDURE — 25000003 PHARM REV CODE 250: Performed by: STUDENT IN AN ORGANIZED HEALTH CARE EDUCATION/TRAINING PROGRAM

## 2023-11-03 PROCEDURE — 25000003 PHARM REV CODE 250: Performed by: NURSE PRACTITIONER

## 2023-11-03 PROCEDURE — 84132 ASSAY OF SERUM POTASSIUM: CPT | Performed by: INTERNAL MEDICINE

## 2023-11-03 PROCEDURE — 25000003 PHARM REV CODE 250: Performed by: INTERNAL MEDICINE

## 2023-11-03 PROCEDURE — 94640 AIRWAY INHALATION TREATMENT: CPT

## 2023-11-03 PROCEDURE — 80053 COMPREHEN METABOLIC PANEL: CPT | Performed by: NURSE PRACTITIONER

## 2023-11-03 PROCEDURE — 63600175 PHARM REV CODE 636 W HCPCS: Performed by: INTERNAL MEDICINE

## 2023-11-03 PROCEDURE — 99900031 HC PATIENT EDUCATION (STAT)

## 2023-11-03 PROCEDURE — 99232 SBSQ HOSP IP/OBS MODERATE 35: CPT | Mod: ,,, | Performed by: INTERNAL MEDICINE

## 2023-11-03 PROCEDURE — 25000242 PHARM REV CODE 250 ALT 637 W/ HCPCS: Performed by: INTERNAL MEDICINE

## 2023-11-03 PROCEDURE — 94660 CPAP INITIATION&MGMT: CPT

## 2023-11-03 PROCEDURE — 36415 COLL VENOUS BLD VENIPUNCTURE: CPT | Performed by: NURSE PRACTITIONER

## 2023-11-03 PROCEDURE — 83735 ASSAY OF MAGNESIUM: CPT | Performed by: NURSE PRACTITIONER

## 2023-11-03 PROCEDURE — 99232 PR SUBSEQUENT HOSPITAL CARE,LEVL II: ICD-10-PCS | Mod: ,,, | Performed by: INTERNAL MEDICINE

## 2023-11-03 PROCEDURE — 99900035 HC TECH TIME PER 15 MIN (STAT)

## 2023-11-03 PROCEDURE — 84100 ASSAY OF PHOSPHORUS: CPT | Performed by: NURSE PRACTITIONER

## 2023-11-03 PROCEDURE — 36415 COLL VENOUS BLD VENIPUNCTURE: CPT | Performed by: INTERNAL MEDICINE

## 2023-11-03 PROCEDURE — 94761 N-INVAS EAR/PLS OXIMETRY MLT: CPT

## 2023-11-03 RX ORDER — CODEINE PHOSPHATE AND GUAIFENESIN 10; 100 MG/5ML; MG/5ML
5 SOLUTION ORAL 3 TIMES DAILY PRN
Qty: 237 ML | Refills: 0 | Status: SHIPPED | OUTPATIENT
Start: 2023-11-03 | End: 2023-11-08

## 2023-11-03 RX ORDER — GUAIFENESIN 600 MG/1
1200 TABLET, EXTENDED RELEASE ORAL 2 TIMES DAILY
Qty: 20 TABLET | Refills: 0 | Status: SHIPPED | OUTPATIENT
Start: 2023-11-03

## 2023-11-03 RX ORDER — BENZONATATE 200 MG/1
200 CAPSULE ORAL 3 TIMES DAILY PRN
Qty: 30 CAPSULE | Refills: 1 | Status: SHIPPED | OUTPATIENT
Start: 2023-11-03 | End: 2023-11-13

## 2023-11-03 RX ORDER — LACTULOSE 10 G/15ML
20 SOLUTION ORAL EVERY 6 HOURS PRN
Qty: 473 ML | Refills: 1 | Status: SHIPPED | OUTPATIENT
Start: 2023-11-03 | End: 2024-02-12

## 2023-11-03 RX ORDER — PREDNISONE 10 MG/1
TABLET ORAL
Qty: 40 TABLET | Refills: 0 | Status: SHIPPED | OUTPATIENT
Start: 2023-11-04 | End: 2024-01-22 | Stop reason: CLARIF

## 2023-11-03 RX ORDER — PREDNISONE 20 MG/1
40 TABLET ORAL DAILY
Status: DISCONTINUED | OUTPATIENT
Start: 2023-11-03 | End: 2023-11-03 | Stop reason: HOSPADM

## 2023-11-03 RX ORDER — IPRATROPIUM BROMIDE AND ALBUTEROL SULFATE 2.5; .5 MG/3ML; MG/3ML
3 SOLUTION RESPIRATORY (INHALATION)
Status: DISCONTINUED | OUTPATIENT
Start: 2023-11-03 | End: 2023-11-03 | Stop reason: HOSPADM

## 2023-11-03 RX ADMIN — Medication 1000 UNITS: at 09:11

## 2023-11-03 RX ADMIN — SENNOSIDES AND DOCUSATE SODIUM 1 TABLET: 50; 8.6 TABLET ORAL at 09:11

## 2023-11-03 RX ADMIN — BENZONATATE 200 MG: 100 CAPSULE ORAL at 09:11

## 2023-11-03 RX ADMIN — IPRATROPIUM BROMIDE AND ALBUTEROL SULFATE 3 ML: 2.5; .5 SOLUTION RESPIRATORY (INHALATION) at 08:11

## 2023-11-03 RX ADMIN — TAMSULOSIN HYDROCHLORIDE 0.4 MG: 0.4 CAPSULE ORAL at 09:11

## 2023-11-03 RX ADMIN — AMIODARONE HYDROCHLORIDE 200 MG: 200 TABLET ORAL at 09:11

## 2023-11-03 RX ADMIN — MUPIROCIN 1 G: 20 OINTMENT TOPICAL at 09:11

## 2023-11-03 RX ADMIN — ARFORMOTEROL TARTRATE 15 MCG: 15 SOLUTION RESPIRATORY (INHALATION) at 08:11

## 2023-11-03 RX ADMIN — GUAIFENESIN AND DEXTROMETHORPHAN 10 ML: 100; 10 SYRUP ORAL at 09:11

## 2023-11-03 RX ADMIN — PREDNISONE 40 MG: 20 TABLET ORAL at 09:11

## 2023-11-03 RX ADMIN — LEVOTHYROXINE SODIUM 88 MCG: 0.09 TABLET ORAL at 05:11

## 2023-11-03 RX ADMIN — ASPIRIN 81 MG 81 MG: 81 TABLET ORAL at 09:11

## 2023-11-03 RX ADMIN — OXYCODONE HYDROCHLORIDE AND ACETAMINOPHEN 500 MG: 500 TABLET ORAL at 09:11

## 2023-11-03 RX ADMIN — AMLODIPINE BESYLATE 10 MG: 5 TABLET ORAL at 09:11

## 2023-11-03 RX ADMIN — LACTULOSE 20 G: 20 SOLUTION ORAL at 09:11

## 2023-11-03 RX ADMIN — GABAPENTIN 300 MG: 300 CAPSULE ORAL at 09:11

## 2023-11-03 RX ADMIN — SODIUM ZIRCONIUM CYCLOSILICATE 5 G: 5 POWDER, FOR SUSPENSION ORAL at 09:11

## 2023-11-03 RX ADMIN — THERA TABS 1 TABLET: TAB at 09:11

## 2023-11-03 RX ADMIN — Medication 400 MG: at 09:11

## 2023-11-03 RX ADMIN — SACUBITRIL AND VALSARTAN 1 TABLET: 49; 51 TABLET, FILM COATED ORAL at 09:11

## 2023-11-03 RX ADMIN — POTASSIUM CHLORIDE 10 MEQ: 750 CAPSULE, EXTENDED RELEASE ORAL at 09:11

## 2023-11-03 RX ADMIN — GUAIFENESIN 1200 MG: 600 TABLET, EXTENDED RELEASE ORAL at 09:11

## 2023-11-03 RX ADMIN — FUROSEMIDE 40 MG: 40 TABLET ORAL at 09:11

## 2023-11-03 RX ADMIN — APIXABAN 5 MG: 5 TABLET, FILM COATED ORAL at 09:11

## 2023-11-03 RX ADMIN — Medication 400 UNITS: at 09:11

## 2023-11-03 RX ADMIN — BUDESONIDE INHALATION 0.5 MG: 0.5 SUSPENSION RESPIRATORY (INHALATION) at 08:11

## 2023-11-03 RX ADMIN — ATORVASTATIN CALCIUM 20 MG: 20 TABLET, FILM COATED ORAL at 09:11

## 2023-11-03 NOTE — CARE UPDATE
11/03/23 0853   Patient Assessment/Suction   Level of Consciousness (AVPU) alert   Respiratory Effort Normal   Expansion/Accessory Muscles/Retractions no use of accessory muscles   All Lung Fields Breath Sounds Anterior:   FLAVIA Breath Sounds clear   RUL Breath Sounds coarse   RML Breath Sounds coarse   Rhythm/Pattern, Respiratory unlabored   Cough Frequency infrequent   Cough Type good;nonproductive   PRE-TX-O2   Device (Oxygen Therapy) room air   SpO2 97 %   Pulse Oximetry Type Intermittent   $ Pulse Oximetry - Multiple Charge Pulse Oximetry - Multiple   Pulse 64   Resp 18   Aerosol Therapy   $ Aerosol Therapy Charges Aerosol Treatment   Daily Review of Necessity (SVN) completed   Respiratory Treatment Status (SVN) continuous   Treatment Route (SVN) mask   Patient Position (SVN) sitting on edge of bed   Post Treatment Assessment (SVN) patient reports breathing improved   Signs of Intolerance (SVN) none   Breath Sounds Post-Respiratory Treatment   Throughout All Fields Post-Treatment All Fields   Throughout All Fields Post-Treatment aeration increased   Post-treatment Heart Rate (beats/min) 57   Post-treatment Resp Rate (breaths/min) 18   Peak Flow   $ Peak Flow Charges Given   PEFR PREtreatment (L/Min) 400   PEFR POST-Treatment (L/Min) 420   Effort (PEFR) good   Patient Position (PEFR) sitting on edge of bed   Change Pre/Post Treatment (%) 5   Preset CPAP/BiPAP Settings   Mode Of Delivery CPAP;Standby   Education   $ Education Bronchodilator;45 min;Other (see comment)  (peak flow)

## 2023-11-03 NOTE — PROGRESS NOTES
Peak flow what four hundred by Pulmonary/Critical Care Progress Note      PATIENT NAME: Nico Teague  MRN: [id number]  TODAY'S DATE: 2023  9:07 AM  ADMIT DATE: 10/26/2023  AGE: 73 y.o. : 1950    CONSULT REQUESTED BY: Noah June MD    REASON FOR CONSULT:   COVID and cough and asthma and BANDAR    HPI:  Patient is a 73-year-old male whom we follow for asthma and obstructive sleep apnea who called on the  to say that he was febrile and very short of breath and COVID positive.  He was referred to the ER where he was admitted.  I got a notice yesterday that he was still in the hospital and still coughing a great deal.    The patient complains he did not sleep at all last night because he is coughing so much and did not find any relief with the Tessalon and Tussionex.  He is making upper airway noises.  He complains of being terribly constipated.  His chest and abdomen hurt from coughing.  He was only able to wear CPAP till midnight because he was coughing so much.  He feels terrible.  On the bright side, he is neither febrile nor hypoxemic     the patient perceives he is dying.  He is very constipated in his abdomen is distended.  His abdomen hurts.  He is asking for narcotics.  Explained that having narcotics worsens his constipation.  Will start lactulose he is coughing up blood-tinged beige sputum.  I do not hear any wheezing but will start checking peak flows.       The patient is still very uncomfortable.  He is still coughing.  His peak flow is in the mid 200s.  He is yet to have a real bowel movement.  He did have a small when this morning.  He is passing gas.    11/3 the patient is still coughing when he tries to talk.  However, he looks much better today.  He did pass 1 piece of stool last night.    REVIEW OF SYSTEMS  GENERAL: Feeling his upper airways are breathing better.  EYES: Vision is good.  ENT: No sinusitis or pharyngitis.   HEART: No chest pain or palpitations.  LUNGS:  He  "is coughing and bringing up less mucus  GI:  He is terribly constipated his abdominal wall hurts terribly from coughing, still.  : No dysuria, hesitancy, or nocturia.  SKIN: No lesions or rashes.  MUSCULOSKELETAL: No joint pain or myalgias.  NEURO:  Doing okay  LYMPH: No edema or adenopathy.  PSYCH: No anxiety or depression.  ENDO: No weight change.    No change in the patient's Past Medical History, Past Surgical History, Social History or Family History since admission.      VITAL SIGNS (MOST RECENT)  Temp: 97.5 °F (36.4 °C) (11/03/23 0700)  Pulse: 72 (11/03/23 0700)  Resp: 18 (11/03/23 0700)  BP: (!) 143/63 (11/03/23 0700)  SpO2: (!) 92 % (11/03/23 0700)    INTAKE AND OUTPUT (LAST 24 HOURS):  Intake/Output Summary (Last 24 hours) at 11/3/2023 0840  Last data filed at 11/3/2023 0825  Gross per 24 hour   Intake 1020 ml   Output --   Net 1020 ml       WEIGHT  Wt Readings from Last 1 Encounters:   10/27/23 117.9 kg (259 lb 14.8 oz)       PHYSICAL EXAM  GENERAL: Older patient who appears significantly better today.  He is sitting up on the side of the bed.  HEENT: Pupils equal and reactive. Extraocular movements intact. Nose intact. Pharynx moist.    NECK: Supple.   HEART: Regular rate and rhythm. No murmur or gallop auscultated.  LUNGS: Clear to auscultation and percussion. Lung excursion symmetrical. No change in fremitus.  The patient coughs every time he tries to talk  ABDOMEN: Bowel sounds present.  Abdomen is firm and distended.   : Normal anatomy.  EXTREMITIES: Normal muscle tone and joint movement, no cyanosis or clubbing.   LYMPHATICS: No adenopathy palpated, no edema.  SKIN: Dry, intact, no lesions.   NEURO: Cranial nerves II-XII intact. Motor strength 5/5 bilaterally, upper and lower extremities.  PSYCH:  More calm          CBC LAST (LAST 24 HOURS)  No results for input(s): "WBC", "RBC", "HGB", "HCT", "MCV", "MCH", "MCHC", "RDW", "PLT", "MPV", "GRAN", "LYMPH", "MONO", "BASO", "NRBC" in the last 24 " hours.    CHEMISTRY LAST (LAST 24 HOURS)  Recent Labs   Lab 11/03/23  0454      K 5.5*      CO2 32*   ANIONGAP 4*   BUN 37*   CREATININE 1.4   *   CALCIUM 9.1   MG 2.6   ALBUMIN 3.3*   PROT 5.8*   ALKPHOS 64   ALT 48*   AST 31   BILITOT 0.4           CARDIAC PROFILE (LAST 24 HOURS)  Recent Labs   Lab 11/02/23  0011          LAST 7 DAYS MICROBIOLOGY   Microbiology Results (last 7 days)       ** No results found for the last 168 hours. **            MOST RECENT IMAGING  X-Ray Abdomen AP 1 View  HISTORY: Constipation. Evaluate for obstruction.    COMPARISON:None available.    FINDINGS:Diffusely dilated loops passed and are established throughout the abdominal cavity without regional areas of disproportionate distention. There is no evidence of mechanical obstruction. No evidence of pneumatosis. The ascending colon is distended with air. The psoas margins are well aligned. Minimal dextroconvex alignment of the lumbar spine.    IMPRESSION: Mild small bowel stasis.    Electronically signed by:  Minh Wing MD  11/02/2023 01:58 PM CDT Workstation: [id number]  X-Ray Chest PA And Lateral  Reason: Rule out pneumonia portable chest x-ray at 7:54 AM is compared to prior study 10/29/2023    Clinical history is shortness of breath    There is hypoinflation. The cardiomediastinal silhouette is normal in size. There are no confluent infiltrates. There are no acute osseous abnormalities.    IMPRESSION: Hypoinflation with no acute pulmonary process Taken on deepest inspiration    FINDINGS:  PA and lateral chest without comparisons show normal cardiomediastinal silhouette.    Lungs are clear. Pulmonary vasculature is normal. Bones are normal.    IMPRESSION:  Normal chest.    Electronically signed by:  Lianne Lang MD  11/02/2023 08:03 AM CDT Workstation: [id number]      CURRENT VISIT EKG  Results for orders placed or performed during the hospital encounter of 10/26/23   EKG 12-lead    Narrative     Test Reason : R94.31,    Vent. Rate : 072 BPM     Atrial Rate : 072 BPM     P-R Int : 166 ms          QRS Dur : 112 ms      QT Int : 428 ms       P-R-T Axes : 032 -43 047 degrees     QTc Int : 468 ms    Sinus rhythm with sinus arrhythmia with occasional Premature ventricular  complexes  Left axis deviation  Moderate voltage criteria for LVH, may be normal variant ( R in aVL ,   [place] product )  Abnormal ECG  When compared with ECG of 26-OCT-2023 20:40,  Premature ventricular complexes are now Present    Referred By: [email or web address]   SELF           Confirmed By:      The patient is on room air        IMPRESSION AND PLAN  COVID-19 infection  Asthma exacerbation  -  peak flow up to 420, normally 500  - change Solu-Medrol to prednisone  - continue 1200 mg Mucinex to help clear the inspissated secretions  -  chest x-ray remains clear  Constipation  - lactulose q.6 until results, will double the dose  Obstructive sleep apnea  - encourage compliance with his CPAP  Anxiety  - improve  Tremors  - resolved    I think the patient can be discharged home today.  I would send him home with large volumes of lactulose that he can stop when he finally has a bowel movement.  He needs a long prednisone taper 40 for 4 days, 30 for 4 days, 20 for 4 days, 10 for 4 days.  He needs to come in the office next week.    Charlee Brewer MD  Date of Service: 11/03/2023  9:07 AM

## 2023-11-03 NOTE — PLAN OF CARE
Problem: Adult Inpatient Plan of Care  Goal: Plan of Care Review  Outcome: Met  Goal: Patient-Specific Goal (Individualized)  Outcome: Met  Goal: Absence of Hospital-Acquired Illness or Injury  Outcome: Met  Goal: Optimal Comfort and Wellbeing  Outcome: Met  Goal: Readiness for Transition of Care  Outcome: Met     Problem: Fall Injury Risk  Goal: Absence of Fall and Fall-Related Injury  Outcome: Met     Problem: Gas Exchange Impaired  Goal: Optimal Gas Exchange  Outcome: Met

## 2023-11-03 NOTE — PLAN OF CARE
Patient cleared for discharge from case management standpoint.    Follow up appointments scheduled and added to AVS.    Chart and discharge orders reviewed.  Patient discharged home with no further case management needs.       11/03/23 1200   Final Note   Assessment Type Final Discharge Note   Anticipated Discharge Disposition Home   Hospital Resources/Appts/Education Provided Provided patient/caregiver with written discharge plan information;Appointments scheduled and added to AVS   Post-Acute Status   Discharge Delays None known at this time

## 2023-11-04 NOTE — DISCHARGE SUMMARY
Critical access hospital Medicine  Discharge Summary      Patient Name: Nico Teague  MRN: [id number]  BENTON: [id number]  Patient Class: IP- Inpatient  Admission Date: 10/26/2023  Hospital Length of Stay: 7 days  Discharge Date and Time: 11/3/2023  1:31 PM  Attending Physician: No att. providers found   Discharging Provider: Noah June MD  Primary Care Provider: Gelacio Cantu Jr., MD    Primary Care Team: Networked reference to record PCT     HPI:   No notes on file    * No surgery found *      Hospital Course:   Patient admitted with acute hypoxic respiration failure due to covid 19 infection.  He has Asthma and BANDAR on cpap at night.  He has completed remdesivir.  Started on Dexamethasone but transitioned to solumedrol by Pulmonary.   Peak flow is below his normal range.  CXR is clear.  Constipated with two small BMs today in response to lactulose but his abdomen still feels much more bloated than his usual- soft on exam and passing gas. KUB obtained and reveals gaseous distension without obstruction. A small BM overnight but still feels bloated.  Peak flows are better and closer to his baseline .Good room air saturations.  Pulmonary has cleared him for discharge on steroid taper, mucinex, tessalon, Cheratussin, lactulose.  Examined on day of discharge and alert, NAD, comfortable respirations on room air but still coughing. Dr. Brewer would like to see him in office in one week.       Goals of Care Treatment Preferences:  Code Status: Full Code      Consults:     No new Assessment & Plan notes have been filed under this hospital service since the last note was generated.  Service: Hospital Medicine    Final Active Diagnoses:    Diagnosis Date Noted POA    PRINCIPAL PROBLEM:  Acute hypoxemic respiratory failure due to COVID-19 [U07.1, J96.01] 10/27/2023 Yes    Constipation [K59.00] 11/01/2023 Yes    Cough [R05.9] 10/30/2023 No    COVID-19 [U07.1] 10/27/2023 Yes    Asthma [J45.909] 10/27/2023  "Yes    Hypothyroidism (acquired) [E03.9] 07/31/2023 Yes    Atrial fibrillation [I48.91] 10/26/2022 Yes    BPH (benign prostatic hyperplasia) [N40.0] 12/11/2019 Yes      Problems Resolved During this Admission:       Discharged Condition: good    Disposition: Home or Self Care    Follow Up:   Follow-up Information     Charlee Brewer MD. Schedule an appointment as soon as possible for a visit in 1 week(s).    Specialties: Pulmonary Disease, Sleep Medicine  Why: post hospital discharge follow up.  Please tell office staff that Dr. Brewer would like to see you in one week.  Contact information:  [address]  [address]  [facility] [address] [phone]                       Patient Instructions:      Diet Cardiac     Notify your health care provider if you experience any of the following:  difficulty breathing or increased cough     Activity as tolerated       Significant Diagnostic Studies: Labs:   CMP   Recent Labs   Lab 11/02/23  0546 11/03/23  0454 11/03/23  1130    139  --    K 3.8 5.5* 4.3    103  --    CO2 24 32*  --    * 158*  --    BUN 38* 37*  --    CREATININE 1.4 1.4  --    CALCIUM 9.4 9.1  --    PROT 6.3 5.8*  --    ALBUMIN 3.5 3.3*  --    BILITOT 0.3 0.4  --    ALKPHOS 68 64  --    AST 33 31  --    ALT 46* 48*  --    ANIONGAP 11 4*  --     and CBC No results for input(s): "WBC", "HGB", "HCT", "PLT" in the last 48 hours.    Pending Diagnostic Studies:     None         Medications:  Reconciled Home Medications:      Medication List      START taking these medications    benzonatate 200 MG capsule  Commonly known as: TESSALON  Take 1 capsule (200 mg total) by mouth 3 (three) times daily as needed for Cough.     guaiFENesin 600 mg 12 hr tablet  Commonly known as: MUCINEX  Take 2 tablets (1,200 mg total) by mouth 2 (two) times daily.     guaiFENesin-codeine 100-10 mg/5 ml  mg/5 mL syrup  Commonly known as: TUSSI-ORGANIDIN NR  Take 5 mLs by mouth 3 (three) times daily as " needed for Cough.     lactulose 20 gram/30 mL Soln  Commonly known as: CHRONULAC  Take 30 mLs (20 g total) by mouth every 6 (six) hours as needed (constipation).     predniSONE 10 MG tablet  Commonly known as: DELTASONE  4 tabs po daily for 4 days then 3 tabs po daily for 4 days then 2 tabs po daily for 4 days and then 1 tab po daily for 4 days.  Start taking on: November 4, 2023        CHANGE how you take these medications    allopurinoL 300 MG tablet  Commonly known as: ZYLOPRIM  TAKE 1 TABLET ONE TIME DAILY  What changed: when to take this     levothyroxine 88 MCG tablet  Commonly known as: SYNTHROID  TAKE 1 TABLET BEFORE BREAKFAST  What changed: how to take this     triamterene-hydrochlorothiazide 37.5-25 mg 37.5-25 mg per capsule  Commonly known as: DYAZIDE  TAKE 1 CAPSULE EVERY MORNING  What changed: when to take this        CONTINUE taking these medications    amiodarone 200 MG Tab  Commonly known as: PACERONE  Take 200 mg by mouth 2 (two) times daily.     apixaban 5 mg Tab  Commonly known as: ELIQUIS  Take 5 mg by mouth 2 (two) times daily.     ascorbic acid (vitamin C) 1000 MG tablet  Commonly known as: VITAMIN C  Take 1,000 mg by mouth once daily.     aspirin 81 MG Chew  Take 81 mg by mouth every 7 days.     cholecalciferol (vitamin D3) 25 mcg (1,000 unit) capsule  Commonly known as: VITAMIN D3  Take 1,000 Units by mouth once daily.     co-enzyme Q-10 50 mg capsule  Take 50 mg by mouth once daily.     ENTRESTO 49-51 mg per tablet  Generic drug: sacubitriL-valsartan  Take 1 tablet by mouth 2 (two) times daily.     fluticasone-umeclidin-vilanter 100-62.5-25 mcg Dsdv  Commonly known as: TRELEGY ELLIPTA  Inhale 1 puff into the lungs once daily.     furosemide 40 MG tablet  Commonly known as: LASIX  Take 20 mg by mouth once daily.     Lactobacillus rhamnosus GG 10 billion cell capsule  Commonly known as: CULTURELLE  Take 1 capsule by mouth.     magnesium gluconate 27.5 mg magne- sium (500 mg) Tab  Take 1 tablet  by mouth once.     melatonin 10 mg Tab  Take 1 tablet by mouth nightly as needed.     montelukast 10 mg tablet  Commonly known as: SINGULAIR  TAKE 1 TABLET EVERY EVENING     multivitamin per tablet  Commonly known as: THERAGRAN  Take 1 tablet by mouth once daily.     potassium chloride 10 MEQ Cpsr  Commonly known as: MICRO-K  Take 10 mEq by mouth once daily.     rosuvastatin 10 MG tablet  Commonly known as: CRESTOR  Take 1 tablet (10 mg total) by mouth every other day.     tamsulosin 0.4 mg Cap  Commonly known as: FLOMAX  Take 1 tablet by mouth Daily.     vitamin E 1000 UNIT capsule  Take 1,000 Units by mouth once daily.        STOP taking these medications    cefdinir 300 MG capsule  Commonly known as: OMNICEF            Indwelling Lines/Drains at time of discharge:   Lines/Drains/Airways     None                 Time spent on the discharge of patient: 33 minutes         Noah June MD  Department of Hospital Medicine  [facility]

## 2023-11-04 NOTE — HOSPITAL COURSE
Patient admitted with acute hypoxic respiration failure due to covid 19 infection.  He has Asthma and BANDAR on cpap at night.  He has completed remdesivir.  Started on Dexamethasone but transitioned to solumedrol by Pulmonary.   Peak flow is below his normal range.  CXR is clear.  Constipated with two small BMs today in response to lactulose but his abdomen still feels much more bloated than his usual- soft on exam and passing gas. KUB obtained and reveals gaseous distension without obstruction. A small BM overnight but still feels bloated.  Peak flows are better and closer to his baseline .Good room air saturations.  Pulmonary has cleared him for discharge on steroid taper, mucinex, tessalon, Cheratussin, lactulose.  Examined on day of discharge and alert, NAD, comfortable respirations on room air but still coughing. Dr. Brewer would like to see him in office in one week.

## 2023-11-06 ENCOUNTER — TELEPHONE (OUTPATIENT)
Dept: PULMONOLOGY | Facility: CLINIC | Age: 73
End: 2023-11-06

## 2023-11-06 ENCOUNTER — PATIENT OUTREACH (OUTPATIENT)
Dept: ADMINISTRATIVE | Facility: CLINIC | Age: 73
End: 2023-11-06
Payer: MEDICARE

## 2023-11-06 NOTE — TELEPHONE ENCOUNTER
----- Message from Rasta Emery sent at 11/6/2023 10:06 AM CST -----  Regarding: Hospital Follow Up  Contact: Nico, [phone]  The patient called requesting a hospital follow up with Dr. Brewer.  He was seen in [facility] for COVID and discharged 11/3/23.  Please clarify how soon Dr. Brewer would like to see the patient in the office and we will call him to accommodate. Thanks in advance for your assistance.     Sherry

## 2023-11-06 NOTE — PROGRESS NOTES
C3 nurse spoke with Nico Teague  for a [facility] post hospital discharge follow up call. The patient has a scheduled [facility] appointment with Gelacio Cantu Jr., MD on 11/08/2023 @ 5971.

## 2023-11-07 ENCOUNTER — OFFICE VISIT (OUTPATIENT)
Dept: PULMONOLOGY | Facility: CLINIC | Age: 73
End: 2023-11-07
Payer: MEDICARE

## 2023-11-07 VITALS
SYSTOLIC BLOOD PRESSURE: 138 MMHG | HEART RATE: 65 BPM | BODY MASS INDEX: 37.59 KG/M2 | OXYGEN SATURATION: 95 % | WEIGHT: 247.19 LBS | DIASTOLIC BLOOD PRESSURE: 76 MMHG

## 2023-11-07 DIAGNOSIS — G47.33 OSA (OBSTRUCTIVE SLEEP APNEA): ICD-10-CM

## 2023-11-07 DIAGNOSIS — J45.40 MODERATE PERSISTENT ASTHMA WITHOUT COMPLICATION: ICD-10-CM

## 2023-11-07 DIAGNOSIS — U07.1 COVID-19: Primary | ICD-10-CM

## 2023-11-07 DIAGNOSIS — R09.82 POST-NASAL DRIP: ICD-10-CM

## 2023-11-07 DIAGNOSIS — R53.83 FATIGUE, UNSPECIFIED TYPE: ICD-10-CM

## 2023-11-07 DIAGNOSIS — J45.41 MODERATE PERSISTENT ASTHMA WITH EXACERBATION: ICD-10-CM

## 2023-11-07 PROCEDURE — 3008F PR BODY MASS INDEX (BMI) DOCUMENTED: ICD-10-PCS | Mod: CPTII,S$GLB,, | Performed by: INTERNAL MEDICINE

## 2023-11-07 PROCEDURE — 1159F PR MEDICATION LIST DOCUMENTED IN MEDICAL RECORD: ICD-10-PCS | Mod: CPTII,S$GLB,, | Performed by: INTERNAL MEDICINE

## 2023-11-07 PROCEDURE — 3008F BODY MASS INDEX DOCD: CPT | Mod: CPTII,S$GLB,, | Performed by: INTERNAL MEDICINE

## 2023-11-07 PROCEDURE — 3078F PR MOST RECENT DIASTOLIC BLOOD PRESSURE < 80 MM HG: ICD-10-PCS | Mod: CPTII,S$GLB,, | Performed by: INTERNAL MEDICINE

## 2023-11-07 PROCEDURE — 4010F ACE/ARB THERAPY RXD/TAKEN: CPT | Mod: CPTII,S$GLB,, | Performed by: INTERNAL MEDICINE

## 2023-11-07 PROCEDURE — 1111F PR DISCHARGE MEDS RECONCILED W/ CURRENT OUTPATIENT MED LIST: ICD-10-PCS | Mod: CPTII,S$GLB,, | Performed by: INTERNAL MEDICINE

## 2023-11-07 PROCEDURE — 3075F PR MOST RECENT SYSTOLIC BLOOD PRESS GE 130-139MM HG: ICD-10-PCS | Mod: CPTII,S$GLB,, | Performed by: INTERNAL MEDICINE

## 2023-11-07 PROCEDURE — 1125F AMNT PAIN NOTED PAIN PRSNT: CPT | Mod: CPTII,S$GLB,, | Performed by: INTERNAL MEDICINE

## 2023-11-07 PROCEDURE — 1125F PR PAIN SEVERITY QUANTIFIED, PAIN PRESENT: ICD-10-PCS | Mod: CPTII,S$GLB,, | Performed by: INTERNAL MEDICINE

## 2023-11-07 PROCEDURE — 99214 PR OFFICE/OUTPT VISIT, EST, LEVL IV, 30-39 MIN: ICD-10-PCS | Mod: S$GLB,,, | Performed by: INTERNAL MEDICINE

## 2023-11-07 PROCEDURE — 3075F SYST BP GE 130 - 139MM HG: CPT | Mod: CPTII,S$GLB,, | Performed by: INTERNAL MEDICINE

## 2023-11-07 PROCEDURE — 99214 OFFICE O/P EST MOD 30 MIN: CPT | Mod: S$GLB,,, | Performed by: INTERNAL MEDICINE

## 2023-11-07 PROCEDURE — 1159F MED LIST DOCD IN RCRD: CPT | Mod: CPTII,S$GLB,, | Performed by: INTERNAL MEDICINE

## 2023-11-07 PROCEDURE — 4010F PR ACE/ARB THEARPY RXD/TAKEN: ICD-10-PCS | Mod: CPTII,S$GLB,, | Performed by: INTERNAL MEDICINE

## 2023-11-07 PROCEDURE — 3078F DIAST BP <80 MM HG: CPT | Mod: CPTII,S$GLB,, | Performed by: INTERNAL MEDICINE

## 2023-11-07 PROCEDURE — 1111F DSCHRG MED/CURRENT MED MERGE: CPT | Mod: CPTII,S$GLB,, | Performed by: INTERNAL MEDICINE

## 2023-11-07 NOTE — PROGRESS NOTES
.    SUBJECTIVE:    Patient ID: Nico Teague is a 73 y.o. male.    Chief Complaint: Follow-up (Hospital follow up for Covid discharged 11/03/2023/Patient is weak,cough, sinus drainage, heart rate drops to 33, blood sugars up & down)    The patient is here still feeling tired. He feels his heart rate drops into the 30's.  He is still tired.  He is taking his prednisone t.i.d. instead of all 3 pills in the morning.  He is having nocturia.  His glucoses are only in the 140s.  Past Medical History:   Diagnosis Date    Allergy     Arthritis     Asthma     Atrial fibrillation     Basal cell carcinoma     Fever blister     Hypertension     Hypothyroidism (acquired) 7/31/2023    Joint pain     Melanoma     BANDAR on CPAP     PVC (premature ventricular contraction)     Skin disease     Squamous cell carcinoma      Past Surgical History:   Procedure Laterality Date    BRONCHOSCOPY N/A 7/18/2022    Procedure: Bronchoscopy;  Surgeon: Charlee Brewer MD;  Location: [facility];  Service: Pulmonary;  Laterality: N/A;  monday 7/18/22 at 0830 look see bronch no biopsy    CARDIAC CATHETERIZATION  05/25/2021    ESOPHAGOGASTRODUODENOSCOPY N/A 7/7/2022    Procedure: EGD (ESOPHAGOGASTRODUODENOSCOPY);  Surgeon: Emmanuel Deleon MD;  Location: [facility];  Service: Endoscopy;  Laterality: N/A;    EYE SURGERY Left 12/03/2019    EYE SURGERY Right 01/28/2020    LIPOMA RESECTION      NOSE SURGERY      SHOULDER ARTHROSCOPY      SKIN CANCER EXCISION      Moh's x 2    SKIN GRAFT       Family History   Problem Relation Age of Onset    Cancer Mother         esophageal    Heart disease Father     Hypertension Father     Arthritis Paternal Grandmother         Social History:   Marital Status:   Occupation: retired captain   Alcohol History:  reports no history of alcohol use.  Tobacco History:  reports that he quit smoking about 35 years ago. His smoking use included cigarettes. He started smoking about 51 years ago. He has a 40.0 pack-year  smoking history. He has been exposed to tobacco smoke. He has never used smokeless tobacco.  Drug History:  reports no history of drug use.    Review of patient's allergies indicates:   Allergen Reactions    Bactrim [sulfamethoxazole-trimethoprim]      HIVES/ RASH       Current Outpatient Medications   Medication Sig Dispense Refill    allopurinoL (ZYLOPRIM) 300 MG tablet TAKE 1 TABLET ONE TIME DAILY (Patient taking differently: Take 300 mg by mouth once daily.) 90 tablet 1    amiodarone (PACERONE) 200 MG Tab Take 200 mg by mouth 2 (two) times daily.      apixaban (ELIQUIS) 5 mg Tab Take 5 mg by mouth 2 (two) times daily.      ascorbic acid, vitamin C, (VITAMIN C) 1000 MG tablet Take 1,000 mg by mouth once daily.      aspirin 81 MG Chew Take 81 mg by mouth every 7 days.      benzonatate (TESSALON) 200 MG capsule Take 1 capsule (200 mg total) by mouth 3 (three) times daily as needed for Cough. 30 capsule 1    cholecalciferol, vitamin D3, (VITAMIN D3) 25 mcg (1,000 unit) capsule Take 1,000 Units by mouth once daily.      co-enzyme Q-10 50 mg capsule Take 50 mg by mouth once daily.      ENTRESTO 49-51 mg per tablet Take 1 tablet by mouth 2 (two) times daily.      fluticasone-umeclidin-vilanter (TRELEGY ELLIPTA) 100-62.5-25 mcg DsDv Inhale 1 puff into the lungs once daily. 3 each 6    furosemide (LASIX) 40 MG tablet Take 20 mg by mouth once daily.      guaiFENesin (MUCINEX) 600 mg 12 hr tablet Take 2 tablets (1,200 mg total) by mouth 2 (two) times daily. 20 tablet 0    Lactobacillus rhamnosus GG (CULTURELLE) 10 billion cell capsule Take 1 capsule by mouth.      lactulose (CHRONULAC) 20 gram/30 mL Soln Take 30 mLs (20 g total) by mouth every 6 (six) hours as needed (constipation). 473 mL 1    levothyroxine (SYNTHROID) 88 MCG tablet TAKE 1 TABLET BEFORE BREAKFAST (Patient taking differently: Take 88 mcg by mouth before breakfast.) 90 tablet 1    magnesium gluconate 27.5 mg magne- sium (500 mg) Tab Take 1 tablet by mouth  once.      melatonin 10 mg Tab Take 1 tablet by mouth nightly as needed.      montelukast (SINGULAIR) 10 mg tablet TAKE 1 TABLET EVERY EVENING (Patient taking differently: Take 10 mg by mouth every evening.) 90 tablet 6    multivitamin (THERAGRAN) per tablet Take 1 tablet by mouth once daily.      potassium chloride (MICRO-K) 10 MEQ CpSR Take 10 mEq by mouth once daily.      predniSONE (DELTASONE) 10 MG tablet 4 tabs po daily for 4 days then 3 tabs po daily for 4 days then 2 tabs po daily for 4 days and then 1 tab po daily for 4 days. 40 tablet 0    rosuvastatin (CRESTOR) 10 MG tablet Take 1 tablet (10 mg total) by mouth every other day. 90 tablet 1    tamsulosin (FLOMAX) 0.4 mg Cap Take 1 tablet by mouth Daily.      triamterene-hydrochlorothiazide 37.5-25 mg (DYAZIDE) 37.5-25 mg per capsule TAKE 1 CAPSULE EVERY MORNING (Patient taking differently: Take 1 capsule by mouth once daily.) 90 capsule 1    vitamin E 1000 UNIT capsule Take 1,000 Units by mouth once daily.      guaiFENesin-codeine 100-10 mg/5 ml (TUSSI-ORGANIDIN NR)  mg/5 mL syrup Take 5 mLs by mouth 3 (three) times daily as needed for Cough. (Patient not taking: Reported on 11/6/2023) 237 mL 0     No current facility-administered medications for this visit.       Last PFT: 4/2018  Last CT 08/2022    IMPRESSION:     1. Mild to moderate prominence and with evidence of scattered bullae throughout.  2. No CT evidence of aspiration focus.  3. Benign cyst arising from lower pole left kidney reaching 3.1 cm in the widest dimension.     Review of Systems  General: does not feel well, no fever, fatigued   Eyes: vision is good  ENT: nasal stuffiness comes and goes  Heart:: no recent atrial fib    Lungs:shortness of breath, productive cough with light green, mostly from his sinuses, cough is improved from the hospital  GI: No Nausea, vomiting, constipation, diarrhea, or reflux.  : nocturia times 8, usually x4  Musculoskeletal: no myalgias  Skin: No lesions or  rashes.  Neuro: brain fog  Lymph:swelling to legs   Psych: No anxiety or depression.  Endo: weight stable    OBJECTIVE:      /76 (BP Location: Right arm, Patient Position: Sitting, BP Method: Large (Manual))   Pulse 65   Wt 112.1 kg (247 lb 3.2 oz)   SpO2 95%   BMI 37.59 kg/m²     Physical Exam  GENERAL: Older patient in no distress.looks as thought he does not feel well   HEENT: Pupils equal and reactive. Extraocular movements intact. Nose intact.      Pharynx moist.  NECK: Supple.   HEART: regular rate and rhythym  LUNGS: Clear to auscultation and percussion. Lung excursion symmetrical. No change in fremitus. No adventitial noises.  No cough in the office for the entire visit  ABDOMEN: Bowel sounds present. Non-tender, no masses palpated.  EXTREMITIES: Normal muscle tone and joint movement, no cyanosis or clubbing.   LYMPHATICS: No adenopathy palpated, 1+ edema  SKIN: Dry, intact, no lesions.   NEURO: Cranial nerves II-XII intact. Motor strength 5/5 bilaterally, upper and lower extremities.  PSYCH: Appropriate affect.        Assessment:       1. COVID-19    2. Moderate persistent asthma without complication    3. BANDAR (obstructive sleep apnea)    4. Post-nasal drip    5. Fatigue, unspecified type       The patient was hospitalized for days with an asthma exacerbation precipitated by COVID.  He also had terrible constipation which took multiple doses of lactulose to resolve.  He is now on prednisone taper.  He is coughing less.  He is still fatigued.  Plan:       COVID-19    Moderate persistent asthma without complication    BANDAR (obstructive sleep apnea)    Post-nasal drip    Fatigue, unspecified type        Continue CPAP nightly  Give yourself time to get over the Covid  Continue Trelegy daily  Take prednisone til finished  Follow up in about 3 months (around 2/7/2024).

## 2023-11-08 ENCOUNTER — OFFICE VISIT (OUTPATIENT)
Dept: FAMILY MEDICINE | Facility: CLINIC | Age: 73
End: 2023-11-08
Payer: MEDICARE

## 2023-11-08 VITALS
HEART RATE: 60 BPM | DIASTOLIC BLOOD PRESSURE: 70 MMHG | TEMPERATURE: 99 F | SYSTOLIC BLOOD PRESSURE: 116 MMHG | BODY MASS INDEX: 37.28 KG/M2 | HEIGHT: 68 IN | WEIGHT: 246 LBS | OXYGEN SATURATION: 96 %

## 2023-11-08 DIAGNOSIS — Z23 NEED FOR PROPHYLACTIC VACCINATION AND INOCULATION AGAINST INFLUENZA: ICD-10-CM

## 2023-11-08 DIAGNOSIS — U07.1 COVID: Primary | ICD-10-CM

## 2023-11-08 PROBLEM — R00.1 BRADYCARDIA: Status: ACTIVE | Noted: 2023-11-07

## 2023-11-08 PROCEDURE — 4010F PR ACE/ARB THEARPY RXD/TAKEN: ICD-10-PCS | Mod: CPTII,S$GLB,, | Performed by: INTERNAL MEDICINE

## 2023-11-08 PROCEDURE — 90694 FLU VACCINE - QUADRIVALENT - ADJUVANTED: ICD-10-PCS | Mod: S$GLB,,, | Performed by: INTERNAL MEDICINE

## 2023-11-08 PROCEDURE — 3074F PR MOST RECENT SYSTOLIC BLOOD PRESSURE < 130 MM HG: ICD-10-PCS | Mod: CPTII,S$GLB,, | Performed by: INTERNAL MEDICINE

## 2023-11-08 PROCEDURE — 1101F PR PT FALLS ASSESS DOC 0-1 FALLS W/OUT INJ PAST YR: ICD-10-PCS | Mod: CPTII,S$GLB,, | Performed by: INTERNAL MEDICINE

## 2023-11-08 PROCEDURE — 99213 PR OFFICE/OUTPT VISIT, EST, LEVL III, 20-29 MIN: ICD-10-PCS | Mod: S$GLB,,, | Performed by: INTERNAL MEDICINE

## 2023-11-08 PROCEDURE — 1125F AMNT PAIN NOTED PAIN PRSNT: CPT | Mod: CPTII,S$GLB,, | Performed by: INTERNAL MEDICINE

## 2023-11-08 PROCEDURE — 1111F DSCHRG MED/CURRENT MED MERGE: CPT | Mod: CPTII,S$GLB,, | Performed by: INTERNAL MEDICINE

## 2023-11-08 PROCEDURE — 3288F PR FALLS RISK ASSESSMENT DOCUMENTED: ICD-10-PCS | Mod: CPTII,S$GLB,, | Performed by: INTERNAL MEDICINE

## 2023-11-08 PROCEDURE — 3008F PR BODY MASS INDEX (BMI) DOCUMENTED: ICD-10-PCS | Mod: CPTII,S$GLB,, | Performed by: INTERNAL MEDICINE

## 2023-11-08 PROCEDURE — 1101F PT FALLS ASSESS-DOCD LE1/YR: CPT | Mod: CPTII,S$GLB,, | Performed by: INTERNAL MEDICINE

## 2023-11-08 PROCEDURE — 1111F PR DISCHARGE MEDS RECONCILED W/ CURRENT OUTPATIENT MED LIST: ICD-10-PCS | Mod: CPTII,S$GLB,, | Performed by: INTERNAL MEDICINE

## 2023-11-08 PROCEDURE — 3078F PR MOST RECENT DIASTOLIC BLOOD PRESSURE < 80 MM HG: ICD-10-PCS | Mod: CPTII,S$GLB,, | Performed by: INTERNAL MEDICINE

## 2023-11-08 PROCEDURE — 4010F ACE/ARB THERAPY RXD/TAKEN: CPT | Mod: CPTII,S$GLB,, | Performed by: INTERNAL MEDICINE

## 2023-11-08 PROCEDURE — 1159F PR MEDICATION LIST DOCUMENTED IN MEDICAL RECORD: ICD-10-PCS | Mod: CPTII,S$GLB,, | Performed by: INTERNAL MEDICINE

## 2023-11-08 PROCEDURE — 3078F DIAST BP <80 MM HG: CPT | Mod: CPTII,S$GLB,, | Performed by: INTERNAL MEDICINE

## 2023-11-08 PROCEDURE — G0008 FLU VACCINE - QUADRIVALENT - ADJUVANTED: ICD-10-PCS | Mod: S$GLB,,, | Performed by: INTERNAL MEDICINE

## 2023-11-08 PROCEDURE — 3288F FALL RISK ASSESSMENT DOCD: CPT | Mod: CPTII,S$GLB,, | Performed by: INTERNAL MEDICINE

## 2023-11-08 PROCEDURE — 3074F SYST BP LT 130 MM HG: CPT | Mod: CPTII,S$GLB,, | Performed by: INTERNAL MEDICINE

## 2023-11-08 PROCEDURE — 3008F BODY MASS INDEX DOCD: CPT | Mod: CPTII,S$GLB,, | Performed by: INTERNAL MEDICINE

## 2023-11-08 PROCEDURE — 99213 OFFICE O/P EST LOW 20 MIN: CPT | Mod: S$GLB,,, | Performed by: INTERNAL MEDICINE

## 2023-11-08 PROCEDURE — 1125F PR PAIN SEVERITY QUANTIFIED, PAIN PRESENT: ICD-10-PCS | Mod: CPTII,S$GLB,, | Performed by: INTERNAL MEDICINE

## 2023-11-08 PROCEDURE — 90694 VACC AIIV4 NO PRSRV 0.5ML IM: CPT | Mod: S$GLB,,, | Performed by: INTERNAL MEDICINE

## 2023-11-08 PROCEDURE — G0008 ADMIN INFLUENZA VIRUS VAC: HCPCS | Mod: S$GLB,,, | Performed by: INTERNAL MEDICINE

## 2023-11-08 PROCEDURE — 1159F MED LIST DOCD IN RCRD: CPT | Mod: CPTII,S$GLB,, | Performed by: INTERNAL MEDICINE

## 2023-11-08 NOTE — PROGRESS NOTES
Subjective:       Patient ID: Nico Teague is a 73 y.o. male.    Chief Complaint: Follow-up (Hospital follow up)    Here for hospital follow up.  He was diagnosed with COVID last visit but was unable to tka Paxlovid d/t drug interactions.  His condition worsened (triggered his asthma) and he went to ER with hypoxia and was admitted.  Hospitalized 7 days.  Sent home with prednisone taper, cough meds, lactulose for constipation.  He saw Pulm in follow up yesterday.  He reports his HR had been getting down into the 30s but that seems better; hasn't been that low in 2 days.   Cough improved but not completely resolved.  Biggest issue is persistent fatigue.          Review of Systems   Constitutional:  Positive for fatigue and unexpected weight change. Negative for activity change, appetite change, chills, diaphoresis and fever.   HENT:  Positive for congestion, postnasal drip and voice change. Negative for ear discharge, ear pain, hearing loss, nosebleeds, rhinorrhea, sinus pressure, sinus pain, sneezing, sore throat, tinnitus and trouble swallowing.    Eyes:  Negative for photophobia, pain, discharge, redness, itching and visual disturbance.   Respiratory:  Positive for apnea, cough, shortness of breath and wheezing. Negative for choking and chest tightness.    Cardiovascular:  Positive for palpitations. Negative for chest pain and leg swelling.   Gastrointestinal:  Negative for abdominal distention, abdominal pain, blood in stool, constipation, diarrhea, nausea and vomiting.   Endocrine: Negative for cold intolerance, heat intolerance, polydipsia and polyuria.   Genitourinary:  Positive for difficulty urinating and frequency. Negative for decreased urine volume, dysuria, enuresis, flank pain, genital sores, hematuria, penile discharge, penile pain, scrotal swelling, testicular pain and urgency.   Musculoskeletal:  Positive for myalgias. Negative for arthralgias, back pain, gait problem, joint swelling, neck pain and  neck stiffness.   Skin:  Negative for rash and wound.   Allergic/Immunologic: Negative for environmental allergies, food allergies and immunocompromised state.   Neurological:  Negative for dizziness, tremors, seizures, syncope, facial asymmetry, speech difficulty, weakness, light-headedness, numbness and headaches.   Hematological:  Negative for adenopathy. Bruises/bleeds easily.   Psychiatric/Behavioral:  Positive for sleep disturbance. Negative for confusion, decreased concentration, hallucinations, self-injury and suicidal ideas. The patient is not nervous/anxious.        Past Medical History:   Diagnosis Date    Allergy     Arthritis     Asthma     Atrial fibrillation     Basal cell carcinoma     Fever blister     Hypertension     Hypothyroidism (acquired) 7/31/2023    Joint pain     Melanoma     BANDAR on CPAP     PVC (premature ventricular contraction)     Skin disease     Squamous cell carcinoma       Past Surgical History:   Procedure Laterality Date    BRONCHOSCOPY N/A 7/18/2022    Procedure: Bronchoscopy;  Surgeon: Charlee Brewer MD;  Location: [facility];  Service: Pulmonary;  Laterality: N/A;  monday 7/18/22 at 0830 look see bronch no biopsy    CARDIAC CATHETERIZATION  05/25/2021    ESOPHAGOGASTRODUODENOSCOPY N/A 7/7/2022    Procedure: EGD (ESOPHAGOGASTRODUODENOSCOPY);  Surgeon: Emmanuel Deleon MD;  Location: [facility];  Service: Endoscopy;  Laterality: N/A;    EYE SURGERY Left 12/03/2019    EYE SURGERY Right 01/28/2020    LIPOMA RESECTION      NOSE SURGERY      SHOULDER ARTHROSCOPY      SKIN CANCER EXCISION      Moh's x 2    SKIN GRAFT         Family History   Problem Relation Age of Onset    Cancer Mother         esophageal    Heart disease Father     Hypertension Father     Arthritis Paternal Grandmother        Social History     Socioeconomic History    Marital status:    Occupational History    Occupation: retired captain    Tobacco Use    Smoking status: Former     Current packs/day: 0.00      Average packs/day: 2.0 packs/day for 20.0 years (40.0 ttl pk-yrs)     Types: Cigarettes     Start date: 1972     Quit date: 1988     Years since quittin.2     Passive exposure: Past    Smokeless tobacco: Never    Tobacco comments:     Quit smoking in    Substance and Sexual Activity    Alcohol use: No    Drug use: No    Sexual activity: Never   Social History Narrative    Live with wife     Social Determinants of Health     Financial Resource Strain: Low Risk  (3/7/2022)    Overall Financial Resource Strain (CARDIA)     Difficulty of Paying Living Expenses: Not hard at all   Food Insecurity: No Food Insecurity (3/7/2022)    Hunger Vital Sign     Worried About Running Out of Food in the Last Year: Never true     Ran Out of Food in the Last Year: Never true   Transportation Needs: No Transportation Needs (3/7/2022)    PRAPARE - Transportation     Lack of Transportation (Medical): No     Lack of Transportation (Non-Medical): No   Physical Activity: Insufficiently Active (3/7/2022)    Exercise Vital Sign     Days of Exercise per Week: 3 days     Minutes of Exercise per Session: 20 min   Stress: No Stress Concern Present (3/7/2022)    [employer] [place] [employer] - Occupational Stress Questionnaire     Feeling of Stress : Only a little   Social Connections: Unknown (3/7/2022)    Social Connection and Isolation Panel [NHANES]     Frequency of Communication with Friends and Family: More than three times a week     Frequency of Social Gatherings with Friends and Family: Once a week     Active Member of Clubs or Organizations: Yes     Attends Club or Organization Meetings: More than 4 times per year     Marital Status:        Current Outpatient Medications   Medication Sig Dispense Refill    allopurinoL (ZYLOPRIM) 300 MG tablet TAKE 1 TABLET ONE TIME DAILY (Patient taking differently: Take 300 mg by mouth once daily.) 90 tablet 1    amiodarone (PACERONE) 200 MG Tab Take 200 mg by mouth 2  (two) times daily.      apixaban (ELIQUIS) 5 mg Tab Take 5 mg by mouth 2 (two) times daily.      ascorbic acid, vitamin C, (VITAMIN C) 1000 MG tablet Take 1,000 mg by mouth once daily.      aspirin 81 MG Chew Take 81 mg by mouth every 7 days.      benzonatate (TESSALON) 200 MG capsule Take 1 capsule (200 mg total) by mouth 3 (three) times daily as needed for Cough. 30 capsule 1    cholecalciferol, vitamin D3, (VITAMIN D3) 25 mcg (1,000 unit) capsule Take 1,000 Units by mouth once daily.      co-enzyme Q-10 50 mg capsule Take 50 mg by mouth once daily.      ENTRESTO 49-51 mg per tablet Take 1 tablet by mouth 2 (two) times daily.      fluticasone-umeclidin-vilanter (TRELEGY ELLIPTA) 100-62.5-25 mcg DsDv Inhale 1 puff into the lungs once daily. 3 each 6    furosemide (LASIX) 40 MG tablet Take 20 mg by mouth once daily.      guaiFENesin (MUCINEX) 600 mg 12 hr tablet Take 2 tablets (1,200 mg total) by mouth 2 (two) times daily. 20 tablet 0    Lactobacillus rhamnosus GG (CULTURELLE) 10 billion cell capsule Take 1 capsule by mouth.      lactulose (CHRONULAC) 20 gram/30 mL Soln Take 30 mLs (20 g total) by mouth every 6 (six) hours as needed (constipation). 473 mL 1    levothyroxine (SYNTHROID) 88 MCG tablet TAKE 1 TABLET BEFORE BREAKFAST (Patient taking differently: Take 88 mcg by mouth before breakfast.) 90 tablet 1    magnesium gluconate 27.5 mg magne- sium (500 mg) Tab Take 1 tablet by mouth once.      melatonin 10 mg Tab Take 1 tablet by mouth nightly as needed.      montelukast (SINGULAIR) 10 mg tablet TAKE 1 TABLET EVERY EVENING (Patient taking differently: Take 10 mg by mouth every evening.) 90 tablet 6    multivitamin (THERAGRAN) per tablet Take 1 tablet by mouth once daily.      potassium chloride (MICRO-K) 10 MEQ CpSR Take 10 mEq by mouth once daily.      predniSONE (DELTASONE) 10 MG tablet 4 tabs po daily for 4 days then 3 tabs po daily for 4 days then 2 tabs po daily for 4 days and then 1 tab po daily for 4  "days. 40 tablet 0    rosuvastatin (CRESTOR) 10 MG tablet Take 1 tablet (10 mg total) by mouth every other day. 90 tablet 1    tamsulosin (FLOMAX) 0.4 mg Cap Take 1 tablet by mouth Daily.      triamterene-hydrochlorothiazide 37.5-25 mg (DYAZIDE) 37.5-25 mg per capsule TAKE 1 CAPSULE EVERY MORNING (Patient taking differently: Take 1 capsule by mouth once daily.) 90 capsule 1    vitamin E 1000 UNIT capsule Take 1,000 Units by mouth once daily.       No current facility-administered medications for this visit.       Review of patient's allergies indicates:   Allergen Reactions    Bactrim [sulfamethoxazole-trimethoprim]      HIVES/ RASH    Green tea (rashida sinensis) Hives     Pt is has allergies to black tea. Not an option under search.     Objective:    HPI     Follow-up     Additional comments: Hospital follow up          Last edited by Radha Ruffin MA on 11/8/2023  4:12 PM.      Blood pressure 116/70, pulse 60, temperature 98.6 °F (37 °C), temperature source Temporal, height 5' 8" (1.727 m), weight 111.6 kg (246 lb), SpO2 96 %. Body mass index is 37.4 kg/m².   Physical Exam  Vitals and nursing note reviewed.   Constitutional:       General: He is not in acute distress.     Appearance: Normal appearance. He is obese. He is not ill-appearing, toxic-appearing or diaphoretic.   Cardiovascular:      Rate and Rhythm: Normal rate and regular rhythm.      Heart sounds: Normal heart sounds.   Pulmonary:      Effort: Pulmonary effort is normal. No respiratory distress.      Breath sounds: Normal breath sounds. No stridor. No wheezing, rhonchi or rales.   Neurological:      Mental Status: He is alert.             Assessment:       1. COVID    2. Need for prophylactic vaccination and inoculation against influenza        Plan:       Nico was seen today for follow-up.    Diagnoses and all orders for this visit:    COVID  Comments:  May take several weeks to regain strength    Need for prophylactic vaccination and " inoculation against influenza  -     Influenza - Quadrivalent (Adjuvanted)

## 2023-11-09 RX ORDER — FLUTICASONE FUROATE, UMECLIDINIUM BROMIDE AND VILANTEROL TRIFENATATE 100; 62.5; 25 UG/1; UG/1; UG/1
POWDER RESPIRATORY (INHALATION)
Qty: 180 EACH | Refills: 10 | Status: SHIPPED | OUTPATIENT
Start: 2023-11-09

## 2023-11-13 ENCOUNTER — TELEPHONE (OUTPATIENT)
Dept: PULMONOLOGY | Facility: CLINIC | Age: 73
End: 2023-11-13

## 2023-11-13 ENCOUNTER — TELEPHONE (OUTPATIENT)
Dept: PULMONOLOGY | Facility: HOSPITAL | Age: 73
End: 2023-11-13

## 2023-11-13 DIAGNOSIS — J45.40 MODERATE PERSISTENT ASTHMA WITHOUT COMPLICATION: Primary | ICD-10-CM

## 2023-11-13 RX ORDER — ALBUTEROL SULFATE 90 UG/1
2 AEROSOL, METERED RESPIRATORY (INHALATION) EVERY 6 HOURS PRN
Qty: 54 G | Refills: 4 | Status: SHIPPED | OUTPATIENT
Start: 2023-11-13

## 2023-11-13 NOTE — TELEPHONE ENCOUNTER
PT NEEDS RXS FOR TRELEGY AND ALBUTEROL (PROAIR RESP) TO [facility] THEY CALLED AND TOLD HIM HIS RXS WERE CANCELED FOR SOME REASON

## 2023-11-18 ENCOUNTER — PATIENT MESSAGE (OUTPATIENT)
Dept: FAMILY MEDICINE | Facility: CLINIC | Age: 73
End: 2023-11-18

## 2023-12-28 DIAGNOSIS — E78.00 PURE HYPERCHOLESTEROLEMIA: ICD-10-CM

## 2023-12-28 RX ORDER — ROSUVASTATIN CALCIUM 10 MG/1
10 TABLET, COATED ORAL EVERY OTHER DAY
Qty: 45 TABLET | Refills: 1 | Status: SHIPPED | OUTPATIENT
Start: 2023-12-28

## 2024-01-22 ENCOUNTER — HOSPITAL ENCOUNTER (OUTPATIENT)
Dept: PREADMISSION TESTING | Facility: HOSPITAL | Age: 74
Discharge: HOME | End: 2024-01-22
Attending: INTERNAL MEDICINE
Payer: MEDICARE

## 2024-01-22 VITALS
HEIGHT: 68 IN | BODY MASS INDEX: 37.76 KG/M2 | SYSTOLIC BLOOD PRESSURE: 147 MMHG | TEMPERATURE: 98 F | RESPIRATION RATE: 16 BRPM | OXYGEN SATURATION: 98 % | WEIGHT: 249.13 LBS | HEART RATE: 75 BPM | DIASTOLIC BLOOD PRESSURE: 74 MMHG

## 2024-01-22 DIAGNOSIS — Z01.818 PRE-OP TESTING: Primary | ICD-10-CM

## 2024-01-22 LAB
BASOPHILS # BLD AUTO: 0.03 K/UL (ref 0–0.2)
BASOPHILS NFR BLD: 0.4 % (ref 0–1.9)
DIFFERENTIAL METHOD BLD: ABNORMAL
EOSINOPHIL # BLD AUTO: 0.1 K/UL (ref 0–0.5)
EOSINOPHIL NFR BLD: 1.4 % (ref 0–8)
ERYTHROCYTE [DISTWIDTH] IN BLOOD BY AUTOMATED COUNT: 14.2 % (ref 11.5–14.5)
HCT VFR BLD AUTO: 45.4 % (ref 40–54)
HGB BLD-MCNC: 14.1 G/DL (ref 14–18)
IMM GRANULOCYTES # BLD AUTO: 0.03 K/UL (ref 0–0.04)
IMM GRANULOCYTES NFR BLD AUTO: 0.4 % (ref 0–0.5)
LYMPHOCYTES # BLD AUTO: 1.9 K/UL (ref 1–4.8)
LYMPHOCYTES NFR BLD: 23.8 % (ref 18–48)
MCH RBC QN AUTO: 30.1 PG (ref 27–31)
MCHC RBC AUTO-ENTMCNC: 31.1 G/DL (ref 32–36)
MCV RBC AUTO: 97 FL (ref 82–98)
MONOCYTES # BLD AUTO: 0.5 K/UL (ref 0.3–1)
MONOCYTES NFR BLD: 6.5 % (ref 4–15)
NEUTROPHILS # BLD AUTO: 5.4 K/UL (ref 1.8–7.7)
NEUTROPHILS NFR BLD: 67.5 % (ref 38–73)
NRBC BLD-RTO: 0 /100 WBC
PLATELET # BLD AUTO: 247 K/UL (ref 150–450)
PMV BLD AUTO: 10.8 FL (ref 9.2–12.9)
RBC # BLD AUTO: 4.69 M/UL (ref 4.6–6.2)
WBC # BLD AUTO: 8.03 K/UL (ref 3.9–12.7)

## 2024-01-22 PROCEDURE — 85025 COMPLETE CBC W/AUTO DIFF WBC: CPT | Performed by: STUDENT IN AN ORGANIZED HEALTH CARE EDUCATION/TRAINING PROGRAM

## 2024-01-22 PROCEDURE — 36415 COLL VENOUS BLD VENIPUNCTURE: CPT | Performed by: STUDENT IN AN ORGANIZED HEALTH CARE EDUCATION/TRAINING PROGRAM

## 2024-01-22 NOTE — CARE UPDATE
INSTRUCTIONS  To confirm your doctor has scheduled your surgery for:    Morning of surgery please check in with registration near Parking Garage Entrance then proceed to Registration then to endoscopy department    ENDOSCOPY NURSES will call the afternoon prior to procedure with your final arrival time.    TAKE ONLY THESE MEDICATIONS WITH A SMALL SIP OF WATER THE MORNING OF SURGERY:    DO NOT TAKE ANY INSULIN OR ORAL DIABETIC MEDICATIONS THE MORNING OF SURGERY UNLESS DIRECTED BY YOUR PHYSICIAN OR PRE ADMIT NURSE.    DO NOT TAKE THESE MEDICATIONS 5-7 DAYS PRIOR to your procedure per your surgeon's request: ASPIRIN, ALEVE, BC powder, MIGUEL SELTZER, IBUPROFEN, FISH OIL, VITAMIN E, OR HERBALS   (May take Tylenol)    If you are prescribed any types of blood thinners (Aspirin, Coumadin, Plavix, Pradaxa, Xarelto, Aggrenox, Effient, Eliquis, Savasya, Brilinta or any other), please ask your surgeon how many days before scheduled procedure should you stop taking them. You may also need to verify with prescribing physician if it is OK to stop your blood thinners.      INSTRUCTIONS IMPORTANT!!  Do not eat or drink anything between midnight and the time of your procedure- this includes gum, mints, and candy. You may brush your teeth but do not swallow.  ONLY if you are diabetic, check your sugar in the morning before your procedure.  Do not smoke, vape or drink alcoholic beverages 24 hours prior to your procedure.  If you wear contact lenses, dentures, hearing aids or glasses, bring a container to put them in during surgery and give to a family member for safe keeping.    Please leave all jewelry, piercing's and valuables at home.   Wear comfortable loose clothing and rubber soled shoes.  If your condition changes such as fever, cough, etc, please notify your surgeon.   ONLY if you have been diagnosed with sleep apnea please bring your C-PAP machine.  ONLY if you wear home oxygen please bring your portable oxygen tank the day of  your procedure.   ONLY for patients requiring bowel prep, written instructions will be given by your doctor's office.  ONLY if you have a neuro stimulator, please bring the controller with you the morning of surgery  Make arrangements in advance for transportation home by a responsible adult.  You must make arrangements for transportation, TAXI'S, UBER'S OR LYFTS ARE NOT ALLOWED.        If you have any questions about these instructions, call Endoscopy Nurse at [phone]

## 2024-01-24 DIAGNOSIS — E03.2 HYPOTHYROIDISM DUE TO MEDICATION: ICD-10-CM

## 2024-01-24 RX ORDER — LEVOTHYROXINE SODIUM 88 UG/1
TABLET ORAL
Qty: 90 TABLET | Refills: 1 | Status: SHIPPED | OUTPATIENT
Start: 2024-01-24

## 2024-01-25 ENCOUNTER — ANESTHESIA EVENT (OUTPATIENT)
Dept: SURGERY | Facility: HOSPITAL | Age: 74
End: 2024-01-25
Payer: MEDICARE

## 2024-01-25 ENCOUNTER — ANESTHESIA (OUTPATIENT)
Dept: SURGERY | Facility: HOSPITAL | Age: 74
End: 2024-01-25
Payer: MEDICARE

## 2024-01-25 ENCOUNTER — HOSPITAL ENCOUNTER (OUTPATIENT)
Facility: HOSPITAL | Age: 74
Discharge: HOME | End: 2024-01-25
Attending: INTERNAL MEDICINE | Admitting: INTERNAL MEDICINE
Payer: MEDICARE

## 2024-01-25 VITALS
SYSTOLIC BLOOD PRESSURE: 119 MMHG | OXYGEN SATURATION: 99 % | HEART RATE: 89 BPM | TEMPERATURE: 98 F | DIASTOLIC BLOOD PRESSURE: 82 MMHG | RESPIRATION RATE: 16 BRPM

## 2024-01-25 DIAGNOSIS — Z86.010 PERSONAL HISTORY OF COLONIC POLYPS: ICD-10-CM

## 2024-01-25 PROCEDURE — D9220A PRA ANESTHESIA: Mod: PT,ANES,, | Performed by: ANESTHESIOLOGY

## 2024-01-25 PROCEDURE — 45385 COLONOSCOPY W/LESION REMOVAL: CPT | Performed by: INTERNAL MEDICINE

## 2024-01-25 PROCEDURE — 37000009 HC ANESTHESIA EA ADD 15 MINS: Performed by: INTERNAL MEDICINE

## 2024-01-25 PROCEDURE — D9220A PRA ANESTHESIA: Mod: PT,CRNA,, | Performed by: NURSE ANESTHETIST, CERTIFIED REGISTERED

## 2024-01-25 PROCEDURE — 88305 TISSUE EXAM BY PATHOLOGIST: CPT | Mod: TC,59 | Performed by: PATHOLOGY

## 2024-01-25 PROCEDURE — 27201114 HC TRAP (ANY): Performed by: INTERNAL MEDICINE

## 2024-01-25 PROCEDURE — 37000008 HC ANESTHESIA 1ST 15 MINUTES: Performed by: INTERNAL MEDICINE

## 2024-01-25 PROCEDURE — 27201089 HC SNARE, DISP (ANY): Performed by: INTERNAL MEDICINE

## 2024-01-25 PROCEDURE — 63600175 PHARM REV CODE 636 W HCPCS: Performed by: NURSE ANESTHETIST, CERTIFIED REGISTERED

## 2024-01-25 RX ORDER — SODIUM CHLORIDE 9 MG/ML
INJECTION, SOLUTION INTRAVENOUS CONTINUOUS
Status: CANCELLED | OUTPATIENT
Start: 2024-01-25

## 2024-01-25 RX ORDER — PROPOFOL 10 MG/ML
VIAL (ML) INTRAVENOUS
Status: DISCONTINUED | OUTPATIENT
Start: 2024-01-25 | End: 2024-01-25

## 2024-01-25 RX ORDER — DIPHENHYDRAMINE HYDROCHLORIDE 50 MG/ML
50 INJECTION INTRAMUSCULAR; INTRAVENOUS ONCE AS NEEDED
Status: CANCELLED | OUTPATIENT
Start: 2024-01-25 | End: 2035-06-23

## 2024-01-25 RX ORDER — SODIUM CHLORIDE 0.9 % (FLUSH) 0.9 %
2 SYRINGE (ML) INJECTION
Status: CANCELLED | OUTPATIENT
Start: 2024-01-25

## 2024-01-25 RX ADMIN — PROPOFOL 50 MG: 10 INJECTION, EMULSION INTRAVENOUS at 09:01

## 2024-01-25 RX ADMIN — SODIUM CHLORIDE, SODIUM LACTATE, POTASSIUM CHLORIDE, AND CALCIUM CHLORIDE: .6; .31; .03; .02 INJECTION, SOLUTION INTRAVENOUS at 09:01

## 2024-01-25 RX ADMIN — PROPOFOL 100 MG: 10 INJECTION, EMULSION INTRAVENOUS at 09:01

## 2024-01-25 RX ADMIN — PROPOFOL 50 MG: 10 INJECTION, EMULSION INTRAVENOUS at 10:01

## 2024-01-25 NOTE — ANESTHESIA POSTPROCEDURE EVALUATION
Anesthesia Post Evaluation    Patient: Nico Teague    Procedure(s) Performed: Procedure(s) (LRB):  COLONOSCOPY (N/A)    Final Anesthesia Type: general      Patient location during evaluation: GI PACU  Patient participation: Yes- Able to Participate  Level of consciousness: awake and alert  Post-procedure vital signs: reviewed and stable  Pain management: adequate  Airway patency: patent    PONV status at discharge: No PONV  Anesthetic complications: no      Cardiovascular status: blood pressure returned to baseline and stable  Respiratory status: unassisted and room air  Hydration status: euvolemic  Follow-up not needed.              Vitals Value Taken Time   /78 01/25/24 1045   Temp 36.6 °C (97.8 °F) 01/25/24 1030   Pulse 76 01/25/24 1045   Resp 16 01/25/24 1030   SpO2 93 % 01/25/24 1045   Vitals shown include unvalidated device data.      No case tracking events are documented in the log.      Pain/Rayo Score: No data recorded

## 2024-01-25 NOTE — ANESTHESIA PREPROCEDURE EVALUATION
01/25/2024  Nico Teague is a 73 y.o., male.      Pre-op Assessment    I have reviewed the Patient Summary Reports.     I have reviewed the Nursing Notes. I have reviewed the NPO Status.   I have reviewed the Medications.     Review of Systems  Anesthesia Hx:  No problems with previous Anesthesia   Neg history of prior surgery.          Denies Family Hx of Anesthesia complications.    Denies Personal Hx of Anesthesia complications.                    Social:  Former Smoker, No Alcohol Use       Hematology/Oncology:                                  Oncology Comments: Hx of Basal cell and Melanoma     Cardiovascular:     Hypertension    Dysrhythmias atrial fibrillation  CHF                                 Pulmonary:    Asthma moderate Shortness of breath  Sleep Apnea           Education provided regarding risk of obstructive sleep apnea            Hepatic/GI:        History of polyps.  Changes in bowel habits.          Musculoskeletal:  Arthritis               Endocrine:   Hypothyroidism        Obesity / BMI > 30      Patient Active Problem List   Diagnosis    Moderate asthma    BPH (benign prostatic hyperplasia)    Hyperlipidemia    Hypertension    Vertigo    Dyspnea    Post-nasal drip    Severe obesity (BMI 35.0-39.9) with comorbidity    BANDAR (obstructive sleep apnea)    Atrial fibrillation    PVC's (premature ventricular contractions)    Pure hypercholesterolemia    Hypothyroidism (acquired)    Dilated cardiomyopathy    Acute hypoxemic respiratory failure due to COVID-19    COVID-19    Asthma    Cough    Constipation    Fatigue    Bradycardia       Past Surgical History:   Procedure Laterality Date    BRONCHOSCOPY N/A 07/18/2022    Procedure: Bronchoscopy;  Surgeon: Charlee Brewer MD;  Location: [facility];  Service: Pulmonary;  Laterality: N/A;  monday 7/18/22 at 0830 look see bronch no biopsy    CARDIAC  CATHETERIZATION  05/25/2021    ESOPHAGOGASTRODUODENOSCOPY N/A 07/07/2022    Procedure: EGD (ESOPHAGOGASTRODUODENOSCOPY);  Surgeon: Emmanuel Deleon MD;  Location: [facility];  Service: Endoscopy;  Laterality: N/A;    EYE SURGERY Left 12/03/2019    EYE SURGERY Right 01/28/2020    FRACTURE SURGERY      INSERTION OF PACEMAKER  11/2023    LIPOMA RESECTION      NOSE SURGERY      SHOULDER ARTHROSCOPY      SKIN BIOPSY      SKIN CANCER EXCISION      Moh's x 2    SKIN GRAFT          Tobacco Use:  The patient  reports that he quit smoking about 35 years ago. His smoking use included cigarettes. He started smoking about 51 years ago. He has a 40.0 pack-year smoking history. He has been exposed to tobacco smoke. He has never used smokeless tobacco.     Results for orders placed or performed in visit on 01/22/24   EKG 12-lead    Collection Time: 01/22/24  1:18 PM    Narrative    Test Reason :     Vent. Rate : 070 BPM     Atrial Rate : 070 BPM     P-R Int : 106 ms          QRS Dur : 114 ms      QT Int : 428 ms       P-R-T Axes : -04 -42 037 degrees     QTc Int : 462 ms    Atrial-paced rhythm  Left axis deviation  Moderate voltage criteria for LVH, may be normal variant ( R in aVL ,   Ra product )  Abnormal ECG  When compared with ECG of 30-OCT-2023 11:22,  Electronic atrial pacemaker has replaced Sinus rhythm  Confirmed by Ca BLAIR, Saleem SANON ([id number]) on 1/23/2024 7:57:24 AM    Referred By:             Confirmed By:Saleem Penaloza MD             Lab Results   Component Value Date    WBC 8.03 01/22/2024    HGB 14.1 01/22/2024    HCT 45.4 01/22/2024    MCV 97 01/22/2024     01/22/2024     BMP  Lab Results   Component Value Date     01/22/2024    K 3.6 01/22/2024     01/22/2024    CO2 27 01/22/2024    BUN 20 01/22/2024    CREATININE 1.1 01/22/2024    CALCIUM 9.2 01/22/2024    ANIONGAP 9 01/22/2024     01/22/2024     (H) 11/03/2023     (H) 11/02/2023       No results found for this or any  previous visit.          Physical Exam  General: Well nourished and Alert    Airway:  Mallampati: II   Mouth Opening: Normal  TM Distance: Normal  Tongue: Normal  Neck ROM: Normal ROM    Dental:  Intact    Chest/Lungs:  Clear to auscultation, Normal Respiratory Rate    Heart:  Rate: Normal  Rhythm: Regular Rhythm  Sounds: Normal        Anesthesia Plan  Type of Anesthesia, risks & benefits discussed:    Anesthesia Type: Gen Natural Airway  Intra-op Monitoring Plan: Standard ASA Monitors  Induction:  IV  Informed Consent: Informed consent signed with the Patient and all parties understand the risks and agree with anesthesia plan.  All questions answered.   ASA Score: 3    Ready For Surgery From Anesthesia Perspective.     .

## 2024-01-25 NOTE — [employer] PATIENT INSTRUCTIONS
Discharge Summary/Instructions after an Endoscopic Procedure  Patient Name: Nico Teague  Patient MRN: [id number]  Patient YOB: 1950 Thursday, January 25, 2024  Emmanuel Deleon MD  RESTRICTIONS:  During your procedure today, you received medications for sedation.  These   medications may affect your judgment, balance and coordination.  Therefore,   for 24 hours, you have the following restrictions:   - DO NOT drive a car, operate machinery, make legal/financial decisions,   sign important papers or drink alcohol.    ACTIVITY:  Today: no heavy lifting, straining or running due to procedural   sedation/anesthesia.  The following day: return to full activity including work.  DIET:  Eat and drink normally unless instructed otherwise.     TREATMENT FOR COMMON SIDE EFFECTS:  - Mild abdominal pain, nausea, belching, bloating or excessive gas:  rest,   eat lightly and use a heating pad.  - Sore Throat: treat with throat lozenges and/or gargle with warm salt   water.  - Because air was used during the procedure, expelling large amounts of air   from your rectum or belching is normal.  - If a bowel prep was taken, you may not have a bowel movement for 1-3 days.    This is normal.  SYMPTOMS TO WATCH FOR AND REPORT TO YOUR PHYSICIAN:  1. Abdominal pain or bloating, other than gas cramps.  2. Chest pain.  3. Back pain.  4. Signs of infection such as: chills or fever occurring within 24 hours   after the procedure.  5. Rectal bleeding, which would show as bright red, maroon, or black stools.   (A tablespoon of blood from the rectum is not serious, especially if   hemorrhoids are present.)  6. Vomiting.  7. Weakness or dizziness.  GO DIRECTLY TO THE NEAREST EMERGENCY ROOM IF YOU HAVE ANY OF THE FOLLOWING:      Difficulty breathing              Chills and/or fever over 101 F   Persistent vomiting and/or vomiting blood   Severe abdominal pain   Severe chest pain   Black, tarry stools   Bleeding- more than one  tablespoon   Any other symptom or condition that you feel may need urgent attention  Your doctor recommends these additional instructions:  If any biopsies were taken, your doctors clinic will contact you in 1 to 2   weeks with any results.  - Patient has a contact number available for emergencies.  The signs and   symptoms of potential delayed complications were discussed with the   patient.  Return to normal activities tomorrow.  Written discharge   instructions were provided to the patient.   - Resume previous diet.   - Continue present medications.   - Await pathology results.   - Repeat colonoscopy in 5 years for surveillance.   - Return to GI clinic PRN.   - Discharge patient to home (with escort).  For questions, problems or results please call your physician - Emmanuel Deleon MD at Work:  [phone].  [facility], EMERGENCY ROOM PHONE NUMBER: [phone]  IF A COMPLICATION OR EMERGENCY SITUATION ARISES AND YOU ARE UNABLE TO REACH   YOUR PHYSICIAN - GO DIRECTLY TO THE EMERGENCY ROOM.  MD Emmanuel Muniz MD  1/25/2024 10:28:04 AM  This report has been verified and signed electronically.  Dear patient,  As a result of recent federal legislation (The Federal Cures Act), you may   receive lab or pathology results from your procedure in your [facility]   account before your physician is able to contact you. Your physician or   their representative will relay the results to you with their   recommendations at their soonest availability.  Thank you,  [employer]

## 2024-01-25 NOTE — H&P
GASTROENTEROLOGY PRE-PROCEDURE H&P NOTE  Patient Name: Nico Teague  Patient MRN: [id number]  Patient : 1950    Service date: 2024    PCP: Gelacio Cantu Jr., MD    No chief complaint on file.      HPI: Patient is a 73 y.o. male with PMHx as below here for evaluation of    Nico Teague  is a 73 year old   male  who is seen today for a follow-up visit.  73 year old male hospitalized with COVID around 10/11 2023.  Had kub showing degree of ascending dilation.  Was severely constipated for 9 days.  Now having 1 bm every 3 - 4 days with miralax.  no weight loss. no bleeding.  no new meds.  had pacemaker placed.        Chart Review     HPI  Nico Teague is a 71 year old male who is seen today for a follow-up visit. pt reports that he had a bay leaf stuck in his esopahgus. had egd done that was unremarkable. on ppi. denies heartburn. reports sensation of something stuck in epigastrium. worse with breathing. chronic sob since covid in .      EGD  - inlet patch. path negative for dysplasia.  neg HP.  Colon 2017- 7 mm polyp- repeat .    LABS- CBC 10/30/23- hgb 14, plt 238     Past Medical History:  Past Medical History:   Diagnosis Date    Allergy     Arthritis     Asthma     Atrial fibrillation     Basal cell carcinoma     CHF (congestive heart failure)     Fever blister     Hypertension     Hypothyroidism (acquired) 2023    Joint pain     Melanoma     BANDAR on CPAP     PVC (premature ventricular contraction)     Skin disease     Squamous cell carcinoma         Past Surgical History:  Past Surgical History:   Procedure Laterality Date    BRONCHOSCOPY N/A 2022    Procedure: Bronchoscopy;  Surgeon: Charlee Brewer MD;  Location: [facility];  Service: Pulmonary;  Laterality: N/A;  22 at 0830 look see bronch no biopsy    CARDIAC CATHETERIZATION  2021    ESOPHAGOGASTRODUODENOSCOPY N/A 2022    Procedure: EGD (ESOPHAGOGASTRODUODENOSCOPY);  Surgeon: Emmanuel Deleon,  MD;  Location: [facility];  Service: Endoscopy;  Laterality: N/A;    EYE SURGERY Left 12/03/2019    EYE SURGERY Right 01/28/2020    FRACTURE SURGERY      INSERTION OF PACEMAKER  11/2023    LIPOMA RESECTION      NOSE SURGERY      SHOULDER ARTHROSCOPY      SKIN BIOPSY      SKIN CANCER EXCISION      Moh's x 2    SKIN GRAFT          Home Medications:  Medications Prior to Admission   Medication Sig Dispense Refill Last Dose    amiodarone (PACERONE) 200 MG Tab Take 200 mg by mouth 2 (two) times daily.   1/25/2024    apixaban (ELIQUIS) 5 mg Tab Take 5 mg by mouth 2 (two) times daily.   Past Week    aspirin 81 MG Chew Take 81 mg by mouth every 7 days.   Past Month    ENTRESTO 49-51 mg per tablet Take 1 tablet by mouth 2 (two) times daily.   1/24/2024    albuterol (VENTOLIN HFA) 90 mcg/actuation inhaler Inhale 2 puffs into the lungs every 6 (six) hours as needed for Wheezing. Rescue 54 g 4     albuterol sulfate (PROAIR RESPICLICK) 90 mcg/actuation inhaler 2 puffs as needed every 4-6 hours for shortness of breath 18 each 6     allopurinoL (ZYLOPRIM) 300 MG tablet TAKE 1 TABLET ONE TIME DAILY (Patient taking differently: Take 300 mg by mouth once daily.) 90 tablet 1     ascorbic acid, vitamin C, (VITAMIN C) 1000 MG tablet Take 1,000 mg by mouth once daily.       cholecalciferol, vitamin D3, (VITAMIN D3) 25 mcg (1,000 unit) capsule Take 1,000 Units by mouth once daily.       co-enzyme Q-10 50 mg capsule Take 50 mg by mouth once daily.       fluticasone-umeclidin-vilanter (TRELEGY ELLIPTA) 100-62.5-25 mcg DsDv INHALE 1 PUFF ONE TIME DAILY 180 each 10     fluticasone-umeclidin-vilanter (TRELEGY ELLIPTA) 100-62.5-25 mcg DsDv Inhale 1 puff into the lungs once daily. 180 each 4     furosemide (LASIX) 40 MG tablet Take 20 mg by mouth once daily.       guaiFENesin (MUCINEX) 600 mg 12 hr tablet Take 2 tablets (1,200 mg total) by mouth 2 (two) times daily. 20 tablet 0     Lactobacillus rhamnosus GG (CULTURELLE) 10 billion cell capsule  Take 1 capsule by mouth.       lactulose (CHRONULAC) 20 gram/30 mL Soln Take 30 mLs (20 g total) by mouth every 6 (six) hours as needed (constipation). 473 mL 1     levothyroxine (SYNTHROID) 88 MCG tablet TAKE 1 TABLET BEFORE BREAKFAST 90 tablet 1     magnesium gluconate 27.5 mg magne- sium (500 mg) Tab Take 1 tablet by mouth once.       melatonin 10 mg Tab Take 1 tablet by mouth nightly as needed.       montelukast (SINGULAIR) 10 mg tablet TAKE 1 TABLET EVERY EVENING (Patient taking differently: Take 10 mg by mouth every evening.) 90 tablet 6     multivitamin (THERAGRAN) per tablet Take 1 tablet by mouth once daily.       potassium chloride (MICRO-K) 10 MEQ CpSR Take 10 mEq by mouth once daily.       rosuvastatin (CRESTOR) 10 MG tablet TAKE 1 TABLET EVERY OTHER DAY 45 tablet 1     tamsulosin (FLOMAX) 0.4 mg Cap Take 1 tablet by mouth Daily.       triamterene-hydrochlorothiazide 37.5-25 mg (DYAZIDE) 37.5-25 mg per capsule TAKE 1 CAPSULE EVERY MORNING (Patient taking differently: Take 1 capsule by mouth once daily.) 90 capsule 1     vitamin E 1000 UNIT capsule Take 1,000 Units by mouth once daily.                  Review of patient's allergies indicates:   Allergen Reactions    Bactrim [sulfamethoxazole-trimethoprim]      HIVES/ RASH    Green tea (rashida sinensis) Hives     Pt is has allergies to black tea. Not an option under search.       Social History:   Social History     Occupational History    Occupation: retired captain    Tobacco Use    Smoking status: Former     Current packs/day: 0.00     Average packs/day: 2.0 packs/day for 20.0 years (40.0 ttl pk-yrs)     Types: Cigarettes     Start date: 1972     Quit date: 1988     Years since quittin.4     Passive exposure: Past    Smokeless tobacco: Never    Tobacco comments:     Quit smoking in    Substance and Sexual Activity    Alcohol use: No    Drug use: No    Sexual activity: Never       Family History:   Family History   Problem Relation  "Age of Onset    Cancer Mother         esophageal    Heart disease Father     Hypertension Father     Arthritis Paternal Grandmother        Review of Systems:  A 10 point review of systems was performed and was normal, except as mentioned in the HPI, including constitutional, HEENT, heme, lymph, cardiovascular, respiratory, gastrointestinal, genitourinary, neurologic, endocrine, psychiatric and musculoskeletal.      OBJECTIVE:    Physical Exam:  24 Hour Vital Sign Ranges: Temp:  [99.1 °F (37.3 °C)] 99.1 °F (37.3 °C)  Pulse:  [88] 88  Resp:  [16] 16  SpO2:  [97 %] 97 %  BP: (152)/(79) 152/79  Most recent vitals: BP (!) 152/79   Pulse 88   Temp 99.1 °F (37.3 °C)   Resp 16   SpO2 97%    GEN: well-developed, well-nourished, awake and alert, non-toxic appearing adult  HEENT: PERRL, sclera anicteric, oral mucosa pink and moist without lesion  NECK: trachea midline; Good ROM  CV: regular rate and rhythm, no murmurs or gallops  RESP: clear to auscultation bilaterally, no wheezes, rhonci or rales  ABD: soft, non-tender, non-distended, normal bowel sounds  EXT: no swelling or edema, 2+ pulses distally  SKIN: no rashes or jaundice  PSYCH: normal affect    Labs:   Recent Labs     01/22/24  1411   WBC 8.03   MCV 97        Recent Labs     01/22/24  1411      K 3.6      CO2 27   BUN 20        No results for input(s): "ALB" in the last 72 hours.    Invalid input(s): "ALKP", "SGOT", "SGPT", "TBIL", "DBIL", "TPRO"  No results for input(s): "PT", "INR", "PTT" in the last 72 hours.      IMPRESSION / RECOMMENDATIONS:  Pt with history of severe constipation event around october/novemeber 2023 in setting of covid and ongoing persistent constipation  -repeat colon advised  -use miralax purge (mix 4-5 caps of miralax with 32 oz of water or gatorade and drink once over 2 hours) THEN use miralax 1-2 caps with 12 oz water every 1-2 days as needed    Cscope  with interventions as warranted.   RIsks, benefits, " alternatives discussed in detail regarding upcoming procedures and sedation. Some of the more common endoscopic complications include but not limited to immediate or delayed perforation, bleeding, infections, pain, inadvertent injury to surrounding tissue / organs and possible need for surgical evaluation. Patient expressed understanding, all questions answered and will proceed with procedure as planned.     Emmanuel Deleon  1/25/2024  9:25 AM

## 2024-01-25 NOTE — TRANSFER OF CARE
Anesthesia Transfer of Care Note    Patient: Nico Teague    Procedure(s) Performed: Procedure(s) (LRB):  COLONOSCOPY (N/A)    Patient location: GI    Anesthesia Type: general    Transport from OR: Transported from OR on room air with adequate spontaneous ventilation    Post pain: adequate analgesia    Post assessment: no apparent anesthetic complications    Post vital signs: stable    Level of consciousness: awake    Nausea/Vomiting: no nausea/vomiting    Complications: none    Transfer of care protocol was followed    Last vitals: Visit Vitals  BP (!) 152/79   Pulse 88   Temp 37.3 °C (99.1 °F)   Resp 16   SpO2 97%

## 2024-02-05 PROBLEM — U07.1 ACUTE HYPOXEMIC RESPIRATORY FAILURE DUE TO COVID-19: Status: RESOLVED | Noted: 2023-10-27 | Resolved: 2024-02-05

## 2024-02-05 PROBLEM — J96.01 ACUTE HYPOXEMIC RESPIRATORY FAILURE DUE TO COVID-19: Status: RESOLVED | Noted: 2023-10-27 | Resolved: 2024-02-05

## 2024-02-06 ENCOUNTER — OFFICE VISIT (OUTPATIENT)
Dept: PULMONOLOGY | Facility: CLINIC | Age: 74
End: 2024-02-06
Payer: MEDICARE

## 2024-02-06 VITALS
DIASTOLIC BLOOD PRESSURE: 72 MMHG | OXYGEN SATURATION: 97 % | SYSTOLIC BLOOD PRESSURE: 120 MMHG | WEIGHT: 255 LBS | HEART RATE: 39 BPM | BODY MASS INDEX: 38.77 KG/M2

## 2024-02-06 DIAGNOSIS — R06.02 SHORTNESS OF BREATH: ICD-10-CM

## 2024-02-06 DIAGNOSIS — G47.33 OSA (OBSTRUCTIVE SLEEP APNEA): Primary | ICD-10-CM

## 2024-02-06 DIAGNOSIS — J45.40 MODERATE PERSISTENT ASTHMA, UNSPECIFIED WHETHER COMPLICATED: ICD-10-CM

## 2024-02-06 DIAGNOSIS — Z95.0 PACEMAKER: ICD-10-CM

## 2024-02-06 PROCEDURE — 3008F BODY MASS INDEX DOCD: CPT | Mod: CPTII,S$GLB,, | Performed by: INTERNAL MEDICINE

## 2024-02-06 PROCEDURE — 3078F DIAST BP <80 MM HG: CPT | Mod: CPTII,S$GLB,, | Performed by: INTERNAL MEDICINE

## 2024-02-06 PROCEDURE — 1126F AMNT PAIN NOTED NONE PRSNT: CPT | Mod: CPTII,S$GLB,, | Performed by: INTERNAL MEDICINE

## 2024-02-06 PROCEDURE — 3074F SYST BP LT 130 MM HG: CPT | Mod: CPTII,S$GLB,, | Performed by: INTERNAL MEDICINE

## 2024-02-06 PROCEDURE — 1159F MED LIST DOCD IN RCRD: CPT | Mod: CPTII,S$GLB,, | Performed by: INTERNAL MEDICINE

## 2024-02-06 PROCEDURE — 99214 OFFICE O/P EST MOD 30 MIN: CPT | Mod: S$GLB,,, | Performed by: INTERNAL MEDICINE

## 2024-02-06 NOTE — PROGRESS NOTES
.    SUBJECTIVE:    Patient ID: Nico Teague is a 73 y.o. male.    Chief Complaint: Follow-up (3 month follow up )    The patient is here with a atrial and ventricular pacing.  His heart rate is 40. Dr. Rivera states he is having lots of PVC's and therefore his pacemaker doesn't kick in.     His CPAP download shows only 5 days of use in the last 90.  Patient states this isn't correct.  Asked him to get another download in a month and send to us. He also needs supplies.  He is still feeling short of breath at times.  He is using Trelegy daily.  Past Medical History:   Diagnosis Date    Allergy     Arthritis     Asthma     Atrial fibrillation     Basal cell carcinoma     CHF (congestive heart failure)     Fever blister     Hypertension     Hypothyroidism (acquired) 07/31/2023    Joint pain     Melanoma     BANDAR on CPAP     PVC (premature ventricular contraction)     Skin disease     Squamous cell carcinoma      Past Surgical History:   Procedure Laterality Date    BRONCHOSCOPY N/A 07/18/2022    Procedure: Bronchoscopy;  Surgeon: Charlee Brewer MD;  Location: [facility];  Service: Pulmonary;  Laterality: N/A;  monday 7/18/22 at 0830 look see bronch no biopsy    CARDIAC CATHETERIZATION  05/25/2021    COLONOSCOPY N/A 1/25/2024    Procedure: COLONOSCOPY;  Surgeon: Emmanuel Deleon MD;  Location: [facility];  Service: Endoscopy;  Laterality: N/A;    ESOPHAGOGASTRODUODENOSCOPY N/A 07/07/2022    Procedure: EGD (ESOPHAGOGASTRODUODENOSCOPY);  Surgeon: Emmanuel Deleon MD;  Location: [facility];  Service: Endoscopy;  Laterality: N/A;    EYE SURGERY Left 12/03/2019    EYE SURGERY Right 01/28/2020    FRACTURE SURGERY      INSERTION OF PACEMAKER  11/2023    LIPOMA RESECTION      NOSE SURGERY      SHOULDER ARTHROSCOPY      SKIN BIOPSY      SKIN CANCER EXCISION      Moh's x 2    SKIN GRAFT       Family History   Problem Relation Age of Onset    Cancer Mother         esophageal    Heart disease Father     Hypertension Father      Arthritis Paternal Grandmother         Social History:   Marital Status:   Occupation: retired captain   Alcohol History:  reports no history of alcohol use.  Tobacco History:  reports that he quit smoking about 35 years ago. His smoking use included cigarettes. He started smoking about 51 years ago. He has a 40.0 pack-year smoking history. He has been exposed to tobacco smoke. He has never used smokeless tobacco.  Drug History:  reports no history of drug use.    Review of patient's allergies indicates:   Allergen Reactions    Bactrim [sulfamethoxazole-trimethoprim]      HIVES/ RASH       Current Outpatient Medications   Medication Sig Dispense Refill    albuterol (VENTOLIN HFA) 90 mcg/actuation inhaler Inhale 2 puffs into the lungs every 6 (six) hours as needed for Wheezing. Rescue 54 g 4    allopurinoL (ZYLOPRIM) 300 MG tablet TAKE 1 TABLET ONE TIME DAILY (Patient taking differently: Take 300 mg by mouth once daily.) 90 tablet 1    amiodarone (PACERONE) 200 MG Tab Take 200 mg by mouth 2 (two) times daily.      apixaban (ELIQUIS) 5 mg Tab Take 5 mg by mouth 2 (two) times daily.      ascorbic acid, vitamin C, (VITAMIN C) 1000 MG tablet Take 1,000 mg by mouth once daily.      aspirin 81 MG Chew Take 81 mg by mouth every 7 days.      cholecalciferol, vitamin D3, (VITAMIN D3) 25 mcg (1,000 unit) capsule Take 1,000 Units by mouth once daily.      co-enzyme Q-10 50 mg capsule Take 50 mg by mouth once daily.      ENTRESTO 49-51 mg per tablet Take 1 tablet by mouth 2 (two) times daily.      fluticasone-umeclidin-vilanter (TRELEGY ELLIPTA) 100-62.5-25 mcg DsDv INHALE 1 PUFF ONE TIME DAILY 180 each 10    furosemide (LASIX) 40 MG tablet Take 20 mg by mouth once daily.      guaiFENesin (MUCINEX) 600 mg 12 hr tablet Take 2 tablets (1,200 mg total) by mouth 2 (two) times daily. 20 tablet 0    Lactobacillus rhamnosus GG (CULTURELLE) 10 billion cell capsule Take 1 capsule by mouth.      lactulose (CHRONULAC) 20 gram/30 mL  Soln Take 30 mLs (20 g total) by mouth every 6 (six) hours as needed (constipation). 473 mL 1    levothyroxine (SYNTHROID) 88 MCG tablet TAKE 1 TABLET BEFORE BREAKFAST 90 tablet 1    magnesium gluconate 27.5 mg magne- sium (500 mg) Tab Take 1 tablet by mouth once.      melatonin 10 mg Tab Take 1 tablet by mouth nightly as needed.      montelukast (SINGULAIR) 10 mg tablet TAKE 1 TABLET EVERY EVENING (Patient taking differently: Take 10 mg by mouth every evening.) 90 tablet 6    multivitamin (THERAGRAN) per tablet Take 1 tablet by mouth once daily.      potassium chloride (MICRO-K) 10 MEQ CpSR Take 10 mEq by mouth once daily.      rosuvastatin (CRESTOR) 10 MG tablet TAKE 1 TABLET EVERY OTHER DAY 45 tablet 1    tamsulosin (FLOMAX) 0.4 mg Cap Take 1 tablet by mouth Daily.      vitamin E 1000 UNIT capsule Take 1,000 Units by mouth once daily.      albuterol sulfate (PROAIR RESPICLICK) 90 mcg/actuation inhaler 2 puffs as needed every 4-6 hours for shortness of breath 18 each 6    fluticasone-umeclidin-vilanter (TRELEGY ELLIPTA) 100-62.5-25 mcg DsDv Inhale 1 puff into the lungs once daily. 180 each 4    triamterene-hydrochlorothiazide 37.5-25 mg (DYAZIDE) 37.5-25 mg per capsule TAKE 1 CAPSULE EVERY MORNING (Patient not taking: Reported on 2/6/2024) 90 capsule 1     No current facility-administered medications for this visit.       Last PFT: 4/2018  Last CT 08/2022    IMPRESSION:     1. Mild to moderate prominence and with evidence of scattered bullae throughout.  2. No CT evidence of aspiration focus.  3. Benign cyst arising from lower pole left kidney reaching 3.1 cm in the widest dimension.     Review of Systems  General: fatigue is better with the pacemaker  Eyes: vision is good  ENT: nasal stuffiness comes and goes  Heart:: no recent atrial fib    Lungs:shortness of breath with exertion continues  GI: No Nausea, vomiting, constipation, diarrhea, or reflux.  : nocturia is better with less diuretics  Musculoskeletal:  no myalgias  Skin: No lesions or rashes.  Neuro: brain fog  Lymph:swelling to legs better  Psych: No anxiety or depression.  Endo: weight stable    OBJECTIVE:      /72 (BP Location: Right arm, Patient Position: Sitting, BP Method: Medium (Manual))   Pulse (!) 39   Wt 115.7 kg (255 lb)   SpO2 97%   BMI 38.77 kg/m²     Physical Exam  GENERAL: Older patient in no distress.  Continues with a depressed affect  HEENT: Pupils equal and reactive. Extraocular movements intact. Nose intact.      Pharynx moist.  NECK: Supple.   HEART: regular rate and rhythym, wrist palpates at 40, auscultates at 96  LUNGS: Clear to auscultation and percussion. Lung excursion symmetrical. No change in fremitus. No adventitial noises.  ABDOMEN: Bowel sounds present. Non-tender, no masses palpated.  EXTREMITIES: Normal muscle tone and joint movement, no cyanosis or clubbing.   LYMPHATICS: No adenopathy palpated, 1+ edema  SKIN: Dry, intact, no lesions.   NEURO: Cranial nerves II-XII intact. Motor strength 5/5 bilaterally, upper and lower extremities.  PSYCH: Appropriate affect.        Assessment:       1. BANDAR (obstructive sleep apnea)    2. Moderate persistent asthma, unspecified whether complicated    3. Pacemaker    4. Shortness of breath         Patient is still having constipation issues.  He has an appointment with his GI doctor over the telephone on Thursday.  Asked him to ask about continuing lactulose but at a lower dose.  Discussed his heart rate and pacemaker with Dr. Rivera who states that because he has PVCs the pacemaker does not pace is frequently and those beats are not conducted.  However, by the end of the visit, his heart rate was up to 96.  He will transmit a download to Dr. Rivera.  He will get a download of his CPAP and sent to me within 1 month.  Plan:       BANDAR (obstructive sleep apnea)    Moderate persistent asthma, unspecified whether complicated    Pacemaker    Shortness of breath          Continue CPAP  nightly  Get me a download in a month  Continue Trelegy daily  Follow up in about 6 months (around 8/6/2024).

## 2024-02-07 ENCOUNTER — PATIENT MESSAGE (OUTPATIENT)
Dept: PULMONOLOGY | Facility: CLINIC | Age: 74
End: 2024-02-07

## 2024-02-11 NOTE — CARE UPDATE
11/01/23 1945   Patient Assessment/Suction   Level of Consciousness (AVPU) alert   Respiratory Effort Normal;Unlabored   Expansion/Accessory Muscles/Retractions no use of accessory muscles   All Lung Fields Breath Sounds equal bilaterally;wheezes, expiratory;coarse   Rhythm/Pattern, Respiratory shortness of breath   Cough Frequency frequent   Cough Type good;loose;congested;nonproductive   Skin Integrity   $ Wound Care Tech Time 15 min   Area Observed Left;Right;Behind ear   Skin Appearance without discoloration   PRE-TX-O2   Device (Oxygen Therapy) CPAP   Flow (L/min) 2   SpO2 98 %   Pulse Oximetry Type Continuous   $ Pulse Oximetry - Multiple Charge Pulse Oximetry - Multiple   Pulse 65   Resp 20   Aerosol Therapy   $ Aerosol Therapy Charges Aerosol Treatment  (Duoneb)   Daily Review of Necessity (SVN) completed   Respiratory Treatment Status (SVN) given   Treatment Route (SVN) mask   Patient Position (SVN) sitting on edge of bed   Post Treatment Assessment (SVN) breath sounds unchanged   Signs of Intolerance (SVN) none   Breath Sounds Post-Respiratory Treatment   Throughout All Fields Post-Treatment All Fields   Throughout All Fields Post-Treatment no change   Post-treatment Heart Rate (beats/min) 66   Post-treatment Resp Rate (breaths/min) 18   Peak Flow   $ Peak Flow Charges Given   PEFR PREtreatment (L/Min) 260   PEFR POST-Treatment (L/Min)   (Not able to perform post PF due to coughing.)   Effort (PEFR) good   Patient Position (PEFR) sitting on edge of bed   Preset CPAP/BiPAP Settings   Mode Of Delivery CPAP   $ Is patient using? Yes   Size of Mask Other (see comments)   Equipment Type DeVilbiss   Airway Device Type nasal prongs/pillows   CPAP (cm H2O) 8   Education   $ Education Bronchodilator;15 min   Respiratory Evaluation   $ Care Plan Tech Time 15 min   $ Eval/Re-eval Charges Re-evaluation   Evaluation For New Orders        no

## 2024-02-12 ENCOUNTER — OFFICE VISIT (OUTPATIENT)
Dept: FAMILY MEDICINE | Facility: CLINIC | Age: 74
End: 2024-02-12
Payer: MEDICARE

## 2024-02-12 ENCOUNTER — PATIENT MESSAGE (OUTPATIENT)
Dept: FAMILY MEDICINE | Facility: CLINIC | Age: 74
End: 2024-02-12

## 2024-02-12 VITALS
TEMPERATURE: 97 F | OXYGEN SATURATION: 96 % | HEIGHT: 68 IN | WEIGHT: 255.19 LBS | SYSTOLIC BLOOD PRESSURE: 152 MMHG | BODY MASS INDEX: 38.67 KG/M2 | DIASTOLIC BLOOD PRESSURE: 92 MMHG | HEART RATE: 88 BPM

## 2024-02-12 DIAGNOSIS — E78.00 PURE HYPERCHOLESTEROLEMIA: ICD-10-CM

## 2024-02-12 DIAGNOSIS — I10 PRIMARY HYPERTENSION: Primary | ICD-10-CM

## 2024-02-12 DIAGNOSIS — E03.2 HYPOTHYROIDISM DUE TO MEDICATION: ICD-10-CM

## 2024-02-12 PROBLEM — Z95.0 PACEMAKER: Status: ACTIVE | Noted: 2023-11-27

## 2024-02-12 PROCEDURE — 1159F MED LIST DOCD IN RCRD: CPT | Mod: CPTII,S$GLB,, | Performed by: INTERNAL MEDICINE

## 2024-02-12 PROCEDURE — 1126F AMNT PAIN NOTED NONE PRSNT: CPT | Mod: CPTII,S$GLB,, | Performed by: INTERNAL MEDICINE

## 2024-02-12 PROCEDURE — 99214 OFFICE O/P EST MOD 30 MIN: CPT | Mod: S$GLB,,, | Performed by: INTERNAL MEDICINE

## 2024-02-12 PROCEDURE — 3077F SYST BP >= 140 MM HG: CPT | Mod: CPTII,S$GLB,, | Performed by: INTERNAL MEDICINE

## 2024-02-12 PROCEDURE — 3008F BODY MASS INDEX DOCD: CPT | Mod: CPTII,S$GLB,, | Performed by: INTERNAL MEDICINE

## 2024-02-12 PROCEDURE — 3080F DIAST BP >= 90 MM HG: CPT | Mod: CPTII,S$GLB,, | Performed by: INTERNAL MEDICINE

## 2024-02-12 PROCEDURE — 99999 PR PBB SHADOW E&M-EST. PATIENT-LVL IV: CPT | Mod: PBBFAC,,, | Performed by: INTERNAL MEDICINE

## 2024-02-12 RX ORDER — LINACLOTIDE 290 UG/1
290 CAPSULE, GELATIN COATED ORAL DAILY
COMMUNITY
Start: 2024-02-09 | End: 2024-02-12 | Stop reason: DRUGHIGH

## 2024-02-12 RX ORDER — TRIAMCINOLONE ACETONIDE 1 MG/G
1 CREAM TOPICAL
COMMUNITY
Start: 2023-12-29 | End: 2024-03-27

## 2024-02-12 RX ORDER — AMLODIPINE BESYLATE 10 MG/1
10 TABLET ORAL DAILY
Qty: 30 TABLET | Refills: 11
Start: 2024-02-12 | End: 2024-03-27 | Stop reason: ALTCHOICE

## 2024-02-12 NOTE — PROGRESS NOTES
Subjective:       Patient ID: Nico Teague is a 73 y.o. male.    Chief Complaint: Hypertension (5 months follow up), Thyroid Problem, and Hyperlipidemia    Here for routine follow up; last visit note, most recent available labs, and health maintenance topics reviewed.   Followed by Cards for Afib; he had to have a pacemaker placed since last visit with me.  He is also seeing Pulm and GI.  He has been dealing with constipation since bout of COVID last fall; now on linzess.  Using CPAP for BANDAR; sleeping better and feeling better rested.   One gout flare since last visit.    Hypertension  This is a chronic problem. The problem is uncontrolled. Pertinent negatives include no anxiety, chest pain, headaches, neck pain, palpitations, peripheral edema or shortness of breath. Past treatments include angiotensin blockers, calcium channel blockers, alpha 1 blockers, diuretics and beta blockers (Cardiology stopped maxzide). The current treatment provides significant improvement. There are no compliance problems.  Identifiable causes of hypertension include a thyroid problem.   Thyroid Problem  Presents for follow-up visit. Symptoms include constipation. Patient reports no anxiety, cold intolerance, depressed mood, diaphoresis, diarrhea, fatigue, heat intolerance, palpitations or tremors. The symptoms have been stable. His past medical history is significant for hyperlipidemia.   Hyperlipidemia  This is a chronic problem. The problem is controlled. Recent lipid tests were reviewed and are variable (trig a little high). Pertinent negatives include no chest pain, myalgias or shortness of breath. Current antihyperlipidemic treatment includes statins. The current treatment provides significant improvement of lipids. There are no compliance problems.      Review of Systems   Constitutional:  Negative for activity change, appetite change, chills, diaphoresis, fatigue, fever and unexpected weight change.   HENT:  Negative for  congestion, ear discharge, ear pain, hearing loss, nosebleeds, postnasal drip, rhinorrhea, sinus pressure, sinus pain, sneezing, sore throat, tinnitus, trouble swallowing and voice change.    Eyes:  Negative for photophobia, pain, discharge, redness, itching and visual disturbance.   Respiratory:  Negative for apnea, cough, choking, chest tightness, shortness of breath and wheezing.    Cardiovascular:  Negative for chest pain, palpitations and leg swelling.   Gastrointestinal:  Positive for constipation. Negative for abdominal distention, abdominal pain, blood in stool, diarrhea, nausea and vomiting.   Endocrine: Negative for cold intolerance, heat intolerance, polydipsia and polyuria.   Genitourinary:  Positive for frequency. Negative for decreased urine volume, difficulty urinating, dysuria, enuresis, flank pain, genital sores, hematuria, penile discharge, penile pain, scrotal swelling, testicular pain and urgency.   Musculoskeletal:  Positive for back pain. Negative for arthralgias, gait problem, joint swelling, myalgias, neck pain and neck stiffness.   Skin:  Negative for rash and wound.   Allergic/Immunologic: Negative for environmental allergies, food allergies and immunocompromised state.   Neurological:  Negative for dizziness, tremors, seizures, syncope, facial asymmetry, speech difficulty, weakness, light-headedness, numbness and headaches.   Hematological:  Negative for adenopathy. Bruises/bleeds easily.   Psychiatric/Behavioral:  Negative for confusion, decreased concentration, hallucinations, self-injury, sleep disturbance and suicidal ideas. The patient is not nervous/anxious.        Past Medical History:   Diagnosis Date    Allergy     Arthritis     Asthma     Atrial fibrillation     Basal cell carcinoma     CHF (congestive heart failure)     Fever blister     Hypertension     Hypothyroidism (acquired) 07/31/2023    Joint pain     Melanoma     BANDAR on CPAP     PVC (premature ventricular contraction)      Skin disease     Squamous cell carcinoma       Past Surgical History:   Procedure Laterality Date    BRONCHOSCOPY N/A 2022    Procedure: Bronchoscopy;  Surgeon: Charlee Brewer MD;  Location: [facility] ENDO;  Service: Pulmonary;  Laterality: N/A;  22 at 0830 look see bronch no biopsy    CARDIAC CATHETERIZATION  2021    COLONOSCOPY N/A 2024    Procedure: COLONOSCOPY;  Surgeon: Emmanuel Deleon MD;  Location: [facility] ENDO;  Service: Endoscopy;  Laterality: N/A;    ESOPHAGOGASTRODUODENOSCOPY N/A 2022    Procedure: EGD (ESOPHAGOGASTRODUODENOSCOPY);  Surgeon: Emmanuel Deleon MD;  Location: [facility] ENDO;  Service: Endoscopy;  Laterality: N/A;    EYE SURGERY Left 2019    EYE SURGERY Right 2020    FRACTURE SURGERY      INSERTION OF PACEMAKER  2023    LIPOMA RESECTION      NOSE SURGERY      SHOULDER ARTHROSCOPY      SKIN BIOPSY      SKIN CANCER EXCISION      Moh's x 2    SKIN GRAFT         Family History   Problem Relation Age of Onset    Cancer Mother         esophageal    Heart disease Father     Hypertension Father     Arthritis Paternal Grandmother        Social History     Socioeconomic History    Marital status:    Occupational History    Occupation: retired captain    Tobacco Use    Smoking status: Former     Current packs/day: 0.00     Average packs/day: 2.0 packs/day for 20.0 years (40.0 ttl pk-yrs)     Types: Cigarettes     Start date: 1972     Quit date: 1988     Years since quittin.4     Passive exposure: Past    Smokeless tobacco: Never    Tobacco comments:     Quit smoking in    Substance and Sexual Activity    Alcohol use: No    Drug use: No    Sexual activity: Never   Social History Narrative    Live with wife     Social Determinants of Health     Financial Resource Strain: Low Risk  (3/7/2022)    Overall Financial Resource Strain (CARDIA)     Difficulty of Paying Living Expenses: Not hard at all   Food Insecurity: No Food Insecurity  (3/7/2022)    Hunger Vital Sign     Worried About Running Out of Food in the Last Year: Never true     Ran Out of Food in the Last Year: Never true   Transportation Needs: No Transportation Needs (3/7/2022)    PRAPARE - Transportation     Lack of Transportation (Medical): No     Lack of Transportation (Non-Medical): No   Physical Activity: Insufficiently Active (3/7/2022)    Exercise Vital Sign     Days of Exercise per Week: 3 days     Minutes of Exercise per Session: 20 min   Stress: No Stress Concern Present (3/7/2022)    [employer] - Occupational Stress Questionnaire     Feeling of Stress : Only a little   Social Connections: Unknown (3/7/2022)    Social Connection and Isolation Panel [NHANES]     Frequency of Communication with Friends and Family: More than three times a week     Frequency of Social Gatherings with Friends and Family: Once a week     Active Member of Clubs or Organizations: Yes     Attends Club or Organization Meetings: More than 4 times per year     Marital Status:        Current Outpatient Medications   Medication Sig Dispense Refill    albuterol (VENTOLIN HFA) 90 mcg/actuation inhaler Inhale 2 puffs into the lungs every 6 (six) hours as needed for Wheezing. Rescue 54 g 4    allopurinoL (ZYLOPRIM) 300 MG tablet TAKE 1 TABLET ONE TIME DAILY (Patient taking differently: Take 300 mg by mouth once daily.) 90 tablet 1    amiodarone (PACERONE) 200 MG Tab Take 200 mg by mouth 2 (two) times daily.      apixaban (ELIQUIS) 5 mg Tab Take 5 mg by mouth 2 (two) times daily.      ascorbic acid, vitamin C, (VITAMIN C) 1000 MG tablet Take 1,000 mg by mouth once daily.      aspirin 81 MG Chew Take 81 mg by mouth every 7 days.      cholecalciferol, vitamin D3, (VITAMIN D3) 25 mcg (1,000 unit) capsule Take 1,000 Units by mouth once daily.      co-enzyme Q-10 50 mg capsule Take 50 mg by mouth once daily.      ENTRESTO 49-51 mg per tablet Take 1 tablet by mouth 2 (two) times  daily.      fluticasone-umeclidin-vilanter (TRELEGY ELLIPTA) 100-62.5-25 mcg DsDv INHALE 1 PUFF ONE TIME DAILY 180 each 10    furosemide (LASIX) 40 MG tablet Take 20 mg by mouth once daily.      guaiFENesin (MUCINEX) 600 mg 12 hr tablet Take 2 tablets (1,200 mg total) by mouth 2 (two) times daily. 20 tablet 0    Lactobacillus rhamnosus GG (CULTURELLE) 10 billion cell capsule Take 1 capsule by mouth.      levothyroxine (SYNTHROID) 88 MCG tablet TAKE 1 TABLET BEFORE BREAKFAST 90 tablet 1    magnesium gluconate 27.5 mg magne- sium (500 mg) Tab Take 1 tablet by mouth once.      melatonin 10 mg Tab Take 1 tablet by mouth nightly as needed.      montelukast (SINGULAIR) 10 mg tablet TAKE 1 TABLET EVERY EVENING (Patient taking differently: Take 10 mg by mouth every evening.) 90 tablet 6    multivitamin (THERAGRAN) per tablet Take 1 tablet by mouth once daily.      potassium chloride (MICRO-K) 10 MEQ CpSR Take 10 mEq by mouth once daily.      rosuvastatin (CRESTOR) 10 MG tablet TAKE 1 TABLET EVERY OTHER DAY 45 tablet 1    tamsulosin (FLOMAX) 0.4 mg Cap Take 1 tablet by mouth Daily.      triamcinolone acetonide 0.1% (KENALOG) 0.1 % cream Apply 1 g topically every 14 (fourteen) days.      vitamin E 1000 UNIT capsule Take 1,000 Units by mouth once daily.      amLODIPine (NORVASC) 10 MG tablet Take 1 tablet (10 mg total) by mouth once daily. 30 tablet 11    linaCLOtide (LINZESS) 145 mcg Cap capsule Take 145 mcg by mouth before breakfast.       No current facility-administered medications for this visit.       Review of patient's allergies indicates:   Allergen Reactions    Bactrim [sulfamethoxazole-trimethoprim]      HIVES/ RASH    Green tea (rashida sinensis) Hives     Pt is has allergies to black tea. Not an option under search.     Objective:    HPI     Hypertension     Additional comments: 5 months follow up          Last edited by Radha Ruffin MA on 2/12/2024  9:47 AM.      Blood pressure (!) 152/92, pulse 88,  "temperature 96.5 °F (35.8 °C), temperature source Temporal, height 5' 8" (1.727 m), weight 115.8 kg (255 lb 2.9 oz), SpO2 96 %. Body mass index is 38.8 kg/m².   Physical Exam  Vitals and nursing note reviewed.   Constitutional:       General: He is not in acute distress.     Appearance: He is well-developed. He is obese. He is not ill-appearing, toxic-appearing or diaphoretic.   HENT:      Head: Normocephalic and atraumatic.   Eyes:      General: No scleral icterus.        Right eye: No discharge.         Left eye: No discharge.      Conjunctiva/sclera: Conjunctivae normal.   Neck:      Vascular: No carotid bruit.   Cardiovascular:      Rate and Rhythm: Normal rate and regular rhythm.      Heart sounds: Normal heart sounds. No murmur heard.  Pulmonary:      Effort: Pulmonary effort is normal. No respiratory distress.      Breath sounds: Normal breath sounds. No decreased breath sounds, wheezing, rhonchi or rales.   Abdominal:      General: There is no distension.      Palpations: Abdomen is soft.      Tenderness: There is no abdominal tenderness. There is no guarding or rebound.   Musculoskeletal:      Right lower le+ Pitting Edema present.      Left lower le+ Pitting Edema present.   Skin:     General: Skin is warm and dry.   Neurological:      Mental Status: He is alert.      Motor: No tremor.   Psychiatric:         Mood and Affect: Mood normal.         Speech: Speech normal.         Behavior: Behavior normal.           Hospital Outpatient Visit on 2024   Component Date Value Ref Range Status    WBC 2024 8.03  3.90 - 12.70 K/uL Final    RBC 2024 4.69  4.60 - 6.20 M/uL Final    Hemoglobin 2024 14.1  14.0 - 18.0 g/dL Final    Hematocrit 2024 45.4  40.0 - 54.0 % Final    MCV 2024 97  82 - 98 fL Final    MCH 2024 30.1  27.0 - 31.0 pg Final    MCHC 2024 31.1 (L)  32.0 - 36.0 g/dL Final    RDW 2024 14.2  11.5 - 14.5 % Final    Platelets 2024 247  150 - " 450 K/uL Final    MPV 01/22/2024 10.8  9.2 - 12.9 fL Final    Immature Granulocytes 01/22/2024 0.4  0.0 - 0.5 % Final    Gran # (ANC) 01/22/2024 5.4  1.8 - 7.7 K/uL Final    Immature Grans (Abs) 01/22/2024 0.03  0.00 - 0.04 K/uL Final    Comment: Mild elevation in immature granulocytes is non specific and   can be seen in a variety of conditions including stress response,   acute inflammation, trauma and pregnancy. Correlation with other   laboratory and clinical findings is essential.      Lymph # 01/22/2024 1.9  1.0 - 4.8 K/uL Final    Mono # 01/22/2024 0.5  0.3 - 1.0 K/uL Final    Eos # 01/22/2024 0.1  0.0 - 0.5 K/uL Final    Baso # 01/22/2024 0.03  0.00 - 0.20 K/uL Final    nRBC 01/22/2024 0  0 /100 WBC Final    Gran % 01/22/2024 67.5  38.0 - 73.0 % Final    Lymph % 01/22/2024 23.8  18.0 - 48.0 % Final    Mono % 01/22/2024 6.5  4.0 - 15.0 % Final    Eosinophil % 01/22/2024 1.4  0.0 - 8.0 % Final    Basophil % 01/22/2024 0.4  0.0 - 1.9 % Final    Differential Method 01/22/2024 Automated   Final   Lab Visit on 01/22/2024   Component Date Value Ref Range Status    Sodium 01/22/2024 140  136 - 145 mmol/L Final    Potassium 01/22/2024 3.6  3.5 - 5.1 mmol/L Final    Chloride 01/22/2024 104  95 - 110 mmol/L Final    CO2 01/22/2024 27  23 - 29 mmol/L Final    Glucose 01/22/2024 103  70 - 110 mg/dL Final    BUN 01/22/2024 20  8 - 23 mg/dL Final    Creatinine 01/22/2024 1.1  0.5 - 1.4 mg/dL Final    Calcium 01/22/2024 9.2  8.7 - 10.5 mg/dL Final    Anion Gap 01/22/2024 9  8 - 16 mmol/L Final    eGFR 01/22/2024 >60.0  >60 mL/min/1.73 m^2 Final   ]  Assessment:       1. Primary hypertension    2. Hypothyroidism due to medication    3. Pure hypercholesterolemia        Plan:       Nico was seen today for hypertension, thyroid problem and hyperlipidemia.    Diagnoses and all orders for this visit:    Primary hypertension  Comments:  Unclear whether he is taking amlodipine.  He thinks so but shown as stopped in Oct.  Check  home meds.  Resume if not taking.  Call for other Rx if still taking.  Orders:  -     amLODIPine (NORVASC) 10 MG tablet; Take 1 tablet (10 mg total) by mouth once daily.    Hypothyroidism due to medication  Comments:  TSH ULN on recent labs    Pure hypercholesterolemia  Comments:  Continue statin

## 2024-03-21 RX ORDER — MONTELUKAST SODIUM 10 MG/1
TABLET ORAL
Qty: 90 TABLET | Refills: 3 | Status: SHIPPED | OUTPATIENT
Start: 2024-03-21

## 2024-03-27 ENCOUNTER — OFFICE VISIT (OUTPATIENT)
Dept: FAMILY MEDICINE | Facility: CLINIC | Age: 74
End: 2024-03-27
Payer: MEDICARE

## 2024-03-27 VITALS
HEIGHT: 68 IN | HEART RATE: 73 BPM | TEMPERATURE: 96 F | DIASTOLIC BLOOD PRESSURE: 76 MMHG | BODY MASS INDEX: 39.58 KG/M2 | OXYGEN SATURATION: 98 % | WEIGHT: 261.13 LBS | SYSTOLIC BLOOD PRESSURE: 130 MMHG

## 2024-03-27 DIAGNOSIS — I10 PRIMARY HYPERTENSION: Primary | ICD-10-CM

## 2024-03-27 PROBLEM — I11.0 HYPERTENSIVE HEART DISEASE WITH HEART FAILURE: Status: ACTIVE | Noted: 2024-03-27

## 2024-03-27 PROCEDURE — 1101F PT FALLS ASSESS-DOCD LE1/YR: CPT | Mod: CPTII,S$GLB,, | Performed by: INTERNAL MEDICINE

## 2024-03-27 PROCEDURE — 3078F DIAST BP <80 MM HG: CPT | Mod: CPTII,S$GLB,, | Performed by: INTERNAL MEDICINE

## 2024-03-27 PROCEDURE — 1159F MED LIST DOCD IN RCRD: CPT | Mod: CPTII,S$GLB,, | Performed by: INTERNAL MEDICINE

## 2024-03-27 PROCEDURE — 3008F BODY MASS INDEX DOCD: CPT | Mod: CPTII,S$GLB,, | Performed by: INTERNAL MEDICINE

## 2024-03-27 PROCEDURE — 4010F ACE/ARB THERAPY RXD/TAKEN: CPT | Mod: CPTII,S$GLB,, | Performed by: INTERNAL MEDICINE

## 2024-03-27 PROCEDURE — 3288F FALL RISK ASSESSMENT DOCD: CPT | Mod: CPTII,S$GLB,, | Performed by: INTERNAL MEDICINE

## 2024-03-27 PROCEDURE — 99999 PR PBB SHADOW E&M-EST. PATIENT-LVL IV: CPT | Mod: PBBFAC,,, | Performed by: INTERNAL MEDICINE

## 2024-03-27 PROCEDURE — 99212 OFFICE O/P EST SF 10 MIN: CPT | Mod: S$GLB,,, | Performed by: INTERNAL MEDICINE

## 2024-03-27 PROCEDURE — 3075F SYST BP GE 130 - 139MM HG: CPT | Mod: CPTII,S$GLB,, | Performed by: INTERNAL MEDICINE

## 2024-03-27 PROCEDURE — 1126F AMNT PAIN NOTED NONE PRSNT: CPT | Mod: CPTII,S$GLB,, | Performed by: INTERNAL MEDICINE

## 2024-03-27 RX ORDER — SACUBITRIL AND VALSARTAN 97; 103 MG/1; MG/1
1 TABLET, FILM COATED ORAL 2 TIMES DAILY
COMMUNITY
Start: 2024-03-13

## 2024-03-27 RX ORDER — LINACLOTIDE 290 UG/1
290 CAPSULE, GELATIN COATED ORAL DAILY
COMMUNITY
Start: 2024-03-09

## 2024-03-27 RX ORDER — TERAZOSIN 10 MG/1
10 CAPSULE ORAL DAILY
COMMUNITY
Start: 2024-03-21

## 2024-03-27 RX ORDER — METOPROLOL SUCCINATE 100 MG/1
100 TABLET, EXTENDED RELEASE ORAL DAILY
COMMUNITY
Start: 2024-03-13

## 2024-03-27 NOTE — PROGRESS NOTES
Subjective:       Patient ID: Nico Teague is a 73 y.o. male.    Chief Complaint: Follow-up (6 weeks follow up bp)    Here for follow up on blood pressure.  He was off amlodipine but discussed with Cardiology and they did not want to resume it.  He was started on terazosin instead; initially 5mg but currently on 10mg.  BP has been better on home monitoring although he does note it is higher in the AM until he takes his meds.       Review of Systems   Constitutional:  Negative for activity change, appetite change, chills, diaphoresis, fatigue, fever and unexpected weight change.   HENT:  Negative for congestion, ear discharge, ear pain, hearing loss, nosebleeds, postnasal drip, rhinorrhea, sinus pressure, sinus pain, sneezing, sore throat, tinnitus, trouble swallowing and voice change.    Eyes:  Negative for photophobia, pain, discharge, redness, itching and visual disturbance.   Respiratory:  Negative for apnea, cough, choking, chest tightness, shortness of breath and wheezing.    Cardiovascular:  Negative for chest pain, palpitations and leg swelling.   Gastrointestinal:  Positive for constipation. Negative for abdominal distention, abdominal pain, blood in stool, diarrhea, nausea and vomiting.   Endocrine: Negative for cold intolerance, heat intolerance, polydipsia and polyuria.   Genitourinary:  Negative for decreased urine volume, difficulty urinating, dysuria, enuresis, flank pain, frequency, genital sores, hematuria, penile discharge, penile pain, scrotal swelling, testicular pain and urgency.   Musculoskeletal:  Positive for back pain. Negative for arthralgias, gait problem, joint swelling, myalgias, neck pain and neck stiffness.   Skin:  Negative for rash and wound.   Allergic/Immunologic: Negative for environmental allergies, food allergies and immunocompromised state.   Neurological:  Negative for dizziness, tremors, seizures, syncope, facial asymmetry, speech difficulty, weakness, light-headedness,  numbness and headaches.   Hematological:  Negative for adenopathy. Bruises/bleeds easily.   Psychiatric/Behavioral:  Negative for confusion, decreased concentration, hallucinations, self-injury, sleep disturbance and suicidal ideas. The patient is not nervous/anxious.        Past Medical History:   Diagnosis Date    Allergy     Arthritis     Asthma     Atrial fibrillation     Basal cell carcinoma     CHF (congestive heart failure)     Fever blister     Hypertension     Hypothyroidism (acquired) 07/31/2023    Joint pain     Melanoma     BANDAR on CPAP     PVC (premature ventricular contraction)     Skin disease     Squamous cell carcinoma       Past Surgical History:   Procedure Laterality Date    BRONCHOSCOPY N/A 07/18/2022    Procedure: Bronchoscopy;  Surgeon: Charlee Brewer MD;  Location: [facility] ENDO;  Service: Pulmonary;  Laterality: N/A;  monday 7/18/22 at 0830 look see bronch no biopsy    CARDIAC CATHETERIZATION  05/25/2021    COLONOSCOPY N/A 1/25/2024    Procedure: COLONOSCOPY;  Surgeon: Emmanuel Deleon MD;  Location: [facility];  Service: Endoscopy;  Laterality: N/A;    ESOPHAGOGASTRODUODENOSCOPY N/A 07/07/2022    Procedure: EGD (ESOPHAGOGASTRODUODENOSCOPY);  Surgeon: Emmanuel Deleon MD;  Location: [facility] ENDO;  Service: Endoscopy;  Laterality: N/A;    EYE SURGERY Left 12/03/2019    EYE SURGERY Right 01/28/2020    FRACTURE SURGERY      INSERTION OF PACEMAKER  11/2023    LIPOMA RESECTION      NOSE SURGERY      SHOULDER ARTHROSCOPY      SKIN BIOPSY      SKIN CANCER EXCISION      Moh's x 2    SKIN GRAFT         Family History   Problem Relation Age of Onset    Cancer Mother         esophageal    Heart disease Father     Hypertension Father     Arthritis Paternal Grandmother        Social History     Socioeconomic History    Marital status:    Occupational History    Occupation: retired captain    Tobacco Use    Smoking status: Former     Current packs/day: 0.00     Average packs/day: 2.0 packs/day for 20.0  years (40.0 ttl pk-yrs)     Types: Cigarettes     Start date: 1972     Quit date: 1988     Years since quittin.6     Passive exposure: Past    Smokeless tobacco: Never    Tobacco comments:     Quit smoking in    Substance and Sexual Activity    Alcohol use: No    Drug use: No    Sexual activity: Never   Social History Narrative    Live with wife     Social Determinants of Health     Financial Resource Strain: Low Risk  (3/7/2022)    Overall Financial Resource Strain (CARDIA)     Difficulty of Paying Living Expenses: Not hard at all   Food Insecurity: No Food Insecurity (3/7/2022)    Hunger Vital Sign     Worried About Running Out of Food in the Last Year: Never true     Ran Out of Food in the Last Year: Never true   Transportation Needs: No Transportation Needs (3/7/2022)    PRAPARE - Transportation     Lack of Transportation (Medical): No     Lack of Transportation (Non-Medical): No   Physical Activity: Insufficiently Active (3/7/2022)    Exercise Vital Sign     Days of Exercise per Week: 3 days     Minutes of Exercise per Session: 20 min   Stress: No Stress Concern Present (3/7/2022)    [employer] - Occupational Stress Questionnaire     Feeling of Stress : Only a little   Social Connections: Unknown (3/7/2022)    Social Connection and Isolation Panel [NHANES]     Frequency of Communication with Friends and Family: More than three times a week     Frequency of Social Gatherings with Friends and Family: Once a week     Active Member of Clubs or Organizations: Yes     Attends Club or Organization Meetings: More than 4 times per year     Marital Status:        Current Outpatient Medications   Medication Sig Dispense Refill    albuterol (VENTOLIN HFA) 90 mcg/actuation inhaler Inhale 2 puffs into the lungs every 6 (six) hours as needed for Wheezing. Rescue 54 g 4    allopurinoL (ZYLOPRIM) 300 MG tablet TAKE 1 TABLET ONE TIME DAILY (Patient taking differently: Take  300 mg by mouth once daily.) 90 tablet 1    amiodarone (PACERONE) 200 MG Tab Take 200 mg by mouth 2 (two) times daily.      apixaban (ELIQUIS) 5 mg Tab Take 5 mg by mouth 2 (two) times daily.      ascorbic acid, vitamin C, (VITAMIN C) 1000 MG tablet Take 1,000 mg by mouth once daily.      aspirin 81 MG Chew Take 81 mg by mouth every 7 days.      cholecalciferol, vitamin D3, (VITAMIN D3) 25 mcg (1,000 unit) capsule Take 1,000 Units by mouth once daily.      co-enzyme Q-10 50 mg capsule Take 50 mg by mouth once daily.      ENTRESTO  mg per tablet Take 1 tablet by mouth 2 (two) times daily.      fluticasone-umeclidin-vilanter (TRELEGY ELLIPTA) 100-62.5-25 mcg DsDv INHALE 1 PUFF ONE TIME DAILY 180 each 10    furosemide (LASIX) 40 MG tablet Take 20 mg by mouth once daily.      guaiFENesin (MUCINEX) 600 mg 12 hr tablet Take 2 tablets (1,200 mg total) by mouth 2 (two) times daily. 20 tablet 0    Lactobacillus rhamnosus GG (CULTURELLE) 10 billion cell capsule Take 1 capsule by mouth.      levothyroxine (SYNTHROID) 88 MCG tablet TAKE 1 TABLET BEFORE BREAKFAST 90 tablet 1    LINZESS 290 mcg Cap capsule Take 290 mcg by mouth Daily.      magnesium gluconate 27.5 mg magne- sium (500 mg) Tab Take 1 tablet by mouth once.      melatonin 10 mg Tab Take 1 tablet by mouth nightly as needed.      metoprolol succinate (TOPROL-XL) 100 MG 24 hr tablet Take 100 mg by mouth once daily.      montelukast (SINGULAIR) 10 mg tablet TAKE 1 TABLET EVERY EVENING 90 tablet 3    multivitamin (THERAGRAN) per tablet Take 1 tablet by mouth once daily.      potassium chloride (MICRO-K) 10 MEQ CpSR Take 10 mEq by mouth once daily.      rosuvastatin (CRESTOR) 10 MG tablet TAKE 1 TABLET EVERY OTHER DAY 45 tablet 1    tamsulosin (FLOMAX) 0.4 mg Cap Take 1 tablet by mouth Daily.      terazosin (HYTRIN) 10 MG capsule Take 10 mg by mouth once daily.      vitamin E 1000 UNIT capsule Take 1,000 Units by mouth once daily.       No current  "facility-administered medications for this visit.       Review of patient's allergies indicates:   Allergen Reactions    Bactrim [sulfamethoxazole-trimethoprim]      HIVES/ RASH    Tea (rashida sinensis) Hives     Pt is has allergies to black tea. Not an option under search.     Objective:      Blood pressure 130/76, pulse 73, temperature 96.4 °F (35.8 °C), temperature source Temporal, height 5' 8" (1.727 m), weight 118.5 kg (261 lb 2.2 oz), SpO2 98 %. Body mass index is 39.71 kg/m².   Physical Exam        Assessment:       1. Primary hypertension        Plan:       Nico was seen today for follow-up.    Diagnoses and all orders for this visit:    Primary hypertension  Comments:  BP better.  He generally takes terazosin several hours before bedtime.  I would move to bedtime to see if that will help blunt AM elevations           "

## 2024-05-05 DIAGNOSIS — E78.00 PURE HYPERCHOLESTEROLEMIA: ICD-10-CM

## 2024-05-05 DIAGNOSIS — M10.9 GOUT, UNSPECIFIED CAUSE, UNSPECIFIED CHRONICITY, UNSPECIFIED SITE: ICD-10-CM

## 2024-05-06 RX ORDER — ROSUVASTATIN CALCIUM 10 MG/1
10 TABLET, COATED ORAL EVERY OTHER DAY
Qty: 45 TABLET | Refills: 1 | Status: SHIPPED | OUTPATIENT
Start: 2024-05-06

## 2024-05-06 RX ORDER — ALLOPURINOL 300 MG/1
300 TABLET ORAL
Qty: 90 TABLET | Refills: 1 | Status: SHIPPED | OUTPATIENT
Start: 2024-05-06

## 2024-05-28 DIAGNOSIS — Z00.00 ENCOUNTER FOR MEDICARE ANNUAL WELLNESS EXAM: ICD-10-CM

## 2024-08-05 ENCOUNTER — OFFICE VISIT (OUTPATIENT)
Dept: FAMILY MEDICINE | Facility: CLINIC | Age: 74
End: 2024-08-05
Payer: MEDICARE

## 2024-08-05 VITALS
HEIGHT: 68 IN | SYSTOLIC BLOOD PRESSURE: 124 MMHG | HEART RATE: 87 BPM | DIASTOLIC BLOOD PRESSURE: 76 MMHG | BODY MASS INDEX: 40.16 KG/M2 | WEIGHT: 265 LBS | OXYGEN SATURATION: 97 %

## 2024-08-05 DIAGNOSIS — I48.91 ATRIAL FIBRILLATION, UNSPECIFIED TYPE: ICD-10-CM

## 2024-08-05 DIAGNOSIS — E78.00 PURE HYPERCHOLESTEROLEMIA: ICD-10-CM

## 2024-08-05 DIAGNOSIS — G47.33 OSA ON CPAP: ICD-10-CM

## 2024-08-05 DIAGNOSIS — Z12.5 SCREENING FOR PROSTATE CANCER: ICD-10-CM

## 2024-08-05 DIAGNOSIS — I10 PRIMARY HYPERTENSION: ICD-10-CM

## 2024-08-05 DIAGNOSIS — M10.9 GOUT, UNSPECIFIED CAUSE, UNSPECIFIED CHRONICITY, UNSPECIFIED SITE: ICD-10-CM

## 2024-08-05 DIAGNOSIS — I11.0 HYPERTENSIVE HEART DISEASE WITH HEART FAILURE: ICD-10-CM

## 2024-08-05 DIAGNOSIS — E03.2 HYPOTHYROIDISM DUE TO MEDICATION: Primary | ICD-10-CM

## 2024-08-05 DIAGNOSIS — I73.9 PERIPHERAL VASCULAR DISEASE, UNSPECIFIED: ICD-10-CM

## 2024-08-05 PROBLEM — E66.01 SEVERE OBESITY (BMI 35.0-39.9) WITH COMORBIDITY: Status: RESOLVED | Noted: 2022-06-06 | Resolved: 2024-08-05

## 2024-08-05 PROCEDURE — 3074F SYST BP LT 130 MM HG: CPT | Mod: HCNC,CPTII,S$GLB, | Performed by: NURSE PRACTITIONER

## 2024-08-05 PROCEDURE — 3078F DIAST BP <80 MM HG: CPT | Mod: HCNC,CPTII,S$GLB, | Performed by: NURSE PRACTITIONER

## 2024-08-05 PROCEDURE — 3008F BODY MASS INDEX DOCD: CPT | Mod: HCNC,CPTII,S$GLB, | Performed by: NURSE PRACTITIONER

## 2024-08-05 PROCEDURE — 1101F PT FALLS ASSESS-DOCD LE1/YR: CPT | Mod: HCNC,CPTII,S$GLB, | Performed by: NURSE PRACTITIONER

## 2024-08-05 PROCEDURE — 99999 PR PBB SHADOW E&M-EST. PATIENT-LVL V: CPT | Mod: PBBFAC,HCNC,, | Performed by: NURSE PRACTITIONER

## 2024-08-05 PROCEDURE — 1126F AMNT PAIN NOTED NONE PRSNT: CPT | Mod: HCNC,CPTII,S$GLB, | Performed by: NURSE PRACTITIONER

## 2024-08-05 PROCEDURE — 3288F FALL RISK ASSESSMENT DOCD: CPT | Mod: HCNC,CPTII,S$GLB, | Performed by: NURSE PRACTITIONER

## 2024-08-05 PROCEDURE — 99214 OFFICE O/P EST MOD 30 MIN: CPT | Mod: HCNC,S$GLB,, | Performed by: NURSE PRACTITIONER

## 2024-08-05 PROCEDURE — 4010F ACE/ARB THERAPY RXD/TAKEN: CPT | Mod: HCNC,CPTII,S$GLB, | Performed by: NURSE PRACTITIONER

## 2024-08-05 PROCEDURE — 1159F MED LIST DOCD IN RCRD: CPT | Mod: HCNC,CPTII,S$GLB, | Performed by: NURSE PRACTITIONER

## 2024-08-05 PROCEDURE — 1160F RVW MEDS BY RX/DR IN RCRD: CPT | Mod: HCNC,CPTII,S$GLB, | Performed by: NURSE PRACTITIONER

## 2024-08-05 RX ORDER — LEVOTHYROXINE SODIUM 88 UG/1
88 TABLET ORAL
Qty: 90 TABLET | Refills: 1 | Status: SHIPPED | OUTPATIENT
Start: 2024-08-05

## 2024-08-06 ENCOUNTER — OFFICE VISIT (OUTPATIENT)
Dept: PULMONOLOGY | Facility: CLINIC | Age: 74
End: 2024-08-06
Payer: MEDICARE

## 2024-08-06 VITALS
OXYGEN SATURATION: 96 % | HEIGHT: 68 IN | DIASTOLIC BLOOD PRESSURE: 68 MMHG | SYSTOLIC BLOOD PRESSURE: 120 MMHG | HEART RATE: 78 BPM | BODY MASS INDEX: 40.37 KG/M2 | WEIGHT: 266.38 LBS

## 2024-08-06 DIAGNOSIS — R09.82 POST-NASAL DRIP: ICD-10-CM

## 2024-08-06 DIAGNOSIS — J45.40 MODERATE PERSISTENT ASTHMA, UNSPECIFIED WHETHER COMPLICATED: ICD-10-CM

## 2024-08-06 DIAGNOSIS — G47.33 OSA (OBSTRUCTIVE SLEEP APNEA): Primary | ICD-10-CM

## 2024-08-06 LAB
PSA SERPL-MCNC: 1.72 NG/ML
T4 FREE SERPL-MCNC: 1.4 NG/DL (ref 0.8–1.8)
TSH SERPL-ACNC: 2.1 MIU/L (ref 0.4–4.5)
URATE SERPL-MCNC: 5.7 MG/DL (ref 4–8)

## 2024-08-06 PROCEDURE — 3078F DIAST BP <80 MM HG: CPT | Mod: HCNC,CPTII,S$GLB, | Performed by: INTERNAL MEDICINE

## 2024-08-06 PROCEDURE — 1125F AMNT PAIN NOTED PAIN PRSNT: CPT | Mod: HCNC,CPTII,S$GLB, | Performed by: INTERNAL MEDICINE

## 2024-08-06 PROCEDURE — 1101F PT FALLS ASSESS-DOCD LE1/YR: CPT | Mod: HCNC,CPTII,S$GLB, | Performed by: INTERNAL MEDICINE

## 2024-08-06 PROCEDURE — 1159F MED LIST DOCD IN RCRD: CPT | Mod: HCNC,CPTII,S$GLB, | Performed by: INTERNAL MEDICINE

## 2024-08-06 PROCEDURE — 3288F FALL RISK ASSESSMENT DOCD: CPT | Mod: HCNC,CPTII,S$GLB, | Performed by: INTERNAL MEDICINE

## 2024-08-06 PROCEDURE — 99999 PR PBB SHADOW E&M-EST. PATIENT-LVL IV: CPT | Mod: PBBFAC,HCNC,, | Performed by: INTERNAL MEDICINE

## 2024-08-06 PROCEDURE — 99214 OFFICE O/P EST MOD 30 MIN: CPT | Mod: HCNC,S$GLB,, | Performed by: INTERNAL MEDICINE

## 2024-08-06 PROCEDURE — 4010F ACE/ARB THERAPY RXD/TAKEN: CPT | Mod: HCNC,CPTII,S$GLB, | Performed by: INTERNAL MEDICINE

## 2024-08-06 PROCEDURE — 3008F BODY MASS INDEX DOCD: CPT | Mod: HCNC,CPTII,S$GLB, | Performed by: INTERNAL MEDICINE

## 2024-08-06 PROCEDURE — 3074F SYST BP LT 130 MM HG: CPT | Mod: HCNC,CPTII,S$GLB, | Performed by: INTERNAL MEDICINE

## 2024-08-07 ENCOUNTER — PATIENT MESSAGE (OUTPATIENT)
Dept: PULMONOLOGY | Facility: CLINIC | Age: 74
End: 2024-08-07
Payer: MEDICARE

## 2024-11-12 ENCOUNTER — OFFICE VISIT (OUTPATIENT)
Dept: OTOLARYNGOLOGY | Facility: CLINIC | Age: 74
End: 2024-11-12
Payer: MEDICARE

## 2024-11-12 ENCOUNTER — CLINICAL SUPPORT (OUTPATIENT)
Dept: AUDIOLOGY | Facility: CLINIC | Age: 74
End: 2024-11-12
Payer: MEDICARE

## 2024-11-12 ENCOUNTER — PATIENT MESSAGE (OUTPATIENT)
Dept: FAMILY MEDICINE | Facility: CLINIC | Age: 74
End: 2024-11-12
Payer: MEDICARE

## 2024-11-12 VITALS — WEIGHT: 266.31 LBS | HEIGHT: 68 IN | BODY MASS INDEX: 40.36 KG/M2

## 2024-11-12 DIAGNOSIS — H93.13 TINNITUS OF BOTH EARS: ICD-10-CM

## 2024-11-12 DIAGNOSIS — H91.8X3 ASYMMETRICAL HEARING LOSS: Primary | ICD-10-CM

## 2024-11-12 DIAGNOSIS — H93.13 TINNITUS OF BOTH EARS: Primary | ICD-10-CM

## 2024-11-12 DIAGNOSIS — H61.21 IMPACTED CERUMEN OF RIGHT EAR: ICD-10-CM

## 2024-11-12 DIAGNOSIS — H90.A21 SENSORINEURAL HEARING LOSS (SNHL) OF RIGHT EAR WITH RESTRICTED HEARING OF LEFT EAR: Primary | ICD-10-CM

## 2024-11-12 DIAGNOSIS — M10.9 GOUT, UNSPECIFIED CAUSE, UNSPECIFIED CHRONICITY, UNSPECIFIED SITE: ICD-10-CM

## 2024-11-12 PROCEDURE — 1101F PT FALLS ASSESS-DOCD LE1/YR: CPT | Mod: HCNC,CPTII,S$GLB, | Performed by: STUDENT IN AN ORGANIZED HEALTH CARE EDUCATION/TRAINING PROGRAM

## 2024-11-12 PROCEDURE — 3008F BODY MASS INDEX DOCD: CPT | Mod: HCNC,CPTII,S$GLB, | Performed by: STUDENT IN AN ORGANIZED HEALTH CARE EDUCATION/TRAINING PROGRAM

## 2024-11-12 PROCEDURE — 92567 TYMPANOMETRY: CPT | Mod: HCNC,S$GLB,, | Performed by: AUDIOLOGIST

## 2024-11-12 PROCEDURE — 99204 OFFICE O/P NEW MOD 45 MIN: CPT | Mod: 25,HCNC,S$GLB, | Performed by: STUDENT IN AN ORGANIZED HEALTH CARE EDUCATION/TRAINING PROGRAM

## 2024-11-12 PROCEDURE — 3288F FALL RISK ASSESSMENT DOCD: CPT | Mod: HCNC,CPTII,S$GLB, | Performed by: STUDENT IN AN ORGANIZED HEALTH CARE EDUCATION/TRAINING PROGRAM

## 2024-11-12 PROCEDURE — 99999 PR PBB SHADOW E&M-EST. PATIENT-LVL I: CPT | Mod: PBBFAC,HCNC,, | Performed by: AUDIOLOGIST

## 2024-11-12 PROCEDURE — 1159F MED LIST DOCD IN RCRD: CPT | Mod: HCNC,CPTII,S$GLB, | Performed by: STUDENT IN AN ORGANIZED HEALTH CARE EDUCATION/TRAINING PROGRAM

## 2024-11-12 PROCEDURE — 4010F ACE/ARB THERAPY RXD/TAKEN: CPT | Mod: HCNC,CPTII,S$GLB, | Performed by: STUDENT IN AN ORGANIZED HEALTH CARE EDUCATION/TRAINING PROGRAM

## 2024-11-12 PROCEDURE — 99999 PR PBB SHADOW E&M-EST. PATIENT-LVL III: CPT | Mod: PBBFAC,HCNC,, | Performed by: STUDENT IN AN ORGANIZED HEALTH CARE EDUCATION/TRAINING PROGRAM

## 2024-11-12 PROCEDURE — 92557 COMPREHENSIVE HEARING TEST: CPT | Mod: HCNC,S$GLB,, | Performed by: AUDIOLOGIST

## 2024-11-12 PROCEDURE — G0268 REMOVAL OF IMPACTED WAX MD: HCPCS | Mod: HCNC,S$GLB,, | Performed by: STUDENT IN AN ORGANIZED HEALTH CARE EDUCATION/TRAINING PROGRAM

## 2024-11-12 PROCEDURE — 1126F AMNT PAIN NOTED NONE PRSNT: CPT | Mod: HCNC,CPTII,S$GLB, | Performed by: STUDENT IN AN ORGANIZED HEALTH CARE EDUCATION/TRAINING PROGRAM

## 2024-11-12 RX ORDER — METOPROLOL SUCCINATE 100 MG/1
1 TABLET, EXTENDED RELEASE ORAL 2 TIMES DAILY
COMMUNITY
Start: 2024-10-21 | End: 2025-10-21

## 2024-11-12 RX ORDER — ALLOPURINOL 300 MG/1
300 TABLET ORAL DAILY
Qty: 90 TABLET | Refills: 1 | Status: SHIPPED | OUTPATIENT
Start: 2024-11-12

## 2024-11-12 NOTE — PATIENT INSTRUCTIONS
Tinnitus measures:  1.  Avoid exposure to loud sounds and noises.  Wear ear protection around loud noises.  2.  Get your blood pressure check regularly.  If it is high, see your doctor.  3.  Decrease salt intake.  4.  Avoid stimulants such as caffeine and tobacco.  5.  Exercise daily to improve circulation.  6.  Get adequate rest.  Avoid fatigue.  7.  Stop worrying about the noise.  Recognize the noise as an annoyance and learned to ignore it as much as possible.  8.  Consider a trial of lipoflavanoids, slow-release niacin, or Arches' Tinnitus Formula (over-the-counter).  9.  Purchase a white noise machine to mask tinnitus.  10. Hearing aids can help.     We will get MRI before we clear for hearing aids.
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
## 2024-11-12 NOTE — PROGRESS NOTES
The patient was referred by Dr. Maribell Neves for a hearing evaluation.    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 Test results indicated a mild-to-profound sensorineural hearing loss in the right ear and a mild-to-moderately severe sensorineural hearing loss in the left ear. Impedance testing showed a Type C tympanogram for the right ear and a Type A tympanogram for the left ear.    The recommendations were as follows:    (1)  Medical evaluation due to asymmetry and due to Type C tympanogram  (2)  Hearing aid consult pending medical clearance  (3)  Ear protection in loud noise   (4)  Hearing evaluation in one year or sooner if hearing decrease is noted     Today's test results and recommendations were discussed with the patient.

## 2025-02-04 ENCOUNTER — OFFICE VISIT (OUTPATIENT)
Dept: PULMONOLOGY | Facility: CLINIC | Age: 75
End: 2025-02-04
Payer: MEDICARE

## 2025-02-04 VITALS
HEART RATE: 100 BPM | HEIGHT: 68 IN | DIASTOLIC BLOOD PRESSURE: 80 MMHG | WEIGHT: 262 LBS | BODY MASS INDEX: 39.71 KG/M2 | SYSTOLIC BLOOD PRESSURE: 120 MMHG | OXYGEN SATURATION: 98 %

## 2025-02-04 DIAGNOSIS — G47.33 OSA (OBSTRUCTIVE SLEEP APNEA): ICD-10-CM

## 2025-02-04 DIAGNOSIS — J45.40 MODERATE PERSISTENT ASTHMA, UNSPECIFIED WHETHER COMPLICATED: Primary | ICD-10-CM

## 2025-02-04 PROCEDURE — 99999 PR PBB SHADOW E&M-EST. PATIENT-LVL IV: CPT | Mod: PBBFAC,,, | Performed by: NURSE PRACTITIONER

## 2025-02-04 PROCEDURE — 3074F SYST BP LT 130 MM HG: CPT | Mod: CPTII,S$GLB,, | Performed by: NURSE PRACTITIONER

## 2025-02-04 PROCEDURE — 99214 OFFICE O/P EST MOD 30 MIN: CPT | Mod: S$GLB,,, | Performed by: NURSE PRACTITIONER

## 2025-02-04 PROCEDURE — 1126F AMNT PAIN NOTED NONE PRSNT: CPT | Mod: CPTII,S$GLB,, | Performed by: NURSE PRACTITIONER

## 2025-02-04 PROCEDURE — 3008F BODY MASS INDEX DOCD: CPT | Mod: CPTII,S$GLB,, | Performed by: NURSE PRACTITIONER

## 2025-02-04 PROCEDURE — 1159F MED LIST DOCD IN RCRD: CPT | Mod: CPTII,S$GLB,, | Performed by: NURSE PRACTITIONER

## 2025-02-04 PROCEDURE — 3079F DIAST BP 80-89 MM HG: CPT | Mod: CPTII,S$GLB,, | Performed by: NURSE PRACTITIONER

## 2025-02-04 RX ORDER — SPIRONOLACTONE 25 MG/1
1 TABLET ORAL DAILY
COMMUNITY
Start: 2025-01-23 | End: 2026-01-23

## 2025-02-04 NOTE — PATIENT INSTRUCTIONS
Need download   Continue CPAP nightly    Continue Trelegy daily  Use albuterol as needed    GERD precautions, elevated head of bed, no eating or drinking at least 2 hours before bed.   Need to lose weight   Follow up in about 6 months (around 8/4/2025).

## 2025-02-04 NOTE — PROGRESS NOTES
.    SUBJECTIVE:    Patient ID: Nico Teague is a 74 y.o. male.    Chief Complaint: Follow-up, Sleep Apnea, and Asthma    Patient here today with complaints of hoarseness for a while. He is rinsing well after  his Trelegy. He feels the hoarseness started after starting spironolactone.  He does like to eat snacks at night.  He did see ENT for his hearing.  He is sleeping on his CPAP every night, he is bringing machine for download today. He is short of breath with exertion, he does not exercise.  He understands he needs to lose some weight.     Past Medical History:   Diagnosis Date    Allergy     Arthritis     Asthma     Atrial fibrillation     Basal cell carcinoma     CHF (congestive heart failure)     Fever blister     Hypertension     Hypothyroidism (acquired) 07/31/2023    Joint pain     Melanoma     BANDAR on CPAP     PVC (premature ventricular contraction)     Skin disease     Squamous cell carcinoma      Past Surgical History:   Procedure Laterality Date    BRONCHOSCOPY N/A 07/18/2022    Procedure: Bronchoscopy;  Surgeon: Charlee Brewer MD;  Location: [facility];  Service: Pulmonary;  Laterality: N/A;  monday 7/18/22 at 0830 look see bronch no biopsy    CARDIAC CATHETERIZATION  05/25/2021    COLONOSCOPY N/A 1/25/2024    Procedure: COLONOSCOPY;  Surgeon: Emmanuel Deleon MD;  Location: [facility];  Service: Endoscopy;  Laterality: N/A;    ESOPHAGOGASTRODUODENOSCOPY N/A 07/07/2022    Procedure: EGD (ESOPHAGOGASTRODUODENOSCOPY);  Surgeon: Emmanuel Deleon MD;  Location: [facility];  Service: Endoscopy;  Laterality: N/A;    EYE SURGERY Left 12/03/2019    EYE SURGERY Right 01/28/2020    FRACTURE SURGERY      INSERTION OF PACEMAKER  11/2023    LIPOMA RESECTION      NOSE SURGERY      SHOULDER ARTHROSCOPY      SKIN BIOPSY      SKIN CANCER EXCISION      Moh's x 2    SKIN GRAFT       Family History   Problem Relation Name Age of Onset    Cancer Mother          esophageal    Heart disease Father      Hypertension Father       Arthritis Paternal Grandmother          Social History:   Marital Status:   Occupation: retired captain   Alcohol History:  reports no history of alcohol use.  Tobacco History:  reports that he quit smoking about 36 years ago. His smoking use included cigarettes. He started smoking about 52 years ago. He has a 40 pack-year smoking history. He has been exposed to tobacco smoke. He has never used smokeless tobacco.  Drug History:  reports no history of drug use.    Review of patient's allergies indicates:   Allergen Reactions    Bactrim [sulfamethoxazole-trimethoprim]      HIVES/ RASH       Current Outpatient Medications   Medication Sig Dispense Refill    albuterol (VENTOLIN HFA) 90 mcg/actuation inhaler Inhale 2 puffs into the lungs every 6 (six) hours as needed for Wheezing. Rescue 54 g 4    allopurinoL (ZYLOPRIM) 300 MG tablet Take 1 tablet (300 mg total) by mouth once daily. 90 tablet 1    amiodarone (PACERONE) 200 MG Tab Take 200 mg by mouth 2 (two) times daily.      apixaban (ELIQUIS) 5 mg Tab Take 5 mg by mouth 2 (two) times daily.      ascorbic acid, vitamin C, (VITAMIN C) 1000 MG tablet Take 1,000 mg by mouth once daily.      aspirin 81 MG Chew Take 81 mg by mouth every 7 days.      cholecalciferol, vitamin D3, (VITAMIN D3) 25 mcg (1,000 unit) capsule Take 1,000 Units by mouth once daily.      co-enzyme Q-10 50 mg capsule Take 50 mg by mouth once daily.      ENTRESTO  mg per tablet Take 1 tablet by mouth 2 (two) times daily.      fluticasone-umeclidin-vilanter (TRELEGY ELLIPTA) 100-62.5-25 mcg DsDv INHALE 1 PUFF ONE TIME DAILY 180 each 10    furosemide (LASIX) 40 MG tablet Take 20 mg by mouth once daily.      guaiFENesin (MUCINEX) 600 mg 12 hr tablet Take 2 tablets (1,200 mg total) by mouth 2 (two) times daily. 20 tablet 0    Lactobacillus rhamnosus GG (CULTURELLE) 10 billion cell capsule Take 1 capsule by mouth.      levothyroxine (SYNTHROID) 88 MCG tablet Take 1 tablet (88 mcg total) by  "mouth before breakfast. 90 tablet 1    LINZESS 290 mcg Cap capsule Take 290 mcg by mouth Daily.      magnesium gluconate 27.5 mg magne- sium (500 mg) Tab Take 1 tablet by mouth once.      melatonin 10 mg Tab Take 1 tablet by mouth nightly as needed.      metoprolol succinate (TOPROL-XL) 100 MG 24 hr tablet Take 1 tablet by mouth 2 (two) times daily.      montelukast (SINGULAIR) 10 mg tablet TAKE 1 TABLET EVERY EVENING 90 tablet 3    multivitamin (THERAGRAN) per tablet Take 1 tablet by mouth once daily.      potassium chloride (MICRO-K) 10 MEQ CpSR Take 10 mEq by mouth once daily.      rosuvastatin (CRESTOR) 10 MG tablet TAKE 1 TABLET EVERY OTHER DAY 45 tablet 1    spironolactone (ALDACTONE) 25 MG tablet Take 1 tablet by mouth once daily.      tamsulosin (FLOMAX) 0.4 mg Cap Take 1 tablet by mouth Daily.      vitamin E 1000 UNIT capsule Take 1,000 Units by mouth once daily.       No current facility-administered medications for this visit.       Last PFT: 4/2018  Last CT 08/2022    IMPRESSION:     1. Mild to moderate prominence and with evidence of scattered bullae throughout.  2. No CT evidence of aspiration focus.  3. Benign cyst arising from lower pole left kidney reaching 3.1 cm in the widest dimension.     Review of Systems  General:  feels alright, sleeps a lot   Eyes: vision is good  ENT:  hoarseness , tinnitus, has hearing aides now  Heart:: his BP is too high in the morning  Lungs: shortness of breath with exertion   GI: No Nausea, vomiting, constipation, diarrhea, or reflux.  : nocturia is better with less diuretics  Musculoskeletal: no myalgias  Skin: No lesions or rashes.  Neuro: brain fog  Lymph:swelling to legs continues  Psych: No anxiety or depression.  Endo:  weight stable     OBJECTIVE:      /80 (BP Location: Left arm, Patient Position: Sitting)   Pulse 100   Ht 5' 8" (1.727 m)   Wt 118.8 kg (262 lb)   SpO2 98%   BMI 39.84 kg/m²     Physical Exam  GENERAL: Older patient in no distress.  "   HEENT: Pupils equal and reactive. Extraocular movements intact. Nose intact.      Pharynx moist.  NECK: Supple.   HEART: regular rate and rhythym  LUNGS: Clear to auscultation and percussion. Lung excursion symmetrical. No change in fremitus. No adventitial noises.  ABDOMEN: Bowel sounds present. Non-tender, no masses palpated.  EXTREMITIES: Normal muscle tone and joint movement, no cyanosis or clubbing.   LYMPHATICS: No adenopathy palpated, 1+ edema to right , trace edema to left  SKIN: Dry, intact, no lesions.   NEURO: Cranial nerves II-XII intact. Motor strength 5/5 bilaterally, upper and lower extremities.  PSYCH: Appropriate affect.        Assessment:       1. BANDAR (obstructive sleep apnea)    2. Moderate persistent asthma, unspecified whether complicated               Plan:       BANDAR (obstructive sleep apnea)    Moderate persistent asthma, unspecified whether complicated            Need download   Continue CPAP nightly  Need to see ENT to have chronic hoarseness evaluated  Continue Trelegy daily  Use albuterol as needed    GERD precautions, elevated head of bed, no eating or drinking at least 2 hours before bed.   Need to lose weight   Follow up in about 6 months (around 8/4/2025).

## 2025-02-05 ENCOUNTER — TELEPHONE (OUTPATIENT)
Dept: PULMONOLOGY | Facility: CLINIC | Age: 75
End: 2025-02-05
Payer: MEDICARE

## 2025-02-05 ENCOUNTER — TELEPHONE (OUTPATIENT)
Dept: OTOLARYNGOLOGY | Facility: CLINIC | Age: 75
End: 2025-02-05
Payer: MEDICARE

## 2025-02-05 ENCOUNTER — OFFICE VISIT (OUTPATIENT)
Dept: FAMILY MEDICINE | Facility: CLINIC | Age: 75
End: 2025-02-05
Payer: MEDICARE

## 2025-02-05 VITALS
OXYGEN SATURATION: 99 % | WEIGHT: 260.69 LBS | HEART RATE: 67 BPM | TEMPERATURE: 99 F | DIASTOLIC BLOOD PRESSURE: 78 MMHG | SYSTOLIC BLOOD PRESSURE: 118 MMHG | HEIGHT: 68 IN | BODY MASS INDEX: 39.51 KG/M2

## 2025-02-05 DIAGNOSIS — I10 PRIMARY HYPERTENSION: ICD-10-CM

## 2025-02-05 DIAGNOSIS — M10.9 GOUT, UNSPECIFIED CAUSE, UNSPECIFIED CHRONICITY, UNSPECIFIED SITE: ICD-10-CM

## 2025-02-05 DIAGNOSIS — I48.91 ATRIAL FIBRILLATION, UNSPECIFIED TYPE: ICD-10-CM

## 2025-02-05 DIAGNOSIS — Z79.01 LONG TERM CURRENT USE OF ANTICOAGULANT: ICD-10-CM

## 2025-02-05 DIAGNOSIS — Z23 NEED FOR INFLUENZA VACCINATION: ICD-10-CM

## 2025-02-05 DIAGNOSIS — E78.00 PURE HYPERCHOLESTEROLEMIA: ICD-10-CM

## 2025-02-05 DIAGNOSIS — E03.2 HYPOTHYROIDISM DUE TO MEDICATION: Primary | ICD-10-CM

## 2025-02-05 PROCEDURE — 90653 IIV ADJUVANT VACCINE IM: CPT | Mod: S$GLB,,, | Performed by: NURSE PRACTITIONER

## 2025-02-05 PROCEDURE — 99999 PR PBB SHADOW E&M-EST. PATIENT-LVL III: CPT | Mod: PBBFAC,,, | Performed by: NURSE PRACTITIONER

## 2025-02-05 PROCEDURE — 1159F MED LIST DOCD IN RCRD: CPT | Mod: CPTII,S$GLB,, | Performed by: NURSE PRACTITIONER

## 2025-02-05 PROCEDURE — 3074F SYST BP LT 130 MM HG: CPT | Mod: CPTII,S$GLB,, | Performed by: NURSE PRACTITIONER

## 2025-02-05 PROCEDURE — G2211 COMPLEX E/M VISIT ADD ON: HCPCS | Mod: 95,S$GLB,, | Performed by: NURSE PRACTITIONER

## 2025-02-05 PROCEDURE — 3078F DIAST BP <80 MM HG: CPT | Mod: CPTII,S$GLB,, | Performed by: NURSE PRACTITIONER

## 2025-02-05 PROCEDURE — 3008F BODY MASS INDEX DOCD: CPT | Mod: CPTII,S$GLB,, | Performed by: NURSE PRACTITIONER

## 2025-02-05 PROCEDURE — 1160F RVW MEDS BY RX/DR IN RCRD: CPT | Mod: CPTII,S$GLB,, | Performed by: NURSE PRACTITIONER

## 2025-02-05 PROCEDURE — 1126F AMNT PAIN NOTED NONE PRSNT: CPT | Mod: CPTII,S$GLB,, | Performed by: NURSE PRACTITIONER

## 2025-02-05 PROCEDURE — G0008 ADMIN INFLUENZA VIRUS VAC: HCPCS | Mod: S$GLB,,, | Performed by: NURSE PRACTITIONER

## 2025-02-05 PROCEDURE — 99214 OFFICE O/P EST MOD 30 MIN: CPT | Mod: S$GLB,,, | Performed by: NURSE PRACTITIONER

## 2025-02-05 NOTE — PROGRESS NOTES
SUBJECTIVE:      Patient ID: Nico Teague is a 74 y.o. male.    Chief Complaint: Follow-up (6 mon f/u) and Hoarse (Onset week and half. St that after starting spirolactone he has had this problem )    History of Present Illness    CHIEF COMPLAINT:  Mr. Teague presents for a follow-up visit and to discuss recent issues, including high blood pressure and a hoarse voice potentially related to silent reflux.    HPI:  Mr. Teague reports elevated blood pressure a few weeks ago. He consulted his cardiologist, who prescribed spironolactone 25 mg. Mr. Teague has a hoarse voice, which a pulmonologist suspects might be due to silent reflux rather than medication. He occasionally has a cough and was advised to elevate the head of his bed until further evaluation.    Mr. Teague uses an inhaler in the morning, which the pulmonologist questioned regarding proper rinsing technique. Two weeks ago, the patient received cortisone injections twice in his foot from Dr. Santiago, though this is unlikely to have caused the voice issues.    Mr. Teague reports improvement in leg swelling since starting spironolactone. He had shortness of breath before starting the medication, which has remained constant since. The Trelegy helps improve his breathing slightly. He also reports having had thrush once when he forgot to rinse his mouth after using the inhaler.    Mr. Teague is on allopurinol for gout but reports no recent flare-ups. He is taking thyroid medication at 88 mcg. He was previously taking potassium supplements with Lasix but was advised to discontinue when starting spironolactone until they could assess its effect on potassium levels.    Mr. Teague denies any current indigestion, blood in stool, or bleeding issues.    MEDICATIONS:  Mr. Teague is on Spironolactone 25 mg daily, started 2 weeks ago for high blood pressure, which is helping with swelling in his legs. He uses an inhaler in the morning to improve his breathing. Mr. Teague is on Allopurinol  daily for gout prevention and thyroid medication 88 mcg daily. He is also on Lasix for fluid retention and Eliquis as an anticoagulant. He is on Trelegy to help improve his breathing.    MEDICAL HISTORY:  Mr. Teague has a history of hypertension, gout, thyroid condition, suspected silent reflux, cardiovascular condition, and respiratory condition.    SURGICAL HISTORY:  Mr. Teague received cortisone injections 2 weeks ago, administered twice in the foot by Dr. Santiago.    TEST RESULTS:  Mr. Teague's uric acid level was 5.7 six months ago. His thyroid test result was 2.1 six months ago. A BMP on January 24, 2025, showed a normal potassium level of 4.5.        Review of Systems   Constitutional:  Negative for activity change, appetite change, chills, diaphoresis, fatigue, fever and unexpected weight change.   HENT:  Positive for hearing loss ([employer]valdo hearing aids) and voice change. Negative for congestion, ear pain, sinus pressure, sore throat and trouble swallowing.    Eyes:  Negative for pain, discharge and visual disturbance.   Respiratory:  Positive for shortness of breath (chronic). Negative for cough, chest tightness and wheezing.         Asthma and BANDAR   Cardiovascular:  Positive for leg swelling. Negative for chest pain and palpitations.        CHF, HTN, HLD, A. Fib   Gastrointestinal:  Positive for constipation (controlled). Negative for abdominal pain, diarrhea, nausea and vomiting.   Endocrine:        Hypothyroid   Genitourinary:  Negative for difficulty urinating, flank pain, frequency and urgency.   Musculoskeletal:  Negative for back pain and joint swelling.   Skin:  Negative for color change and rash.   Neurological:  Negative for dizziness, seizures, syncope, weakness, numbness and headaches.   Hematological:  Negative for adenopathy. Bruises/bleeds easily (on Eliquis).   Psychiatric/Behavioral:  Negative for dysphoric mood and sleep disturbance. The patient is not nervous/anxious.        Family History   Problem  Relation Name Age of Onset    Cancer Mother          esophageal    Heart disease Father      Hypertension Father      Arthritis Paternal Grandmother        Social History     Socioeconomic History    Marital status:    Occupational History    Occupation: retired captain    Tobacco Use    Smoking status: Former     Current packs/day: 0.00     Average packs/day: 2.0 packs/day for 20.0 years (40.0 ttl pk-yrs)     Types: Cigarettes     Start date: 1972     Quit date: 1988     Years since quittin.4     Passive exposure: Past    Smokeless tobacco: Never    Tobacco comments:     Quit smoking in    Substance and Sexual Activity    Alcohol use: No    Drug use: No    Sexual activity: Never   Social History Narrative    Live with wife     Social Drivers of Health     Financial Resource Strain: Low Risk  (3/7/2022)    Overall Financial Resource Strain (CARDIA)     Difficulty of Paying Living Expenses: Not hard at all   Food Insecurity: No Food Insecurity (3/7/2022)    Hunger Vital Sign     Worried About Running Out of Food in the Last Year: Never true     Ran Out of Food in the Last Year: Never true   Transportation Needs: No Transportation Needs (3/7/2022)    PRAPARE - Transportation     Lack of Transportation (Medical): No     Lack of Transportation (Non-Medical): No   Physical Activity: Insufficiently Active (3/7/2022)    Exercise Vital Sign     Days of Exercise per Week: 3 days     Minutes of Exercise per Session: 20 min   Stress: No Stress Concern Present (3/7/2022)    [employer] - Occupational Stress Questionnaire     Feeling of Stress : Only a little     Current Outpatient Medications   Medication Sig Dispense Refill    albuterol (VENTOLIN HFA) 90 mcg/actuation inhaler Inhale 2 puffs into the lungs every 6 (six) hours as needed for Wheezing. Rescue 54 g 4    allopurinoL (ZYLOPRIM) 300 MG tablet Take 1 tablet (300 mg total) by mouth once daily. 90 tablet 1     amiodarone (PACERONE) 200 MG Tab Take 200 mg by mouth 2 (two) times daily.      apixaban (ELIQUIS) 5 mg Tab Take 5 mg by mouth 2 (two) times daily.      ascorbic acid, vitamin C, (VITAMIN C) 1000 MG tablet Take 1,000 mg by mouth once daily.      aspirin 81 MG Chew Take 81 mg by mouth every 7 days.      cholecalciferol, vitamin D3, (VITAMIN D3) 25 mcg (1,000 unit) capsule Take 1,000 Units by mouth once daily.      co-enzyme Q-10 50 mg capsule Take 50 mg by mouth once daily.      ENTRESTO  mg per tablet Take 1 tablet by mouth 2 (two) times daily.      fluticasone-umeclidin-vilanter (TRELEGY ELLIPTA) 100-62.5-25 mcg DsDv INHALE 1 PUFF ONE TIME DAILY 180 each 10    furosemide (LASIX) 40 MG tablet Take 20 mg by mouth once daily.      guaiFENesin (MUCINEX) 600 mg 12 hr tablet Take 2 tablets (1,200 mg total) by mouth 2 (two) times daily. 20 tablet 0    Lactobacillus rhamnosus GG (CULTURELLE) 10 billion cell capsule Take 1 capsule by mouth.      levothyroxine (SYNTHROID) 88 MCG tablet Take 1 tablet (88 mcg total) by mouth before breakfast. 90 tablet 1    magnesium gluconate 27.5 mg magne- sium (500 mg) Tab Take 1 tablet by mouth once.      melatonin 10 mg Tab Take 1 tablet by mouth nightly as needed.      metoprolol succinate (TOPROL-XL) 100 MG 24 hr tablet Take 1 tablet by mouth 2 (two) times daily.      montelukast (SINGULAIR) 10 mg tablet TAKE 1 TABLET EVERY EVENING 90 tablet 3    multivitamin (THERAGRAN) per tablet Take 1 tablet by mouth once daily.      rosuvastatin (CRESTOR) 10 MG tablet TAKE 1 TABLET EVERY OTHER DAY 45 tablet 1    spironolactone (ALDACTONE) 25 MG tablet Take 1 tablet by mouth once daily.      tamsulosin (FLOMAX) 0.4 mg Cap Take 1 tablet by mouth Daily.      vitamin E 1000 UNIT capsule Take 1,000 Units by mouth once daily.       Current Facility-Administered Medications   Medication Dose Route Frequency Provider Last Rate Last Admin    [COMPLETED] influenza (adjuvanted) (Fluad) 45 mcg/0.5 mL IM  "vaccine (> or = 66 yo) 0.5 mL  0.5 mL Intramuscular 1 time in Clinic/HOD    0.5 mL at 02/05/25 1415     Review of patient's allergies indicates:   Allergen Reactions    Bactrim [sulfamethoxazole-trimethoprim]      HIVES/ RASH    Tea (rashida sinensis) Hives     Pt is has allergies to black tea. Not an option under search.      Past Medical History:   Diagnosis Date    Allergy     Arthritis     Asthma     Atrial fibrillation     Basal cell carcinoma     CHF (congestive heart failure)     Fever blister     Hypertension     Hypothyroidism (acquired) 07/31/2023    Joint pain     Melanoma     BANDAR on CPAP     PVC (premature ventricular contraction)     Skin disease     Squamous cell carcinoma      Past Surgical History:   Procedure Laterality Date    BRONCHOSCOPY N/A 07/18/2022    Procedure: Bronchoscopy;  Surgeon: Charlee Brewer MD;  Location: [facility];  Service: Pulmonary;  Laterality: N/A;  monday 7/18/22 at 0830 look see bronch no biopsy    CARDIAC CATHETERIZATION  05/25/2021    COLONOSCOPY N/A 1/25/2024    Procedure: COLONOSCOPY;  Surgeon: Emmanuel Deleon MD;  Location: [facility];  Service: Endoscopy;  Laterality: N/A;    ESOPHAGOGASTRODUODENOSCOPY N/A 07/07/2022    Procedure: EGD (ESOPHAGOGASTRODUODENOSCOPY);  Surgeon: Emmanuel Deleon MD;  Location: [facility];  Service: Endoscopy;  Laterality: N/A;    EYE SURGERY Left 12/03/2019    EYE SURGERY Right 01/28/2020    FRACTURE SURGERY      INSERTION OF PACEMAKER  11/2023    LIPOMA RESECTION      NOSE SURGERY      SHOULDER ARTHROSCOPY      SKIN BIOPSY      SKIN CANCER EXCISION      Moh's x 2    SKIN GRAFT            OBJECTIVE:      Vitals:    02/05/25 1340   BP: 118/78   BP Location: Left arm   Patient Position: Sitting   Pulse: 67   Temp: 98.6 °F (37 °C)   TempSrc: Oral   SpO2: 99%   Weight: 118.3 kg (260 lb 11.2 oz)   Height: 5' 8" (1.727 m)     Physical Exam  Vitals and nursing note reviewed.   Constitutional:       General: He is awake. He is not in acute " distress.     Appearance: He is well-developed and well-groomed. He is obese. He is not ill-appearing, toxic-appearing or diaphoretic.   HENT:      Head: Normocephalic and atraumatic.      Right Ear: Decreased hearing noted.      Left Ear: Decreased hearing noted.      Ears:      Comments: Wearing bilateral hearing aids     Nose: Nose normal.      Mouth/Throat:      Comments: Voice is hoarse sounding  Eyes:      General: Lids are normal. Gaze aligned appropriately.      Conjunctiva/sclera: Conjunctivae normal.      Right eye: Right conjunctiva is not injected.      Left eye: Left conjunctiva is not injected.      Pupils: Pupils are equal, round, and reactive to light.   Cardiovascular:      Rate and Rhythm: Normal rate and regular rhythm.      Pulses: Normal pulses.      Heart sounds: S1 normal and S2 normal. Heart sounds are distant.   Pulmonary:      Effort: Pulmonary effort is normal. No respiratory distress.      Breath sounds: Normal breath sounds. No stridor. No decreased breath sounds, wheezing, rhonchi or rales.   Chest:      Chest wall: No tenderness.   Musculoskeletal:      Cervical back: Neck supple.      Right lower le+ Edema present.      Left lower le+ Edema present.   Lymphadenopathy:      Cervical: No cervical adenopathy.   Skin:     General: Skin is warm and dry.      Capillary Refill: Capillary refill takes less than 2 seconds.      Findings: No erythema or rash.   Neurological:      Mental Status: He is alert and oriented to person, place, and time. Mental status is at baseline.   Psychiatric:         Attention and Perception: Attention normal.         Mood and Affect: Mood normal.         Speech: Speech normal.         Behavior: Behavior normal. Behavior is cooperative.         Thought Content: Thought content normal.         Judgment: Judgment normal.       Office Visit on 2024   Component Date Value Ref Range Status    TSH 2024 2.10  0.40 - 4.50 mIU/L Final    T4, Free  08/05/2024 1.4  0.8 - 1.8 ng/dL Final    Uric Acid 08/05/2024 5.7  4.0 - 8.0 mg/dL Final    Comment: Therapeutic target for gout patients: <6.0 mg/dL          PROSTATE SPECIFIC ANTIGEN, SCR - Q* 08/05/2024 1.72  < OR = 4.00 ng/mL Final    Comment: The total PSA value from this assay system is   standardized against the WHO standard. The test   result will be approximately 20% lower when compared   to the equimolar-standardized total PSA (Joselin   Camille). Comparison of serial PSA results should be   interpreted with this fact in mind.     This test was performed using the Siemens   chemiluminescent method. Values obtained from   different assay methods cannot be used  interchangeably. PSA levels, regardless of  value, should not be interpreted as absolute  evidence of the presence or absence of disease.            Assessment:       1. Hypothyroidism due to medication    2. Gout, unspecified cause, unspecified chronicity, unspecified site    3. Primary hypertension    4. Pure hypercholesterolemia    5. Atrial fibrillation, unspecified type    6. Long term current use of anticoagulant    7. Need for influenza vaccination        Plan:       Assessment & Plan    IMPRESSION:  - Evaluated recent elevated blood pressure and use of spironolactone 25mg prescribed by cardiologist  - Considered silent reflux as potential cause of hoarse voice, as suggested by pulmonologist  - Discussed possibility of spironolactone causing gastritis or gastric ulcer, potentially contributing to reflux symptoms  - Weighed options of GI vs ENT referral for evaluation of hoarse voice and potential reflux  - Assessed gout management, noting stable uric acid levels on allopurinol  - Reviewed thyroid medication dosage and recent lab results  - Evaluated effectiveness of spironolactone in managing edema and dyspnea    HYPERTENSIVE HEART DISEASE WITH HEART FAILURE:  - Continue Lasix (furosemide) for blood pressure and fluid management.  - Mr. Teague's  blood pressure has improved to 118/78 from previous reading where diastolic was 118.  - Leg swelling and shortness of breath have partially improved since starting spironolactone 25mg, which will be continued as prescribed by cardiologist.    HYPERTENSION:  - Continued all previous BP meds.  - Reduce the amount of salt in your diet; Lose weight; Avoid drinking too much alcohol; Exercise at least 30 minutes per day most days of the week.    - Continue current medications and home BP monitoring.    SUSPECTED SILENT REFLUX:  - Referred patient to ENT for evaluation of potential silent reflux and hoarse voice.  - Advised patient to elevate the head of the bed to alleviate potential reflux symptoms.  - Offered to start reflux medication such as Protonix or Omeprazole, but patient and family declined at this time.  - Noted patient's report of occasional cough and inquired about indigestion symptoms.  - Suspected silent reflux as a possible cause of hoarse voice and discussed other potential causes.  - Patient to schedule appointment with Dr. Maribell Neves for further evaluation.  - Patient stated he does not required a referrral.     GOUT:  - Continue allopurinol for gout management.  - Inquired about recent gout flare-ups.  - Noted last uric acid level was 5.7 six months ago.  - Assessed that patient should not have a flare-up if adhering to medication and diet regimen.  - Recommend low purine diet.    THYROID DISORDER:  - Continue thyroid medication at 88 mcg.  - Ordered thyroid function test.  - Noted TSH level was 2.1 six months ago.  - Planned to check thyroid levels along with other labs.    ANTICOAGULATION:  - Continue Eliquis (apixaban).  - Inquired about any bleeding issues.    FLU VACCINATION:  - Discussed and administered flu vaccine in office.    RESPIRATORY MANAGEMENT:  - Continue Trelegy inhaler.  - Instructed patient to rinse mouth after using inhaler to prevent thrush, emphasizing its  importance.    LABS:  - Ordered CMP to check electrolytes, kidney function, and liver function.    FOLLOW UP:  - Follow up in 6 months.  - Contact the office if a referral to ENT is needed.        Hypothyroidism due to medication  -     Comprehensive Metabolic Panel; Future; Expected date: 02/05/2025  -     Lipid Panel; Future; Expected date: 02/05/2025  -     TSH; Future; Expected date: 02/05/2025  -     T4, FREE; Future; Expected date: 02/05/2025    Gout, unspecified cause, unspecified chronicity, unspecified site  -     Comprehensive Metabolic Panel; Future; Expected date: 02/05/2025  -     URIC ACID; Future; Expected date: 02/05/2025    Primary hypertension  -     Comprehensive Metabolic Panel; Future; Expected date: 02/05/2025  -     Lipid Panel; Future; Expected date: 02/05/2025  -     TSH; Future; Expected date: 02/05/2025  -     T4, FREE; Future; Expected date: 02/05/2025    Pure hypercholesterolemia  -     Comprehensive Metabolic Panel; Future; Expected date: 02/05/2025  -     Lipid Panel; Future; Expected date: 02/05/2025  -     TSH; Future; Expected date: 02/05/2025  -     T4, FREE; Future; Expected date: 02/05/2025    Atrial fibrillation, unspecified type  -     CBC Auto Differential; Future; Expected date: 02/05/2025  -     Comprehensive Metabolic Panel; Future; Expected date: 02/05/2025  -     Lipid Panel; Future; Expected date: 02/05/2025  -     TSH; Future; Expected date: 02/05/2025  -     T4, FREE; Future; Expected date: 02/05/2025    Long term current use of anticoagulant  -     CBC Auto Differential; Future; Expected date: 02/05/2025  -     Comprehensive Metabolic Panel; Future; Expected date: 02/05/2025    Need for influenza vaccination  -     influenza (adjuvanted) (Fluad) 45 mcg/0.5 mL IM vaccine (> or = 66 yo) 0.5 mL    I spent a total of 25 minutes on the day of the visit.This includes face to face time and non-face to face time preparing to see the patient (eg, review of tests), obtaining  and/or reviewing separately obtained history, documenting clinical information in the electronic or other health record, independently interpreting results and communicating results to the patient/family/caregiver, or care coordinator.    Follow up in about 6 months (around 8/5/2025) for TSH.          2/5/2025 CONCHITA Ruiz, TRISH    This note was generated with the assistance of ambient listening technology. Verbal consent was obtained by the patient and accompanying visitor(s) for the recording of patient appointment to facilitate this note. I attest to having reviewed and edited the generated note for accuracy, though some syntax or spelling errors may persist. Please contact the author of this note for any clarification.

## 2025-02-05 NOTE — TELEPHONE ENCOUNTER
Compliance report shows that he is 92% compliant sleeps an average of 7 hours and 20 minutes with an AHI of 1.5

## 2025-02-05 NOTE — TELEPHONE ENCOUNTER
----- Message from Alberta sent at 2/5/2025  3:26 PM CST -----  Type: Needs Medical Advice  Who Called:  Patient  Symptoms (please be specific):    How long has patient had these symptoms:    Pharmacy name and phone #:    Best Call Back Number: [phone]  Additional Information: Patient is requesting a call back from the nurse to schedule an appt. by this week.

## 2025-02-07 ENCOUNTER — OFFICE VISIT (OUTPATIENT)
Dept: OTOLARYNGOLOGY | Facility: CLINIC | Age: 75
End: 2025-02-07
Payer: MEDICARE

## 2025-02-07 VITALS — WEIGHT: 260.13 LBS | BODY MASS INDEX: 39.42 KG/M2 | HEIGHT: 68 IN

## 2025-02-07 DIAGNOSIS — R49.0 HOARSENESS: Primary | ICD-10-CM

## 2025-02-07 PROCEDURE — 99214 OFFICE O/P EST MOD 30 MIN: CPT | Mod: 25,S$GLB,, | Performed by: OTOLARYNGOLOGY

## 2025-02-07 PROCEDURE — 31575 DIAGNOSTIC LARYNGOSCOPY: CPT | Mod: S$GLB,,, | Performed by: OTOLARYNGOLOGY

## 2025-02-07 PROCEDURE — 1101F PT FALLS ASSESS-DOCD LE1/YR: CPT | Mod: CPTII,S$GLB,, | Performed by: OTOLARYNGOLOGY

## 2025-02-07 PROCEDURE — 3288F FALL RISK ASSESSMENT DOCD: CPT | Mod: CPTII,S$GLB,, | Performed by: OTOLARYNGOLOGY

## 2025-02-07 PROCEDURE — 3008F BODY MASS INDEX DOCD: CPT | Mod: CPTII,S$GLB,, | Performed by: OTOLARYNGOLOGY

## 2025-02-07 PROCEDURE — 1160F RVW MEDS BY RX/DR IN RCRD: CPT | Mod: CPTII,S$GLB,, | Performed by: OTOLARYNGOLOGY

## 2025-02-07 PROCEDURE — 99999 PR PBB SHADOW E&M-EST. PATIENT-LVL II: CPT | Mod: PBBFAC,,, | Performed by: OTOLARYNGOLOGY

## 2025-02-07 PROCEDURE — 1159F MED LIST DOCD IN RCRD: CPT | Mod: CPTII,S$GLB,, | Performed by: OTOLARYNGOLOGY

## 2025-02-07 RX ORDER — OMEPRAZOLE 40 MG/1
40 CAPSULE, DELAYED RELEASE ORAL
Qty: 60 CAPSULE | Refills: 3 | Status: SHIPPED | OUTPATIENT
Start: 2025-02-07 | End: 2025-06-07

## 2025-02-07 NOTE — PROGRESS NOTES
Subjective:       Patient ID: Nico Teague is a 74 y.o. male.    Chief Complaint: Hoarse (Patient presents with hoarseness that started a few weeks ago after starting spironolactone. He also has new hearing aids that he did not wear today. )      This 74-year-old gentleman developed significant hoarseness two weeks ago he mentions that that was about the time that he began spironolactone as recommended by his cardiologist and of course he is unsure if that has a contributor.  He also uses a lung inhaler, Trelegy, and it was brought to his attention that maybe he has not been rinsing adequately so he is laid off of that for a few days.  Additionally he has a sleep apnea diagnosis but successfully wears CPAP every night he has not smoked since the 80s and does not have any sore throat or trouble swallowing.          Objective:      ENT Physical Exam    So he is really quite hoarse and intermittently he will have a brief period where he is almost aphonic despite attempts.  Those episodes are brief and most of the time he is easy to communicate     His nasal exam is unremarkable and he is denying much in the way of sinus or nasal abnormalities     His tympanic membranes and ear canals are normal     His oropharynx is unremarkable except for a little bit larger tongue base relative to his anatomy than some patients     We discussed the benefits of a fiberoptic exam that include a better view of the larynx a better view of the anterior aspect of the larynx a more magnified view of the larynx hypopharynx and nasopharynx in the context of the patient's particular complaint and the patient wishes to proceed with this minor examination.  In this particular patient examination with the mirror was attempted but inadequate to provide the necessary exam.    Afrin and lidocaine were atomized into the nasal airway 5 or so minutes were given for the decongestant and anesthetic to take effect and then the flexible fiberoptic  laryngoscope was passed through the nasal cavity and the exam proceeded.    The right side was chosen as they side to access both sides has a lot of room status post septoplasty     The scope passed easily into his nasal cavity and a good view of his larynx and hypopharynx were easy to achieve.  Both cords move well that is no evidence of any type of yeast infection that is no focal redness there has a generalized edema in the intra arytenoid region that is no nodules or polyps.        Assessment:       1. Hoarseness         Plan:          His scope exam was not particularly revealing but it did not show any asymmetry tumors evidence of a yeast infection from his Trelegy or any perfectly clear evidence of reflux although he did have some edema the inter-arytenoid region but no ulceration or focal redness.    I wanted to treat him with b.i.d. PPIs for at least a month or so and returned to reexamine not necessarily needing to do a scope exam but did discuss his progress or lack thereof make adjustments to medicine or additional thoughts depending on how things are going

## 2025-02-11 ENCOUNTER — PATIENT MESSAGE (OUTPATIENT)
Dept: FAMILY MEDICINE | Facility: CLINIC | Age: 75
End: 2025-02-11
Payer: MEDICARE

## 2025-02-11 DIAGNOSIS — E03.2 HYPOTHYROIDISM DUE TO MEDICATION: Primary | ICD-10-CM

## 2025-02-11 LAB
ALBUMIN SERPL-MCNC: 4.2 G/DL (ref 3.6–5.1)
ALBUMIN/GLOB SERPL: 1.9 (CALC) (ref 1–2.5)
ALP SERPL-CCNC: 108 U/L (ref 35–144)
ALT SERPL-CCNC: 20 U/L (ref 9–46)
AST SERPL-CCNC: 18 U/L (ref 10–35)
BASOPHILS # BLD AUTO: 38 CELLS/UL (ref 0–200)
BASOPHILS NFR BLD AUTO: 0.5 %
BILIRUB SERPL-MCNC: 0.5 MG/DL (ref 0.2–1.2)
BUN SERPL-MCNC: 23 MG/DL (ref 7–25)
BUN/CREAT SERPL: 18 (CALC) (ref 6–22)
CALCIUM SERPL-MCNC: 9.1 MG/DL (ref 8.6–10.3)
CHLORIDE SERPL-SCNC: 106 MMOL/L (ref 98–110)
CHOLEST SERPL-MCNC: 144 MG/DL
CHOLEST/HDLC SERPL: 3.3 (CALC)
CO2 SERPL-SCNC: 27 MMOL/L (ref 20–32)
CREAT SERPL-MCNC: 1.3 MG/DL (ref 0.7–1.28)
EGFR: 58 ML/MIN/1.73M2
EOSINOPHIL # BLD AUTO: 129 CELLS/UL (ref 15–500)
EOSINOPHIL NFR BLD AUTO: 1.7 %
ERYTHROCYTE [DISTWIDTH] IN BLOOD BY AUTOMATED COUNT: 12 % (ref 11–15)
GLOBULIN SER CALC-MCNC: 2.2 G/DL (CALC) (ref 1.9–3.7)
GLUCOSE SERPL-MCNC: 84 MG/DL (ref 65–99)
HCT VFR BLD AUTO: 44.8 % (ref 38.5–50)
HDLC SERPL-MCNC: 43 MG/DL
HGB BLD-MCNC: 14.3 G/DL (ref 13.2–17.1)
LDLC SERPL CALC-MCNC: 80 MG/DL (CALC)
LYMPHOCYTES # BLD AUTO: 1718 CELLS/UL (ref 850–3900)
LYMPHOCYTES NFR BLD AUTO: 22.6 %
MCH RBC QN AUTO: 30.2 PG (ref 27–33)
MCHC RBC AUTO-ENTMCNC: 31.9 G/DL (ref 32–36)
MCV RBC AUTO: 94.7 FL (ref 80–100)
MONOCYTES # BLD AUTO: 714 CELLS/UL (ref 200–950)
MONOCYTES NFR BLD AUTO: 9.4 %
NEUTROPHILS # BLD AUTO: 5001 CELLS/UL (ref 1500–7800)
NEUTROPHILS NFR BLD AUTO: 65.8 %
NONHDLC SERPL-MCNC: 101 MG/DL (CALC)
PLATELET # BLD AUTO: 182 THOUSAND/UL (ref 140–400)
PMV BLD REES-ECKER: 11.4 FL (ref 7.5–12.5)
POTASSIUM SERPL-SCNC: 4.9 MMOL/L (ref 3.5–5.3)
PROT SERPL-MCNC: 6.4 G/DL (ref 6.1–8.1)
RBC # BLD AUTO: 4.73 MILLION/UL (ref 4.2–5.8)
SODIUM SERPL-SCNC: 141 MMOL/L (ref 135–146)
T4 FREE SERPL-MCNC: 1.7 NG/DL (ref 0.8–1.8)
TRIGL SERPL-MCNC: 118 MG/DL
TSH SERPL-ACNC: 0.08 MIU/L (ref 0.4–4.5)
URATE SERPL-MCNC: 4.6 MG/DL (ref 4–8)
WBC # BLD AUTO: 7.6 THOUSAND/UL (ref 3.8–10.8)

## 2025-02-11 RX ORDER — LEVOTHYROXINE SODIUM 75 UG/1
75 TABLET ORAL
Qty: 30 TABLET | Refills: 11 | Status: SHIPPED | OUTPATIENT
Start: 2025-02-11 | End: 2026-02-11

## 2025-03-01 ENCOUNTER — PATIENT MESSAGE (OUTPATIENT)
Dept: FAMILY MEDICINE | Facility: CLINIC | Age: 75
End: 2025-03-01
Payer: MEDICARE

## 2025-03-01 DIAGNOSIS — E78.00 PURE HYPERCHOLESTEROLEMIA: ICD-10-CM

## 2025-03-03 RX ORDER — ROSUVASTATIN CALCIUM 10 MG/1
10 TABLET, COATED ORAL EVERY OTHER DAY
Qty: 45 TABLET | Refills: 1 | Status: SHIPPED | OUTPATIENT
Start: 2025-03-03

## 2025-03-07 ENCOUNTER — OFFICE VISIT (OUTPATIENT)
Dept: OTOLARYNGOLOGY | Facility: CLINIC | Age: 75
End: 2025-03-07
Payer: MEDICARE

## 2025-03-07 VITALS — BODY MASS INDEX: 40.93 KG/M2 | WEIGHT: 269.19 LBS

## 2025-03-07 DIAGNOSIS — K21.9 LARYNGOPHARYNGEAL REFLUX (LPR): Primary | ICD-10-CM

## 2025-03-07 DIAGNOSIS — H81.10 BPPV (BENIGN PAROXYSMAL POSITIONAL VERTIGO), UNSPECIFIED LATERALITY: ICD-10-CM

## 2025-03-07 PROCEDURE — 99999 PR PBB SHADOW E&M-EST. PATIENT-LVL III: CPT | Mod: PBBFAC,,, | Performed by: OTOLARYNGOLOGY

## 2025-03-12 ENCOUNTER — HOSPITAL ENCOUNTER (OUTPATIENT)
Dept: RADIOLOGY | Facility: HOSPITAL | Age: 75
Discharge: HOME | End: 2025-03-12
Attending: ORTHOPAEDIC SURGERY
Payer: MEDICARE

## 2025-03-12 ENCOUNTER — OFFICE VISIT (OUTPATIENT)
Dept: ORTHOPEDICS | Facility: CLINIC | Age: 75
End: 2025-03-12
Payer: MEDICARE

## 2025-03-12 VITALS — BODY MASS INDEX: 39.4 KG/M2 | HEIGHT: 68 IN | WEIGHT: 260 LBS

## 2025-03-12 DIAGNOSIS — M41.9 SCOLIOSIS OF THORACOLUMBAR SPINE, UNSPECIFIED SCOLIOSIS TYPE: ICD-10-CM

## 2025-03-12 DIAGNOSIS — Z79.01 LONG TERM CURRENT USE OF ANTICOAGULANT: ICD-10-CM

## 2025-03-12 DIAGNOSIS — M47.816 FACET ARTHRITIS OF LUMBAR REGION: ICD-10-CM

## 2025-03-12 DIAGNOSIS — M51.360 DEGENERATION OF INTERVERTEBRAL DISC OF LUMBAR REGION WITH DISCOGENIC BACK PAIN: Primary | ICD-10-CM

## 2025-03-12 PROCEDURE — 1160F RVW MEDS BY RX/DR IN RCRD: CPT | Mod: CPTII,S$GLB,, | Performed by: ORTHOPAEDIC SURGERY

## 2025-03-12 PROCEDURE — 1125F AMNT PAIN NOTED PAIN PRSNT: CPT | Mod: CPTII,S$GLB,, | Performed by: ORTHOPAEDIC SURGERY

## 2025-03-12 PROCEDURE — 72170 X-RAY EXAM OF PELVIS: CPT | Mod: TC,PN

## 2025-03-12 PROCEDURE — 99999 PR PBB SHADOW E&M-EST. PATIENT-LVL V: CPT | Mod: PBBFAC,,, | Performed by: ORTHOPAEDIC SURGERY

## 2025-03-12 PROCEDURE — 1101F PT FALLS ASSESS-DOCD LE1/YR: CPT | Mod: CPTII,S$GLB,, | Performed by: ORTHOPAEDIC SURGERY

## 2025-03-12 PROCEDURE — 99203 OFFICE O/P NEW LOW 30 MIN: CPT | Mod: S$GLB,,, | Performed by: ORTHOPAEDIC SURGERY

## 2025-03-12 PROCEDURE — 3288F FALL RISK ASSESSMENT DOCD: CPT | Mod: CPTII,S$GLB,, | Performed by: ORTHOPAEDIC SURGERY

## 2025-03-12 PROCEDURE — 72100 X-RAY EXAM L-S SPINE 2/3 VWS: CPT | Mod: 26,,, | Performed by: RADIOLOGY

## 2025-03-12 PROCEDURE — 3008F BODY MASS INDEX DOCD: CPT | Mod: CPTII,S$GLB,, | Performed by: ORTHOPAEDIC SURGERY

## 2025-03-12 PROCEDURE — 72170 X-RAY EXAM OF PELVIS: CPT | Mod: 26,,, | Performed by: RADIOLOGY

## 2025-03-12 PROCEDURE — 1159F MED LIST DOCD IN RCRD: CPT | Mod: CPTII,S$GLB,, | Performed by: ORTHOPAEDIC SURGERY

## 2025-03-12 PROCEDURE — 72100 X-RAY EXAM L-S SPINE 2/3 VWS: CPT | Mod: TC,PN

## 2025-03-12 RX ORDER — TIZANIDINE 4 MG/1
2 TABLET ORAL EVERY 8 HOURS
Qty: 90 TABLET | Refills: 0 | Status: SHIPPED | OUTPATIENT
Start: 2025-03-12 | End: 2025-04-11

## 2025-03-20 ENCOUNTER — PATIENT MESSAGE (OUTPATIENT)
Dept: FAMILY MEDICINE | Facility: CLINIC | Age: 75
End: 2025-03-20
Payer: MEDICARE

## 2025-03-26 ENCOUNTER — RESULTS FOLLOW-UP (OUTPATIENT)
Dept: FAMILY MEDICINE | Facility: CLINIC | Age: 75
End: 2025-03-26

## 2025-04-10 DIAGNOSIS — E03.2 HYPOTHYROIDISM DUE TO MEDICATION: ICD-10-CM

## 2025-04-10 RX ORDER — LEVOTHYROXINE SODIUM 75 UG/1
75 TABLET ORAL
Qty: 30 TABLET | Refills: 11 | Status: SHIPPED | OUTPATIENT
Start: 2025-04-10 | End: 2025-04-10

## 2025-04-10 RX ORDER — LEVOTHYROXINE SODIUM 75 UG/1
75 TABLET ORAL
Qty: 90 TABLET | Refills: 3 | Status: SHIPPED | OUTPATIENT
Start: 2025-04-10

## 2025-04-10 NOTE — TELEPHONE ENCOUNTER
----- Message from Merle sent at 4/10/2025 11:16 AM CDT -----  Pt is calling about levothyroxine 75 mg would like a 90 day sent to [facility] [phone]

## 2025-04-16 ENCOUNTER — PATIENT MESSAGE (OUTPATIENT)
Dept: FAMILY MEDICINE | Facility: CLINIC | Age: 75
End: 2025-04-16
Payer: MEDICARE

## 2025-04-16 NOTE — TELEPHONE ENCOUNTER
Called and talked to patient , informed patient  may 10 to 14 days to get mail order to him. Offered number if patient would like to contact him. Or if ne heeded it called in locally we can check in locally. No further issues noted.

## 2025-05-01 DIAGNOSIS — R06.02 SHORTNESS OF BREATH: Primary | ICD-10-CM

## 2025-05-05 ENCOUNTER — PATIENT MESSAGE (OUTPATIENT)
Dept: FAMILY MEDICINE | Facility: CLINIC | Age: 75
End: 2025-05-05
Payer: MEDICARE

## 2025-05-08 ENCOUNTER — OFFICE VISIT (OUTPATIENT)
Dept: FAMILY MEDICINE | Facility: CLINIC | Age: 75
End: 2025-05-08
Payer: MEDICARE

## 2025-05-08 VITALS
SYSTOLIC BLOOD PRESSURE: 128 MMHG | TEMPERATURE: 98 F | HEIGHT: 68 IN | HEART RATE: 80 BPM | BODY MASS INDEX: 41.03 KG/M2 | OXYGEN SATURATION: 98 % | DIASTOLIC BLOOD PRESSURE: 82 MMHG | WEIGHT: 270.75 LBS

## 2025-05-08 DIAGNOSIS — E03.2 HYPOTHYROIDISM DUE TO MEDICATION: ICD-10-CM

## 2025-05-08 DIAGNOSIS — E78.00 PURE HYPERCHOLESTEROLEMIA: ICD-10-CM

## 2025-05-08 DIAGNOSIS — I70.90 ATHEROSCLEROSIS: ICD-10-CM

## 2025-05-08 DIAGNOSIS — E66.813 CLASS 3 SEVERE OBESITY DUE TO EXCESS CALORIES WITH SERIOUS COMORBIDITY AND BODY MASS INDEX (BMI) OF 40.0 TO 44.9 IN ADULT: Primary | ICD-10-CM

## 2025-05-08 DIAGNOSIS — I10 PRIMARY HYPERTENSION: ICD-10-CM

## 2025-05-08 DIAGNOSIS — I42.0 DILATED CARDIOMYOPATHY: ICD-10-CM

## 2025-05-08 DIAGNOSIS — Z12.5 SCREENING FOR PROSTATE CANCER: ICD-10-CM

## 2025-05-08 DIAGNOSIS — E66.01 CLASS 3 SEVERE OBESITY DUE TO EXCESS CALORIES WITH SERIOUS COMORBIDITY AND BODY MASS INDEX (BMI) OF 40.0 TO 44.9 IN ADULT: Primary | ICD-10-CM

## 2025-05-08 PROCEDURE — 99999 PR PBB SHADOW E&M-EST. PATIENT-LVL III: CPT | Mod: PBBFAC,,, | Performed by: NURSE PRACTITIONER

## 2025-05-08 NOTE — PROGRESS NOTES
SUBJECTIVE:      Patient ID: Nico Teague is a 74 y.o. male.    Chief Complaint: wt loss options  (Discuss Wt loss options ) and Follow-up (ER F/U. Wants discuss most recent er visit. )    History of Present Illness    CHIEF COMPLAINT:  Mr. Teague presents to discuss two concerns: a recent episode of left shoulder and chest pain, and to explore weight loss medication options.    HPI:  Mr. Teague reports an event on Sunday night, prompting evaluation at the ER at [facility] in [place]. He describes pain in his left shoulder and chest area, which had resolved by that afternoon without recurrence. Cardiac workup was unremarkable with 2 negative troponin levels.     He mentions right foot issues, particularly with extensive walking or treadmill use. Dr. Gunter previously administered an injection, which alleviated the foot problem, but he still has discomfort with prolonged walking.  This makes it difficulty for him to exercise and lose weight.     He expresses interest in weight loss options, noting his wife's significant weight loss using Mounjaro under Dr. Tahir Sherman's care. He wishes to explore similar options compatible with his current medications and medical history.    Regarding blood pressure, he states it tends to be higher in the mornings before taking Metoprolol. Within about 3 hours of medication, his blood pressure typically drops to around 130/80. He is scheduled to see his cardiologist on the 19th and has been asked to do a week's worth of blood pressure readings prior to the appointment.    He denies any history of pancreatitis, thyroid cancers, tumors in himself or his family, diabetes, or cardiac stents.       Review of Systems   Constitutional:  Negative for activity change, appetite change, chills, diaphoresis, fatigue, fever and unexpected weight change.   HENT:  Negative for congestion, ear pain, sinus pressure, sore throat, trouble swallowing and voice change.    Eyes:  Negative for pain,  discharge and visual disturbance.   Respiratory:  Negative for cough, chest tightness, shortness of breath and wheezing.    Cardiovascular:  Positive for leg swelling. Negative for chest pain and palpitations.        CHF, HTN, HLD, A. Fib   Gastrointestinal:  Negative for abdominal pain, constipation, diarrhea, nausea and vomiting.   Endocrine:        Hypothyroid   Genitourinary:  Negative for difficulty urinating, flank pain, frequency and urgency.   Musculoskeletal:  Positive for arthralgias. Negative for back pain and joint swelling.   Skin:  Negative for color change and rash.   Neurological:  Negative for dizziness, seizures, syncope, weakness, numbness and headaches.   Hematological:  Negative for adenopathy. Bruises/bleeds easily (on Eliquis).   Psychiatric/Behavioral:  Negative for dysphoric mood and sleep disturbance. The patient is not nervous/anxious.        Family History   Problem Relation Name Age of Onset    Cancer Mother          esophageal    Heart disease Father      Hypertension Father      Arthritis Paternal Grandmother        Social History     Socioeconomic History    Marital status:    Occupational History    Occupation: retired captain    Tobacco Use    Smoking status: Former     Current packs/day: 0.00     Average packs/day: 2.0 packs/day for 20.0 years (40.0 ttl pk-yrs)     Types: Cigarettes     Start date: 1972     Quit date: 1988     Years since quittin.7     Passive exposure: Past    Smokeless tobacco: Never    Tobacco comments:     Quit smoking in    Substance and Sexual Activity    Alcohol use: No    Drug use: No    Sexual activity: Never   Social History Narrative    Live with wife     Social Drivers of Health     Financial Resource Strain: Low Risk  (3/7/2022)    Overall Financial Resource Strain (CARDIA)     Difficulty of Paying Living Expenses: Not hard at all   Food Insecurity: No Food Insecurity (3/7/2022)    Hunger Vital Sign     Worried About Running  Out of Food in the Last Year: Never true     Ran Out of Food in the Last Year: Never true   Transportation Needs: No Transportation Needs (3/7/2022)    PRAPARE - Transportation     Lack of Transportation (Medical): No     Lack of Transportation (Non-Medical): No   Physical Activity: Insufficiently Active (3/7/2022)    Exercise Vital Sign     Days of Exercise per Week: 3 days     Minutes of Exercise per Session: 20 min   Stress: No Stress Concern Present (3/7/2022)    [employer] - Occupational Stress Questionnaire     Feeling of Stress : Only a little     Current Outpatient Medications   Medication Sig Note    albuterol (VENTOLIN HFA) 90 mcg/actuation inhaler Inhale 2 puffs into the lungs every 6 (six) hours as needed for Wheezing. Rescue     allopurinoL (ZYLOPRIM) 300 MG tablet Take 1 tablet (300 mg total) by mouth once daily.     amiodarone (PACERONE) 200 MG Tab Take 200 mg by mouth 2 (two) times daily.     apixaban (ELIQUIS) 5 mg Tab Take 5 mg by mouth 2 (two) times daily. 1/22/2024: Holding prior to surg     ascorbic acid, vitamin C, (VITAMIN C) 1000 MG tablet Take 1,000 mg by mouth once daily.     aspirin 81 MG Chew Take 81 mg by mouth every 7 days. 1/22/2024: Holding until after surg     cholecalciferol, vitamin D3, (VITAMIN D3) 25 mcg (1,000 unit) capsule Take 1,000 Units by mouth once daily.     co-enzyme Q-10 50 mg capsule Take 50 mg by mouth once daily.     empagliflozin (JARDIANCE) 10 mg tablet Take 1 tablet by mouth once daily.     ENTRESTO  mg per tablet Take 1 tablet by mouth 2 (two) times daily.     fluticasone-umeclidin-vilanter (TRELEGY ELLIPTA) 100-62.5-25 mcg DsDv INHALE 1 PUFF ONE TIME DAILY     furosemide (LASIX) 40 MG tablet Take 20 mg by mouth once daily.     guaiFENesin (MUCINEX) 600 mg 12 hr tablet Take 2 tablets (1,200 mg total) by mouth 2 (two) times daily.     Lactobacillus rhamnosus GG (CULTURELLE) 10 billion cell capsule Take 1 capsule by mouth.      levothyroxine (SYNTHROID) 75 MCG tablet Take 1 tablet (75 mcg total) by mouth before breakfast.     magnesium gluconate 27.5 mg magne- sium (500 mg) Tab Take 1 tablet by mouth once.     melatonin 10 mg Tab Take 1 tablet by mouth nightly as needed.     metoprolol succinate (TOPROL-XL) 100 MG 24 hr tablet Take 1 tablet by mouth 2 (two) times daily.     montelukast (SINGULAIR) 10 mg tablet TAKE 1 TABLET EVERY EVENING     multivitamin (THERAGRAN) per tablet Take 1 tablet by mouth once daily.     omeprazole (PRILOSEC) 40 MG capsule Take 1 capsule (40 mg total) by mouth 2 (two) times daily before meals.     rosuvastatin (CRESTOR) 10 MG tablet Take 1 tablet (10 mg total) by mouth every other day.     spironolactone (ALDACTONE) 25 MG tablet Take 1 tablet by mouth once daily.     tamsulosin (FLOMAX) 0.4 mg Cap Take 1 tablet by mouth Daily.     vitamin E 1000 UNIT capsule Take 1,000 Units by mouth once daily.     semaglutide (OZEMPIC) 0.25 mg or 0.5 mg (2 mg/3 mL) pen injector Inject 0.25 mg into the skin every 7 days for 30 days, THEN 0.5 mg every 7 days.    Last reviewed on 3/12/2025 10:44 AM by Gerson Vila MD    Review of patient's allergies indicates:   Allergen Reactions    Bactrim [sulfamethoxazole-trimethoprim]      HIVES/ RASH    Tea (rashida sinensis) Hives     Pt is has allergies to black tea. Not an option under search.      Past Medical History:   Diagnosis Date    Allergy     Arthritis     Asthma     Atrial fibrillation     Basal cell carcinoma     CHF (congestive heart failure)     Fever blister     Hypertension     Hypothyroidism (acquired) 07/31/2023    Joint pain     Melanoma     BANDAR on CPAP     PVC (premature ventricular contraction)     Skin disease     Squamous cell carcinoma      Past Surgical History:   Procedure Laterality Date    BRONCHOSCOPY N/A 07/18/2022    Procedure: Bronchoscopy;  Surgeon: Charlee Brewer MD;  Location: [facility];  Service: Pulmonary;  Laterality: N/A;  monday 7/18/22 at  "0830 look see bronch no biopsy    CARDIAC CATHETERIZATION  05/25/2021    COLONOSCOPY N/A 1/25/2024    Procedure: COLONOSCOPY;  Surgeon: Emmanuel Deleon MD;  Location: [facility];  Service: Endoscopy;  Laterality: N/A;    ESOPHAGOGASTRODUODENOSCOPY N/A 07/07/2022    Procedure: EGD (ESOPHAGOGASTRODUODENOSCOPY);  Surgeon: Emmanuel Deleon MD;  Location: [facility];  Service: Endoscopy;  Laterality: N/A;    EYE SURGERY Left 12/03/2019    EYE SURGERY Right 01/28/2020    FRACTURE SURGERY      INSERTION OF PACEMAKER  11/2023    LIPOMA RESECTION      NOSE SURGERY      SHOULDER ARTHROSCOPY      SKIN BIOPSY      SKIN CANCER EXCISION      Moh's x 2    SKIN GRAFT            OBJECTIVE:      Vitals:    05/08/25 0920   BP: 128/82   BP Location: Left arm   Patient Position: Sitting   Pulse: 80   Temp: 97.8 °F (36.6 °C)   TempSrc: Oral   SpO2: 98%   Weight: 122.8 kg (270 lb 11.6 oz)   Height: 5' 8" (1.727 m)     Physical Exam  Vitals and nursing note reviewed.   Constitutional:       General: He is awake. He is not in acute distress.     Appearance: He is well-developed and well-groomed. He is morbidly obese. He is not ill-appearing, toxic-appearing or diaphoretic.   HENT:      Head: Normocephalic and atraumatic.      Right Ear: Decreased hearing noted.      Left Ear: Decreased hearing noted.      Nose: Nose normal.   Eyes:      General: Lids are normal. Gaze aligned appropriately.      Conjunctiva/sclera: Conjunctivae normal.      Right eye: Right conjunctiva is not injected.      Left eye: Left conjunctiva is not injected.      Pupils: Pupils are equal, round, and reactive to light.   Cardiovascular:      Rate and Rhythm: Normal rate and regular rhythm.      Pulses: Normal pulses.      Heart sounds: S1 normal and S2 normal. Heart sounds are distant.   Pulmonary:      Effort: Pulmonary effort is normal. No respiratory distress.      Breath sounds: Normal breath sounds. No stridor. No decreased breath sounds, wheezing, rhonchi or " rales.   Chest:      Chest wall: No tenderness.   Musculoskeletal:      Cervical back: Neck supple.      Right lower le+ Edema present.      Left lower le+ Edema present.   Lymphadenopathy:      Cervical: No cervical adenopathy.   Skin:     General: Skin is warm and dry.      Capillary Refill: Capillary refill takes less than 2 seconds.      Findings: No erythema or rash.   Neurological:      Mental Status: He is alert and oriented to person, place, and time. Mental status is at baseline.   Psychiatric:         Attention and Perception: Attention normal.         Mood and Affect: Mood normal.         Speech: Speech normal.         Behavior: Behavior normal. Behavior is cooperative.         Thought Content: Thought content normal.         Judgment: Judgment normal.       Orders Only on 2025   Component Date Value Ref Range Status    TSH 2025 2.18  0.40 - 4.50 mIU/L Final    T4, Free 2025 1.3  0.8 - 1.8 ng/dL Final   Office Visit on 2025   Component Date Value Ref Range Status    WBC 02/10/2025 7.6  3.8 - 10.8 Thousand/uL Final    RBC 02/10/2025 4.73  4.20 - 5.80 Million/uL Final    Hemoglobin 02/10/2025 14.3  13.2 - 17.1 g/dL Final    Hematocrit 02/10/2025 44.8  38.5 - 50.0 % Final    MCV 02/10/2025 94.7  80.0 - 100.0 fL Final    MCH 02/10/2025 30.2  27.0 - 33.0 pg Final    MCHC 02/10/2025 31.9 (L)  32.0 - 36.0 g/dL Final    Comment: For adults, a slight decrease in the calculated MCHC  value (in the range of 30 to 32 g/dL) is most likely  not clinically significant; however, it should be  interpreted with caution in correlation with other  red cell parameters and the patient's clinical  condition.      RDW 02/10/2025 12.0  11.0 - 15.0 % Final    Platelets 02/10/2025 182  140 - 400 Thousand/uL Final    MPV 02/10/2025 11.4  7.5 - 12.5 fL Final    Neutrophils, Abs 02/10/2025 5,001  1,500 - 7,800 cells/uL Final    Lymph # 02/10/2025 1,718  850 - 3,900 cells/uL Final    Mono # 02/10/2025  714  200 - 950 cells/uL Final    Eos # 02/10/2025 129  15 - 500 cells/uL Final    Baso # 02/10/2025 38  0 - 200 cells/uL Final    Neutrophils Relative 02/10/2025 65.8  % Final    Lymph % 02/10/2025 22.6  % Final    Mono % 02/10/2025 9.4  % Final    Eosinophil % 02/10/2025 1.7  % Final    Basophil % 02/10/2025 0.5  % Final    Glucose 02/10/2025 84  65 - 99 mg/dL Final    Comment:               Fasting reference interval         BUN 02/10/2025 23  7 - 25 mg/dL Final    Creatinine 02/10/2025 1.30 (H)  0.70 - 1.28 mg/dL Final    eGFR 02/10/2025 58 (L)  > OR = 60 mL/min/1.73m2 Final    BUN/Creatinine Ratio 02/10/2025 18  6 - 22 (calc) Final    Sodium 02/10/2025 141  135 - 146 mmol/L Final    Potassium 02/10/2025 4.9  3.5 - 5.3 mmol/L Final    Chloride 02/10/2025 106  98 - 110 mmol/L Final    CO2 02/10/2025 27  20 - 32 mmol/L Final    Calcium 02/10/2025 9.1  8.6 - 10.3 mg/dL Final    Total Protein 02/10/2025 6.4  6.1 - 8.1 g/dL Final    Albumin 02/10/2025 4.2  3.6 - 5.1 g/dL Final    Globulin, Total 02/10/2025 2.2  1.9 - 3.7 g/dL (calc) Final    Albumin/Globulin Ratio 02/10/2025 1.9  1.0 - 2.5 (calc) Final    Total Bilirubin 02/10/2025 0.5  0.2 - 1.2 mg/dL Final    Alkaline Phosphatase 02/10/2025 108  35 - 144 U/L Final    AST 02/10/2025 18  10 - 35 U/L Final    ALT 02/10/2025 20  9 - 46 U/L Final    Cholesterol 02/10/2025 144  <200 mg/dL Final    HDL 02/10/2025 43  > OR = 40 mg/dL Final    Triglycerides 02/10/2025 118  <150 mg/dL Final    LDL Cholesterol 02/10/2025 80  mg/dL (calc) Final    Comment: Reference range: <100     Desirable range <100 mg/dL for primary prevention;    <70 mg/dL for patients with CHD or diabetic patients   with > or = 2 CHD risk factors.     LDL-C is now calculated using the Omer   calculation, which is a validated novel method providing   better accuracy than the Friedewald equation in the   estimation of LDL-C.   Kevin BANG et al. NICOLE. 2013;310(74): 2999-6341    (http://education.[employer]/faq/UUQ253)      HDL/Cholesterol Ratio 02/10/2025 3.3  <5.0 (calc) Final    Non HDL Chol. (LDL+VLDL) 02/10/2025 101  <130 mg/dL (calc) Final    Comment: For patients with diabetes plus 1 major ASCVD risk   factor, treating to a non-HDL-C goal of <100 mg/dL   (LDL-C of <70 mg/dL) is considered a therapeutic   option.      TSH 02/10/2025 0.08 (L)  0.40 - 4.50 mIU/L Final    T4, Free 02/10/2025 1.7  0.8 - 1.8 ng/dL Final    Uric Acid 02/10/2025 4.6  4.0 - 8.0 mg/dL Final    Comment: Therapeutic target for gout patients: <6.0 mg/dL                Assessment:       1. Class 3 severe obesity due to excess calories with serious comorbidity and body mass index (BMI) of 40.0 to 44.9 in adult    2. Dilated cardiomyopathy    3. Atherosclerosis    4. Hypothyroidism due to medication    5. Pure hypercholesterolemia    6. Primary hypertension    7. Screening for prostate cancer        Plan:       Assessment & Plan    PLAN SUMMARY:  - Labs ordered for August prior to next appointment  - Initiated prior authorization for Ozempic through [facility]  - If approved, start Ozempic 0.25 mg weekly injection, increase to 0.5 mg after 1 month  - Continue levothyroxine 75 mcg daily  - Blood pressure management deferred to patient's cardiologist  - Follow-up appointment scheduled for August with lab results review    SEVERE OBESITY (BMI 35.0-39.9) WITH COMORBIDITY:  - Monitored the patient's weight loss efforts, noting reduction in inflammatory foods like Special K cereal and whole wheat bread.  - Evaluated patient's interest in weight loss options, considering Ozempic for both weight management and cardiac protection due to patient's cardiac history, despite absence of diabetes.  - Initiated prior authorization for Ozempic through [facility], citing cardiac concerns and weight as justification for coverage.  - Discussed potential side effects including initial nausea, constipation,  feeling full faster (emphasizing importance of not overeating), and possible injection site reactions.  - If approved by insurance, plan to start Ozempic 0.25 mg weekly injection, increasing to 0.5 mg maintenance dose after 1 month, with potential for further dose increases if weight loss plateaus.    HYPERTENSION:  - Blood pressure is slightly elevated today, though typically runs at 130/80 after morning Metoprolol.  - Repeat blood pressure was stable at 128/82.  - Blood pressure management is being deferred to the patient's cardiologist who oversees this aspect of care.    HYPOTHYROIDISM:  - Mr. Teague continues on levothyroxine 75 mcg daily.  - Last thyroid function test in March showed optimal levels at 2.18.    RIGHT FOOT PAIN:  - Mr. Teague reports foot discomfort occurs primarily during extensive walking.    FOLLOW-UP:  - Labs ordered to be checked in August prior to next appointment.        Class 3 severe obesity due to excess calories with serious comorbidity and body mass index (BMI) of 40.0 to 44.9 in adult  -     semaglutide (OZEMPIC) 0.25 mg or 0.5 mg (2 mg/3 mL) pen injector; Inject 0.25 mg into the skin every 7 days for 30 days, THEN 0.5 mg every 7 days.  Dispense: 9 mL; Refill: 0  -     CBC Auto Differential; Future; Expected date: 08/01/2025  -     Comprehensive Metabolic Panel; Future; Expected date: 08/01/2025  -     Lipid Panel; Future; Expected date: 08/01/2025  -     TSH; Future; Expected date: 08/01/2025    Dilated cardiomyopathy  -     semaglutide (OZEMPIC) 0.25 mg or 0.5 mg (2 mg/3 mL) pen injector; Inject 0.25 mg into the skin every 7 days for 30 days, THEN 0.5 mg every 7 days.  Dispense: 9 mL; Refill: 0  -     CBC Auto Differential; Future; Expected date: 08/01/2025  -     Comprehensive Metabolic Panel; Future; Expected date: 08/01/2025  -     Lipid Panel; Future; Expected date: 08/01/2025  -     TSH; Future; Expected date: 08/01/2025    Atherosclerosis  -     semaglutide (OZEMPIC) 0.25 mg or  0.5 mg (2 mg/3 mL) pen injector; Inject 0.25 mg into the skin every 7 days for 30 days, THEN 0.5 mg every 7 days.  Dispense: 9 mL; Refill: 0  -     CBC Auto Differential; Future; Expected date: 08/01/2025  -     Comprehensive Metabolic Panel; Future; Expected date: 08/01/2025  -     Lipid Panel; Future; Expected date: 08/01/2025  -     TSH; Future; Expected date: 08/01/2025    Hypothyroidism due to medication  -     CBC Auto Differential; Future; Expected date: 08/01/2025  -     Comprehensive Metabolic Panel; Future; Expected date: 08/01/2025  -     Lipid Panel; Future; Expected date: 08/01/2025  -     TSH; Future; Expected date: 08/01/2025    Pure hypercholesterolemia  -     CBC Auto Differential; Future; Expected date: 08/01/2025  -     Comprehensive Metabolic Panel; Future; Expected date: 08/01/2025  -     Lipid Panel; Future; Expected date: 08/01/2025  -     TSH; Future; Expected date: 08/01/2025    Primary hypertension  -     CBC Auto Differential; Future; Expected date: 08/01/2025  -     Comprehensive Metabolic Panel; Future; Expected date: 08/01/2025  -     Lipid Panel; Future; Expected date: 08/01/2025  -     TSH; Future; Expected date: 08/01/2025    Screening for prostate cancer  -     PSA, Screening; Future; Expected date: 08/01/2025    I spent a total of 26 minutes on the day of the visit.This includes face to face time and non-face to face time preparing to see the patient (eg, review of tests), obtaining and/or reviewing separately obtained history, documenting clinical information in the electronic or other health record, independently interpreting results and communicating results to the patient/family/caregiver, or care coordinator.    Follow up if symptoms worsen or fail to improve.          5/8/2025 CONCHITA Ruiz, JOCYP    This note was generated with the assistance of ambient listening technology. Verbal consent was obtained by the patient and accompanying visitor(s) for the recording of  patient appointment to facilitate this note. I attest to having reviewed and edited the generated note for accuracy, though some syntax or spelling errors may persist. Please contact the author of this note for any clarification.

## 2025-05-09 ENCOUNTER — PATIENT MESSAGE (OUTPATIENT)
Dept: FAMILY MEDICINE | Facility: CLINIC | Age: 75
End: 2025-05-09
Payer: MEDICARE

## 2025-05-12 DIAGNOSIS — G47.33 OSA ON CPAP: ICD-10-CM

## 2025-05-12 DIAGNOSIS — E66.01 CLASS 3 SEVERE OBESITY DUE TO EXCESS CALORIES WITH SERIOUS COMORBIDITY AND BODY MASS INDEX (BMI) OF 40.0 TO 44.9 IN ADULT: Primary | ICD-10-CM

## 2025-05-12 DIAGNOSIS — E66.813 CLASS 3 SEVERE OBESITY DUE TO EXCESS CALORIES WITH SERIOUS COMORBIDITY AND BODY MASS INDEX (BMI) OF 40.0 TO 44.9 IN ADULT: Primary | ICD-10-CM

## 2025-05-12 RX ORDER — TIRZEPATIDE 2.5 MG/.5ML
2.5 INJECTION, SOLUTION SUBCUTANEOUS
Qty: 2 ML | Refills: 0 | Status: SHIPPED | OUTPATIENT
Start: 2025-05-12

## 2025-05-13 ENCOUNTER — HOSPITAL ENCOUNTER (OUTPATIENT)
Dept: PULMONOLOGY | Facility: HOSPITAL | Age: 75
Discharge: HOME | End: 2025-05-13
Attending: NURSE PRACTITIONER
Payer: MEDICARE

## 2025-05-13 DIAGNOSIS — R06.02 SHORTNESS OF BREATH: ICD-10-CM

## 2025-05-13 DIAGNOSIS — M10.9 GOUT, UNSPECIFIED CAUSE, UNSPECIFIED CHRONICITY, UNSPECIFIED SITE: ICD-10-CM

## 2025-05-13 LAB
DLCO SINGLE BREATH LLN: 17.98
DLCO SINGLE BREATH PRE REF: 62.3 %
DLCO SINGLE BREATH REF: 24.91
DLCOC SBVA LLN: 2.49
DLCOC SBVA REF: 3.71
DLCOC SINGLE BREATH LLN: 17.98
DLCOC SINGLE BREATH REF: 24.91
DLCOVA LLN: 2.49
DLCOVA PRE REF: 93.6 %
DLCOVA PRE: 3.47 ML/(MIN*MMHG*L) (ref 2.49–4.92)
DLCOVA REF: 3.71
ERVN2 LLN: -16449.03
ERVN2 PRE REF: 0 %
ERVN2 PRE: 0 L (ref -16449.03–16450.97)
ERVN2 REF: 0.97
FEF 25 75 LLN: 1.2
FEF 25 75 PRE REF: 73.9 %
FEF 25 75 REF: 2.91
FEV1 FVC LLN: 62
FEV1 FVC PRE REF: 103.2 %
FEV1 FVC REF: 76
FEV1 LLN: 2.06
FEV1 PRE REF: 83.7 %
FEV1 REF: 2.89
FRCN2 LLN: 2.62
FRCN2 PRE REF: 44.9 %
FRCN2 REF: 3.61
FVC LLN: 2.82
FVC PRE REF: 80.8 %
FVC REF: 3.84
IVC PRE: 2.63 L (ref 2.82–4.87)
IVC SINGLE BREATH LLN: 2.82
IVC SINGLE BREATH PRE REF: 68.6 %
IVC SINGLE BREATH REF: 3.84
PEF LLN: 5.42
PEF PRE REF: 117.1 %
PEF REF: 7.6
PRE DLCO: 15.5 ML/(MIN*MMHG) (ref 17.98–31.83)
PRE FEF 25 75: 2.15 L/S (ref 1.2–4.62)
PRE FET 100: 6.66 SEC
PRE FEV1 FVC: 78.21 % (ref 61.86–88.26)
PRE FEV1: 2.42 L (ref 2.06–3.67)
PRE FRC N2: 1.62 L (ref 2.62–4.6)
PRE FVC: 3.1 L (ref 2.82–4.87)
PRE PEF: 8.91 L/S (ref 5.42–9.79)
RVN2 LLN: 1.96
RVN2 PRE REF: 61.5 %
RVN2 PRE: 1.62 L (ref 1.96–3.31)
RVN2 REF: 2.64
RVN2TLCN2 LLN: 33.45
RVN2TLCN2 PRE REF: 79.5 %
RVN2TLCN2 PRE: 33.74 % (ref 33.45–51.41)
RVN2TLCN2 REF: 42.43
TLCN2 LLN: 5.57
TLCN2 PRE REF: 71.5 %
TLCN2 PRE: 4.81 L (ref 5.57–7.87)
TLCN2 REF: 6.72
VA PRE: 4.47 L (ref 6.57–6.57)
VA SINGLE BREATH LLN: 6.57
VA SINGLE BREATH PRE REF: 68.1 %
VA SINGLE BREATH REF: 6.57
VCMAXN2 LLN: 2.82
VCMAXN2 PRE REF: 83 %
VCMAXN2 PRE: 3.19 L (ref 2.82–4.87)
VCMAXN2 REF: 3.84

## 2025-05-13 PROCEDURE — 94727 GAS DIL/WSHOT DETER LNG VOL: CPT | Performed by: CLINIC/CENTER

## 2025-05-13 PROCEDURE — 94010 BREATHING CAPACITY TEST: CPT | Performed by: CLINIC/CENTER

## 2025-05-13 PROCEDURE — 94729 DIFFUSING CAPACITY: CPT | Performed by: CLINIC/CENTER

## 2025-05-13 RX ORDER — ALLOPURINOL 300 MG/1
300 TABLET ORAL DAILY
Qty: 90 TABLET | Refills: 3 | Status: SHIPPED | OUTPATIENT
Start: 2025-05-13

## 2025-05-14 ENCOUNTER — PATIENT MESSAGE (OUTPATIENT)
Dept: PULMONOLOGY | Facility: CLINIC | Age: 75
End: 2025-05-14
Payer: MEDICARE

## 2025-05-14 RX ORDER — MONTELUKAST SODIUM 10 MG/1
10 TABLET ORAL NIGHTLY
Qty: 90 TABLET | Refills: 6 | Status: SHIPPED | OUTPATIENT
Start: 2025-05-14 | End: 2025-06-13

## 2025-05-28 ENCOUNTER — OFFICE VISIT (OUTPATIENT)
Dept: ORTHOPEDICS | Facility: CLINIC | Age: 75
End: 2025-05-28
Payer: MEDICARE

## 2025-05-28 VITALS — HEIGHT: 68 IN | BODY MASS INDEX: 40.3 KG/M2 | WEIGHT: 265.88 LBS

## 2025-05-28 DIAGNOSIS — M51.360 DEGENERATION OF INTERVERTEBRAL DISC OF LUMBAR REGION WITH DISCOGENIC BACK PAIN: Primary | ICD-10-CM

## 2025-05-28 DIAGNOSIS — M41.9 SCOLIOSIS OF THORACOLUMBAR SPINE, UNSPECIFIED SCOLIOSIS TYPE: ICD-10-CM

## 2025-05-28 DIAGNOSIS — M47.816 FACET ARTHRITIS OF LUMBAR REGION: ICD-10-CM

## 2025-05-28 DIAGNOSIS — Z79.01 LONG TERM CURRENT USE OF ANTICOAGULANT: ICD-10-CM

## 2025-05-28 PROCEDURE — 3008F BODY MASS INDEX DOCD: CPT | Mod: CPTII,S$GLB,, | Performed by: ORTHOPAEDIC SURGERY

## 2025-05-28 PROCEDURE — 99213 OFFICE O/P EST LOW 20 MIN: CPT | Mod: S$GLB,,, | Performed by: ORTHOPAEDIC SURGERY

## 2025-05-28 PROCEDURE — 3288F FALL RISK ASSESSMENT DOCD: CPT | Mod: CPTII,S$GLB,, | Performed by: ORTHOPAEDIC SURGERY

## 2025-05-28 PROCEDURE — 1101F PT FALLS ASSESS-DOCD LE1/YR: CPT | Mod: CPTII,S$GLB,, | Performed by: ORTHOPAEDIC SURGERY

## 2025-05-28 PROCEDURE — 1159F MED LIST DOCD IN RCRD: CPT | Mod: CPTII,S$GLB,, | Performed by: ORTHOPAEDIC SURGERY

## 2025-05-28 PROCEDURE — 4010F ACE/ARB THERAPY RXD/TAKEN: CPT | Mod: CPTII,S$GLB,, | Performed by: ORTHOPAEDIC SURGERY

## 2025-05-28 PROCEDURE — 99999 PR PBB SHADOW E&M-EST. PATIENT-LVL II: CPT | Mod: PBBFAC,,, | Performed by: ORTHOPAEDIC SURGERY

## 2025-05-28 PROCEDURE — 1125F AMNT PAIN NOTED PAIN PRSNT: CPT | Mod: CPTII,S$GLB,, | Performed by: ORTHOPAEDIC SURGERY

## 2025-05-28 PROCEDURE — 1160F RVW MEDS BY RX/DR IN RCRD: CPT | Mod: CPTII,S$GLB,, | Performed by: ORTHOPAEDIC SURGERY

## 2025-05-28 RX ORDER — POTASSIUM CHLORIDE 750 MG/1
10 CAPSULE, EXTENDED RELEASE ORAL
COMMUNITY
Start: 2025-05-10

## 2025-05-28 RX ORDER — HYDROCHLOROTHIAZIDE 25 MG/1
25 TABLET ORAL
COMMUNITY
Start: 2025-05-27

## 2025-05-28 NOTE — PROGRESS NOTES
Subjective:       Patient ID: Nico Teague is a 74 y.o. male.    Chief Complaint: Pain of the Lumbar Spine (Lumbar PT helped a lot, doing home exercises, the back pain is much better, no leg pain, )      History of Present Illness    Prior to meeting with the patient I reviewed the medical chart in [facility]. This included reviewing the previous progress notes from our office, review of the patient's last appointment with their primary care provider, review of any visits to the emergency room, and review of any pain management appointments or procedures.   Jeet comes in today for follow-up for his left-sided low back pain.  He has been working with physical therapy.  He has found significant relief in his symptoms.  Continues to deny any radicular symptoms.  Denies any bowel or bladder incontinence.    Current Medications  Current Medications[1]    Allergies  Review of patient's allergies indicates:   Allergen Reactions    Bactrim [sulfamethoxazole-trimethoprim]      HIVES/ RASH    Tea (rashida sinensis) Hives     Pt is has allergies to black tea. Not an option under search.       Past Medical History  Past Medical History:   Diagnosis Date    Allergy     Arthritis     Asthma     Atrial fibrillation     Basal cell carcinoma     CHF (congestive heart failure)     Fever blister     Hypertension     Hypothyroidism (acquired) 07/31/2023    Joint pain     Melanoma     BANDAR on CPAP     PVC (premature ventricular contraction)     Skin disease     Squamous cell carcinoma        Surgical History  Past Surgical History:   Procedure Laterality Date    BRONCHOSCOPY N/A 07/18/2022    Procedure: Bronchoscopy;  Surgeon: Charlee Brewer MD;  Location: [facility];  Service: Pulmonary;  Laterality: N/A;  monday 7/18/22 at 0830 look see bronch no biopsy    CARDIAC CATHETERIZATION  05/25/2021    COLONOSCOPY N/A 1/25/2024    Procedure: COLONOSCOPY;  Surgeon: Emmanuel Deleon MD;  Location: [facility];  Service: Endoscopy;  Laterality: N/A;     ESOPHAGOGASTRODUODENOSCOPY N/A 07/07/2022    Procedure: EGD (ESOPHAGOGASTRODUODENOSCOPY);  Surgeon: Emmanuel Deleon MD;  Location: [facility];  Service: Endoscopy;  Laterality: N/A;    EYE SURGERY Left 12/03/2019    EYE SURGERY Right 01/28/2020    FRACTURE SURGERY      INSERTION OF PACEMAKER  11/2023    LIPOMA RESECTION      NOSE SURGERY      SHOULDER ARTHROSCOPY      SKIN BIOPSY      SKIN CANCER EXCISION      Moh's x 2    SKIN GRAFT         Family History:   Family History   Problem Relation Name Age of Onset    Cancer Mother          esophageal    Heart disease Father      Hypertension Father      Arthritis Paternal Grandmother         Social History:   Social History[2]    Hospitalization/Major Diagnostic Procedure:     Review of Systems     General/Constitutional:  Chills denies. Fatigue denies. Fever denies. Weight gain denies. Weight loss denies.    Respiratory:  Shortness of breath denies.    Cardiovascular:  Chest pain denies.    Gastrointestinal:  Constipation denies. Diarrhea denies. Nausea denies. Vomiting denies.     Hematology:  Easy bruising denies. Prolonged bleeding denies.     Genitourinary:  Frequent urination denies. Pain in lower back denies. Painful urination denies.     Musculoskeletal:  See HPI for details    Skin:  Rash denies.    Neurologic:  Dizziness denies. Gait abnormalities denies. Seizures denies. Tingling/Numbess denies.    Psychiatric:  Anxiety denies. Depressed mood denies.     Objective:   Vital Signs: There were no vitals filed for this visit.     Physical Exam      General Examination:     Constitutional: The patient is alert and oriented to lace person and time. Mood is pleasant.     Head/Face: Normal facial features normal eyebrows    Eyes: Normal extraocular motion bilaterally    Lungs: Respirations are equal and unlabored    Gait is coordinated.    Cardiovascular: There are no swelling or varicosities present.    Lymphatic: Negative for adenopathy    Skin:  "Normal    Neurological: Level of consciousness normal. Oriented to place person and time and situation    Psychiatric: Oriented to time place person and situation    Lumbar exam: Skin to lower back clean dry and intact.  No erythema or ecchymosis.  No signs or symptoms of infection.  Neurovascularly intact throughout bilateral lower extremities.  He stands erect.  Can weightbear as tolerated on bilateral lower extremities.  Ambulates without an aid.  Nonantalgic gait.  Negative straight leg raise maneuver bilaterally.  Negative Homans bilaterally.      XRAY Report/ Interpretation:  No new radiographs taken on today's clinic visit.    Assessment:       1. Degeneration of intervertebral disc of lumbar region with discogenic back pain    2. Facet arthritis of lumbar region    3. Scoliosis of thoracolumbar spine, unspecified scoliosis type    4. Long term current use of anticoagulant        Plan:       Nico Carlos" was seen today for pain.    Diagnoses and all orders for this visit:    Degeneration of intervertebral disc of lumbar region with discogenic back pain    Facet arthritis of lumbar region    Scoliosis of thoracolumbar spine, unspecified scoliosis type    Long term current use of anticoagulant         Follow up if symptoms worsen or fail to improve.  This is to attest that the medical assistant, Enio Vargas served in the capacity as a scribe for this patient's encounter.  This is also verify that I have reviewed the patient's history and  formulated the treatment plan for this patient.  I have  evaluated this patient  and formulated a treatment plan for this patient visit.  The treatment plan and medical decision-making is outlined below  He has had significant relief of his symptoms.  He has completed his PT in his maintaining with home exercises taught to him through his therapist.  Once again, he asked to avoid NSAIDs secondary to chronic anticoagulant use and some level of renal insufficiency.  He can " follow up with us on an as-needed basis for any issues or concerns that arise.  He can resume/continue his regular activities of daily living as pain tolerates.    Treatment options were discussed with regards to the nature of the medical condition. Conservative pain intervention and surgical options were discussed in detail. The probability of success of each separate treatment option was discussed. The patient expressed a clear understanding of the treatment options. With regards to surgery, the procedure risk, benefits, complications, and outcomes were discussed. No guarantees were given with regards to surgical outcome.   The risk of complications, morbidity, and mortality of patient management decisions have been made at the time of this visit. These are associated with the patient's problems, diagnostic procedures and treatment options. This includes the possible management options selected and those considered but not selected by the patient after shared medical decision making we discussed with the patient.     This note was created using Dragon voice recognition software that occasionally misinterpreted phrases or words.         [1]   Current Outpatient Medications   Medication Sig Dispense Refill    albuterol (VENTOLIN HFA) 90 mcg/actuation inhaler Inhale 2 puffs into the lungs every 6 (six) hours as needed for Wheezing. Rescue 54 g 4    allopurinoL (ZYLOPRIM) 300 MG tablet TAKE 1 TABLET (300 MG TOTAL) BY  MOUTH ONCE DAILY. 90 tablet 3    amiodarone (PACERONE) 200 MG Tab Take 200 mg by mouth 2 (two) times daily.      apixaban (ELIQUIS) 5 mg Tab Take 5 mg by mouth 2 (two) times daily.      ascorbic acid, vitamin C, (VITAMIN C) 1000 MG tablet Take 1,000 mg by mouth once daily.      aspirin 81 MG Chew Take 81 mg by mouth every 7 days.      cholecalciferol, vitamin D3, (VITAMIN D3) 25 mcg (1,000 unit) capsule Take 1,000 Units by mouth once daily.      co-enzyme Q-10 50 mg capsule Take 50 mg by mouth once  daily.      empagliflozin (JARDIANCE) 10 mg tablet Take 1 tablet by mouth once daily.      ENTRESTO  mg per tablet Take 1 tablet by mouth 2 (two) times daily.      fluticasone-umeclidin-vilanter (TRELEGY ELLIPTA) 100-62.5-25 mcg DsDv INHALE 1 PUFF ONE TIME DAILY 180 each 10    furosemide (LASIX) 40 MG tablet Take 20 mg by mouth once daily.      guaiFENesin (MUCINEX) 600 mg 12 hr tablet Take 2 tablets (1,200 mg total) by mouth 2 (two) times daily. 20 tablet 0    hydroCHLOROthiazide (HYDRODIURIL) 25 MG tablet Take 25 mg by mouth.      Lactobacillus rhamnosus GG (CULTURELLE) 10 billion cell capsule Take 1 capsule by mouth.      levothyroxine (SYNTHROID) 75 MCG tablet Take 1 tablet (75 mcg total) by mouth before breakfast. 90 tablet 3    magnesium gluconate 27.5 mg magne- sium (500 mg) Tab Take 1 tablet by mouth once.      melatonin 10 mg Tab Take 1 tablet by mouth nightly as needed.      metoprolol succinate (TOPROL-XL) 100 MG 24 hr tablet Take 1 tablet by mouth 2 (two) times daily.      montelukast (SINGULAIR) 10 mg tablet TAKE 1 TABLET EVERY EVENING 90 tablet 3    montelukast (SINGULAIR) 10 mg tablet Take 1 tablet (10 mg total) by mouth every evening. 90 tablet 6    multivitamin (THERAGRAN) per tablet Take 1 tablet by mouth once daily.      omeprazole (PRILOSEC) 40 MG capsule Take 1 capsule (40 mg total) by mouth 2 (two) times daily before meals. 60 capsule 3    potassium chloride (MICRO-K) 10 MEQ CpSR Take 10 mEq by mouth.      rosuvastatin (CRESTOR) 10 MG tablet Take 1 tablet (10 mg total) by mouth every other day. 45 tablet 1    spironolactone (ALDACTONE) 25 MG tablet Take 1 tablet by mouth once daily.      tamsulosin (FLOMAX) 0.4 mg Cap Take 1 tablet by mouth Daily.      tirzepatide, weight loss, (ZEPBOUND) 2.5 mg/0.5 mL PnIj Inject 2.5 mg into the skin every 7 days. 2 mL 0    vitamin E 1000 UNIT capsule Take 1,000 Units by mouth once daily.       No current facility-administered medications for this visit.    [2]   Social History  Socioeconomic History    Marital status:    Occupational History    Occupation: retired captain    Tobacco Use    Smoking status: Former     Current packs/day: 0.00     Average packs/day: 2.0 packs/day for 20.0 years (40.0 ttl pk-yrs)     Types: Cigarettes     Start date: 1972     Quit date: 1988     Years since quittin.7     Passive exposure: Past    Smokeless tobacco: Never    Tobacco comments:     Quit smoking in    Substance and Sexual Activity    Alcohol use: No    Drug use: No    Sexual activity: Never   Social History Narrative    Live with wife     Social Drivers of Health     Financial Resource Strain: Low Risk  (3/7/2022)    Overall Financial Resource Strain (CARDIA)     Difficulty of Paying Living Expenses: Not hard at all   Food Insecurity: No Food Insecurity (3/7/2022)    Hunger Vital Sign     Worried About Running Out of Food in the Last Year: Never true     Ran Out of Food in the Last Year: Never true   Transportation Needs: No Transportation Needs (3/7/2022)    PRAPARE - Transportation     Lack of Transportation (Medical): No     Lack of Transportation (Non-Medical): No   Physical Activity: Insufficiently Active (3/7/2022)    Exercise Vital Sign     Days of Exercise per Week: 3 days     Minutes of Exercise per Session: 20 min   Stress: No Stress Concern Present (3/7/2022)    [employer] - Occupational Stress Questionnaire     Feeling of Stress : Only a little

## 2025-06-08 ENCOUNTER — PATIENT MESSAGE (OUTPATIENT)
Dept: FAMILY MEDICINE | Facility: CLINIC | Age: 75
End: 2025-06-08
Payer: MEDICARE

## 2025-06-08 DIAGNOSIS — E66.813 CLASS 3 SEVERE OBESITY DUE TO EXCESS CALORIES WITH SERIOUS COMORBIDITY AND BODY MASS INDEX (BMI) OF 40.0 TO 44.9 IN ADULT: ICD-10-CM

## 2025-06-08 DIAGNOSIS — E66.01 CLASS 3 SEVERE OBESITY DUE TO EXCESS CALORIES WITH SERIOUS COMORBIDITY AND BODY MASS INDEX (BMI) OF 40.0 TO 44.9 IN ADULT: ICD-10-CM

## 2025-06-08 DIAGNOSIS — G47.33 OSA ON CPAP: ICD-10-CM

## 2025-06-09 RX ORDER — TIRZEPATIDE 2.5 MG/.5ML
2.5 INJECTION, SOLUTION SUBCUTANEOUS
Qty: 2 ML | Refills: 0 | Status: SHIPPED | OUTPATIENT
Start: 2025-06-09

## 2025-06-09 NOTE — TELEPHONE ENCOUNTER
Patient message    Dr. Solis, I have recieved a letter from [facility] case # [id number] concerning our Appeal on authorization for Zepbound> they have reviewed our request and have decided to approve it. Here is the authorization # they have sent me, [id number] for Zepbound. Will I be starting on a low dose to start? Can you please send the prescription to [facility] AT [address] [place] [address]. Thank you for your help and look foward to hearing from you. Nico Teague, 1950.

## 2025-06-30 ENCOUNTER — OFFICE VISIT (OUTPATIENT)
Dept: FAMILY MEDICINE | Facility: CLINIC | Age: 75
End: 2025-06-30
Payer: MEDICARE

## 2025-06-30 VITALS
SYSTOLIC BLOOD PRESSURE: 118 MMHG | WEIGHT: 266.75 LBS | HEART RATE: 66 BPM | BODY MASS INDEX: 40.56 KG/M2 | DIASTOLIC BLOOD PRESSURE: 76 MMHG | TEMPERATURE: 98 F | OXYGEN SATURATION: 96 %

## 2025-06-30 DIAGNOSIS — J01.90 ACUTE NON-RECURRENT SINUSITIS, UNSPECIFIED LOCATION: Primary | ICD-10-CM

## 2025-06-30 DIAGNOSIS — E66.01 CLASS 3 SEVERE OBESITY DUE TO EXCESS CALORIES WITH SERIOUS COMORBIDITY AND BODY MASS INDEX (BMI) OF 40.0 TO 44.9 IN ADULT: ICD-10-CM

## 2025-06-30 DIAGNOSIS — E66.813 CLASS 3 SEVERE OBESITY DUE TO EXCESS CALORIES WITH SERIOUS COMORBIDITY AND BODY MASS INDEX (BMI) OF 40.0 TO 44.9 IN ADULT: ICD-10-CM

## 2025-06-30 DIAGNOSIS — R05.1 ACUTE COUGH: ICD-10-CM

## 2025-06-30 DIAGNOSIS — J45.41 MODERATE PERSISTENT ASTHMA WITH ACUTE EXACERBATION: ICD-10-CM

## 2025-06-30 DIAGNOSIS — J30.9 ALLERGIC RHINITIS, UNSPECIFIED SEASONALITY, UNSPECIFIED TRIGGER: ICD-10-CM

## 2025-06-30 PROCEDURE — 99214 OFFICE O/P EST MOD 30 MIN: CPT | Mod: S$GLB,,, | Performed by: NURSE PRACTITIONER

## 2025-06-30 PROCEDURE — G2211 COMPLEX E/M VISIT ADD ON: HCPCS | Mod: 95,S$GLB,, | Performed by: NURSE PRACTITIONER

## 2025-06-30 PROCEDURE — 3288F FALL RISK ASSESSMENT DOCD: CPT | Mod: CPTII,S$GLB,, | Performed by: NURSE PRACTITIONER

## 2025-06-30 PROCEDURE — 1101F PT FALLS ASSESS-DOCD LE1/YR: CPT | Mod: CPTII,S$GLB,, | Performed by: NURSE PRACTITIONER

## 2025-06-30 PROCEDURE — 3008F BODY MASS INDEX DOCD: CPT | Mod: CPTII,S$GLB,, | Performed by: NURSE PRACTITIONER

## 2025-06-30 PROCEDURE — 3078F DIAST BP <80 MM HG: CPT | Mod: CPTII,S$GLB,, | Performed by: NURSE PRACTITIONER

## 2025-06-30 PROCEDURE — 3074F SYST BP LT 130 MM HG: CPT | Mod: CPTII,S$GLB,, | Performed by: NURSE PRACTITIONER

## 2025-06-30 PROCEDURE — 1160F RVW MEDS BY RX/DR IN RCRD: CPT | Mod: CPTII,S$GLB,, | Performed by: NURSE PRACTITIONER

## 2025-06-30 PROCEDURE — 99999 PR PBB SHADOW E&M-EST. PATIENT-LVL V: CPT | Mod: PBBFAC,,, | Performed by: NURSE PRACTITIONER

## 2025-06-30 PROCEDURE — 4010F ACE/ARB THERAPY RXD/TAKEN: CPT | Mod: CPTII,S$GLB,, | Performed by: NURSE PRACTITIONER

## 2025-06-30 PROCEDURE — 1159F MED LIST DOCD IN RCRD: CPT | Mod: CPTII,S$GLB,, | Performed by: NURSE PRACTITIONER

## 2025-06-30 RX ORDER — TIRZEPATIDE 5 MG/.5ML
5 INJECTION, SOLUTION SUBCUTANEOUS
Qty: 2 ML | Refills: 3 | Status: SHIPPED | OUTPATIENT
Start: 2025-06-30 | End: 2025-06-30

## 2025-06-30 RX ORDER — DOXYCYCLINE HYCLATE 100 MG
100 TABLET ORAL 2 TIMES DAILY
Qty: 14 TABLET | Refills: 0 | Status: SHIPPED | OUTPATIENT
Start: 2025-06-30 | End: 2025-07-07

## 2025-06-30 RX ORDER — TIRZEPATIDE 5 MG/.5ML
5 INJECTION, SOLUTION SUBCUTANEOUS
Qty: 2 ML | Refills: 3 | Status: SHIPPED | OUTPATIENT
Start: 2025-06-30

## 2025-06-30 RX ORDER — PREDNISONE 20 MG/1
40 TABLET ORAL DAILY
Qty: 6 TABLET | Refills: 0 | Status: SHIPPED | OUTPATIENT
Start: 2025-06-30 | End: 2025-07-03

## 2025-06-30 NOTE — PROGRESS NOTES
SUBJECTIVE:      Patient ID: Nico Teague is a 74 y.o. male.    Chief Complaint: sick    History of Present Illness    CHIEF COMPLAINT:  Mr. Teague presents with symptoms of a cold, including sinus drainage and cough, that have been ongoing for about a week or slightly longer.    HPI:  Mr. Teague reports cold symptoms for approximately 1 week or longer. Initially, he had chest symptoms, which have since improved. His main complaint is persistent and significant sinus drainage with slight sinus pressure.    He has a productive cough that worsens at night when supine and in the morning. He has longstanding shortness of breath, which has not changed or worsened with the current illness.    To manage symptoms, he has been taking Mucinex, which appears to be effective. He also uses Flonase nasal spray and takes Singulair daily in the evening, both long-term medications. He attempted to schedule an appointment last week but was unsuccessful.    His wife has also been ill with a cold. He denies any recent travel. He reports slightly improved energy levels as the condition seems to be improving.    He denies fever, chest pain, and worsening reflux.       Review of Systems   Constitutional:  Negative for activity change, appetite change, chills, diaphoresis, fatigue, fever and unexpected weight change.   HENT:  Positive for congestion, postnasal drip and sinus pressure. Negative for ear pain, sore throat, trouble swallowing and voice change.    Eyes:  Negative for pain, discharge and visual disturbance.   Respiratory:  Positive for cough and shortness of breath. Negative for chest tightness and wheezing.    Cardiovascular:  Negative for chest pain and palpitations.   Gastrointestinal:  Negative for abdominal pain, constipation, diarrhea, nausea and vomiting.   Genitourinary:  Negative for difficulty urinating, flank pain, frequency and urgency.   Musculoskeletal:  Negative for back pain and joint swelling.   Skin:   Negative for color change and rash.   Neurological:  Negative for dizziness, seizures, syncope, weakness, numbness and headaches.   Hematological:  Negative for adenopathy.   Psychiatric/Behavioral:  Negative for dysphoric mood and sleep disturbance. The patient is not nervous/anxious.        Family History   Problem Relation Name Age of Onset    Cancer Mother          esophageal    Heart disease Father      Hypertension Father      Arthritis Paternal Grandmother        Social History     Socioeconomic History    Marital status:    Occupational History    Occupation: retired captain    Tobacco Use    Smoking status: Former     Current packs/day: 0.00     Average packs/day: 2.0 packs/day for 20.0 years (40.0 ttl pk-yrs)     Types: Cigarettes     Start date: 1972     Quit date: 1988     Years since quittin.8     Passive exposure: Past    Smokeless tobacco: Never    Tobacco comments:     Quit smoking in    Substance and Sexual Activity    Alcohol use: No    Drug use: No    Sexual activity: Never   Social History Narrative    Live with wife     Social Drivers of Health     Financial Resource Strain: Low Risk  (3/7/2022)    Overall Financial Resource Strain (CARDIA)     Difficulty of Paying Living Expenses: Not hard at all   Food Insecurity: No Food Insecurity (3/7/2022)    Hunger Vital Sign     Worried About Running Out of Food in the Last Year: Never true     Ran Out of Food in the Last Year: Never true   Transportation Needs: No Transportation Needs (3/7/2022)    PRAPARE - Transportation     Lack of Transportation (Medical): No     Lack of Transportation (Non-Medical): No   Physical Activity: Insufficiently Active (3/7/2022)    Exercise Vital Sign     Days of Exercise per Week: 3 days     Minutes of Exercise per Session: 20 min   Stress: No Stress Concern Present (3/7/2022)    [employer] - Occupational Stress Questionnaire     Feeling of Stress : Only a little      Current Outpatient Medications   Medication Sig Note    albuterol (VENTOLIN HFA) 90 mcg/actuation inhaler Inhale 2 puffs into the lungs every 6 (six) hours as needed for Wheezing. Rescue     allopurinoL (ZYLOPRIM) 300 MG tablet TAKE 1 TABLET (300 MG TOTAL) BY  MOUTH ONCE DAILY.     amiodarone (PACERONE) 200 MG Tab Take 200 mg by mouth 2 (two) times daily.     ascorbic acid, vitamin C, (VITAMIN C) 1000 MG tablet Take 1,000 mg by mouth once daily.     apixaban (ELIQUIS) 5 mg Tab Take 5 mg by mouth 2 (two) times daily. 1/22/2024: Holding prior to surg     aspirin 81 MG Chew Take 81 mg by mouth every 7 days. 1/22/2024: Holding until after surg     cholecalciferol, vitamin D3, (VITAMIN D3) 25 mcg (1,000 unit) capsule Take 1,000 Units by mouth once daily.     co-enzyme Q-10 50 mg capsule Take 50 mg by mouth once daily.     doxycycline (VIBRA-TABS) 100 MG tablet Take 1 tablet (100 mg total) by mouth 2 (two) times daily. for 7 days     empagliflozin (JARDIANCE) 10 mg tablet Take 1 tablet by mouth once daily.     ENTRESTO  mg per tablet Take 1 tablet by mouth 2 (two) times daily.     fluticasone-umeclidin-vilanter (TRELEGY ELLIPTA) 100-62.5-25 mcg DsDv INHALE 1 PUFF ONE TIME DAILY     furosemide (LASIX) 40 MG tablet Take 20 mg by mouth once daily.     guaiFENesin (MUCINEX) 600 mg 12 hr tablet Take 2 tablets (1,200 mg total) by mouth 2 (two) times daily.     hydroCHLOROthiazide (HYDRODIURIL) 25 MG tablet Take 25 mg by mouth.     Lactobacillus rhamnosus GG (CULTURELLE) 10 billion cell capsule Take 1 capsule by mouth.     levothyroxine (SYNTHROID) 75 MCG tablet Take 1 tablet (75 mcg total) by mouth before breakfast.     magnesium gluconate 27.5 mg magne- sium (500 mg) Tab Take 1 tablet by mouth once.     melatonin 10 mg Tab Take 1 tablet by mouth nightly as needed.     metoprolol succinate (TOPROL-XL) 100 MG 24 hr tablet Take 1 tablet by mouth 2 (two) times daily.     montelukast (SINGULAIR) 10 mg tablet TAKE 1 TABLET  EVERY EVENING     multivitamin (THERAGRAN) per tablet Take 1 tablet by mouth once daily.     omeprazole (PRILOSEC) 40 MG capsule Take 1 capsule (40 mg total) by mouth 2 (two) times daily before meals.     potassium chloride (MICRO-K) 10 MEQ CpSR Take 10 mEq by mouth.     predniSONE (DELTASONE) 20 MG tablet Take 2 tablets (40 mg total) by mouth once daily. for 3 days     rosuvastatin (CRESTOR) 10 MG tablet Take 1 tablet (10 mg total) by mouth every other day.     spironolactone (ALDACTONE) 25 MG tablet Take 1 tablet by mouth once daily.     tamsulosin (FLOMAX) 0.4 mg Cap Take 1 tablet by mouth Daily.     tirzepatide, weight loss, (ZEPBOUND) 5 mg/0.5 mL PnIj Inject 5 mg into the skin every 7 days.     vitamin E 1000 UNIT capsule Take 1,000 Units by mouth once daily.    Last reviewed on 6/30/2025  9:18 AM by Venkata Solis FNP-C    Review of patient's allergies indicates:   Allergen Reactions    Bactrim [sulfamethoxazole-trimethoprim]      HIVES/ RASH    Tea (rashida sinensis) Hives     Pt is has allergies to black tea. Not an option under search.      Past Medical History:   Diagnosis Date    Allergy     Arthritis     Asthma     Atrial fibrillation     Basal cell carcinoma     CHF (congestive heart failure)     Fever blister     Hypertension     Hypothyroidism (acquired) 07/31/2023    Joint pain     Melanoma     BANDAR on CPAP     PVC (premature ventricular contraction)     Skin disease     Squamous cell carcinoma      Past Surgical History:   Procedure Laterality Date    BRONCHOSCOPY N/A 07/18/2022    Procedure: Bronchoscopy;  Surgeon: Charlee Brewer MD;  Location: [facility];  Service: Pulmonary;  Laterality: N/A;  monday 7/18/22 at 0830 look see bronch no biopsy    CARDIAC CATHETERIZATION  05/25/2021    COLONOSCOPY N/A 1/25/2024    Procedure: COLONOSCOPY;  Surgeon: Emmanuel Deleon MD;  Location: [facility];  Service: Endoscopy;  Laterality: N/A;    ESOPHAGOGASTRODUODENOSCOPY N/A 07/07/2022     Procedure: EGD (ESOPHAGOGASTRODUODENOSCOPY);  Surgeon: Emmanuel Deleon MD;  Location: [facility];  Service: Endoscopy;  Laterality: N/A;    EYE SURGERY Left 12/03/2019    EYE SURGERY Right 01/28/2020    FRACTURE SURGERY      INSERTION OF PACEMAKER  11/2023    LIPOMA RESECTION      NOSE SURGERY      SHOULDER ARTHROSCOPY      SKIN BIOPSY      SKIN CANCER EXCISION      Moh's x 2    SKIN GRAFT            OBJECTIVE:      Vitals:    06/30/25 0858   BP: 118/76   Patient Position: Sitting   Pulse: 66   Temp: 98 °F (36.7 °C)   SpO2: 96%   Weight: 121 kg (266 lb 12.1 oz)     Physical Exam  Vitals and nursing note reviewed.   Constitutional:       General: He is awake. He is not in acute distress.     Appearance: He is well-developed and well-groomed. He is morbidly obese. He is not ill-appearing, toxic-appearing or diaphoretic.   HENT:      Head: Normocephalic and atraumatic.      Right Ear: Tympanic membrane, ear canal and external ear normal.      Left Ear: Tympanic membrane, ear canal and external ear normal.      Nose: Nose normal.      Right Sinus: No maxillary sinus tenderness or frontal sinus tenderness.      Left Sinus: No maxillary sinus tenderness or frontal sinus tenderness.   Eyes:      General: Lids are normal. Gaze aligned appropriately.      Conjunctiva/sclera: Conjunctivae normal.      Right eye: Right conjunctiva is not injected.      Left eye: Left conjunctiva is not injected.      Pupils: Pupils are equal, round, and reactive to light.   Cardiovascular:      Rate and Rhythm: Normal rate and regular rhythm.      Pulses: Normal pulses.      Heart sounds: Normal heart sounds, S1 normal and S2 normal. No murmur heard.     No friction rub. No gallop.   Pulmonary:      Effort: Pulmonary effort is normal. No respiratory distress.      Breath sounds: Normal breath sounds. No stridor. No decreased breath sounds, wheezing, rhonchi or rales.   Chest:      Chest wall: No tenderness.   Musculoskeletal:      Cervical  back: Neck supple.      Right lower leg: No edema.      Left lower leg: No edema.   Lymphadenopathy:      Cervical: No cervical adenopathy.   Skin:     General: Skin is warm and dry.      Capillary Refill: Capillary refill takes less than 2 seconds.      Findings: No erythema or rash.   Neurological:      Mental Status: He is alert and oriented to person, place, and time. Mental status is at baseline.   Psychiatric:         Attention and Perception: Attention normal.         Mood and Affect: Mood normal.         Speech: Speech normal.         Behavior: Behavior normal. Behavior is cooperative.         Thought Content: Thought content normal.         Judgment: Judgment normal.       Hospital Outpatient Visit on 05/13/2025   Component Date Value Ref Range Status    Pre FVC 05/13/2025 3.10  2.82 - 4.87 L Preliminary    Pre FEV1 05/13/2025 2.42  2.06 - 3.67 L Preliminary    Pre FEV1 FVC 05/13/2025 78.21  61.86 - 88.26 % Preliminary    Pre FEF 25 75 05/13/2025 2.15  1.20 - 4.62 L/s Preliminary    Pre PEF 05/13/2025 8.91  5.42 - 9.79 L/s Preliminary    Pre  05/13/2025 6.66  sec Preliminary    Pre DLCO 05/13/2025 15.50 (L)  17.98 - 31.83 ml/(min*mmHg) Preliminary    DLCOVA PRE 05/13/2025 3.47  2.49 - 4.92 ml/(min*mmHg*L) Preliminary    VA PRE 05/13/2025 4.47 (L)  6.57 - 6.57 L Preliminary    IVC PRE 05/13/2025 2.63 (L)  2.82 - 4.87 L Preliminary    TLCN2 PRE 05/13/2025 4.81 (L)  5.57 - 7.87 L Preliminary    VCMAXN2 PRE 05/13/2025 3.19  2.82 - 4.87 L Preliminary    Pre FRC N2 05/13/2025 1.62 (L)  2.62 - 4.60 L Preliminary    ERVN2 PRE 05/13/2025 0.00  -75022.03 - 73126.97 L Preliminary    RVN2 PRE 05/13/2025 1.62 (L)  1.96 - 3.31 L Preliminary    FMR9NATK1 PRE 05/13/2025 33.74  33.45 - 51.41 % Preliminary    FVC Ref 05/13/2025 3.84   Preliminary    FVC LLN 05/13/2025 2.82   Preliminary    FVC Pre Ref 05/13/2025 80.8  % Preliminary    FEV1 Ref 05/13/2025 2.89   Preliminary    FEV1 LLN 05/13/2025 2.06   Preliminary     FEV1 Pre Ref 05/13/2025 83.7  % Preliminary    FEV1 FVC Ref 05/13/2025 76   Preliminary    FEV1 FVC LLN 05/13/2025 62   Preliminary    FEV1 FVC Pre Ref 05/13/2025 103.2  % Preliminary    FEF 25 75 Ref 05/13/2025 2.91   Preliminary    FEF 25 75 LLN 05/13/2025 1.20   Preliminary    FEF 25 75 Pre Ref 05/13/2025 73.9  % Preliminary    PEF Ref 05/13/2025 7.60   Preliminary    PEF LLN 05/13/2025 5.42   Preliminary    PEF Pre Ref 05/13/2025 117.1  % Preliminary    TLCN2 Ref 05/13/2025 6.72   Preliminary    TLCN2 LLN 05/13/2025 5.57   Preliminary    TLCN2 Pre Ref 05/13/2025 71.5  % Preliminary    VCMAXN2 Ref 05/13/2025 3.84   Preliminary    VCMAXN2 LLN 05/13/2025 2.82   Preliminary    VCMAXN2 Pre Ref 05/13/2025 83.0  % Preliminary    FRCN2 Ref 05/13/2025 3.61   Preliminary    FRCN2 LLN 05/13/2025 2.62   Preliminary    FRCN2 Pre Ref 05/13/2025 44.9  % Preliminary    ERVN2 Ref 05/13/2025 0.97   Preliminary    ERVN2 LLN 05/13/2025 -68986.03   Preliminary    ERVN2 Pre Ref 05/13/2025 0.0  % Preliminary    RVN2 Ref 05/13/2025 2.64   Preliminary    RVN2 LLN 05/13/2025 1.96   Preliminary    RVN2 Pre Ref 05/13/2025 61.5  % Preliminary    FRT0RFOD4 Ref 05/13/2025 42.43   Preliminary    IBU1VULW2 LLN 05/13/2025 33.45   Preliminary    VZQ4NFOG3 Pre Ref 05/13/2025 79.5  % Preliminary    DLCO Single Breath Ref 05/13/2025 24.91   Preliminary    DLCO Single Breath LLN 05/13/2025 17.98   Preliminary    DLCO Single Breath Pre Ref 05/13/2025 62.3  % Preliminary    DLCOc Single Breath Ref 05/13/2025 24.91   Preliminary    DLCOc Single Breath LLN 05/13/2025 17.98   Preliminary    DLCOVA Ref 05/13/2025 3.71   Preliminary    DLCOVA LLN 05/13/2025 2.49   Preliminary    DLCOVA Pre Ref 05/13/2025 93.6  % Preliminary    DLCOc SBVA Ref 05/13/2025 3.71   Preliminary    DLCOc SBVA LLN 05/13/2025 2.49   Preliminary    VA Single Breath Ref 05/13/2025 6.57   Preliminary    VA Single Breath LLN 05/13/2025 6.57   Preliminary    VA Single Breath Pre Ref  05/13/2025 68.1  % Preliminary    IVC Single Breath Ref 05/13/2025 3.84   Preliminary    IVC Single Breath LLN 05/13/2025 2.82   Preliminary    IVC Single Breath Pre Ref 05/13/2025 68.6  % Preliminary   Orders Only on 02/11/2025   Component Date Value Ref Range Status    TSH 03/25/2025 2.18  0.40 - 4.50 mIU/L Final    T4, Free 03/25/2025 1.3  0.8 - 1.8 ng/dL Final   Office Visit on 02/05/2025   Component Date Value Ref Range Status    WBC 02/10/2025 7.6  3.8 - 10.8 Thousand/uL Final    RBC 02/10/2025 4.73  4.20 - 5.80 Million/uL Final    Hemoglobin 02/10/2025 14.3  13.2 - 17.1 g/dL Final    Hematocrit 02/10/2025 44.8  38.5 - 50.0 % Final    MCV 02/10/2025 94.7  80.0 - 100.0 fL Final    MCH 02/10/2025 30.2  27.0 - 33.0 pg Final    MCHC 02/10/2025 31.9 (L)  32.0 - 36.0 g/dL Final    Comment: For adults, a slight decrease in the calculated MCHC  value (in the range of 30 to 32 g/dL) is most likely  not clinically significant; however, it should be  interpreted with caution in correlation with other  red cell parameters and the patient's clinical  condition.      RDW 02/10/2025 12.0  11.0 - 15.0 % Final    Platelets 02/10/2025 182  140 - 400 Thousand/uL Final    MPV 02/10/2025 11.4  7.5 - 12.5 fL Final    Neutrophils, Abs 02/10/2025 5,001  1,500 - 7,800 cells/uL Final    Lymph # 02/10/2025 1,718  850 - 3,900 cells/uL Final    Mono # 02/10/2025 714  200 - 950 cells/uL Final    Eos # 02/10/2025 129  15 - 500 cells/uL Final    Baso # 02/10/2025 38  0 - 200 cells/uL Final    Neutrophils Relative 02/10/2025 65.8  % Final    Lymph % 02/10/2025 22.6  % Final    Mono % 02/10/2025 9.4  % Final    Eosinophil % 02/10/2025 1.7  % Final    Basophil % 02/10/2025 0.5  % Final    Glucose 02/10/2025 84  65 - 99 mg/dL Final    Comment:               Fasting reference interval         BUN 02/10/2025 23  7 - 25 mg/dL Final    Creatinine 02/10/2025 1.30 (H)  0.70 - 1.28 mg/dL Final    eGFR 02/10/2025 58 (L)  > OR = 60 mL/min/1.73m2 Final     BUN/Creatinine Ratio 02/10/2025 18  6 - 22 (calc) Final    Sodium 02/10/2025 141  135 - 146 mmol/L Final    Potassium 02/10/2025 4.9  3.5 - 5.3 mmol/L Final    Chloride 02/10/2025 106  98 - 110 mmol/L Final    CO2 02/10/2025 27  20 - 32 mmol/L Final    Calcium 02/10/2025 9.1  8.6 - 10.3 mg/dL Final    Total Protein 02/10/2025 6.4  6.1 - 8.1 g/dL Final    Albumin 02/10/2025 4.2  3.6 - 5.1 g/dL Final    Globulin, Total 02/10/2025 2.2  1.9 - 3.7 g/dL (calc) Final    Albumin/Globulin Ratio 02/10/2025 1.9  1.0 - 2.5 (calc) Final    Total Bilirubin 02/10/2025 0.5  0.2 - 1.2 mg/dL Final    Alkaline Phosphatase 02/10/2025 108  35 - 144 U/L Final    AST 02/10/2025 18  10 - 35 U/L Final    ALT 02/10/2025 20  9 - 46 U/L Final    Cholesterol 02/10/2025 144  <200 mg/dL Final    HDL 02/10/2025 43  > OR = 40 mg/dL Final    Triglycerides 02/10/2025 118  <150 mg/dL Final    LDL Cholesterol 02/10/2025 80  mg/dL (calc) Final    Comment: Reference range: <100     Desirable range <100 mg/dL for primary prevention;    <70 mg/dL for patients with CHD or diabetic patients   with > or = 2 CHD risk factors.     LDL-C is now calculated using the Kevin-Shannon   calculation, which is a validated novel method providing   better accuracy than the Friedewald equation in the   estimation of LDL-C.   Kevin BANG et al. NICOLE. 2013;310(19): 2692-3979   (http://education.[employer]/faq/JHG838)      HDL/Cholesterol Ratio 02/10/2025 3.3  <5.0 (calc) Final    Non HDL Chol. (LDL+VLDL) 02/10/2025 101  <130 mg/dL (calc) Final    Comment: For patients with diabetes plus 1 major ASCVD risk   factor, treating to a non-HDL-C goal of <100 mg/dL   (LDL-C of <70 mg/dL) is considered a therapeutic   option.      TSH 02/10/2025 0.08 (L)  0.40 - 4.50 mIU/L Final    T4, Free 02/10/2025 1.7  0.8 - 1.8 ng/dL Final    Uric Acid 02/10/2025 4.6  4.0 - 8.0 mg/dL Final    Comment: Therapeutic target for gout patients: <6.0 mg/dL                Assessment:       1.  Acute non-recurrent sinusitis, unspecified location    2. Moderate persistent asthma with acute exacerbation    3. Acute cough    4. Allergic rhinitis, unspecified seasonality, unspecified trigger    5. Class 3 severe obesity due to excess calories with serious comorbidity and body mass index (BMI) of 40.0 to 44.9 in adult        Plan:       Assessment & Plan    PLAN SUMMARY:  - Continue Flonase (fluticasone) nasal spray daily  - Initiate 3-day course of prednisone for potential asthma exacerbation  - Prescribe doxycycline 100 mg twice daily for 7 days  - Increase Zepbound (tirzepatide) to next dose level  - Continue Singulair (montelukast) daily in the evening  - Recommend OTC antihistamines (Claritin or Zyrtec) for sinus symptoms  - Continue Mucinex as needed for congestion  - Follow up in 2 months for weight check    ACUTE SINUSITIS:  - Mr. Teague reports improvement in symptoms with increased energy, though still experiencing sinus drainage, rhinorrhea, and mild sinus pressure.  - Prescribed doxycycline 100 mg twice daily for 7 days due to duration of symptoms and history of asthma; advised to limit sun exposure.  - Continue Flonase (fluticasone) nasal spray daily.  - Recommend OTC antihistamines (Claritin or Zyrtec) to help dry up sinuses and continue Mucinex as needed for congestion.  - Discussed possibility of reflux as a contributing factor to chronic sinusitis symptoms.    ASTHMA MANAGEMENT:  - Initiated 3-day course of prednisone to address potential exacerbation of asthma symptoms.  - Continue Singulair (montelukast) daily in the evening.  - Continued Trelegy.    OBESITY:  - Patient finished Zepbound 2.5 mg weekly x4 weeks.  - Tolerating medication well without side effects.  - Patient has lost 4 lbs.  - Zepbound increased to 5 mg weekly.   - Will recheck weight in 2 months.     MEDICATION MANAGEMENT:  - Increased Zepbound (tirzepatide) to the next dose level.  - Discussed potential side effects, noting no  direct link to sinus symptoms.    FOLLOW-UP:  - Follow up in 2 months for weight check.        Acute non-recurrent sinusitis, unspecified location  -     predniSONE (DELTASONE) 20 MG tablet; Take 2 tablets (40 mg total) by mouth once daily. for 3 days  Dispense: 6 tablet; Refill: 0  -     doxycycline (VIBRA-TABS) 100 MG tablet; Take 1 tablet (100 mg total) by mouth 2 (two) times daily. for 7 days  Dispense: 14 tablet; Refill: 0    Moderate persistent asthma with acute exacerbation  -     predniSONE (DELTASONE) 20 MG tablet; Take 2 tablets (40 mg total) by mouth once daily. for 3 days  Dispense: 6 tablet; Refill: 0  -     doxycycline (VIBRA-TABS) 100 MG tablet; Take 1 tablet (100 mg total) by mouth 2 (two) times daily. for 7 days  Dispense: 14 tablet; Refill: 0    Acute cough    Allergic rhinitis, unspecified seasonality, unspecified trigger    Class 3 severe obesity due to excess calories with serious comorbidity and body mass index (BMI) of 40.0 to 44.9 in adult  -     Discontinue: tirzepatide, weight loss, (ZEPBOUND) 5 mg/0.5 mL PnIj; Inject 5 mg into the skin every 7 days.  Dispense: 2 mL; Refill: 3  -     tirzepatide, weight loss, (ZEPBOUND) 5 mg/0.5 mL PnIj; Inject 5 mg into the skin every 7 days.  Dispense: 2 mL; Refill: 3    I spent a total of 16 minutes on the day of the visit.This includes face to face time and non-face to face time preparing to see the patient (eg, review of tests), obtaining and/or reviewing separately obtained history, documenting clinical information in the electronic or other health record, independently interpreting results and communicating results to the patient/family/caregiver, or care coordinator.    Follow up if symptoms worsen or fail to improve.          6/30/2025 CONCHITA Ruiz, TRISH    This note was generated with the assistance of ambient listening technology. Verbal consent was obtained by the patient and accompanying visitor(s) for the recording of patient  appointment to facilitate this note. I attest to having reviewed and edited the generated note for accuracy, though some syntax or spelling errors may persist. Please contact the author of this note for any clarification.

## 2025-07-11 DIAGNOSIS — D33.2 BENIGN NEOPLASM OF BRAIN, UNSPECIFIED: Primary | ICD-10-CM

## 2025-07-14 ENCOUNTER — PATIENT MESSAGE (OUTPATIENT)
Dept: FAMILY MEDICINE | Facility: CLINIC | Age: 75
End: 2025-07-14
Payer: MEDICARE

## 2025-07-15 ENCOUNTER — OFFICE VISIT (OUTPATIENT)
Dept: FAMILY MEDICINE | Facility: CLINIC | Age: 75
End: 2025-07-15
Payer: MEDICARE

## 2025-07-15 VITALS
OXYGEN SATURATION: 95 % | HEART RATE: 88 BPM | WEIGHT: 261.94 LBS | TEMPERATURE: 98 F | DIASTOLIC BLOOD PRESSURE: 74 MMHG | SYSTOLIC BLOOD PRESSURE: 138 MMHG | BODY MASS INDEX: 39.82 KG/M2

## 2025-07-15 DIAGNOSIS — N18.31 STAGE 3A CHRONIC KIDNEY DISEASE: ICD-10-CM

## 2025-07-15 DIAGNOSIS — I11.0 HYPERTENSIVE HEART DISEASE WITH HEART FAILURE: ICD-10-CM

## 2025-07-15 DIAGNOSIS — S06.6X0A SUBARACHNOID HEMORRHAGE FOLLOWING INJURY, NO LOSS OF CONSCIOUSNESS, INITIAL ENCOUNTER: Primary | ICD-10-CM

## 2025-07-15 DIAGNOSIS — I48.91 ATRIAL FIBRILLATION, UNSPECIFIED TYPE: ICD-10-CM

## 2025-07-15 DIAGNOSIS — I10 PRIMARY HYPERTENSION: ICD-10-CM

## 2025-07-15 DIAGNOSIS — R26.89 IMBALANCE: ICD-10-CM

## 2025-07-15 DIAGNOSIS — M43.02 CERVICAL SPONDYLOLYSIS: ICD-10-CM

## 2025-07-15 DIAGNOSIS — Z79.01 LONG TERM CURRENT USE OF ANTICOAGULANT: ICD-10-CM

## 2025-07-15 DIAGNOSIS — Z99.89 DEPENDENCE ON OTHER ENABLING MACHINES AND DEVICES: ICD-10-CM

## 2025-07-15 DIAGNOSIS — H53.2 DOUBLE VISION: ICD-10-CM

## 2025-07-15 PROBLEM — M06.9 RHEUMATOID ARTHRITIS, INVOLVING UNSPECIFIED SITE, UNSPECIFIED WHETHER RHEUMATOID FACTOR PRESENT: Status: RESOLVED | Noted: 2025-07-15 | Resolved: 2025-07-15

## 2025-07-15 PROBLEM — M06.9 RHEUMATOID ARTHRITIS, INVOLVING UNSPECIFIED SITE, UNSPECIFIED WHETHER RHEUMATOID FACTOR PRESENT: Status: ACTIVE | Noted: 2025-07-15

## 2025-07-15 PROCEDURE — 3075F SYST BP GE 130 - 139MM HG: CPT | Mod: CPTII,S$GLB,, | Performed by: NURSE PRACTITIONER

## 2025-07-15 PROCEDURE — 1125F AMNT PAIN NOTED PAIN PRSNT: CPT | Mod: CPTII,S$GLB,, | Performed by: NURSE PRACTITIONER

## 2025-07-15 PROCEDURE — 3008F BODY MASS INDEX DOCD: CPT | Mod: CPTII,S$GLB,, | Performed by: NURSE PRACTITIONER

## 2025-07-15 PROCEDURE — G2211 COMPLEX E/M VISIT ADD ON: HCPCS | Mod: S$GLB,,, | Performed by: NURSE PRACTITIONER

## 2025-07-15 PROCEDURE — 3078F DIAST BP <80 MM HG: CPT | Mod: CPTII,S$GLB,, | Performed by: NURSE PRACTITIONER

## 2025-07-15 PROCEDURE — 3288F FALL RISK ASSESSMENT DOCD: CPT | Mod: CPTII,S$GLB,, | Performed by: NURSE PRACTITIONER

## 2025-07-15 PROCEDURE — 1159F MED LIST DOCD IN RCRD: CPT | Mod: CPTII,S$GLB,, | Performed by: NURSE PRACTITIONER

## 2025-07-15 PROCEDURE — 99999 PR PBB SHADOW E&M-EST. PATIENT-LVL III: CPT | Mod: PBBFAC,,, | Performed by: NURSE PRACTITIONER

## 2025-07-15 PROCEDURE — 4010F ACE/ARB THERAPY RXD/TAKEN: CPT | Mod: CPTII,S$GLB,, | Performed by: NURSE PRACTITIONER

## 2025-07-15 PROCEDURE — 1100F PTFALLS ASSESS-DOCD GE2>/YR: CPT | Mod: CPTII,S$GLB,, | Performed by: NURSE PRACTITIONER

## 2025-07-15 PROCEDURE — 99215 OFFICE O/P EST HI 40 MIN: CPT | Mod: S$GLB,,, | Performed by: NURSE PRACTITIONER

## 2025-07-15 PROCEDURE — 1160F RVW MEDS BY RX/DR IN RCRD: CPT | Mod: CPTII,S$GLB,, | Performed by: NURSE PRACTITIONER

## 2025-07-15 NOTE — PROGRESS NOTES
SUBJECTIVE:      Patient ID: Nico Teague is a 74 y.o. male.    Chief Complaint: Follow-up    History of Present Illness    CHIEF COMPLAINT:  Mr. Teague presents for follow-up after a recent hospitalization for subarachnoid hemorrhage, with ongoing symptoms of double vision, dizziness, neck pain, and headaches.    HPI:  Mr. Teague was hospitalized on the 4th after falling due to dizziness when getting out of bed, resulting in a head injury. He was diagnosed with a subarachnoid hemorrhage. He reports ongoing double vision that began after the fall and persists when both eyes are open, with images appearing slanted. His dizziness continues, affecting balance and depth perception.    He has intermittent neck pain and sometimes wears a neck brace at night due to discomfort when lying on a pillow. Headaches occur intermittently. On the previous Saturday, he went to the emergency room due to severe headache pain, which caused significant blood pressure elevation. He received IV Tylenol and morphine for pain management.    Mr. Teague feels weaker than before the incident and now uses a walker to assist with balance. He is scheduled to see Dr. Aguilar, the neurosurgeon, for follow-up on the coming Monday, two weeks post-discharge. He also has an appointment with Dr. North, a cardiologist, on the 17th to discuss a potential Watchman procedure.    He resumed taking Eliquis (blood thinner) as of the previous Saturday. He has upcoming appointments with pulmonology on August 12th and another cardiologist, Dr. Rivera, on August 19th.    He denies having double vision prior to the fall.    MEDICATIONS:  Mr. Teague is on Eliquis, which was resumed on Saturday after a hospital stay. He is taking Tylenol as needed for headache management. Hydrocodone was prescribed after an ER visit but discontinued due to side effects including hallucinations and discomfort in bed. Eliquis was temporarily discontinued during the hospital stay but  resumed on Saturday.    MEDICAL HISTORY:  Mr. Teague has a history of recent subarachnoid hemorrhage. He also has chronic multiple multi-level degenerative changes in the neck.    IMAGING:  Mr. Teague underwent a CT Head on the 4th, which revealed a subarachnoid hemorrhage. A repeat CT Head during an ER visit showed the bleed was improving and resolving in some areas. After a fall, a CT Neck was performed, showing chronic multiple multi-level degenerative changes but no acute traumatic injury. Mr. Teague also had an MRI of the neck after the fall and multiple X-rays.        Review of Systems   Constitutional:  Negative for activity change, appetite change, chills, diaphoresis, fatigue, fever and unexpected weight change.   HENT:  Negative for congestion, ear pain, sinus pressure, sore throat, trouble swallowing and voice change.    Eyes:  Negative for pain, discharge and visual disturbance.   Respiratory:  Negative for cough, chest tightness, shortness of breath and wheezing.    Cardiovascular:  Negative for chest pain and palpitations.   Gastrointestinal:  Negative for abdominal pain, constipation, diarrhea, nausea and vomiting.   Genitourinary:  Negative for difficulty urinating, flank pain, frequency and urgency.   Musculoskeletal:  Positive for arthralgias and neck pain. Negative for back pain and joint swelling.   Skin:  Negative for color change and rash.   Neurological:  Positive for dizziness, weakness and headaches. Negative for seizures, syncope and numbness.        Imbalance, unsteady   Hematological:  Negative for adenopathy.   Psychiatric/Behavioral:  Negative for dysphoric mood and sleep disturbance. The patient is not nervous/anxious.          Family History   Problem Relation Name Age of Onset    Cancer Mother          esophageal    Heart disease Father      Hypertension Father      Arthritis Paternal Grandmother        Social History     Socioeconomic History    Marital status:    Occupational  History    Occupation: retired captain    Tobacco Use    Smoking status: Former     Current packs/day: 0.00     Average packs/day: 2.0 packs/day for 20.0 years (40.0 ttl pk-yrs)     Types: Cigarettes     Start date: 1972     Quit date: 1988     Years since quittin.9     Passive exposure: Past    Smokeless tobacco: Never    Tobacco comments:     Quit smoking in    Substance and Sexual Activity    Alcohol use: No    Drug use: No    Sexual activity: Never   Social History Narrative    Live with wife     Social Drivers of Health     Financial Resource Strain: Low Risk  (3/7/2022)    Overall Financial Resource Strain (CARDIA)     Difficulty of Paying Living Expenses: Not hard at all   Food Insecurity: No Food Insecurity (2025)    Received from [facility]    Hunger Vital Sign     Worried About Running Out of Food in the Last Year: Never true     Ran Out of Food in the Last Year: Never true   Transportation Needs: No Transportation Needs (2025)    Received from [facility]    PRAPARE - Transportation     In the past 12 months, has lack of transportation kept you from medical appointments or from getting medications?: No     In the past 12 months, has lack of transportation kept you from meetings, work, or from getting things needed for daily living?: No   Physical Activity: Insufficiently Active (3/7/2022)    Exercise Vital Sign     Days of Exercise per Week: 3 days     Minutes of Exercise per Session: 20 min   Stress: No Stress Concern Present (3/7/2022)    [employer] - Occupational Stress Questionnaire     Feeling of Stress : Only a little   Housing Stability: Low Risk  (2025)    Received from [facility]    Housing Stability Vital Sign     Unable to Pay for Housing in the Last Year: No     Number of Times Moved in the Last Year: 0     Homeless in the Last Year: No     Current Outpatient Medications   Medication Sig Note    albuterol (VENTOLIN HFA) 90  mcg/actuation inhaler Inhale 2 puffs into the lungs every 6 (six) hours as needed for Wheezing. Rescue     allopurinoL (ZYLOPRIM) 300 MG tablet TAKE 1 TABLET (300 MG TOTAL) BY  MOUTH ONCE DAILY.     amiodarone (PACERONE) 200 MG Tab Take 200 mg by mouth 2 (two) times daily.     apixaban (ELIQUIS) 5 mg Tab Take 5 mg by mouth 2 (two) times daily. 1/22/2024: Holding prior to surg     ascorbic acid, vitamin C, (VITAMIN C) 1000 MG tablet Take 1,000 mg by mouth once daily.     aspirin 81 MG Chew Take 81 mg by mouth every 7 days. 1/22/2024: Holding until after surg     cholecalciferol, vitamin D3, (VITAMIN D3) 25 mcg (1,000 unit) capsule Take 1,000 Units by mouth once daily.     co-enzyme Q-10 50 mg capsule Take 50 mg by mouth once daily.     empagliflozin (JARDIANCE) 10 mg tablet Take 1 tablet by mouth once daily.     ENTRESTO  mg per tablet Take 1 tablet by mouth 2 (two) times daily.     fluticasone-umeclidin-vilanter (TRELEGY ELLIPTA) 100-62.5-25 mcg DsDv INHALE 1 PUFF ONE TIME DAILY     furosemide (LASIX) 40 MG tablet Take 20 mg by mouth once daily.     guaiFENesin (MUCINEX) 600 mg 12 hr tablet Take 2 tablets (1,200 mg total) by mouth 2 (two) times daily.     hydroCHLOROthiazide (HYDRODIURIL) 25 MG tablet Take 25 mg by mouth.     Lactobacillus rhamnosus GG (CULTURELLE) 10 billion cell capsule Take 1 capsule by mouth.     levothyroxine (SYNTHROID) 75 MCG tablet Take 1 tablet (75 mcg total) by mouth before breakfast.     magnesium gluconate 27.5 mg magne- sium (500 mg) Tab Take 1 tablet by mouth once.     melatonin 10 mg Tab Take 1 tablet by mouth nightly as needed.     metoprolol succinate (TOPROL-XL) 100 MG 24 hr tablet Take 1 tablet by mouth 2 (two) times daily.     montelukast (SINGULAIR) 10 mg tablet TAKE 1 TABLET EVERY EVENING     multivitamin (THERAGRAN) per tablet Take 1 tablet by mouth once daily.     potassium chloride (MICRO-K) 10 MEQ CpSR Take 10 mEq by mouth.     rosuvastatin (CRESTOR) 10 MG tablet Take  1 tablet (10 mg total) by mouth every other day.     spironolactone (ALDACTONE) 25 MG tablet Take 1 tablet by mouth once daily.     tamsulosin (FLOMAX) 0.4 mg Cap Take 1 tablet by mouth Daily.     tirzepatide, weight loss, (ZEPBOUND) 5 mg/0.5 mL PnIj Inject 5 mg into the skin every 7 days.     vitamin E 1000 UNIT capsule Take 1,000 Units by mouth once daily.     omeprazole (PRILOSEC) 40 MG capsule Take 1 capsule (40 mg total) by mouth 2 (two) times daily before meals.    Last reviewed on 7/15/2025  2:00 PM by Venkata Solis FNP-C    Review of patient's allergies indicates:   Allergen Reactions    Bactrim [sulfamethoxazole-trimethoprim]      HIVES/ RASH    Tea (rashida sinensis) Hives     Pt is has allergies to black tea. Not an option under search.      Past Medical History:   Diagnosis Date    Allergy     Arthritis     Asthma     Atrial fibrillation     Basal cell carcinoma     CHF (congestive heart failure)     Fever blister     Hypertension     Hypothyroidism (acquired) 07/31/2023    Joint pain     Melanoma     BANDAR on CPAP     PVC (premature ventricular contraction)     Skin disease     Squamous cell carcinoma      Past Surgical History:   Procedure Laterality Date    BRONCHOSCOPY N/A 07/18/2022    Procedure: Bronchoscopy;  Surgeon: Charlee Brewer MD;  Location: [facility];  Service: Pulmonary;  Laterality: N/A;  monday 7/18/22 at 0830 look see bronch no biopsy    CARDIAC CATHETERIZATION  05/25/2021    COLONOSCOPY N/A 1/25/2024    Procedure: COLONOSCOPY;  Surgeon: Emmanuel Deleon MD;  Location: [facility];  Service: Endoscopy;  Laterality: N/A;    ESOPHAGOGASTRODUODENOSCOPY N/A 07/07/2022    Procedure: EGD (ESOPHAGOGASTRODUODENOSCOPY);  Surgeon: Emmanuel Deleon MD;  Location: [facility];  Service: Endoscopy;  Laterality: N/A;    EYE SURGERY Left 12/03/2019    EYE SURGERY Right 01/28/2020    FRACTURE SURGERY      INSERTION OF PACEMAKER  11/2023    LIPOMA RESECTION      NOSE SURGERY      SHOULDER  ARTHROSCOPY      SKIN BIOPSY      SKIN CANCER EXCISION      Moh's x 2    SKIN GRAFT            OBJECTIVE:      Vitals:    07/15/25 1336   BP: 138/74   Pulse: 88   Temp: 97.9 °F (36.6 °C)   TempSrc: Oral   SpO2: 95%   Weight: 118.8 kg (261 lb 14.5 oz)     Physical Exam  Vitals and nursing note reviewed.   Constitutional:       General: He is awake. He is not in acute distress.     Appearance: He is well-developed and well-groomed. He is obese. He is not ill-appearing, toxic-appearing or diaphoretic.   HENT:      Head: Normocephalic and atraumatic.      Right Ear: Tympanic membrane, ear canal and external ear normal.      Left Ear: Tympanic membrane, ear canal and external ear normal.      Nose: Nose normal.   Eyes:      General: Lids are normal. Gaze aligned appropriately.      Conjunctiva/sclera: Conjunctivae normal.      Right eye: Right conjunctiva is not injected.      Left eye: Left conjunctiva is not injected.      Pupils: Pupils are equal, round, and reactive to light.   Neck:      Comments: Wearing soft collar  Cardiovascular:      Rate and Rhythm: Normal rate and regular rhythm.      Pulses: Normal pulses.      Heart sounds: Normal heart sounds, S1 normal and S2 normal. No murmur heard.     No friction rub. No gallop.   Pulmonary:      Effort: Pulmonary effort is normal. No respiratory distress.      Breath sounds: Normal breath sounds. No stridor. No decreased breath sounds, wheezing, rhonchi or rales.   Chest:      Chest wall: No tenderness.   Musculoskeletal:      Cervical back: Neck supple. Pain with movement present. Decreased range of motion.      Right lower leg: No edema.      Left lower leg: No edema.   Lymphadenopathy:      Cervical: No cervical adenopathy.   Skin:     General: Skin is warm and dry.      Capillary Refill: Capillary refill takes less than 2 seconds.      Findings: No erythema or rash.   Neurological:      Mental Status: He is alert and oriented to person, place, and time. Mental  status is at baseline.   Psychiatric:         Attention and Perception: Attention normal.         Mood and Affect: Mood normal.         Speech: Speech normal.         Behavior: Behavior normal. Behavior is cooperative.         Thought Content: Thought content normal.         Judgment: Judgment normal.            Assessment:       1. Subarachnoid hemorrhage following injury, no loss of consciousness, initial encounter    2. Primary hypertension    3. Stage 3a chronic kidney disease    4. Hypertensive heart disease with heart failure    5. Atrial fibrillation, unspecified type    6. Double vision    7. Long term current use of anticoagulant    8. Imbalance    9. Cervical spondylolysis    10. Dependence on other enabling machines and devices        Plan:       Assessment & Plan    PLAN SUMMARY:  - Refer to [facility] for physical therapy, focusing on neuro PT  - Refer to cardiologist to discuss Watchman procedure  - Prescribe Tylenol for pain management  - Order lab work: lipids, thyroid, CMP, CBC before next cardiology appointment  - Complete handicap parking form for patient to submit to DM  - Refer to neurosurgeon Dr. Amezquita   - Follow up in 6 months  - Neurosurgeon appointment scheduled for next Monday  - Try both regular walker and roller walker to determine better balance support    HYPERTENSIVE HEART DISEASE WITH HEART FAILURE:  - Monitored patient's significantly elevated blood pressure which led to an emergency room visit on Saturday, exacerbated by intense headache pain.  - Pain management included IV Tylenol and morphine in the ER, with hydrocodone prescribed for continued management at home.  - Referred patient to a cardiologist to discuss the Watchman procedure as a potential option to manage heart condition and reduce anticoagulant requirements.  - Blood pressure stable today at 138/74.   - Continued metoprolol succinate 100 mg twice daily, spirolactone 25 mg daily, HCTZ 25 mg, and Lasix 40 mg, and  Entresto  mg.   - Managed by Cardiology.     SUBARACHNOID HEMORRHAGE:  - Monitored patient's subarachnoid hemorrhage that occurred after falling and hitting head.  - Repeat head CT shows the bleed is improving and resolving in some areas.  - Blood absorption may lead to improvement in balance and other symptoms over time.  - Referred patient to neurosurgeon Dr. Amezquita for follow-up visit next Monday.    STAGE 3A CHRONIC KIDNEY DISEASE:  - Avoid NSAIDs.  - Will continue to monitor renal function and if it continues to decline will send to nephrology.     ATRIAL FIBRILLATION:  - Patient resumed Eliquis 5 mg.  - Has follow-up with Cardiology on Thursday to discuss Watchman.   - Continued amiodarone 200 mg and metoprolol succinate 100 mg.     DIPLOPIA (DOUBLE VISION):  - Monitored patient's double vision, noting that things appear slanted when both eyes are open but resolves when one eye is closed.  - Assessed as likely cranial nerve IV palsy due to pressure from hemorrhage, affecting eye rotation and vision.  - Informed patient that hopefully this condition is expected to resolve as the hemorrhage continues to improve.    CERVICAL SPONDYLOSIS:  - Monitored chronic multi-level degenerative changes in the cervical region as noted on imaging.  - Mr. Teague reports neck soreness that comes and goes, sometimes requiring use of a traveling brace at night.  - May continue soft cervical collar for comfort.  - Continue Tylenol prn.  - Will hold off on muscle relaxer at this time due dizziness and balance issues.    BALANCE ISSUES AND UNSTEADINESS:  - Monitored patient's balance issues, noting use of walker for stability.  - Assessed need for physical therapy to address balance issues and weakness.  - Referred patient to [facility] for physical therapy with focus on neuro PT to strengthen following brain bleed and improve balance.    ANTICOAGULANT USE:  - Mr. Teague is back on Eliquis after temporary discontinuation.  -  Considering potential Watchman procedure with cardiology to possibly discontinue anticoagulants in the future.    HEADACHE MANAGEMENT:  - Monitored patient's intermittent headaches, currently managed with Tylenol.  - Recommend Tylenol instead of anti-inflammatory medications due to increased bleeding risk.    DEPENDENCE ON ENABLING DEVICES:  - Monitored patient's use of walker to aid balance.  - Patient ordered a roller glider with larger wheels for outdoor use.  - Assessed  strength to determine suitability for different walker types.  - Instructed patient to try both regular walker and roller walker (when received) to determine which provides better balance support.    ## FOLLOW-UP AND ADDITIONAL ORDERS:  - Ordered lab work (lipids, thyroid, CMP, CBC) to be completed before next cardiology appointment.  - Completed handicap parking form for patient to submit to [employer].  - Follow up in 6 months.  - Mr. Teague advised to contact office if need arises before scheduled follow-up.        Subarachnoid hemorrhage following injury, no loss of consciousness, initial encounter  -     Ambulatory Referral/Consult to Physical Therapy    Primary hypertension    Stage 3a chronic kidney disease    Hypertensive heart disease with heart failure    Atrial fibrillation, unspecified type    Double vision    Long term current use of anticoagulant    Imbalance  -     Ambulatory Referral/Consult to Physical Therapy    Cervical spondylolysis    Dependence on other enabling machines and devices    I spent a total of 50 minutes on the day of the visit.This includes face to face time and non-face to face time preparing to see the patient (eg, review of tests), obtaining and/or reviewing separately obtained history, documenting clinical information in the electronic or other health record, independently interpreting results and communicating results to the patient/family/caregiver, or care coordinator.    Follow up in about 6 months (around  1/15/2026).          7/15/2025 CONCHITA Ruiz, TRISH    This note was generated with the assistance of ambient listening technology. Verbal consent was obtained by the patient and accompanying visitor(s) for the recording of patient appointment to facilitate this note. I attest to having reviewed and edited the generated note for accuracy, though some syntax or spelling errors may persist. Please contact the author of this note for any clarification.

## 2025-07-18 DIAGNOSIS — E78.00 PURE HYPERCHOLESTEROLEMIA: ICD-10-CM

## 2025-07-21 ENCOUNTER — CLINICAL SUPPORT (OUTPATIENT)
Dept: REHABILITATION | Facility: HOSPITAL | Age: 75
End: 2025-07-21
Payer: MEDICARE

## 2025-07-21 VITALS — HEART RATE: 62 BPM | DIASTOLIC BLOOD PRESSURE: 85 MMHG | SYSTOLIC BLOOD PRESSURE: 133 MMHG

## 2025-07-21 DIAGNOSIS — R26.89 IMPAIRED BALANCE AS LATE EFFECT OF CEREBROVASCULAR ACCIDENT: ICD-10-CM

## 2025-07-21 DIAGNOSIS — I69.398 IMPAIRED BALANCE AS LATE EFFECT OF CEREBROVASCULAR ACCIDENT: ICD-10-CM

## 2025-07-21 DIAGNOSIS — Z91.81 AT RISK FOR FALLS: ICD-10-CM

## 2025-07-21 DIAGNOSIS — H53.2 DIPLOPIA: Primary | ICD-10-CM

## 2025-07-21 PROCEDURE — 97162 PT EVAL MOD COMPLEX 30 MIN: CPT | Mod: PO

## 2025-07-21 PROCEDURE — 97112 NEUROMUSCULAR REEDUCATION: CPT | Mod: PO

## 2025-07-21 RX ORDER — ROSUVASTATIN CALCIUM 10 MG/1
10 TABLET, COATED ORAL EVERY OTHER DAY
Qty: 45 TABLET | Refills: 3 | Status: SHIPPED | OUTPATIENT
Start: 2025-07-21

## 2025-07-24 ENCOUNTER — HOSPITAL ENCOUNTER (OUTPATIENT)
Dept: RADIOLOGY | Facility: HOSPITAL | Age: 75
Discharge: HOME | End: 2025-07-24
Attending: STUDENT IN AN ORGANIZED HEALTH CARE EDUCATION/TRAINING PROGRAM
Payer: MEDICARE

## 2025-07-24 DIAGNOSIS — D33.2 BENIGN NEOPLASM OF BRAIN, UNSPECIFIED: ICD-10-CM

## 2025-07-24 PROCEDURE — 70450 CT HEAD/BRAIN W/O DYE: CPT | Mod: TC,PO

## 2025-07-24 PROCEDURE — 70450 CT HEAD/BRAIN W/O DYE: CPT | Mod: 26,,, | Performed by: RADIOLOGY

## 2025-07-27 PROBLEM — Z91.81 AT RISK FOR FALLS: Status: ACTIVE | Noted: 2025-07-27

## 2025-07-27 PROBLEM — R26.89 IMPAIRED BALANCE AS LATE EFFECT OF CEREBROVASCULAR ACCIDENT: Status: ACTIVE | Noted: 2025-07-27

## 2025-07-27 PROBLEM — I69.398 IMPAIRED BALANCE AS LATE EFFECT OF CEREBROVASCULAR ACCIDENT: Status: ACTIVE | Noted: 2025-07-27

## 2025-07-27 PROBLEM — H53.2 DIPLOPIA: Status: ACTIVE | Noted: 2025-07-27

## 2025-07-28 ENCOUNTER — CLINICAL SUPPORT (OUTPATIENT)
Dept: REHABILITATION | Facility: HOSPITAL | Age: 75
End: 2025-07-28
Payer: MEDICARE

## 2025-07-28 DIAGNOSIS — H53.2 DIPLOPIA: ICD-10-CM

## 2025-07-28 DIAGNOSIS — Z91.81 AT RISK FOR FALLS: ICD-10-CM

## 2025-07-28 DIAGNOSIS — R26.89 IMPAIRED BALANCE AS LATE EFFECT OF CEREBROVASCULAR ACCIDENT: Primary | ICD-10-CM

## 2025-07-28 DIAGNOSIS — I69.398 IMPAIRED BALANCE AS LATE EFFECT OF CEREBROVASCULAR ACCIDENT: Primary | ICD-10-CM

## 2025-07-28 PROCEDURE — 97112 NEUROMUSCULAR REEDUCATION: CPT | Mod: PO

## 2025-07-28 NOTE — PROGRESS NOTES
Outpatient Rehab    Physical Therapy Evaluation    Patient Name: Jeet Teague  MRN: [id number]  YOB: 1950  Encounter Date: 7/21/2025    Therapy Diagnosis:   Encounter Diagnoses   Name Primary?    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     At risk for falls      Physician: Venkata Solis*    Physician Orders: Eval and Treat  Medical Diagnosis: Subarachnoid hemorrhage following injury, no loss of consciousness, initial encounter  Imbalance  Surgical Diagnosis: Not applicable for this Episode   Surgical Date: Not applicable for this Episode  Days Since Last Surgery: Not applicable for this Episode    Visit # / Visits Authorized:  1 / 1  Insurance Authorization Period: 7/15/2025 to 12/31/2025  Date of Evaluation: 7/21/2025  Plan of Care Certification: 7/21/2025 to 9/12/2025     Time In: 1425   Time Out: 1515  Total Time (in minutes): 50   Total Billable Time (in minutes): 50    Intake Outcome Measure for FOTO Survey    Therapist reviewed FOTO scores for Jeet Teague on 7/21/2025.   FOTO report - see Media section or FOTO account episode details.     Intake Score (%): Not applicable for this Episode    Precautions:  Additional Precautions and Protocol Details: Pacemaker, HTN,     Subjective   History of Present Illness  Jeet is a 74 y.o. male who reports to physical therapy with a chief concern of double vision and dizziness.     The patient reports a medical diagnosis of S06.6X0A (ICD-10-CM) - Subarachnoid hemorrhage following injury, no loss of consciousness, initial encounter  R26.89 (ICD-10-CM) - Imbalance.            Dominant Hand: Right  History of Present Condition/Illness: Pt got up early on July 4th with episode of dizziness and fell when attempting to stand. Went back to bed and woke up with worsening of symptoms. Diagnosed with cerebellar stroke at ER. He reports impaired vision, imbalance, headaches, and dizziness. He is wearing an eye patch on his left  eye upon arrival due to diplopia. States he alternates which eye is patched. Denies any weakness or tingling to trunk or extremities. Wants to improve balance and reduce diplopia.     Pain     Patient reports a current pain level of 0/10. Pain at best is reported as 0/10. Pain at worst is reported as 6/10.   Location: headache to posterior and right  Clinical Progression (since onset): Improved  Pain Qualities: Aching  Pain-Relieving Factors: Medications - over-the-counter  Pain-Aggravating Factors: Transfers         Treatment History  Treatments  Discharged From Past 30 Days: Inpatient acute facility  Previously Received Treatments: Yes  Previous Treatments: Physical therapy  Currently Receiving Treatments: No    Living Arrangements  Living Situation  Housing: Home independently  Living Arrangements: Spouse/significant other    Equipment/Treatments  Mobility Equipment: Standard walker  Patient Expressive Communication Modes: Verbal        Employment  Patient reports: Does the patient's condition impact their ability to work?  Employment Status: Retired          Past Medical History/Physical Systems Review:   Nico Teague  has a past medical history of Allergy, Arthritis, Asthma, Atrial fibrillation, Basal cell carcinoma, CHF (congestive heart failure), Fever blister, Hypertension, Hypothyroidism (acquired), Joint pain, Melanoma, BANDAR on CPAP, PVC (premature ventricular contraction), Skin disease, and Squamous cell carcinoma.    Nico Teague  has a past surgical history that includes Shoulder arthroscopy; Lipoma resection; Skin graft; Nose surgery; Eye surgery (Left, 12/03/2019); Eye surgery (Right, 01/28/2020); Cardiac catheterization (05/25/2021); Skin cancer excision; Esophagogastroduodenoscopy (N/A, 07/07/2022); Bronchoscopy (N/A, 07/18/2022); Insertion of pacemaker (11/2023); Fracture surgery; Skin biopsy; and Colonoscopy (N/A, 1/25/2024).    Nico has a current medication list which includes the following  prescription(s): albuterol, allopurinol, amiodarone, apixaban, ascorbic acid (vitamin c), aspirin, cholecalciferol (vitamin d3), co-enzyme q-10, empagliflozin, entresto, trelegy ellipta, furosemide, guaifenesin, hydrochlorothiazide, lactobacillus rhamnosus gg, levothyroxine, magnesium gluconate, melatonin, metoprolol succinate, montelukast, multivitamin, omeprazole, potassium chloride, rosuvastatin, spironolactone, tamsulosin, zepbound, and vitamin e.    Review of patient's allergies indicates:   Allergen Reactions    Bactrim [sulfamethoxazole-trimethoprim]      HIVES/ RASH    Tea (rashida sinensis) Hives     Pt is has allergies to black tea. Not an option under search.        Objective   Vital Signs  /85   Pulse 62   BP Location: Left arm  BP Position: Sitting  BP Cuff Size: Adult         Communication  The patient's current expressive communication modes are Verbal.          Vision  Near Gaze Diplopia: Objects split horizontally, Constant diplopia, Objects split diagonally  Far Gaze Diplopia: Constant diplopia, Objects split horizontally, Objects split diagonally  Color Sensation: Intact  Contrast Sensitivity: Impaired  Visual Midline Shift: None (Intact)           Subcranial Range of Motion   Active Restricted? Passive Restricted? Pain   Flexion         Protraction         Retraction           Cervical ROM within normal limits with pain at end ranges         Vestibulo-Ocular Reflex (VOR) Tests       Impaired: Vestibulo-Ocular Reflex (VOR) Cancellation/Suppression and Gaze Stabilization       Nystagmus and Associated Symptom Provocative Tests  Positive: End-Point Nystagmus              Fall Risk  Functional mobility test results suggest the patient is: At Risk for Falls  Four Stage Balance Test  Narrow Base of Support: 15 sec sec  Tandem Stand - Right Foot in Front: 10 sec  Tandem Stand - Left Foot in Front: 10 sec        Single Leg Stand - Right Foot: 1 sec  Single Leg Stand - Left Foot: 1 sec            Ambulation Assistance Required  Surface With  Assistive Device Without Assistive Device Details   Level Supervision        Uneven Contact guard assist       Curb           Gait Analysis  Base of Support: Wide  Walking Speed: Decreased            Gait Analysis Details  Use of walker for gait and stability.         Treatment:  Balance/Neuromuscular Re-Education  NMR 1: Seated saccades single target in limited ROM to maintain focus = horizontal and vertical  NMR 2: Seated smooth pursuits single target in limited ROM to maintain focus = horizontal and vertical  NMR 3: Seated VOR** = horizontal and vertical    Time Entry(in minutes):  PT Evaluation (Moderate) Time Entry: 30  Neuromuscular Re-Education Time Entry: 20    Assessment & Plan   Assessment  Jeet presents with a condition of Moderate complexity.   Presentation of Symptoms: Evolving  Will Comorbidities Impact Care: Yes  Pacemaker, kidney disease, stroke,    Functional Limitations: Ambulating on uneven surfaces, Decreased ambulation distance/endurance, Maintaining balance, Increased risk of fall, Gross motor coordination, Gait limitations, Visual impairments, Transfers  Impairments: Abnormal gait, Impaired balance, Abnormal or restricted range of motion, Lack of appropriate home exercise program    Patient Goal for Therapy (PT): improve balance and vision  Prognosis: Fair  Assessment Details: Jeet is a 74 year old male with the medical diagnosis of stroke and imbalance. He arrives reporting diplopia and instability during gait. Ambulates into physical therapy assessment using front wheeled walker. Cervical range of motion is within normal limits with end range discomfort. Intermittent headaches to right posterior region of head. He is at increased risk of falls due to limited balance in variable positions and with unstable surfaces. He would benefit from physical therapy to address imbalance and diplopia to reduce risk of falls.    Plan  From a physical therapy  perspective, the patient would benefit from: Skilled Rehab Services    Planned therapy interventions include: Therapeutic exercise, Therapeutic activities, Neuromuscular re-education, Manual therapy, Canalith repositioning, Orthotic management and training, and Gait training.    Planned modalities to include: Electrical stimulation - attended, Cryotherapy (cold pack), Electrical stimulation - passive/unattended, and Thermotherapy (hot pack).        Visit Frequency: 2 times Per Week for 6 Weeks.       This plan was discussed with Patient.   Discussion participants: Agreed Upon Plan of Care  Plan details: Plan of Care: 9/12/2025          The patient's spiritual, cultural, and educational needs were considered, and the patient is agreeable to the plan of care and goals.     Education  Education was done with Patient. The patient's learning style includes Listening, Demonstration, and Pictures/video. The patient Verbalizes understanding and Demonstrates understanding.         PT Plan of Care and HEP.       Goals:   Active       Long Term Goals       Patient will perform romberg stance on firm surface with eyes closed for 30 seconds to reduce risk of falls.       Start:  07/21/25    Expected End:  09/12/25            Patient will tolerate standing saccades for 30 seconds without dizziness reported over 2/10.       Start:  07/21/25    Expected End:  09/12/25            Patient will ambulate using cane or LRAD on variable surfaces with independence to improve mobility in community.       Start:  07/21/25    Expected End:  09/12/25               Short Term Goals       Patient will report compliance with home exercise program 5 days a week to improve carry over.        Start:  07/21/25    Expected End:  09/12/25            Patient will perform end range cervical ROM without discomfort reported.       Start:  07/21/25    Expected End:  09/12/25            Patient will improve gait speed 0.14 m/s while ambulating with FWW.        Start:  07/21/25    Expected End:  09/12/25                Katy Jauregui PT

## 2025-07-28 NOTE — PROGRESS NOTES
Outpatient Rehab    Physical Therapy Visit    Patient Name: Jeet Teague  MRN: [id number]  YOB: 1950  Encounter Date: 7/28/2025    Therapy Diagnosis:   Encounter Diagnoses   Name Primary?    Impaired balance as late effect of cerebrovascular accident Yes    Diplopia     At risk for falls      Physician: Venkata Solis*    Physician Orders: Eval and Treat  Medical Diagnosis: Subarachnoid hemorrhage following injury, no loss of consciousness, initial encounter  Imbalance  Surgical Diagnosis: Not applicable for this Episode   Surgical Date: Not applicable for this Episode  Days Since Last Surgery: Not applicable for this Episode    Visit # / Visits Authorized:  1 / 20  Insurance Authorization Period: 7/17/2025 to 12/31/2025  Date of Evaluation: 7/21/2025  Plan of Care Certification: 7/21/2025 to 9/12/2025      Time In: 1054   Time Out: 1139  Total Time (in minutes): 45 minutes  Total Billable Time (in minutes): 45 minutes    FOTO:  Intake Score (%): 49  Survey Score 2 (%): Not applicable for this Episode  Survey Score 3 (%): Not applicable for this Episode    Precautions:  Additional Precautions and Protocol Details: Fall; history of Pacemaker, HTN       Subjective   patient reports that his lt visual field is lower than his rt visual field - results in offset double vision; denies dizziness and pain upon arrival.  Pain reported as 0/10.      Objective            Treatment:  Balance/Neuromuscular Re-Education  NMR 1: x 15 ea balance therapeutic exercise with bilateral UE support = heel raises/toe raises, mini squats  NMR 2: x 25 ft ea balance gait with bilateral UE support = side stepping, backwards gait  NMR 3: x 15 alternating toe taps on 3-inch stool with bilateral UE support  NMR 4: x 45 seconds stand on AirEx, eyes open*  NMR 5: 10 x 4 ea seated convergence/divergence = cones on table  NMR 6: x 45 seconds seated target training with head turns = single targets  NMR 7: x 5 ea standing  habituation exercises while holding target = sit to stands, standing trunk tilts, standing lean over touch desk  [facility] 8: 2 x 20 ft ea balance gait in joseph = full head turns*, full head nods    Time Entry(in minutes):  Neuromuscular Re-Education Time Entry: 45    Assessment & Plan   Assessment: Patient needed bilateral upper extremity support during balance therapeutic exercise, while performing alternating toe taps on stool and during balance gait; no symptom elevation reported after convergence/ divergence exercise, after habituation exercises and after target training with head turns; double vision reported throughout oculomotor training; minimal dysequilibrium noted throughout balance gait in joseph.  Evaluation/Treatment Tolerance: Patient tolerated treatment well    The patient will continue to benefit from skilled outpatient physical therapy in order to address the deficits listed in the problem list on the initial evaluation, provide patient and family education, and maximize the patients level of independence in the home and community environments.     The patient's spiritual, cultural, and educational needs were considered, and the patient is agreeable to the plan of care and goals.     Education  Education was done with Patient. The patient's learning style includes Listening. The patient Verbalizes understanding.         Proper therapeutic exercise technique; continue home exercise program twice a day        Plan: continue to progress central/balance training to patient's tolerance    Goals:   Active       Long Term Goals       Patient will perform romberg stance on firm surface with eyes closed for 30 seconds to reduce risk of falls. (Progressing)       Start:  07/21/25    Expected End:  09/12/25            Patient will tolerate standing saccades for 30 seconds without dizziness reported over 2/10. (Progressing)       Start:  07/21/25    Expected End:  09/12/25            Patient will ambulate using cane  or LRAD on variable surfaces with independence to improve mobility in community. (Progressing)       Start:  07/21/25    Expected End:  09/12/25               Short Term Goals       Patient will report compliance with home exercise program 5 days a week to improve carry over.  (Progressing)       Start:  07/21/25    Expected End:  09/12/25            Patient will perform end range cervical ROM without discomfort reported. (Progressing)       Start:  07/21/25    Expected End:  09/12/25            Patient will improve gait speed 0.14 m/s while ambulating with FWW. (Progressing)       Start:  07/21/25    Expected End:  09/12/25                Leopoldo Colon, PT

## 2025-07-30 ENCOUNTER — CLINICAL SUPPORT (OUTPATIENT)
Dept: REHABILITATION | Facility: HOSPITAL | Age: 75
End: 2025-07-30
Payer: MEDICARE

## 2025-07-30 DIAGNOSIS — R26.89 IMPAIRED BALANCE AS LATE EFFECT OF CEREBROVASCULAR ACCIDENT: Primary | ICD-10-CM

## 2025-07-30 DIAGNOSIS — I69.398 IMPAIRED BALANCE AS LATE EFFECT OF CEREBROVASCULAR ACCIDENT: Primary | ICD-10-CM

## 2025-07-30 DIAGNOSIS — H53.2 DIPLOPIA: ICD-10-CM

## 2025-07-30 DIAGNOSIS — Z91.81 AT RISK FOR FALLS: ICD-10-CM

## 2025-07-30 PROCEDURE — 97110 THERAPEUTIC EXERCISES: CPT | Mod: PO

## 2025-07-30 PROCEDURE — 97112 NEUROMUSCULAR REEDUCATION: CPT | Mod: PO

## 2025-07-30 NOTE — PROGRESS NOTES
Outpatient Rehab    Physical Therapy Visit    Patient Name: Jeet Teague  MRN: [id number]  YOB: 1950  Encounter Date: 7/30/2025    Therapy Diagnosis:   Encounter Diagnoses   Name Primary?    Impaired balance as late effect of cerebrovascular accident Yes    Diplopia     At risk for falls      Physician: Venkata Solis*    Physician Orders: Eval and Treat  Medical Diagnosis: Subarachnoid hemorrhage following injury, no loss of consciousness, initial encounter  Imbalance  Surgical Diagnosis: Not applicable for this Episode   Surgical Date: Not applicable for this Episode  Days Since Last Surgery: Not applicable for this Episode    Visit # / Visits Authorized:  2 / 12  Insurance Authorization Period: 7/28/2025 to 9/8/2025  Date of Evaluation: 7/21/2025  Plan of Care Certification: 7/21/2025 to 9/12/2025      Time In: 0918   Time Out: 1001  Total Time (in minutes): 43 minutes  Total Billable Time (in minutes): 43 minutes    FOTO:  Intake Score (%): 49  Survey Score 2 (%): Not applicable for this Episode  Survey Score 3 (%): Not applicable for this Episode    Precautions:  Additional Precautions and Protocol Details: Fall; history of Pacemaker, HTN       Subjective   patient reports elevated dizziness today (6-7/10); denies headache.  Pain reported as 0/10.      Objective            Treatment:  Therapeutic Exercise  TE 1: x 15 ea standing bilateral 3-way straight leg raises with bilateral UE support = hip ABD, hip flexion, hip extension  TE 2: x 15 theraball on wall mini squats  Balance/Neuromuscular Re-Education  NMR 1: x 7 ea single step training = forwards/backwards, side to side  NMR 2: x 4 standing bilateral 90 degree turns*  NMR 3: x 10-15 ea seated LE therapeutic exercise while sitting on theraball with stand = clams, marching*  NMR 4: x 3 gait in square pattern while facing same direction (3 ft x 3 ft)*  NMR 5: x 15 four corners on command*  NMR 6: x 45 seconds ea bilateral step stance  training*    Time Entry(in minutes):  Neuromuscular Re-Education Time Entry: 30  Therapeutic Exercise Time Entry: 13    Assessment & Plan   Assessment: Patient was able to perform single step training without symptom elevation; minimal symptom elevation reported during standing 90 degree turns; bilateral upper extremity support needed during 3-way straight leg raise training; occasional loss of balance noted during marching exercise on theraball with stand, during gait in square pattern and during four corners on command; minimal sway noted during bilateral step stance training.  Evaluation/Treatment Tolerance: Patient tolerated treatment well    The patient will continue to benefit from skilled outpatient physical therapy in order to address the deficits listed in the problem list on the initial evaluation, provide patient and family education, and maximize the patients level of independence in the home and community environments.     The patient's spiritual, cultural, and educational needs were considered, and the patient is agreeable to the plan of care and goals.     Education  Education was done with Patient. The patient's learning style includes Listening. The patient Verbalizes understanding.         Proper therapeutic exercise technique; continue home exercise program twice a day        Plan: continue to progress central/balance training to patient's tolerance    Goals:   Active       Long Term Goals       Patient will perform romberg stance on firm surface with eyes closed for 30 seconds to reduce risk of falls. (Progressing)       Start:  07/21/25    Expected End:  09/12/25            Patient will tolerate standing saccades for 30 seconds without dizziness reported over 2/10. (Progressing)       Start:  07/21/25    Expected End:  09/12/25            Patient will ambulate using cane or LRAD on variable surfaces with independence to improve mobility in community. (Progressing)       Start:  07/21/25     Expected End:  09/12/25               Short Term Goals       Patient will report compliance with home exercise program 5 days a week to improve carry over.  (Progressing)       Start:  07/21/25    Expected End:  09/12/25            Patient will perform end range cervical ROM without discomfort reported. (Progressing)       Start:  07/21/25    Expected End:  09/12/25            Patient will improve gait speed 0.14 m/s while ambulating with FWW. (Progressing)       Start:  07/21/25    Expected End:  09/12/25                Leopoldo Colon, PT

## 2025-08-05 ENCOUNTER — CLINICAL SUPPORT (OUTPATIENT)
Dept: REHABILITATION | Facility: HOSPITAL | Age: 75
End: 2025-08-05
Payer: MEDICARE

## 2025-08-05 DIAGNOSIS — H53.2 DIPLOPIA: Primary | ICD-10-CM

## 2025-08-05 DIAGNOSIS — I69.398 IMPAIRED BALANCE AS LATE EFFECT OF CEREBROVASCULAR ACCIDENT: ICD-10-CM

## 2025-08-05 DIAGNOSIS — R26.89 IMPAIRED BALANCE AS LATE EFFECT OF CEREBROVASCULAR ACCIDENT: ICD-10-CM

## 2025-08-05 DIAGNOSIS — Z91.81 AT RISK FOR FALLS: ICD-10-CM

## 2025-08-05 PROCEDURE — 97112 NEUROMUSCULAR REEDUCATION: CPT | Mod: PO

## 2025-08-05 PROCEDURE — 97530 THERAPEUTIC ACTIVITIES: CPT | Mod: PO

## 2025-08-05 NOTE — PROGRESS NOTES
Outpatient Rehab    Physical Therapy Visit    Patient Name: Jeet Teague  MRN: [id number]  YOB: 1950  Encounter Date: 8/5/2025    Therapy Diagnosis:   Encounter Diagnoses   Name Primary?    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     At risk for falls      Physician: Venkata Solis*    Physician Orders: Eval and Treat  Medical Diagnosis: Subarachnoid hemorrhage following injury, no loss of consciousness, initial encounter  Imbalance  Surgical Diagnosis: Not applicable for this Episode   Surgical Date: Not applicable for this Episode  Days Since Last Surgery: Not applicable for this Episode    Visit # / Visits Authorized:  3 / 12  Insurance Authorization Period: 7/28/2025 to 9/8/2025  Date of Evaluation: 7/21/2025  Plan of Care Certification: 7/21/2025 to 9/12/2025      PT/PTA: PT   Number of PTA visits since last PT visit:0  Time In: 0843   Time Out: 0930  Total Time (in minutes): 47   Total Billable Time (in minutes): 47    FOTO:  Intake Score (%): 49  Survey Score 2 (%): Not applicable for this Episode  Survey Score 3 (%): Not applicable for this Episode    Precautions:  Additional Precautions and Protocol Details: Fall; history of Pacemaker, HTN       Subjective   Pt reports dizziness of 4-5/10. Denies any headache or pain in neck today..  Family / care giver present for this visit:  (wife is present in lobby)  Pain reported as 0/10.      Objective            Treatment:  Balance/Neuromuscular Re-Education  NMR 1: x 8 eyes focused on target with head movements = lateral tilts, head nods*  NMR 2: x 5 standing bilateral 90 degree turns*  NMR 3: static balance on airex in 50% romberg, eyes open*, eyes closed** 2 x 30 sec each  Therapeutic Activity  TA 1: floor ladder in parallel bars:reciprocal stepping with frequent tapping bars for support x 5 laps  TA 2: Floor ladder in parallel bars: reciprocal stepping with 2 sec holds x 3 laps  TA 3: floor ladder in  parallel bars: lateral stepping x 4 laps  TA 4: trampoline ball toss and catch keeping eyes on ball x 15 with trampoline flat level, increased to incline and performed 20 throws with difficulty throwing to target and catching*    Time Entry(in minutes):  Neuromuscular Re-Education Time Entry: 15  Therapeutic Activity Time Entry: 32    Assessment & Plan   Assessment: Jeet tolerated treatment session fair. He arrives with dizziness of 4-5/10, improvement from previous treatment session. Denies any changes to his vision. Performed standing head movements with eyes focused on target and 90 degree turns without single eye covered and indicates increase in dizziness post exercise and has difficulty maintaining single target in focus. Performed ladder drill and balance activities with left eye covered to focus on balance training and allow for pt to determine target to step over. He displays moderate instability with frequent upper extremity taps to regain throughout. He remains appropriate for PT.  Evaluation/Treatment Tolerance: Patient tolerated treatment well    The patient will continue to benefit from skilled outpatient physical therapy in order to address the deficits listed in the problem list on the initial evaluation, provide patient and family education, and maximize the patients level of independence in the home and community environments.     The patient's spiritual, cultural, and educational needs were considered, and the patient is agreeable to the plan of care and goals.     Education  Education was done with Patient. The patient's learning style includes Listening. The patient Verbalizes understanding.         Lateral stepping at counter top       Plan: continue to progress central/balance training to patient's tolerance    Goals:   Active       Long Term Goals       Patient will perform romberg stance on firm surface with eyes closed for 30 seconds to reduce risk of falls. (Progressing)       Start:   07/21/25    Expected End:  09/12/25            Patient will tolerate standing saccades for 30 seconds without dizziness reported over 2/10. (Progressing)       Start:  07/21/25    Expected End:  09/12/25            Patient will ambulate using cane or LRAD on variable surfaces with independence to improve mobility in community. (Progressing)       Start:  07/21/25    Expected End:  09/12/25               Short Term Goals       Patient will report compliance with home exercise program 5 days a week to improve carry over.  (Progressing)       Start:  07/21/25    Expected End:  09/12/25            Patient will perform end range cervical ROM without discomfort reported. (Progressing)       Start:  07/21/25    Expected End:  09/12/25            Patient will improve gait speed 0.14 m/s while ambulating with FWW. (Progressing)       Start:  07/21/25    Expected End:  09/12/25                Katy Jauregui, PT

## 2025-08-11 ENCOUNTER — CLINICAL SUPPORT (OUTPATIENT)
Dept: REHABILITATION | Facility: HOSPITAL | Age: 75
End: 2025-08-11
Payer: MEDICARE

## 2025-08-11 DIAGNOSIS — Z91.81 AT RISK FOR FALLS: ICD-10-CM

## 2025-08-11 DIAGNOSIS — R26.89 IMPAIRED BALANCE AS LATE EFFECT OF CEREBROVASCULAR ACCIDENT: Primary | ICD-10-CM

## 2025-08-11 DIAGNOSIS — H53.2 DIPLOPIA: ICD-10-CM

## 2025-08-11 DIAGNOSIS — I69.398 IMPAIRED BALANCE AS LATE EFFECT OF CEREBROVASCULAR ACCIDENT: Primary | ICD-10-CM

## 2025-08-11 PROCEDURE — 97116 GAIT TRAINING THERAPY: CPT | Mod: PO

## 2025-08-11 PROCEDURE — 97112 NEUROMUSCULAR REEDUCATION: CPT | Mod: PO

## 2025-08-12 ENCOUNTER — OFFICE VISIT (OUTPATIENT)
Dept: PULMONOLOGY | Facility: CLINIC | Age: 75
End: 2025-08-12
Payer: MEDICARE

## 2025-08-12 VITALS
HEART RATE: 89 BPM | OXYGEN SATURATION: 98 % | SYSTOLIC BLOOD PRESSURE: 128 MMHG | BODY MASS INDEX: 40.44 KG/M2 | WEIGHT: 266.81 LBS | HEIGHT: 68 IN | DIASTOLIC BLOOD PRESSURE: 74 MMHG

## 2025-08-12 DIAGNOSIS — G47.33 OSA (OBSTRUCTIVE SLEEP APNEA): ICD-10-CM

## 2025-08-12 DIAGNOSIS — E66.9 OBESITY, UNSPECIFIED CLASS, UNSPECIFIED OBESITY TYPE, UNSPECIFIED WHETHER SERIOUS COMORBIDITY PRESENT: ICD-10-CM

## 2025-08-12 DIAGNOSIS — J45.40 MODERATE PERSISTENT ASTHMA, UNSPECIFIED WHETHER COMPLICATED: Primary | ICD-10-CM

## 2025-08-12 PROBLEM — R06.00 DYSPNEA: Status: RESOLVED | Noted: 2020-01-02 | Resolved: 2025-08-12

## 2025-08-12 PROBLEM — R05.9 COUGH: Status: RESOLVED | Noted: 2023-10-30 | Resolved: 2025-08-12

## 2025-08-12 PROBLEM — J45.909 ASTHMA: Status: RESOLVED | Noted: 2023-10-27 | Resolved: 2025-08-12

## 2025-08-12 PROCEDURE — 3078F DIAST BP <80 MM HG: CPT | Mod: CPTII,S$GLB,, | Performed by: NURSE PRACTITIONER

## 2025-08-12 PROCEDURE — 3288F FALL RISK ASSESSMENT DOCD: CPT | Mod: CPTII,S$GLB,, | Performed by: NURSE PRACTITIONER

## 2025-08-12 PROCEDURE — 1159F MED LIST DOCD IN RCRD: CPT | Mod: CPTII,S$GLB,, | Performed by: NURSE PRACTITIONER

## 2025-08-12 PROCEDURE — 99213 OFFICE O/P EST LOW 20 MIN: CPT | Mod: S$GLB,,, | Performed by: NURSE PRACTITIONER

## 2025-08-12 PROCEDURE — 1100F PTFALLS ASSESS-DOCD GE2>/YR: CPT | Mod: CPTII,S$GLB,, | Performed by: NURSE PRACTITIONER

## 2025-08-12 PROCEDURE — 3074F SYST BP LT 130 MM HG: CPT | Mod: CPTII,S$GLB,, | Performed by: NURSE PRACTITIONER

## 2025-08-12 PROCEDURE — 3008F BODY MASS INDEX DOCD: CPT | Mod: CPTII,S$GLB,, | Performed by: NURSE PRACTITIONER

## 2025-08-12 PROCEDURE — 99999 PR PBB SHADOW E&M-EST. PATIENT-LVL III: CPT | Mod: PBBFAC,,, | Performed by: NURSE PRACTITIONER

## 2025-08-12 PROCEDURE — 1125F AMNT PAIN NOTED PAIN PRSNT: CPT | Mod: CPTII,S$GLB,, | Performed by: NURSE PRACTITIONER

## 2025-08-12 PROCEDURE — 4010F ACE/ARB THERAPY RXD/TAKEN: CPT | Mod: CPTII,S$GLB,, | Performed by: NURSE PRACTITIONER

## 2025-08-14 ENCOUNTER — CLINICAL SUPPORT (OUTPATIENT)
Dept: REHABILITATION | Facility: HOSPITAL | Age: 75
End: 2025-08-14
Payer: MEDICARE

## 2025-08-14 VITALS — SYSTOLIC BLOOD PRESSURE: 151 MMHG | DIASTOLIC BLOOD PRESSURE: 84 MMHG | HEART RATE: 60 BPM

## 2025-08-14 DIAGNOSIS — I69.398 IMPAIRED BALANCE AS LATE EFFECT OF CEREBROVASCULAR ACCIDENT: ICD-10-CM

## 2025-08-14 DIAGNOSIS — R26.89 IMPAIRED BALANCE AS LATE EFFECT OF CEREBROVASCULAR ACCIDENT: ICD-10-CM

## 2025-08-14 DIAGNOSIS — Z91.81 AT RISK FOR FALLS: ICD-10-CM

## 2025-08-14 DIAGNOSIS — H53.2 DIPLOPIA: Primary | ICD-10-CM

## 2025-08-14 PROCEDURE — 97112 NEUROMUSCULAR REEDUCATION: CPT | Mod: PO

## 2025-08-18 DIAGNOSIS — S06.6X9A TRAUMATIC SUBARACHNOID HEMORRHAGE WITH LOSS OF CONSCIOUSNESS OF UNSPECIFIED DURATION, INITIAL ENCOUNTER: Primary | ICD-10-CM

## 2025-08-19 ENCOUNTER — CLINICAL SUPPORT (OUTPATIENT)
Dept: REHABILITATION | Facility: HOSPITAL | Age: 75
End: 2025-08-19
Payer: MEDICARE

## 2025-08-19 VITALS — DIASTOLIC BLOOD PRESSURE: 78 MMHG | HEART RATE: 73 BPM | SYSTOLIC BLOOD PRESSURE: 132 MMHG

## 2025-08-19 DIAGNOSIS — R26.89 IMPAIRED BALANCE AS LATE EFFECT OF CEREBROVASCULAR ACCIDENT: ICD-10-CM

## 2025-08-19 DIAGNOSIS — Z91.81 AT RISK FOR FALLS: ICD-10-CM

## 2025-08-19 DIAGNOSIS — H53.2 DIPLOPIA: Primary | ICD-10-CM

## 2025-08-19 DIAGNOSIS — I69.398 IMPAIRED BALANCE AS LATE EFFECT OF CEREBROVASCULAR ACCIDENT: ICD-10-CM

## 2025-08-19 PROCEDURE — 97112 NEUROMUSCULAR REEDUCATION: CPT | Mod: PO

## 2025-08-21 ENCOUNTER — CLINICAL SUPPORT (OUTPATIENT)
Dept: REHABILITATION | Facility: HOSPITAL | Age: 75
End: 2025-08-21
Payer: MEDICARE

## 2025-08-21 VITALS — DIASTOLIC BLOOD PRESSURE: 86 MMHG | SYSTOLIC BLOOD PRESSURE: 136 MMHG | HEART RATE: 81 BPM

## 2025-08-21 DIAGNOSIS — Z91.81 AT RISK FOR FALLS: ICD-10-CM

## 2025-08-21 DIAGNOSIS — H53.2 DIPLOPIA: Primary | ICD-10-CM

## 2025-08-21 DIAGNOSIS — R26.89 IMPAIRED BALANCE AS LATE EFFECT OF CEREBROVASCULAR ACCIDENT: ICD-10-CM

## 2025-08-21 DIAGNOSIS — I69.398 IMPAIRED BALANCE AS LATE EFFECT OF CEREBROVASCULAR ACCIDENT: ICD-10-CM

## 2025-08-21 PROCEDURE — 97530 THERAPEUTIC ACTIVITIES: CPT | Mod: PO

## 2025-08-21 PROCEDURE — 97112 NEUROMUSCULAR REEDUCATION: CPT | Mod: PO

## 2025-08-25 ENCOUNTER — PATIENT MESSAGE (OUTPATIENT)
Dept: FAMILY MEDICINE | Facility: CLINIC | Age: 75
End: 2025-08-25
Payer: MEDICARE

## 2025-08-25 DIAGNOSIS — E66.813 CLASS 3 SEVERE OBESITY DUE TO EXCESS CALORIES WITH SERIOUS COMORBIDITY AND BODY MASS INDEX (BMI) OF 40.0 TO 44.9 IN ADULT: Primary | ICD-10-CM

## 2025-08-25 RX ORDER — TIRZEPATIDE 7.5 MG/.5ML
7.5 INJECTION, SOLUTION SUBCUTANEOUS
Qty: 2 ML | Refills: 1 | Status: SHIPPED | OUTPATIENT
Start: 2025-08-25

## 2025-08-26 ENCOUNTER — CLINICAL SUPPORT (OUTPATIENT)
Dept: REHABILITATION | Facility: HOSPITAL | Age: 75
End: 2025-08-26
Payer: MEDICARE

## 2025-08-26 DIAGNOSIS — H53.2 DIPLOPIA: Primary | ICD-10-CM

## 2025-08-26 DIAGNOSIS — R26.89 IMPAIRED BALANCE AS LATE EFFECT OF CEREBROVASCULAR ACCIDENT: ICD-10-CM

## 2025-08-26 DIAGNOSIS — Z91.81 AT RISK FOR FALLS: ICD-10-CM

## 2025-08-26 DIAGNOSIS — I69.398 IMPAIRED BALANCE AS LATE EFFECT OF CEREBROVASCULAR ACCIDENT: ICD-10-CM

## 2025-08-26 PROCEDURE — 97112 NEUROMUSCULAR REEDUCATION: CPT | Mod: PO

## 2025-08-28 ENCOUNTER — CLINICAL SUPPORT (OUTPATIENT)
Dept: REHABILITATION | Facility: HOSPITAL | Age: 75
End: 2025-08-28
Payer: MEDICARE

## 2025-08-28 DIAGNOSIS — H53.2 DIPLOPIA: Primary | ICD-10-CM

## 2025-08-28 DIAGNOSIS — Z91.81 AT RISK FOR FALLS: ICD-10-CM

## 2025-08-28 DIAGNOSIS — R26.89 IMPAIRED BALANCE AS LATE EFFECT OF CEREBROVASCULAR ACCIDENT: ICD-10-CM

## 2025-08-28 DIAGNOSIS — I69.398 IMPAIRED BALANCE AS LATE EFFECT OF CEREBROVASCULAR ACCIDENT: ICD-10-CM

## 2025-08-28 PROCEDURE — 97112 NEUROMUSCULAR REEDUCATION: CPT | Mod: PO

## 2025-09-03 ENCOUNTER — CLINICAL SUPPORT (OUTPATIENT)
Dept: REHABILITATION | Facility: HOSPITAL | Age: 75
End: 2025-09-03
Payer: MEDICARE

## 2025-09-03 DIAGNOSIS — I69.398 IMPAIRED BALANCE AS LATE EFFECT OF CEREBROVASCULAR ACCIDENT: ICD-10-CM

## 2025-09-03 DIAGNOSIS — H53.2 DIPLOPIA: Primary | ICD-10-CM

## 2025-09-03 DIAGNOSIS — R26.89 IMPAIRED BALANCE AS LATE EFFECT OF CEREBROVASCULAR ACCIDENT: ICD-10-CM

## 2025-09-03 DIAGNOSIS — Z91.81 AT RISK FOR FALLS: ICD-10-CM

## 2025-09-03 PROCEDURE — 97530 THERAPEUTIC ACTIVITIES: CPT | Mod: PO

## 2025-09-05 ENCOUNTER — CLINICAL SUPPORT (OUTPATIENT)
Dept: REHABILITATION | Facility: HOSPITAL | Age: 75
End: 2025-09-05
Payer: MEDICARE

## 2025-09-05 DIAGNOSIS — R26.89 IMPAIRED BALANCE AS LATE EFFECT OF CEREBROVASCULAR ACCIDENT: Primary | ICD-10-CM

## 2025-09-05 DIAGNOSIS — Z91.81 AT RISK FOR FALLS: ICD-10-CM

## 2025-09-05 DIAGNOSIS — H53.2 DIPLOPIA: ICD-10-CM

## 2025-09-05 DIAGNOSIS — I69.398 IMPAIRED BALANCE AS LATE EFFECT OF CEREBROVASCULAR ACCIDENT: Primary | ICD-10-CM

## 2025-09-05 PROCEDURE — 97112 NEUROMUSCULAR REEDUCATION: CPT | Mod: PO
